# Patient Record
Sex: FEMALE | Race: WHITE | Employment: OTHER | URBAN - METROPOLITAN AREA
[De-identification: names, ages, dates, MRNs, and addresses within clinical notes are randomized per-mention and may not be internally consistent; named-entity substitution may affect disease eponyms.]

---

## 2017-08-30 PROBLEM — I95.9 SEPSIS ASSOCIATED HYPOTENSION (HCC): Status: ACTIVE | Noted: 2017-08-30

## 2017-08-30 PROBLEM — A41.9 SEPSIS ASSOCIATED HYPOTENSION (HCC): Status: ACTIVE | Noted: 2017-08-30

## 2017-08-30 PROBLEM — M62.82 RHABDOMYOLYSIS: Status: ACTIVE | Noted: 2017-08-30

## 2017-09-01 ENCOUNTER — HOSPITAL ENCOUNTER (OUTPATIENT)
Dept: OTHER | Age: 82
Discharge: HOME | End: 2017-09-01
Attending: FAMILY MEDICINE | Admitting: FAMILY MEDICINE

## 2017-09-01 RX ORDER — DRONEDARONE 400 MG/1
TABLET, FILM COATED ORAL
Qty: 180 TABLET | Refills: 3 | Status: SHIPPED | OUTPATIENT
Start: 2017-09-01 | End: 2017-09-04 | Stop reason: HOSPADM

## 2017-09-05 LAB
ALBUMIN SERPL-MCNC: 3.4 GM/DL (ref 3.4–5)
ALP BLD-CCNC: 51 IU/L (ref 40–128)
ALT SERPL-CCNC: 13 U/L (ref 10–40)
ANION GAP SERPL CALCULATED.3IONS-SCNC: 14 MMOL/L (ref 4–16)
AST SERPL-CCNC: 18 IU/L (ref 15–37)
BASOPHILS ABSOLUTE: 0.1 K/CU MM
BASOPHILS RELATIVE PERCENT: 0.5 % (ref 0–1)
BILIRUB SERPL-MCNC: 0.3 MG/DL (ref 0–1)
BUN BLDV-MCNC: 17 MG/DL (ref 6–23)
CALCIUM SERPL-MCNC: 8.6 MG/DL (ref 8.3–10.6)
CHLORIDE BLD-SCNC: 101 MMOL/L (ref 99–110)
CHOLESTEROL, FASTING: 129 MG/DL
CO2: 25 MMOL/L (ref 21–32)
CREAT SERPL-MCNC: 1.3 MG/DL (ref 0.6–1.1)
DIFFERENTIAL TYPE: ABNORMAL
EOSINOPHILS ABSOLUTE: 0.4 K/CU MM
EOSINOPHILS RELATIVE PERCENT: 4.1 % (ref 0–3)
GFR AFRICAN AMERICAN: 48 ML/MIN/1.73M2
GFR NON-AFRICAN AMERICAN: 39 ML/MIN/1.73M2
GLUCOSE BLD-MCNC: 191 MG/DL (ref 70–140)
HCT VFR BLD CALC: 37.5 % (ref 37–47)
HDLC SERPL-MCNC: 30 MG/DL
HEMOGLOBIN: 11.5 GM/DL (ref 12.5–16)
IMMATURE NEUTROPHIL %: 0.6 % (ref 0–0.43)
LDL CHOLESTEROL DIRECT: 81 MG/DL
LYMPHOCYTES ABSOLUTE: 1.3 K/CU MM
LYMPHOCYTES RELATIVE PERCENT: 13.3 % (ref 24–44)
MCH RBC QN AUTO: 29.8 PG (ref 27–31)
MCHC RBC AUTO-ENTMCNC: 30.7 % (ref 32–36)
MCV RBC AUTO: 97.2 FL (ref 78–100)
MONOCYTES ABSOLUTE: 0.8 K/CU MM
MONOCYTES RELATIVE PERCENT: 8.4 % (ref 0–4)
NUCLEATED RBC %: 0 %
PDW BLD-RTO: 14.6 % (ref 11.7–14.9)
PLATELET # BLD: 316 K/CU MM (ref 140–440)
PMV BLD AUTO: 10.1 FL (ref 7.5–11.1)
POTASSIUM SERPL-SCNC: 4.7 MMOL/L (ref 3.5–5.1)
RBC # BLD: 3.86 M/CU MM (ref 4.2–5.4)
SEGMENTED NEUTROPHILS ABSOLUTE COUNT: 7.3 K/CU MM
SEGMENTED NEUTROPHILS RELATIVE PERCENT: 73.1 % (ref 36–66)
SODIUM BLD-SCNC: 140 MMOL/L (ref 135–145)
TOTAL IMMATURE NEUTOROPHIL: 0.06 K/CU MM
TOTAL NUCLEATED RBC: 0 K/CU MM
TOTAL PROTEIN: 6.4 GM/DL (ref 6.4–8.2)
TRIGLYCERIDE, FASTING: 92 MG/DL
TSH HIGH SENSITIVITY: 4.49 UIU/ML (ref 0.27–4.2)
WBC # BLD: 9.9 K/CU MM (ref 4–10.5)

## 2017-09-11 ENCOUNTER — OFFICE VISIT (OUTPATIENT)
Dept: CARDIOLOGY CLINIC | Age: 82
End: 2017-09-11

## 2017-09-11 VITALS
WEIGHT: 242 LBS | BODY MASS INDEX: 41.32 KG/M2 | DIASTOLIC BLOOD PRESSURE: 70 MMHG | HEIGHT: 64 IN | HEART RATE: 92 BPM | SYSTOLIC BLOOD PRESSURE: 118 MMHG

## 2017-09-11 DIAGNOSIS — Z01.810 PRE-OPERATIVE CARDIOVASCULAR EXAMINATION: ICD-10-CM

## 2017-09-11 DIAGNOSIS — I48.20 CHRONIC ATRIAL FIBRILLATION (HCC): Primary | ICD-10-CM

## 2017-09-11 LAB
ALBUMIN SERPL-MCNC: 3.3 GM/DL (ref 3.4–5)
ALP BLD-CCNC: 58 IU/L (ref 40–128)
ALT SERPL-CCNC: 16 U/L (ref 10–40)
ANION GAP SERPL CALCULATED.3IONS-SCNC: 13 MMOL/L (ref 4–16)
AST SERPL-CCNC: 23 IU/L (ref 15–37)
BASOPHILS ABSOLUTE: 0.1 K/CU MM
BASOPHILS RELATIVE PERCENT: 0.5 % (ref 0–1)
BILIRUB SERPL-MCNC: 0.2 MG/DL (ref 0–1)
BUN BLDV-MCNC: 19 MG/DL (ref 6–23)
CALCIUM SERPL-MCNC: 8.8 MG/DL (ref 8.3–10.6)
CHLORIDE BLD-SCNC: 100 MMOL/L (ref 99–110)
CO2: 25 MMOL/L (ref 21–32)
CREAT SERPL-MCNC: 1.1 MG/DL (ref 0.6–1.1)
DIFFERENTIAL TYPE: ABNORMAL
EOSINOPHILS ABSOLUTE: 0.4 K/CU MM
EOSINOPHILS RELATIVE PERCENT: 4.4 % (ref 0–3)
GFR AFRICAN AMERICAN: 58 ML/MIN/1.73M2
GFR NON-AFRICAN AMERICAN: 48 ML/MIN/1.73M2
GLUCOSE BLD-MCNC: 159 MG/DL (ref 70–140)
HCT VFR BLD CALC: 37.5 % (ref 37–47)
HEMOGLOBIN: 11.8 GM/DL (ref 12.5–16)
IMMATURE NEUTROPHIL %: 0.5 % (ref 0–0.43)
LYMPHOCYTES ABSOLUTE: 1.8 K/CU MM
LYMPHOCYTES RELATIVE PERCENT: 18.5 % (ref 24–44)
MCH RBC QN AUTO: 29.7 PG (ref 27–31)
MCHC RBC AUTO-ENTMCNC: 31.5 % (ref 32–36)
MCV RBC AUTO: 94.5 FL (ref 78–100)
MONOCYTES ABSOLUTE: 0.7 K/CU MM
MONOCYTES RELATIVE PERCENT: 6.6 % (ref 0–4)
NUCLEATED RBC %: 0 %
PDW BLD-RTO: 14.1 % (ref 11.7–14.9)
PLATELET # BLD: 353 K/CU MM (ref 140–440)
PMV BLD AUTO: 9.9 FL (ref 7.5–11.1)
POTASSIUM SERPL-SCNC: 4.8 MMOL/L (ref 3.5–5.1)
RBC # BLD: 3.97 M/CU MM (ref 4.2–5.4)
SEGMENTED NEUTROPHILS ABSOLUTE COUNT: 6.9 K/CU MM
SEGMENTED NEUTROPHILS RELATIVE PERCENT: 69.5 % (ref 36–66)
SODIUM BLD-SCNC: 138 MMOL/L (ref 135–145)
TOTAL IMMATURE NEUTOROPHIL: 0.05 K/CU MM
TOTAL NUCLEATED RBC: 0 K/CU MM
TOTAL PROTEIN: 6.4 GM/DL (ref 6.4–8.2)
TSH HIGH SENSITIVITY: 3.16 UIU/ML (ref 0.27–4.2)
WBC # BLD: 9.9 K/CU MM (ref 4–10.5)

## 2017-09-11 PROCEDURE — 1123F ACP DISCUSS/DSCN MKR DOCD: CPT | Performed by: INTERNAL MEDICINE

## 2017-09-11 PROCEDURE — G8419 CALC BMI OUT NRM PARAM NOF/U: HCPCS | Performed by: INTERNAL MEDICINE

## 2017-09-11 PROCEDURE — 1090F PRES/ABSN URINE INCON ASSESS: CPT | Performed by: INTERNAL MEDICINE

## 2017-09-11 PROCEDURE — 1036F TOBACCO NON-USER: CPT | Performed by: INTERNAL MEDICINE

## 2017-09-11 PROCEDURE — 99213 OFFICE O/P EST LOW 20 MIN: CPT | Performed by: INTERNAL MEDICINE

## 2017-09-11 PROCEDURE — 4040F PNEUMOC VAC/ADMIN/RCVD: CPT | Performed by: INTERNAL MEDICINE

## 2017-09-11 PROCEDURE — G8427 DOCREV CUR MEDS BY ELIG CLIN: HCPCS | Performed by: INTERNAL MEDICINE

## 2017-09-11 PROCEDURE — 1111F DSCHRG MED/CURRENT MED MERGE: CPT | Performed by: INTERNAL MEDICINE

## 2017-09-11 PROCEDURE — G8400 PT W/DXA NO RESULTS DOC: HCPCS | Performed by: INTERNAL MEDICINE

## 2017-09-18 LAB
ANION GAP SERPL CALCULATED.3IONS-SCNC: 11 MMOL/L (ref 4–16)
BUN BLDV-MCNC: 24 MG/DL (ref 6–23)
CALCIUM SERPL-MCNC: 8.8 MG/DL (ref 8.3–10.6)
CHLORIDE BLD-SCNC: 102 MMOL/L (ref 99–110)
CO2: 26 MMOL/L (ref 21–32)
CREAT SERPL-MCNC: 1.5 MG/DL (ref 0.6–1.1)
GFR AFRICAN AMERICAN: 40 ML/MIN/1.73M2
GFR NON-AFRICAN AMERICAN: 33 ML/MIN/1.73M2
GLUCOSE BLD-MCNC: 139 MG/DL (ref 70–140)
HCT VFR BLD CALC: 36.7 % (ref 37–47)
HEMOGLOBIN: 11.4 GM/DL (ref 12.5–16)
MCH RBC QN AUTO: 29.8 PG (ref 27–31)
MCHC RBC AUTO-ENTMCNC: 31.1 % (ref 32–36)
MCV RBC AUTO: 95.8 FL (ref 78–100)
PDW BLD-RTO: 14.3 % (ref 11.7–14.9)
PLATELET # BLD: 348 K/CU MM (ref 140–440)
PMV BLD AUTO: 9.9 FL (ref 7.5–11.1)
POTASSIUM SERPL-SCNC: 5 MMOL/L (ref 3.5–5.1)
RBC # BLD: 3.83 M/CU MM (ref 4.2–5.4)
SODIUM BLD-SCNC: 139 MMOL/L (ref 135–145)
WBC # BLD: 10.4 K/CU MM (ref 4–10.5)

## 2017-10-01 ENCOUNTER — HOSPITAL ENCOUNTER (OUTPATIENT)
Dept: OTHER | Age: 82
Discharge: HOME | End: 2017-10-01
Attending: FAMILY MEDICINE | Admitting: FAMILY MEDICINE

## 2017-10-09 LAB
ANION GAP SERPL CALCULATED.3IONS-SCNC: 14 MMOL/L (ref 4–16)
BUN BLDV-MCNC: 25 MG/DL (ref 6–23)
CALCIUM SERPL-MCNC: 8.8 MG/DL (ref 8.3–10.6)
CHLORIDE BLD-SCNC: 99 MMOL/L (ref 99–110)
CO2: 25 MMOL/L (ref 21–32)
CREAT SERPL-MCNC: 1.5 MG/DL (ref 0.6–1.1)
GFR AFRICAN AMERICAN: 40 ML/MIN/1.73M2
GFR NON-AFRICAN AMERICAN: 33 ML/MIN/1.73M2
GLUCOSE BLD-MCNC: 90 MG/DL (ref 70–140)
HCT VFR BLD CALC: 36.2 % (ref 37–47)
HEMOGLOBIN: 11.4 GM/DL (ref 12.5–16)
MCH RBC QN AUTO: 29.8 PG (ref 27–31)
MCHC RBC AUTO-ENTMCNC: 31.5 % (ref 32–36)
MCV RBC AUTO: 94.8 FL (ref 78–100)
PDW BLD-RTO: 14.2 % (ref 11.7–14.9)
PLATELET # BLD: 304 K/CU MM (ref 140–440)
PMV BLD AUTO: 10 FL (ref 7.5–11.1)
POTASSIUM SERPL-SCNC: 5 MMOL/L (ref 3.5–5.1)
RBC # BLD: 3.82 M/CU MM (ref 4.2–5.4)
SODIUM BLD-SCNC: 138 MMOL/L (ref 135–145)
WBC # BLD: 8.1 K/CU MM (ref 4–10.5)

## 2017-10-10 ENCOUNTER — TELEPHONE (OUTPATIENT)
Dept: CARDIOLOGY CLINIC | Age: 82
End: 2017-10-10

## 2017-10-10 NOTE — LETTER
[address]  [facility]  [address] [facility], [address] [place] [address],[address]  [address]  Phone: [phone]    Fax [phone]                  Lisa Godinez MD, Rain Adams MD, Hoa Baig MD, Elvira Hollins MD, MD Daljit Jackson MD Donnamarie Gentles, MD    Cardiac Risk Assessment for Surgery    10/10/2017    Surgery Date: Pending       Surgery: Interstim Removal       Anesthesia Type: MAC       Fax Number: [phone]  To: Dr. Man Copper  From : Dr. aJyy Carroll    Patient Name: Srinivas Lopez   YOB: 1935      Rosita Anaya was evaluated from a cardiac standpoint for her surgery and based on her history, diagnosis, recent cardiac testing, she is considered a medium risk candidate for any cornel-operative cardiac complications. Please continue patient's current medical regimen. Patient may hold Savaysa for a period of 48 hours prior to surgery or procedure. Please resume ASAP. Please call with any further questions.     Respectfully,     Lisa Godinez MD, [facility]  FA/bjd

## 2017-10-10 NOTE — TELEPHONE ENCOUNTER
Cardiac Risk assessment letter faxed to Dr. Flex Nichols for Interstim Removal surgery.   [phone]

## 2017-11-07 ENCOUNTER — TELEPHONE (OUTPATIENT)
Dept: CARDIOLOGY CLINIC | Age: 82
End: 2017-11-07

## 2017-11-07 NOTE — TELEPHONE ENCOUNTER
Patient to have a procedure with Dr Christina Jimenez , they are asking that the patient hold her Savsay five days prior to procedure , family would like a call has some questions
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
## 2018-01-09 ENCOUNTER — OFFICE VISIT (OUTPATIENT)
Dept: CARDIOLOGY CLINIC | Age: 83
End: 2018-01-09

## 2018-01-09 VITALS
SYSTOLIC BLOOD PRESSURE: 128 MMHG | DIASTOLIC BLOOD PRESSURE: 80 MMHG | BODY MASS INDEX: 41.66 KG/M2 | HEART RATE: 88 BPM | HEIGHT: 64 IN | WEIGHT: 244 LBS

## 2018-01-09 DIAGNOSIS — I48.20 CHRONIC ATRIAL FIBRILLATION (HCC): Primary | ICD-10-CM

## 2018-01-09 PROCEDURE — 1123F ACP DISCUSS/DSCN MKR DOCD: CPT | Performed by: INTERNAL MEDICINE

## 2018-01-09 PROCEDURE — G8484 FLU IMMUNIZE NO ADMIN: HCPCS | Performed by: INTERNAL MEDICINE

## 2018-01-09 PROCEDURE — 99213 OFFICE O/P EST LOW 20 MIN: CPT | Performed by: INTERNAL MEDICINE

## 2018-01-09 PROCEDURE — 1036F TOBACCO NON-USER: CPT | Performed by: INTERNAL MEDICINE

## 2018-01-09 PROCEDURE — G8400 PT W/DXA NO RESULTS DOC: HCPCS | Performed by: INTERNAL MEDICINE

## 2018-01-09 PROCEDURE — G8427 DOCREV CUR MEDS BY ELIG CLIN: HCPCS | Performed by: INTERNAL MEDICINE

## 2018-01-09 PROCEDURE — 4040F PNEUMOC VAC/ADMIN/RCVD: CPT | Performed by: INTERNAL MEDICINE

## 2018-01-09 PROCEDURE — 1111F DSCHRG MED/CURRENT MED MERGE: CPT | Performed by: INTERNAL MEDICINE

## 2018-01-09 PROCEDURE — G8417 CALC BMI ABV UP PARAM F/U: HCPCS | Performed by: INTERNAL MEDICINE

## 2018-01-09 PROCEDURE — 1090F PRES/ABSN URINE INCON ASSESS: CPT | Performed by: INTERNAL MEDICINE

## 2018-01-09 NOTE — PATIENT INSTRUCTIONS
Please remember to bring all medication bottles or a medication list with you to your appointment. If you have any questions, please call our office at [phone].

## 2018-02-23 PROBLEM — O22.30 DVT (DEEP VEIN THROMBOSIS) IN PREGNANCY: Status: ACTIVE | Noted: 2018-02-23

## 2018-02-23 PROBLEM — I82.409: Status: ACTIVE | Noted: 2018-02-23

## 2018-03-21 ENCOUNTER — TELEPHONE (OUTPATIENT)
Dept: CARDIOLOGY CLINIC | Age: 83
End: 2018-03-21
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
## 2018-07-06 PROBLEM — G93.41 ACUTE METABOLIC ENCEPHALOPATHY: Status: ACTIVE | Noted: 2018-07-06

## 2018-07-06 PROBLEM — T83.511A URINARY TRACT INFECTION ASSOCIATED WITH CATHETERIZATION OF URINARY TRACT (HCC): Status: ACTIVE | Noted: 2018-07-06

## 2018-07-06 PROBLEM — N39.0 URINARY TRACT INFECTION ASSOCIATED WITH CATHETERIZATION OF URINARY TRACT (HCC): Status: ACTIVE | Noted: 2018-07-06

## 2018-11-22 ENCOUNTER — HOSPITAL ENCOUNTER (OUTPATIENT)
Age: 83
Setting detail: OBSERVATION
Discharge: HOME HEALTH | End: 2018-11-25
Attending: EMERGENCY MEDICINE | Admitting: HOSPITALIST
Payer: MEDICARE

## 2018-11-22 ENCOUNTER — APPOINTMENT (OUTPATIENT)
Dept: GENERAL RADIOLOGY | Age: 83
End: 2018-11-22
Payer: MEDICARE

## 2018-11-22 DIAGNOSIS — R29.6 MULTIPLE FALLS: ICD-10-CM

## 2018-11-22 DIAGNOSIS — Z97.8 CHRONIC INDWELLING FOLEY CATHETER: ICD-10-CM

## 2018-11-22 DIAGNOSIS — R53.1 GENERAL WEAKNESS: Primary | ICD-10-CM

## 2018-11-22 PROBLEM — M62.81 MUSCLE WEAKNESS OF LOWER EXTREMITY: Status: ACTIVE | Noted: 2018-11-22

## 2018-11-22 LAB
ALBUMIN SERPL-MCNC: 3.7 GM/DL (ref 3.4–5)
ALP BLD-CCNC: 43 IU/L (ref 40–129)
ALT SERPL-CCNC: 11 U/L (ref 10–40)
ANION GAP SERPL CALCULATED.3IONS-SCNC: 14 MMOL/L (ref 4–16)
AST SERPL-CCNC: 17 IU/L (ref 15–37)
BACTERIA: ABNORMAL /HPF
BASOPHILS ABSOLUTE: 0 K/CU MM
BASOPHILS RELATIVE PERCENT: 0.4 % (ref 0–1)
BILIRUB SERPL-MCNC: 0.5 MG/DL (ref 0–1)
BILIRUBIN URINE: NEGATIVE MG/DL
BLOOD, URINE: NEGATIVE
BUN BLDV-MCNC: 28 MG/DL (ref 6–23)
CALCIUM SERPL-MCNC: 9.3 MG/DL (ref 8.3–10.6)
CHLORIDE BLD-SCNC: 101 MMOL/L (ref 99–110)
CLARITY: ABNORMAL
CO2: 23 MMOL/L (ref 21–32)
COLOR: YELLOW
CREAT SERPL-MCNC: 1.6 MG/DL (ref 0.6–1.1)
DIFFERENTIAL TYPE: ABNORMAL
EOSINOPHILS ABSOLUTE: 0.4 K/CU MM
EOSINOPHILS RELATIVE PERCENT: 4.2 % (ref 0–3)
GFR AFRICAN AMERICAN: 37 ML/MIN/1.73M2
GFR NON-AFRICAN AMERICAN: 31 ML/MIN/1.73M2
GLUCOSE BLD-MCNC: 82 MG/DL (ref 70–99)
GLUCOSE, URINE: NEGATIVE MG/DL
HCT VFR BLD CALC: 36.3 % (ref 37–47)
HEMOGLOBIN: 11.3 GM/DL (ref 12.5–16)
IMMATURE NEUTROPHIL %: 0.2 % (ref 0–0.43)
KETONES, URINE: NEGATIVE MG/DL
LACTATE: 1.7 MMOL/L (ref 0.4–2)
LEUKOCYTE ESTERASE, URINE: ABNORMAL
LYMPHOCYTES ABSOLUTE: 0.9 K/CU MM
LYMPHOCYTES RELATIVE PERCENT: 10.9 % (ref 24–44)
MCH RBC QN AUTO: 28.6 PG (ref 27–31)
MCHC RBC AUTO-ENTMCNC: 31.1 % (ref 32–36)
MCV RBC AUTO: 91.9 FL (ref 78–100)
MONOCYTES ABSOLUTE: 0.9 K/CU MM
MONOCYTES RELATIVE PERCENT: 10.9 % (ref 0–4)
MUCUS: ABNORMAL HPF
NITRITE URINE, QUANTITATIVE: NEGATIVE
NUCLEATED RBC %: 0 %
PDW BLD-RTO: 14.6 % (ref 11.7–14.9)
PH, URINE: 8 (ref 5–8)
PLATELET # BLD: 246 K/CU MM (ref 140–440)
PMV BLD AUTO: 10.5 FL (ref 7.5–11.1)
POTASSIUM SERPL-SCNC: 3.9 MMOL/L (ref 3.5–5.1)
PROTEIN UA: >500 MG/DL
RBC # BLD: 3.95 M/CU MM (ref 4.2–5.4)
RBC URINE: 11 /HPF (ref 0–6)
SEGMENTED NEUTROPHILS ABSOLUTE COUNT: 6.3 K/CU MM
SEGMENTED NEUTROPHILS RELATIVE PERCENT: 73.4 % (ref 36–66)
SODIUM BLD-SCNC: 138 MMOL/L (ref 135–145)
SPECIFIC GRAVITY UA: 1.01 (ref 1–1.03)
SQUAMOUS EPITHELIAL: 1 /HPF
TOTAL CK: 129 IU/L (ref 26–140)
TOTAL IMMATURE NEUTOROPHIL: 0.02 K/CU MM
TOTAL NUCLEATED RBC: 0 K/CU MM
TOTAL PROTEIN: 7.5 GM/DL (ref 6.4–8.2)
TRICHOMONAS: ABNORMAL /HPF
TRIPLE PHOSPHATE CRYSTALS: ABNORMAL /HPF
TROPONIN T: <0.01 NG/ML
UROBILINOGEN, URINE: NORMAL MG/DL (ref 0.2–1)
WBC # BLD: 8.5 K/CU MM (ref 4–10.5)
WBC UA: 11 /HPF (ref 0–5)

## 2018-11-22 PROCEDURE — 2580000003 HC RX 258: Performed by: NURSE PRACTITIONER

## 2018-11-22 PROCEDURE — 82550 ASSAY OF CK (CPK): CPT

## 2018-11-22 PROCEDURE — 84484 ASSAY OF TROPONIN QUANT: CPT

## 2018-11-22 PROCEDURE — 80053 COMPREHEN METABOLIC PANEL: CPT

## 2018-11-22 PROCEDURE — 87086 URINE CULTURE/COLONY COUNT: CPT

## 2018-11-22 PROCEDURE — 94761 N-INVAS EAR/PLS OXIMETRY MLT: CPT

## 2018-11-22 PROCEDURE — 85025 COMPLETE CBC W/AUTO DIFF WBC: CPT

## 2018-11-22 PROCEDURE — 81001 URINALYSIS AUTO W/SCOPE: CPT

## 2018-11-22 PROCEDURE — 87040 BLOOD CULTURE FOR BACTERIA: CPT

## 2018-11-22 PROCEDURE — 96365 THER/PROPH/DIAG IV INF INIT: CPT

## 2018-11-22 PROCEDURE — 71045 X-RAY EXAM CHEST 1 VIEW: CPT

## 2018-11-22 PROCEDURE — 99285 EMERGENCY DEPT VISIT HI MDM: CPT

## 2018-11-22 PROCEDURE — 87077 CULTURE AEROBIC IDENTIFY: CPT

## 2018-11-22 PROCEDURE — 1200000000 HC SEMI PRIVATE

## 2018-11-22 PROCEDURE — 83605 ASSAY OF LACTIC ACID: CPT

## 2018-11-22 PROCEDURE — 6370000000 HC RX 637 (ALT 250 FOR IP): Performed by: NURSE PRACTITIONER

## 2018-11-22 PROCEDURE — 87186 SC STD MICRODIL/AGAR DIL: CPT

## 2018-11-22 PROCEDURE — 6360000002 HC RX W HCPCS: Performed by: NURSE PRACTITIONER

## 2018-11-22 PROCEDURE — 36415 COLL VENOUS BLD VENIPUNCTURE: CPT

## 2018-11-22 RX ORDER — PRAVASTATIN SODIUM 10 MG
10 TABLET ORAL NIGHTLY
Status: DISCONTINUED | OUTPATIENT
Start: 2018-11-22 | End: 2018-11-25 | Stop reason: HOSPADM

## 2018-11-22 RX ORDER — SODIUM CHLORIDE 0.9 % (FLUSH) 0.9 %
10 SYRINGE (ML) INJECTION PRN
Status: DISCONTINUED | OUTPATIENT
Start: 2018-11-22 | End: 2018-11-25 | Stop reason: HOSPADM

## 2018-11-22 RX ORDER — ONDANSETRON 2 MG/ML
4 INJECTION INTRAMUSCULAR; INTRAVENOUS EVERY 6 HOURS PRN
Status: DISCONTINUED | OUTPATIENT
Start: 2018-11-22 | End: 2018-11-25 | Stop reason: HOSPADM

## 2018-11-22 RX ORDER — PANTOPRAZOLE SODIUM 40 MG/1
40 TABLET, DELAYED RELEASE ORAL
Status: DISCONTINUED | OUTPATIENT
Start: 2018-11-23 | End: 2018-11-25 | Stop reason: HOSPADM

## 2018-11-22 RX ORDER — SODIUM CHLORIDE 0.9 % (FLUSH) 0.9 %
10 SYRINGE (ML) INJECTION EVERY 12 HOURS SCHEDULED
Status: DISCONTINUED | OUTPATIENT
Start: 2018-11-22 | End: 2018-11-25 | Stop reason: HOSPADM

## 2018-11-22 RX ORDER — SODIUM CHLORIDE 9 MG/ML
INJECTION, SOLUTION INTRAVENOUS CONTINUOUS
Status: DISCONTINUED | OUTPATIENT
Start: 2018-11-22 | End: 2018-11-25 | Stop reason: HOSPADM

## 2018-11-22 RX ORDER — FUROSEMIDE 20 MG/1
20 TABLET ORAL DAILY
Status: DISCONTINUED | OUTPATIENT
Start: 2018-11-22 | End: 2018-11-25 | Stop reason: HOSPADM

## 2018-11-22 RX ORDER — ACETAMINOPHEN 325 MG/1
325 TABLET ORAL DAILY
Status: DISCONTINUED | OUTPATIENT
Start: 2018-11-22 | End: 2018-11-25 | Stop reason: HOSPADM

## 2018-11-22 RX ORDER — LEVOTHYROXINE SODIUM 0.12 MG/1
125 TABLET ORAL DAILY
Status: DISCONTINUED | OUTPATIENT
Start: 2018-11-23 | End: 2018-11-25 | Stop reason: HOSPADM

## 2018-11-22 RX ORDER — ACETAMINOPHEN 325 MG/1
650 TABLET ORAL EVERY 4 HOURS PRN
Status: DISCONTINUED | OUTPATIENT
Start: 2018-11-22 | End: 2018-11-25 | Stop reason: HOSPADM

## 2018-11-22 RX ORDER — LOSARTAN POTASSIUM 100 MG/1
100 TABLET ORAL DAILY
Status: DISCONTINUED | OUTPATIENT
Start: 2018-11-23 | End: 2018-11-25 | Stop reason: HOSPADM

## 2018-11-22 RX ORDER — FENOFIBRATE 54 MG/1
54 TABLET ORAL DAILY
Status: DISCONTINUED | OUTPATIENT
Start: 2018-11-23 | End: 2018-11-25 | Stop reason: HOSPADM

## 2018-11-22 RX ADMIN — ACETAMINOPHEN 325 MG: 325 TABLET, FILM COATED ORAL at 19:13

## 2018-11-22 RX ADMIN — PRAVASTATIN SODIUM 10 MG: 10 TABLET ORAL at 22:02

## 2018-11-22 RX ADMIN — SODIUM CHLORIDE: 9 INJECTION, SOLUTION INTRAVENOUS at 19:05

## 2018-11-22 RX ADMIN — CEFTRIAXONE 1 G: 1 INJECTION, POWDER, FOR SOLUTION INTRAMUSCULAR; INTRAVENOUS at 19:15

## 2018-11-22 RX ADMIN — APIXABAN 2.5 MG: 2.5 TABLET, FILM COATED ORAL at 22:02

## 2018-11-22 RX ADMIN — FUROSEMIDE 20 MG: 20 TABLET ORAL at 19:10

## 2018-11-22 RX ADMIN — DRONEDARONE 400 MG: 400 TABLET, FILM COATED ORAL at 19:13

## 2018-11-22 ASSESSMENT — PAIN SCALES - GENERAL
PAINLEVEL_OUTOF10: 0

## 2018-11-22 NOTE — ED NOTES
This nurse attempted to call pt's son, POA and brother for further information. Unable to be reached. Messages left. Pt states they are all together for Thanksgiving.       Hyun Farley RN  11/22/18 5293

## 2018-11-22 NOTE — ED NOTES
Hospitalist in at bedside. Warm blanket provided. Meal tray ordered.       Faisal Chan RN  11/22/18 0718

## 2018-11-23 LAB
ANION GAP SERPL CALCULATED.3IONS-SCNC: 13 MMOL/L (ref 4–16)
BASOPHILS ABSOLUTE: 0 K/CU MM
BASOPHILS RELATIVE PERCENT: 0.3 % (ref 0–1)
BUN BLDV-MCNC: 23 MG/DL (ref 6–23)
CALCIUM SERPL-MCNC: 8.7 MG/DL (ref 8.3–10.6)
CHLORIDE BLD-SCNC: 102 MMOL/L (ref 99–110)
CO2: 26 MMOL/L (ref 21–32)
CREAT SERPL-MCNC: 1.4 MG/DL (ref 0.6–1.1)
DIFFERENTIAL TYPE: ABNORMAL
EOSINOPHILS ABSOLUTE: 0.4 K/CU MM
EOSINOPHILS RELATIVE PERCENT: 5.9 % (ref 0–3)
GFR AFRICAN AMERICAN: 44 ML/MIN/1.73M2
GFR NON-AFRICAN AMERICAN: 36 ML/MIN/1.73M2
GLUCOSE BLD-MCNC: 92 MG/DL (ref 70–99)
HCT VFR BLD CALC: 36.9 % (ref 37–47)
HEMOGLOBIN: 11.3 GM/DL (ref 12.5–16)
IMMATURE NEUTROPHIL %: 0.3 % (ref 0–0.43)
LYMPHOCYTES ABSOLUTE: 1 K/CU MM
LYMPHOCYTES RELATIVE PERCENT: 14.3 % (ref 24–44)
MCH RBC QN AUTO: 28.4 PG (ref 27–31)
MCHC RBC AUTO-ENTMCNC: 30.6 % (ref 32–36)
MCV RBC AUTO: 92.7 FL (ref 78–100)
MONOCYTES ABSOLUTE: 0.9 K/CU MM
MONOCYTES RELATIVE PERCENT: 12.5 % (ref 0–4)
NUCLEATED RBC %: 0 %
PDW BLD-RTO: 14.6 % (ref 11.7–14.9)
PLATELET # BLD: 230 K/CU MM (ref 140–440)
PMV BLD AUTO: 10.2 FL (ref 7.5–11.1)
POTASSIUM SERPL-SCNC: 3.7 MMOL/L (ref 3.5–5.1)
RBC # BLD: 3.98 M/CU MM (ref 4.2–5.4)
SEGMENTED NEUTROPHILS ABSOLUTE COUNT: 4.8 K/CU MM
SEGMENTED NEUTROPHILS RELATIVE PERCENT: 66.7 % (ref 36–66)
SODIUM BLD-SCNC: 141 MMOL/L (ref 135–145)
TOTAL IMMATURE NEUTOROPHIL: 0.02 K/CU MM
TOTAL NUCLEATED RBC: 0 K/CU MM
WBC # BLD: 7.1 K/CU MM (ref 4–10.5)

## 2018-11-23 PROCEDURE — 96376 TX/PRO/DX INJ SAME DRUG ADON: CPT

## 2018-11-23 PROCEDURE — G8979 MOBILITY GOAL STATUS: HCPCS

## 2018-11-23 PROCEDURE — 85025 COMPLETE CBC W/AUTO DIFF WBC: CPT

## 2018-11-23 PROCEDURE — 1200000000 HC SEMI PRIVATE

## 2018-11-23 PROCEDURE — 36415 COLL VENOUS BLD VENIPUNCTURE: CPT

## 2018-11-23 PROCEDURE — 6360000002 HC RX W HCPCS: Performed by: NURSE PRACTITIONER

## 2018-11-23 PROCEDURE — 2580000003 HC RX 258: Performed by: NURSE PRACTITIONER

## 2018-11-23 PROCEDURE — 97163 PT EVAL HIGH COMPLEX 45 MIN: CPT

## 2018-11-23 PROCEDURE — 80048 BASIC METABOLIC PNL TOTAL CA: CPT

## 2018-11-23 PROCEDURE — 6370000000 HC RX 637 (ALT 250 FOR IP): Performed by: NURSE PRACTITIONER

## 2018-11-23 PROCEDURE — G8978 MOBILITY CURRENT STATUS: HCPCS

## 2018-11-23 PROCEDURE — 97530 THERAPEUTIC ACTIVITIES: CPT

## 2018-11-23 RX ORDER — METOPROLOL TARTRATE 50 MG/1
50 TABLET, FILM COATED ORAL 2 TIMES DAILY
COMMUNITY
End: 2019-03-30 | Stop reason: CLARIF

## 2018-11-23 RX ORDER — POTASSIUM CHLORIDE 750 MG/1
10 CAPSULE, EXTENDED RELEASE ORAL 2 TIMES DAILY
COMMUNITY
End: 2019-03-30 | Stop reason: DRUGHIGH

## 2018-11-23 RX ORDER — POTASSIUM CHLORIDE 1.5 G/1.77G
10 POWDER, FOR SOLUTION ORAL DAILY
COMMUNITY
End: 2019-03-30 | Stop reason: DRUGHIGH

## 2018-11-23 RX ORDER — LORATADINE 10 MG/1
10 TABLET ORAL DAILY
COMMUNITY

## 2018-11-23 RX ADMIN — LEVOTHYROXINE SODIUM 125 MCG: 0.12 TABLET ORAL at 06:15

## 2018-11-23 RX ADMIN — SODIUM CHLORIDE: 9 INJECTION, SOLUTION INTRAVENOUS at 06:19

## 2018-11-23 RX ADMIN — FENOFIBRATE 54 MG: 54 TABLET ORAL at 08:30

## 2018-11-23 RX ADMIN — DRONEDARONE 400 MG: 400 TABLET, FILM COATED ORAL at 17:28

## 2018-11-23 RX ADMIN — PANTOPRAZOLE SODIUM 40 MG: 40 TABLET, DELAYED RELEASE ORAL at 06:15

## 2018-11-23 RX ADMIN — DRONEDARONE 400 MG: 400 TABLET, FILM COATED ORAL at 08:29

## 2018-11-23 RX ADMIN — PRAVASTATIN SODIUM 10 MG: 10 TABLET ORAL at 21:44

## 2018-11-23 RX ADMIN — CEFTRIAXONE 1 G: 1 INJECTION, POWDER, FOR SOLUTION INTRAMUSCULAR; INTRAVENOUS at 17:28

## 2018-11-23 RX ADMIN — Medication 5000 UNITS: at 08:29

## 2018-11-23 RX ADMIN — SODIUM CHLORIDE: 9 INJECTION, SOLUTION INTRAVENOUS at 21:44

## 2018-11-23 RX ADMIN — LOSARTAN POTASSIUM 100 MG: 100 TABLET, FILM COATED ORAL at 08:29

## 2018-11-23 RX ADMIN — APIXABAN 2.5 MG: 2.5 TABLET, FILM COATED ORAL at 21:44

## 2018-11-23 RX ADMIN — APIXABAN 2.5 MG: 2.5 TABLET, FILM COATED ORAL at 08:30

## 2018-11-23 RX ADMIN — FUROSEMIDE 20 MG: 20 TABLET ORAL at 08:30

## 2018-11-23 RX ADMIN — ACETAMINOPHEN 325 MG: 325 TABLET, FILM COATED ORAL at 08:30

## 2018-11-23 ASSESSMENT — PAIN DESCRIPTION - LOCATION: LOCATION: HEAD

## 2018-11-23 ASSESSMENT — PAIN SCALES - GENERAL
PAINLEVEL_OUTOF10: 1
PAINLEVEL_OUTOF10: 0

## 2018-11-24 LAB
CULTURE: NORMAL
Lab: NORMAL
ORGANISM: NORMAL
REPORT STATUS: NORMAL
SPECIMEN: NORMAL
TOTAL COLONY COUNT: NORMAL

## 2018-11-24 PROCEDURE — 97166 OT EVAL MOD COMPLEX 45 MIN: CPT

## 2018-11-24 PROCEDURE — 97535 SELF CARE MNGMENT TRAINING: CPT

## 2018-11-24 PROCEDURE — 97530 THERAPEUTIC ACTIVITIES: CPT

## 2018-11-24 PROCEDURE — 2580000003 HC RX 258: Performed by: NURSE PRACTITIONER

## 2018-11-24 PROCEDURE — G8988 SELF CARE GOAL STATUS: HCPCS

## 2018-11-24 PROCEDURE — 6370000000 HC RX 637 (ALT 250 FOR IP): Performed by: NURSE PRACTITIONER

## 2018-11-24 PROCEDURE — G8989 SELF CARE D/C STATUS: HCPCS

## 2018-11-24 PROCEDURE — G8987 SELF CARE CURRENT STATUS: HCPCS

## 2018-11-24 PROCEDURE — 1200000000 HC SEMI PRIVATE

## 2018-11-24 RX ORDER — AMOXICILLIN AND CLAVULANATE POTASSIUM 875; 125 MG/1; MG/1
1 TABLET, FILM COATED ORAL 2 TIMES DAILY
Qty: 14 TABLET | Refills: 0 | Status: SHIPPED | OUTPATIENT
Start: 2018-11-24 | End: 2018-12-01

## 2018-11-24 RX ADMIN — FENOFIBRATE 54 MG: 54 TABLET ORAL at 09:20

## 2018-11-24 RX ADMIN — APIXABAN 2.5 MG: 2.5 TABLET, FILM COATED ORAL at 09:20

## 2018-11-24 RX ADMIN — PRAVASTATIN SODIUM 10 MG: 10 TABLET ORAL at 21:59

## 2018-11-24 RX ADMIN — FUROSEMIDE 20 MG: 20 TABLET ORAL at 09:19

## 2018-11-24 RX ADMIN — SODIUM CHLORIDE: 9 INJECTION, SOLUTION INTRAVENOUS at 10:29

## 2018-11-24 RX ADMIN — LOSARTAN POTASSIUM 100 MG: 100 TABLET, FILM COATED ORAL at 09:18

## 2018-11-24 RX ADMIN — APIXABAN 2.5 MG: 2.5 TABLET, FILM COATED ORAL at 21:59

## 2018-11-24 RX ADMIN — Medication 5000 UNITS: at 09:19

## 2018-11-24 RX ADMIN — DRONEDARONE 400 MG: 400 TABLET, FILM COATED ORAL at 09:19

## 2018-11-24 RX ADMIN — ACETAMINOPHEN 325 MG: 325 TABLET, FILM COATED ORAL at 09:19

## 2018-11-24 ASSESSMENT — PAIN SCALES - GENERAL: PAINLEVEL_OUTOF10: 2

## 2018-11-24 NOTE — PROGRESS NOTES
Occupational Therapy  Occupational Therapy Initial Assessment  Date:  2018    Patient Name: Marylu Cole  MRN: [id number]     :  1935     Treatment Diagnosis: R53.1    Restrictions  Restrictions/Precautions  Restrictions/Precautions: General Precautions, Fall Risk  Position Activity Restriction  Other position/activity restrictions: IV; casey   Subjective   General  Patient assessed for rehabilitation services?: Yes  Pain Assessment  Patient Currently in Pain: No  Pain Level: 2  Vital Signs  Temp: 97.8 °F (36.6 °C)  Temp Source: Oral  Pulse: 72  Heart Rate Source: Monitor  Resp: 19  BP: (!) 144/63  BP Location: Right upper arm  BP Upper/Lower: Upper  MAP (mmHg): 91  Patient Position: Sitting  Level of Consciousness: Alert  MEWS Score: 1  Patient Currently in Pain: No  Oxygen Therapy  SpO2: 93 %  O2 Device: None (Room air)  Home Living  Social/Functional History  Lives With: Alone  Type of Home: House  Home Layout: Two level (with basement but lives on first floor)  Home Access: Stairs to enter without rails  Entrance Stairs - Number of Steps: 1-2  Bathroom Shower/Tub: Walk-in shower  Bathroom Toilet: Standard  Bathroom Equipment: Grab bars in shower, Shower chair  Bathroom Accessibility: Accessible  Home Equipment: Rolling walker  Receives Help From: Family ([employer] keep[employer])  ADL Assistance: Independent  Homemaking Assistance: Needs assistance  Additional Comments: Massiel stays with her on weekends and cooks. Has comfort keepers 9-12 during week which help with bathing and dressing as needed and cleans house. Objective   Observation/Palpation  Posture: Fair  Observation: Up in recliner. ADL  Feeding: Independent  Grooming: Setup  UE Bathing: Setup  UE Dressing: Setup;Stand by assistance;Supervision  LE Dressing: Stand by assistance;Setup  Toileting: Stand by assistance  Additional Comments: Pt has casey catheter ongoing. States only goes to [facility] for BM.    Tone RUE  RUE Tone: Normotonic  Tone

## 2018-11-24 NOTE — DISCHARGE SUMMARY
(VITAMIN D3) 5000 units TABS  Take by mouth             dronedarone hcl (MULTAQ) 400 MG TABS  Take 1 tablet by mouth 2 times daily (with meals)             fenofibrate (TRICOR) 48 MG tablet  Take 67 mg by mouth daily              furosemide (LASIX) 20 MG tablet  Take 20 mg by mouth daily             levothyroxine (SYNTHROID) 125 MCG tablet  Take 1 tablet by mouth daily. loratadine (CLARITIN) 10 MG tablet  Take 10 mg by mouth daily             losartan (COZAAR) 100 MG tablet  Take 100 mg by mouth daily             metoprolol tartrate (LOPRESSOR) 50 MG tablet  Take 50 mg by mouth 2 times daily             omeprazole (PRILOSEC) 20 MG delayed release capsule  Take 20 mg by mouth daily             potassium chloride (KLOR-CON) 20 MEQ packet  Take 10 mEq by mouth daily             potassium chloride (MICRO-K) 10 MEQ extended release capsule  Take 10 mEq by mouth 2 times daily             pravastatin (PRAVACHOL) 10 MG tablet  Take 10 mg by mouth daily                 Objective Findings at Discharge:   BP (!) 165/76   Pulse 74   Temp 97.8 °F (36.6 °C) (Oral)   Resp 17   Ht 5' 4\" (1.626 m)   Wt 202 lb (91.6 kg)   SpO2 96%   BMI 34.67 kg/m²            PHYSICAL EXAM   General - AAO x 3  Psych - Appropriate affect/speech. No agitation  Eyes - Eye lids intact. No scleral icterus  ENT - Lips wnl. External ear clear/dry/intact. No thyromegaly on inspection  Neuro - No gross peripheral or central neuro deficits on inspection  Heart - Sinus. RRR. S1 and S2 present. No added HS/murmurs appreciated. No elevated JVD appreciated  Lung - Adequate air entry b/l, No crackles/wheezes appreciated  GI -  Soft. No guarding/rigidity. No hepatosplenomegaly/ascites. BS+  Skin - Intact. No rash/petechiae/ecchymosis. Warm extremities  MSK - Joints with normal ROM.  No joint swellings    BMP/CBC  Recent Labs      11/22/18   1221  11/23/18   0351   NA  138  141   K  3.9  3.7   CL  101  102   CO2  23  26   BUN  28*  23   CREATININE 1. 6*  1.4*   WBC  8.5  7.1   HCT  36.3*  36.9*   PLT  246  230         Discharge Time of 35 minutes    Electronically signed by Kimber Browne MD on 11/24/2018 at 4:21 PM

## 2018-11-25 VITALS
OXYGEN SATURATION: 95 % | BODY MASS INDEX: 35.68 KG/M2 | SYSTOLIC BLOOD PRESSURE: 114 MMHG | TEMPERATURE: 98.4 F | HEART RATE: 80 BPM | HEIGHT: 64 IN | DIASTOLIC BLOOD PRESSURE: 57 MMHG | WEIGHT: 209 LBS | RESPIRATION RATE: 18 BRPM

## 2018-11-25 PROCEDURE — 6370000000 HC RX 637 (ALT 250 FOR IP): Performed by: NURSE PRACTITIONER

## 2018-11-25 PROCEDURE — G0378 HOSPITAL OBSERVATION PER HR: HCPCS

## 2018-11-25 RX ADMIN — ACETAMINOPHEN 325 MG: 325 TABLET, FILM COATED ORAL at 09:12

## 2018-11-25 RX ADMIN — FENOFIBRATE 54 MG: 54 TABLET ORAL at 09:12

## 2018-11-25 RX ADMIN — APIXABAN 2.5 MG: 2.5 TABLET, FILM COATED ORAL at 09:12

## 2018-11-25 RX ADMIN — LOSARTAN POTASSIUM 100 MG: 100 TABLET, FILM COATED ORAL at 09:12

## 2018-11-25 RX ADMIN — PANTOPRAZOLE SODIUM 40 MG: 40 TABLET, DELAYED RELEASE ORAL at 06:05

## 2018-11-25 RX ADMIN — FUROSEMIDE 20 MG: 20 TABLET ORAL at 09:12

## 2018-11-25 RX ADMIN — Medication 5000 UNITS: at 09:12

## 2018-11-25 RX ADMIN — LEVOTHYROXINE SODIUM 125 MCG: 0.12 TABLET ORAL at 06:05

## 2018-11-25 RX ADMIN — DRONEDARONE 400 MG: 400 TABLET, FILM COATED ORAL at 09:12

## 2018-11-25 ASSESSMENT — PAIN SCALES - GENERAL: PAINLEVEL_OUTOF10: 0

## 2018-11-25 NOTE — PROGRESS NOTES
levothyroxine (SYNTHROID) tablet 125 mcg  125 mcg Oral Daily RAHAT Frost - CNP   125 mcg at 11/25/18 1283    losartan (COZAAR) tablet 100 mg  100 mg Oral Daily RAHAT Frost - CNP   100 mg at 11/25/18 0912    pantoprazole (PROTONIX) tablet 40 mg  40 mg Oral QAM AC RAHAT Frost - CNP   40 mg at 11/25/18 8684    pravastatin (PRAVACHOL) tablet 10 mg  10 mg Oral Nightly RAHAT Frost - CNP   10 mg at 11/24/18 2159    cefTRIAXone (ROCEPHIN) 1 g in dextrose 5 % 50 mL IVPB  1 g Intravenous Q24H RAHAT Frost CNP   Stopped at 11/23/18 1758    0.9 % sodium chloride infusion   Intravenous Continuous RAHAT Frost CNP 75 mL/hr at 11/24/18 1029       Current Outpatient Prescriptions   Medication Sig Dispense Refill    amoxicillin-clavulanate (AUGMENTIN) 875-125 MG per tablet Take 1 tablet by mouth 2 times daily for 7 days 14 tablet 0    metoprolol tartrate (LOPRESSOR) 50 MG tablet Take 50 mg by mouth 2 times daily      loratadine (CLARITIN) 10 MG tablet Take 10 mg by mouth daily      potassium chloride (MICRO-K) 10 MEQ extended release capsule Take 10 mEq by mouth 2 times daily      potassium chloride (KLOR-CON) 20 MEQ packet Take 10 mEq by mouth daily      dronedarone hcl (MULTAQ) 400 MG TABS Take 1 tablet by mouth 2 times daily (with meals) 180 tablet 3    apixaban (ELIQUIS) 2.5 MG TABS tablet Take 1 tablet by mouth 2 times daily 60 tablet 5    furosemide (LASIX) 20 MG tablet Take 20 mg by mouth daily      omeprazole (PRILOSEC) 20 MG delayed release capsule Take 20 mg by mouth daily      Cholecalciferol (VITAMIN D3) 5000 units TABS Take by mouth      fenofibrate (TRICOR) 48 MG tablet Take 67 mg by mouth daily       losartan (COZAAR) 100 MG tablet Take 100 mg by mouth daily      pravastatin (PRAVACHOL) 10 MG tablet Take 10 mg by mouth daily      levothyroxine (SYNTHROID) 125 MCG tablet Take 1 tablet by mouth daily.  (Patient taking differently: Take 150 mcg by mouth daily ) 90 tablet 0    acetaminophen (TYLENOL) 325 MG tablet Take 325 mg by mouth daily. Allergies  Allergies   Allergen Reactions    Tape Glennie Sharif Tape] Itching    Codeine Rash    Sulfa Antibiotics Nausea And Vomiting       REVIEW OF SYSTEMS     Within above limitations. 14 point review of systems reviewed. Pertinent positive or negative as per HPI or otherwise negative per 14 point systems review. Reviewed 11/25/2018 at 5:10 PM    PHYSICAL EXAM       Blood pressure (!) 114/57, pulse 80, temperature 98.4 °F (36.9 °C), temperature source Oral, resp. rate 18, height 5' 4\" (1.626 m), weight 209 lb (94.8 kg), SpO2 95 %. General - AAO x 3  Psych - Appropriate affect/speech. No agitation  Eyes - Eye lids intact. No scleral icterus  ENT - Lips wnl. External ear clear/dry/intact. No thyromegaly on inspection  Neuro - No gross peripheral or central neuro deficits on inspection  Heart - Sinus. RRR. S1 and S2 present. No added HS/murmurs appreciated. No elevated JVD appreciated  Lung - Adequate air entry b/l, No crackles/wheezes appreciated  GI -  Soft. No guarding/rigidity. No hepatosplenomegaly/ascites. BS+  Skin - Intact. No rash/petechiae/ecchymosis. Warm extremities  MSK - Joints with normal ROM.  No joint swellings      Lines/Drains/Airways/Wounds:  [unfilled]    LABS AND IMAGING   CBC  [unfilled]    Last 3 Hemoglobin  Lab Results   Component Value Date    HGB 11.3 11/23/2018    HGB 11.3 11/22/2018    HGB 9.3 07/07/2018     Last 3 WBC/ANC  Lab Results   Component Value Date    WBC 7.1 11/23/2018    WBC 8.5 11/22/2018    WBC 9.0 07/07/2018     No components found for: GRNLOCTYABS  Last 3 Platelets  No results found for: PLATELET  Chemistry  [unfilled]  [unfilled]  No results found for: LDH  Coagulation Studies  Lab Results   Component Value Date    INR 1.48 07/06/2018     Liver Function Studies  Lab Results   Component Value Date    ALT 11 11/22/2018    AST 17 11/22/2018    ALKPHOS 43 11/22/2018       Recent Imaging        Relevant labs and imaging reviewed    ASSESSMENT AND PLAN     Frequent falls  Multifactorial etiology including  UTI, dehydration and generalized deconditioning. UTI  Patient is afebrile with no leukocytosis. Patient discharged on Augmentin    Atrial fibrillation with rate control  Patient is rate controlled. Currently on apixaban for stroke prophylaxis. Patient discharged home.         Hospitalist, Internal Medicine  11/25/2018 at 5:10 PM

## 2018-11-27 LAB
CULTURE: NORMAL
CULTURE: NORMAL
Lab: NORMAL
Lab: NORMAL
REPORT STATUS: NORMAL
REPORT STATUS: NORMAL
SPECIMEN: NORMAL
SPECIMEN: NORMAL

## 2018-11-30 LAB
BUN BLDV-MCNC: 16 MG/DL
CALCIUM SERPL-MCNC: 9.2 MG/DL
CHLORIDE BLD-SCNC: 100 MMOL/L
CO2: NORMAL MMOL/L
CREAT SERPL-MCNC: 1.4 MG/DL
GFR CALCULATED: NORMAL
GLUCOSE BLD-MCNC: 132 MG/DL
POTASSIUM SERPL-SCNC: 4.1 MMOL/L
SODIUM BLD-SCNC: 140 MMOL/L

## 2019-03-30 DIAGNOSIS — R25.1 TREMOR: ICD-10-CM

## 2019-03-30 DIAGNOSIS — K21.9 GASTROESOPHAGEAL REFLUX DISEASE WITHOUT ESOPHAGITIS: ICD-10-CM

## 2019-03-30 DIAGNOSIS — R73.9 HYPERGLYCEMIA: ICD-10-CM

## 2019-03-30 RX ORDER — FENOFIBRATE 67 MG/1
67 CAPSULE ORAL
COMMUNITY
End: 2020-01-15 | Stop reason: SDUPTHER

## 2019-03-30 RX ORDER — OMEPRAZOLE 20 MG/1
20 CAPSULE, DELAYED RELEASE ORAL DAILY
COMMUNITY
End: 2021-09-08

## 2019-03-30 RX ORDER — POTASSIUM CHLORIDE 750 MG/1
10 CAPSULE, EXTENDED RELEASE ORAL DAILY
COMMUNITY
End: 2020-01-15 | Stop reason: SDUPTHER

## 2019-03-30 RX ORDER — LOPERAMIDE HYDROCHLORIDE 2 MG/1
2 CAPSULE ORAL DAILY PRN
Status: ON HOLD | COMMUNITY
End: 2022-09-01 | Stop reason: HOSPADM

## 2019-03-30 RX ORDER — LEVOTHYROXINE SODIUM 0.15 MG/1
150 TABLET ORAL DAILY
COMMUNITY
End: 2020-01-15 | Stop reason: SDUPTHER

## 2019-08-05 NOTE — TELEPHONE ENCOUNTER
Brenda De Jesus called needing the following prescription refills:   Requested Prescriptions     Pending Prescriptions Disp Refills    losartan (COZAAR) 100 MG tablet [Pharmacy Med Name: Russ Farley 100MG] 90 tablet 0     Sig: TAKE 1 TABLET DAILY       Medications were reviewed and appropriate refills were sent to the requested pharmacy after provider approval.     Electronically signed by Leonel Hemphill LPN on 5/0/54 at 3:48 PM

## 2019-08-06 RX ORDER — LOSARTAN POTASSIUM 100 MG/1
TABLET ORAL
Qty: 90 TABLET | Refills: 0 | Status: SHIPPED | OUTPATIENT
Start: 2019-08-06 | End: 2020-01-02 | Stop reason: SDUPTHER

## 2019-08-29 ENCOUNTER — TELEPHONE (OUTPATIENT)
Dept: FAMILY MEDICINE CLINIC | Age: 84
End: 2019-08-29

## 2019-09-26 ENCOUNTER — TELEPHONE (OUTPATIENT)
Dept: FAMILY MEDICINE CLINIC | Age: 84
End: 2019-09-26

## 2019-09-26 NOTE — TELEPHONE ENCOUNTER
----- Message from Verner Luria sent at 9/26/2019  3:04 PM EDT -----  Contact: [phone] [email or web address]     [address] WITH KEEP SEEING HER FOR THE CATHETER.

## 2019-09-27 ENCOUNTER — TELEPHONE (OUTPATIENT)
Dept: FAMILY MEDICINE CLINIC | Age: 84
End: 2019-09-27

## 2019-09-27 NOTE — TELEPHONE ENCOUNTER
----- Message from Kevin Moses sent at 9/27/2019  7:59 AM EDT -----  Contact: SHAW[address] [phone]  RETURN CALL

## 2020-01-02 RX ORDER — LOSARTAN POTASSIUM 100 MG/1
TABLET ORAL
Qty: 90 TABLET | Refills: 0 | Status: SHIPPED | OUTPATIENT
Start: 2020-01-02 | End: 2020-01-15 | Stop reason: SDUPTHER

## 2020-01-15 ENCOUNTER — OFFICE VISIT (OUTPATIENT)
Dept: FAMILY MEDICINE CLINIC | Age: 85
End: 2020-01-15
Payer: MEDICARE

## 2020-01-15 VITALS
OXYGEN SATURATION: 96 % | DIASTOLIC BLOOD PRESSURE: 78 MMHG | HEIGHT: 64 IN | SYSTOLIC BLOOD PRESSURE: 120 MMHG | WEIGHT: 231 LBS | BODY MASS INDEX: 39.44 KG/M2 | HEART RATE: 85 BPM

## 2020-01-15 PROCEDURE — 4040F PNEUMOC VAC/ADMIN/RCVD: CPT | Performed by: PHYSICIAN ASSISTANT

## 2020-01-15 PROCEDURE — 90686 IIV4 VACC NO PRSV 0.5 ML IM: CPT | Performed by: PHYSICIAN ASSISTANT

## 2020-01-15 PROCEDURE — 99214 OFFICE O/P EST MOD 30 MIN: CPT | Performed by: PHYSICIAN ASSISTANT

## 2020-01-15 PROCEDURE — G0008 ADMIN INFLUENZA VIRUS VAC: HCPCS | Performed by: PHYSICIAN ASSISTANT

## 2020-01-15 PROCEDURE — G8417 CALC BMI ABV UP PARAM F/U: HCPCS | Performed by: PHYSICIAN ASSISTANT

## 2020-01-15 PROCEDURE — G8482 FLU IMMUNIZE ORDER/ADMIN: HCPCS | Performed by: PHYSICIAN ASSISTANT

## 2020-01-15 PROCEDURE — G8427 DOCREV CUR MEDS BY ELIG CLIN: HCPCS | Performed by: PHYSICIAN ASSISTANT

## 2020-01-15 PROCEDURE — 1090F PRES/ABSN URINE INCON ASSESS: CPT | Performed by: PHYSICIAN ASSISTANT

## 2020-01-15 PROCEDURE — G8400 PT W/DXA NO RESULTS DOC: HCPCS | Performed by: PHYSICIAN ASSISTANT

## 2020-01-15 PROCEDURE — 1123F ACP DISCUSS/DSCN MKR DOCD: CPT | Performed by: PHYSICIAN ASSISTANT

## 2020-01-15 PROCEDURE — 1036F TOBACCO NON-USER: CPT | Performed by: PHYSICIAN ASSISTANT

## 2020-01-15 RX ORDER — FENOFIBRATE 67 MG/1
67 CAPSULE ORAL
Qty: 90 CAPSULE | Refills: 1 | Status: SHIPPED | OUTPATIENT
Start: 2020-01-15 | End: 2020-08-13 | Stop reason: SDUPTHER

## 2020-01-15 RX ORDER — POTASSIUM CHLORIDE 750 MG/1
10 CAPSULE, EXTENDED RELEASE ORAL DAILY
Qty: 90 CAPSULE | Refills: 1 | Status: SHIPPED | OUTPATIENT
Start: 2020-01-15 | End: 2021-03-08 | Stop reason: SDUPTHER

## 2020-01-15 RX ORDER — LEVOTHYROXINE SODIUM 0.15 MG/1
150 TABLET ORAL DAILY
Qty: 90 TABLET | Refills: 1 | Status: SHIPPED | OUTPATIENT
Start: 2020-01-15 | End: 2020-02-07 | Stop reason: DRUGHIGH

## 2020-01-15 RX ORDER — PRAVASTATIN SODIUM 10 MG
TABLET ORAL
Qty: 90 TABLET | Refills: 1 | Status: SHIPPED | OUTPATIENT
Start: 2020-01-15 | End: 2020-08-13 | Stop reason: SDUPTHER

## 2020-01-15 RX ORDER — FUROSEMIDE 20 MG/1
20 TABLET ORAL DAILY
Qty: 90 TABLET | Refills: 1 | Status: SHIPPED | OUTPATIENT
Start: 2020-01-15 | End: 2020-08-13 | Stop reason: SDUPTHER

## 2020-01-15 RX ORDER — LOSARTAN POTASSIUM 100 MG/1
TABLET ORAL
Qty: 90 TABLET | Refills: 1 | Status: SHIPPED | OUTPATIENT
Start: 2020-01-15 | End: 2020-08-13 | Stop reason: SDUPTHER

## 2020-01-15 ASSESSMENT — PATIENT HEALTH QUESTIONNAIRE - PHQ9
SUM OF ALL RESPONSES TO PHQ QUESTIONS 1-9: 0
SUM OF ALL RESPONSES TO PHQ QUESTIONS 1-9: 0
SUM OF ALL RESPONSES TO PHQ9 QUESTIONS 1 & 2: 0
2. FEELING DOWN, DEPRESSED OR HOPELESS: 0
1. LITTLE INTEREST OR PLEASURE IN DOING THINGS: 0

## 2020-01-15 NOTE — PROGRESS NOTES
1/15/2020    Nagi Butler    Chief Complaint   Patient presents with    Other     med refills,pt wants to try a new bp med       HPI  History was obtained from patient and her niece and grandson. Philippe Steward is a 82 y.o. female who presents today for routine office visit and medication refills. Patient has a history of hypertension for which she takes losartan 100 mg daily. Blood pressure reading of 120/70 in office today. Patient denies any chest pain, shortness of breath, palpitations, lightheadedness, dizziness, headaches, vision changes, leg pain or swelling. Patient has a history of hyperlipidemia for which he takes pravastatin 10 mg daily and fenofibrate 67 mg daily. Patient has not had a fasting lipid panel in a few years that I can locate. She is agreeable to returning this week fasting for labs. Patient has a history of chronic A. fib and is anticoagulated on Eliquis 2.5 mg twice daily. Patient has a history of CHF and takes Lasix 20 mg and supplements with potassium 10 Meq daily. She follows cardiologist, Dr. Matilde Flores. Patient would like her annual influenza injection today but unfortunately, we are out of the high-dose vaccine. I encouraged her to go to the pharmacy so that she could receive the high-dose but patient states that she does not receive it today, she will likely not get one at all. She is requesting regular dose influenza vaccine. Patient is overdue for her mammogram.  She is agreeable to getting 1.     REVIEW OF SYMPTOMS    Constitutional:  Denies fever, chills, weight loss or weakness  Eyes:  Denies photophobia or discharge  ENT:  Denies sore throat or ear pain  Cardiovascular:  Denies chest pain, palpitations or swelling  Respiratory:  Denies cough or shortness of breath  GI:  Denies abdominal pain, nausea, vomiting, or diarrhea  Musculoskeletal:  Denies back pain  Skin:  No rashes  Neurologic:  Denies headache, focal weakness, or sensory changes  Endocrine:  Denies polyuria or polydipsia  Lymphatic:  Denies swollen glands  Psychiatric:  Denies suicidal ideation or homicidal ideation    PAST MEDICAL HISTORY  Past Medical History:   Diagnosis Date    Allergic rhinitis     Arthritis     Atrial fibrillation ([address]     Edema leg     Gastroesophageal reflux disease 3/30/2019    H/O 24 hour EKG monitoring 6/10 1/09 11/08    6/7/10- predominant rhythm is a-fib, freq PVCs    H/O cardiac catheterization 7/20/00    H/O cardiac catheterization 7/20/00    right dominant system, left main is short but angiographically normal, LAD and CX reveal sign  tortuosities distally  but no sign angiographic atherosclerotic disease noted except luminal  irregularities here and there. LVSF normal, EF  55%    H/O cardiovascular stress test 5/2/2016    lexiscan-normal,EF70%    H/O Doppler echocardiogram 7/12/10    intimal thickening but no sign. atherosclerotic plaque noted in [facility]  [facility].  H/O echocardiogram 5/2/2016 9/10     5/16 EF60% mild MR, TR, AR 9/20/10 Complete 2 dim transthoracic echo, techn. difficult. The study was  tech limited, LVSF is normal    History of atrial fibrillation     History of cardiovascular stress test 7/26/13, 7/10 8/08    7/13-WNL Observed defect consistent with breast attenuation. EF 70%. 7/10-post stress myocardial images show a normal pattern of perfusion in all regions. LV is normal  in size. EF is 62%, global  LVSF is normal    Hyperglycemia 3/30/2019    Hyperlipidemia     Hypertension     Hypothyroidism     Incontinence of bowel     Incontinence of urine     Tremor 3/30/2019       FAMILY HISTORY  Family History   Problem Relation Age of Onset    Diabetes Mother     Diabetes Father     Heart Disease Father        SOCIAL HISTORY  Social History     Socioeconomic History    Marital status:       Spouse name: None    Number of children: None    Years of education: None    Highest education level: None   Occupational History    None Social Needs    Financial resource strain: None    Food insecurity:     Worry: None     Inability: None    Transportation needs:     Medical: None     Non-medical: None   Tobacco Use    Smoking status: Former Smoker    Smokeless tobacco: Never Used   Substance and Sexual Activity    Alcohol use: No     Alcohol/week: 0.0 standard drinks    Drug use: No    Sexual activity: Not Currently   Lifestyle    Physical activity:     Days per week: None     Minutes per session: None    Stress: None   Relationships    Social connections:     Talks on phone: None     Gets together: None     Attends [employer] service: None     Active member of club or organization: None     Attends meetings of clubs or organizations: None     Relationship status: None    Intimate partner violence:     Fear of current or ex partner: None     Emotionally abused: None     Physically abused: None     Forced sexual activity: None   Other Topics Concern    None   Social History Narrative    None        SURGICAL HISTORY  Past Surgical History:   Procedure Laterality Date    BREAST SURGERY      Left breast biopsy    CARDIOVERSION  10/17/2008    CARDIOVERSION  12/15/2008    HYSTERECTOMY      JOINT REPLACEMENT  4/2005 2/2006    bilat    OTHER SURGICAL HISTORY  10/2009     bladder stimulator - interstim II 3058 - MRI OF HEAD ONLY -STIMULATOR MUST BE OFF AND TRANSMIT RECEIVE HEAD COIL ONLY    [place] TOTAL HIP ARTHROPLASTY Left 02/23/2015    Hip Replacement, Total    TOTAL KNEE ARTHROPLASTY  2005, 2006    bilateral       CURRENT MEDICATIONS  Current Outpatient Medications   Medication Sig Dispense Refill    losartan (COZAAR) 100 MG tablet TAKE 1 TABLET DAILY 90 tablet 1    fenofibrate micronized (LOFIBRA) 67 MG capsule Take 1 capsule by mouth every morning (before breakfast) 90 capsule 1    potassium chloride (MICRO-K) 10 MEQ extended release capsule Take 1 capsule by mouth daily 90 capsule 1    pravastatin (PRAVACHOL) 10 MG tablet tablet; TAKE 1 TABLET DAILY  -     Comprehensive Metabolic Panel; Future  Well-controlled. Blood pressure reading of 120/70 in office today. We will refill the above medication. Patient to return for a CMP. Hypothyroidism, unspecified type  -     levothyroxine (SYNTHROID) 150 MCG tablet; Take 1 tablet by mouth Daily  -     T4, Free; Future  -     TSH without Reflex; Future  Questionable control. Last TSH and T4 was July 2018 and TSH was slightly low at 0.049. We will recheck TSH and T4. For now, patient will stay on Synthroid 150 mcg daily. We will call with lab results and make any medication changes at that time if needed. Hyperlipidemia, unspecified hyperlipidemia type  -     fenofibrate micronized (LOFIBRA) 67 MG capsule; Take 1 capsule by mouth every morning (before breakfast)  -     pravastatin (PRAVACHOL) 10 MG tablet; TAKE 1 TABLET AT BEDTIME  -     Lipid Panel; Future  Questionable control. I cannot locate a recent fasting lipid panel. Patient is willing to return this week for fasting labs. For now, patient will continue pravastatin 10 mg daily and fenofibrate 67 mg daily. We will call with lab results and make any medication changes at that time if needed. Patient was encouraged to maintain a well-balanced, heart healthy diet. She is to avoid greasy, fatty, fried foods. Chronic atrial fibrillation  -     apixaban (ELIQUIS) 2.5 MG TABS tablet; Take 1 tablet by mouth 2 times daily  Continue Eliquis 2.5 mg twice daily. Continue close follow-up with cardiology. Congestive heart failure, unspecified HF chronicity, unspecified heart failure type (HCC)  -     potassium chloride (MICRO-K) 10 MEQ extended release capsule; Take 1 capsule by mouth daily  -     furosemide (LASIX) 20 MG tablet; Take 1 tablet by mouth daily  Continue Lasix 20 mg daily and potassium 10 mEq daily. Continue close follow-up with cardiology. We will check CMP.     Need for influenza vaccination  -

## 2020-01-23 ENCOUNTER — TELEPHONE (OUTPATIENT)
Dept: FAMILY MEDICINE CLINIC | Age: 85
End: 2020-01-23

## 2020-01-23 NOTE — TELEPHONE ENCOUNTER
[id number] [facility] [address] will be checking monthly to continue changing super pubic catheter.     Rubbie Files [phone]

## 2020-02-05 DIAGNOSIS — Z86.2 HISTORY OF ANEMIA: ICD-10-CM

## 2020-02-05 DIAGNOSIS — E78.5 HYPERLIPIDEMIA, UNSPECIFIED HYPERLIPIDEMIA TYPE: ICD-10-CM

## 2020-02-05 DIAGNOSIS — E03.9 HYPOTHYROIDISM, UNSPECIFIED TYPE: ICD-10-CM

## 2020-02-05 DIAGNOSIS — I10 ESSENTIAL HYPERTENSION: ICD-10-CM

## 2020-02-05 LAB
A/G RATIO: 1.1 (ref 1.1–2.2)
ALBUMIN SERPL-MCNC: 4.1 G/DL (ref 3.4–5)
ALP BLD-CCNC: 41 U/L (ref 40–129)
ALT SERPL-CCNC: 10 U/L (ref 10–40)
ANION GAP SERPL CALCULATED.3IONS-SCNC: 12 MMOL/L (ref 3–16)
AST SERPL-CCNC: 22 U/L (ref 15–37)
BASOPHILS ABSOLUTE: 0.1 K/UL (ref 0–0.2)
BASOPHILS RELATIVE PERCENT: 0.9 %
BILIRUB SERPL-MCNC: 0.4 MG/DL (ref 0–1)
BUN BLDV-MCNC: 16 MG/DL (ref 7–20)
CALCIUM SERPL-MCNC: 9.8 MG/DL (ref 8.3–10.6)
CHLORIDE BLD-SCNC: 102 MMOL/L (ref 99–110)
CHOLESTEROL, TOTAL: 144 MG/DL (ref 0–199)
CO2: 26 MMOL/L (ref 21–32)
CREAT SERPL-MCNC: 1.4 MG/DL (ref 0.6–1.2)
EOSINOPHILS ABSOLUTE: 0.3 K/UL (ref 0–0.6)
EOSINOPHILS RELATIVE PERCENT: 5.4 %
GFR AFRICAN AMERICAN: 44
GFR NON-AFRICAN AMERICAN: 36
GLOBULIN: 3.8 G/DL
GLUCOSE BLD-MCNC: 85 MG/DL (ref 70–99)
HCT VFR BLD CALC: 36.9 % (ref 36–48)
HDLC SERPL-MCNC: 47 MG/DL (ref 40–60)
HEMOGLOBIN: 12.3 G/DL (ref 12–16)
LDL CHOLESTEROL CALCULATED: 79 MG/DL
LYMPHOCYTES ABSOLUTE: 1.4 K/UL (ref 1–5.1)
LYMPHOCYTES RELATIVE PERCENT: 24.4 %
MCH RBC QN AUTO: 31.7 PG (ref 26–34)
MCHC RBC AUTO-ENTMCNC: 33.3 G/DL (ref 31–36)
MCV RBC AUTO: 95.3 FL (ref 80–100)
MONOCYTES ABSOLUTE: 0.6 K/UL (ref 0–1.3)
MONOCYTES RELATIVE PERCENT: 9.5 %
NEUTROPHILS ABSOLUTE: 3.5 K/UL (ref 1.7–7.7)
NEUTROPHILS RELATIVE PERCENT: 59.8 %
PDW BLD-RTO: 14.2 % (ref 12.4–15.4)
PLATELET # BLD: 186 K/UL (ref 135–450)
PMV BLD AUTO: 10.9 FL (ref 5–10.5)
POTASSIUM SERPL-SCNC: 4.4 MMOL/L (ref 3.5–5.1)
RBC # BLD: 3.88 M/UL (ref 4–5.2)
SODIUM BLD-SCNC: 140 MMOL/L (ref 136–145)
T4 FREE: 0.9 NG/DL (ref 0.9–1.8)
TOTAL PROTEIN: 7.9 G/DL (ref 6.4–8.2)
TRIGL SERPL-MCNC: 88 MG/DL (ref 0–150)
TSH SERPL DL<=0.05 MIU/L-ACNC: 11.17 UIU/ML (ref 0.27–4.2)
VLDLC SERPL CALC-MCNC: 18 MG/DL
WBC # BLD: 5.9 K/UL (ref 4–11)

## 2020-02-07 RX ORDER — LEVOTHYROXINE SODIUM 175 UG/1
175 TABLET ORAL DAILY
Qty: 90 TABLET | Refills: 1 | Status: SHIPPED | OUTPATIENT
Start: 2020-02-07 | End: 2020-02-11 | Stop reason: SDUPTHER

## 2020-02-11 RX ORDER — LEVOTHYROXINE SODIUM 175 UG/1
175 TABLET ORAL DAILY
Qty: 90 TABLET | Refills: 1 | Status: SHIPPED | OUTPATIENT
Start: 2020-02-11 | End: 2020-08-13 | Stop reason: SDUPTHER

## 2020-03-23 ENCOUNTER — TELEPHONE (OUTPATIENT)
Dept: FAMILY MEDICINE CLINIC | Age: 85
End: 2020-03-23

## 2020-03-31 ENCOUNTER — TELEPHONE (OUTPATIENT)
Dept: FAMILY MEDICINE CLINIC | Age: 85
End: 2020-03-31

## 2020-04-01 ENCOUNTER — TELEPHONE (OUTPATIENT)
Dept: FAMILY MEDICINE CLINIC | Age: 85
End: 2020-04-01

## 2020-04-08 ENCOUNTER — VIRTUAL VISIT (OUTPATIENT)
Dept: FAMILY MEDICINE CLINIC | Age: 85
End: 2020-04-08
Payer: MEDICARE

## 2020-04-08 PROCEDURE — 99441 PR PHYS/QHP TELEPHONE EVALUATION 5-10 MIN: CPT | Performed by: FAMILY MEDICINE

## 2020-04-08 NOTE — PROGRESS NOTES
Sindy Duarte is a 82 y.o. female evaluated via telephone on 4/8/2020. Consent:  She and/or health care decision maker is aware that that she may receive a bill for this telephone service, depending on her insurance coverage, and has provided verbal consent to proceed: Yes      Documentation:  I communicated with the patient and/or health care decision maker about hypertension, A. fib, diarrhea. Patient states she had a week of diarrhea. She has no idea why it started  . Details of this discussion including any medical advice provided:. Multiple bowel movements in a day. But it is resolved in the last 2 days and she does not know why. She is taken small doses of Imodium with success. Son does not have diarrhea. Patient is not coughing she is not feeling she has any covert symptoms. Patient denies other complaints. She is unable to tell me that she is still on her Eliquis. She has no home blood pressures to relate to me. Her son is usually the one that helps with this. Patient denies complaints and wishes for refill on her medications. I affirm this is a Patient Initiated Episode with an Established Patient who has not had a related appointment within my department in the past 7 days or scheduled within the next 24 hours.     Total Time: minutes: 5-10 minutes    Note: not billable if this call serves to triage the patient into an appointment for the relevant concern      Colletta Marinas

## 2020-05-21 ENCOUNTER — TELEPHONE (OUTPATIENT)
Dept: FAMILY MEDICINE CLINIC | Age: 85
End: 2020-05-21

## 2020-06-25 ENCOUNTER — TELEPHONE (OUTPATIENT)
Dept: FAMILY MEDICINE CLINIC | Age: 85
End: 2020-06-25
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
## 2020-07-05 ENCOUNTER — HOSPITAL ENCOUNTER (EMERGENCY)
Age: 85
Discharge: HOME | End: 2020-07-05
Payer: MEDICARE

## 2020-07-05 VITALS
SYSTOLIC BLOOD PRESSURE: 138 MMHG | HEART RATE: 71 BPM | HEIGHT: 64 IN | TEMPERATURE: 97.9 F | RESPIRATION RATE: 16 BRPM | OXYGEN SATURATION: 98 % | BODY MASS INDEX: 34.15 KG/M2 | WEIGHT: 200 LBS | DIASTOLIC BLOOD PRESSURE: 76 MMHG

## 2020-07-05 PROCEDURE — 99283 EMERGENCY DEPT VISIT LOW MDM: CPT

## 2020-07-05 NOTE — ED PROVIDER NOTES
 furosemide (LASIX) 20 MG tablet Take 1 tablet by mouth daily 90 tablet 1    MULTAQ 400 MG TABS TAKE 1 TABLET TWICE A DAY WITH MEALS 180 tablet 0    omeprazole (PRILOSEC) 20 MG delayed release capsule Take 20 mg by mouth daily      loperamide (IMODIUM) 2 MG capsule Take 2 mg by mouth daily as needed for Diarrhea      loratadine (CLARITIN) 10 MG tablet Take 10 mg by mouth daily      Cholecalciferol (VITAMIN D3) 5000 units TABS Take by mouth      acetaminophen (TYLENOL) 325 MG tablet Take 325 mg by mouth daily. ALLERGIES    Allergies   Allergen Reactions    Tape Vincenzo Dye Tape] Itching    Codeine Rash    Sulfa Antibiotics Nausea And Vomiting       SOCIAL AND FAMILY HISTORY    Social History     Socioeconomic History    Marital status:       Spouse name: None    Number of children: None    Years of education: None    Highest education level: None   Occupational History    None   Social Needs    Financial resource strain: None    Food insecurity     Worry: None     Inability: None    Transportation needs     Medical: None     Non-medical: None   Tobacco Use    Smoking status: Former Smoker    Smokeless tobacco: Never Used   Substance and Sexual Activity    Alcohol use: No     Alcohol/week: 0.0 standard drinks    Drug use: No    Sexual activity: Not Currently   Lifestyle    Physical activity     Days per week: None     Minutes per session: None    Stress: None   Relationships    Social connections     Talks on phone: None     Gets together: None     Attends [employer] service: None     Active member of club or organization: None     Attends meetings of clubs or organizations: None     Relationship status: None    Intimate partner violence     Fear of current or ex partner: None     Emotionally abused: None     Physically abused: None     Forced sexual activity: None   Other Topics Concern    None   Social History Narrative    None     Family History   Problem Relation Age of Onset    Diabetes Mother     Diabetes Father     Heart Disease Father          PHYSICAL EXAM    VITAL SIGNS: BP (!) 150/90   Pulse 84   Temp 97.9 °F (36.6 °C) (Oral)   Resp 19   Ht 5' 4\" (1.626 m)   Wt 200 lb (90.7 kg)   SpO2 98%   BMI 34.33 kg/m²    Constitutional: Elderly female, no acute distress  HENT:  Normocephalic, Atraumatic. Neck/Lymphatics: supple, no JVD, no swollen nodes  Cardiovascular:  Normal heart rate, Normal rhythm  Respiratory:  Nonlabored breathing. Normal breath sounds, No wheezing  Abdomen: Bowel sounds normal, Soft, No tenderness, no masses. No CVA tenderness. 2 areas of erythema to midline abdomen. One at location of suspected umbilicus with no active bleeding. There is no obvious suprapubic site inferior to this, there is area superior in location. Mild surrounding erythema with no obvious tract. Possible tract in suspected umbilicus  Integument:  Warm, Dry  Neurologic:  Alert & oriented , No focal deficits noted. Sensation intact. Psychiatric:  Affect normal, Mood normal.       ED COURSE & MEDICAL DECISION MAKING      Patient presents as above. Patient has 2 locations to abdomen that potentially could be suprapubic location. Patient does not know which area is her suprapubic. Area that appears to be her umbilicus does have a tract that seems possible to be her suprapubic location. There is no suprapubic location inferior to this. There is erythematous area to mid upper abdomen with no obvious tract. No induration or tenderness. No drainage. Attempt is made in both locations without success to place catheter. Consult is made to urologist Dr. Damion Fox who is familiar with placement and states the inferior area is likely suprapubic and that the superior location would be too high. He recommends attempting with smaller catheter and if unable to place it to put a Pelaez catheter and he will follow on the outpatient basis. Reattempt with smaller catheter is unsuccessful. Pelaez catheter is placed with clear urine draining. Recommend follow-up with urologist by calling office to schedule appointment for evaluation. Clinical  IMPRESSION    1. Encounter for suprapubic catheter care [facility])          I discussed with patient importance return the emergency department immediately if new or worsening symptoms develop. Comment: Please note this report has been produced using speech recognition software and may contain errors related to that system including errors in grammar, punctuation, and spelling, as well as words and phrases that may be inappropriate. If there are any questions or concerns please feel free to contact the dictating provider for clarification.             Joce Yoder PA-C  07/05/20 [id number]

## 2020-07-05 NOTE — ED TRIAGE NOTES
Patient to ER via ems after her suprapubic catheter came out. Home health nurse was unable to place it back in position. Patient denies pain, no further complaints.

## 2020-07-22 ENCOUNTER — TELEPHONE (OUTPATIENT)
Dept: FAMILY MEDICINE CLINIC | Age: 85
End: 2020-07-22

## 2020-08-06 ENCOUNTER — TELEPHONE (OUTPATIENT)
Dept: FAMILY MEDICINE CLINIC | Age: 85
End: 2020-08-06

## 2020-08-06 NOTE — TELEPHONE ENCOUNTER
Pt son stated [facility] was faxing over to us about the pt being on a blood thinner-- needs to be off of this for the procedure to have a urine tube put back in-- do we have this back there?

## 2020-08-06 NOTE — TELEPHONE ENCOUNTER
Urology was asking for surgical clearance. We are needing patient to come to our office to be seen to give that surgical clearance. I have not seen her personally in over a year.

## 2020-08-07 NOTE — TELEPHONE ENCOUNTER
Spoke with pt's son suraj [facility] scheduled in office visit 8/13/20 3:30pm with dr Syed Fruits

## 2020-08-13 ENCOUNTER — OFFICE VISIT (OUTPATIENT)
Dept: FAMILY MEDICINE CLINIC | Age: 85
End: 2020-08-13
Payer: MEDICARE

## 2020-08-13 VITALS
HEART RATE: 91 BPM | DIASTOLIC BLOOD PRESSURE: 78 MMHG | BODY MASS INDEX: 40.54 KG/M2 | SYSTOLIC BLOOD PRESSURE: 122 MMHG | OXYGEN SATURATION: 94 % | TEMPERATURE: 98.2 F | WEIGHT: 236.2 LBS

## 2020-08-13 PROCEDURE — 99214 OFFICE O/P EST MOD 30 MIN: CPT | Performed by: FAMILY MEDICINE

## 2020-08-13 PROCEDURE — 93000 ELECTROCARDIOGRAM COMPLETE: CPT | Performed by: FAMILY MEDICINE

## 2020-08-13 RX ORDER — FENOFIBRATE 67 MG/1
67 CAPSULE ORAL
Qty: 90 CAPSULE | Refills: 1 | Status: SHIPPED | OUTPATIENT
Start: 2020-08-13 | End: 2021-03-08 | Stop reason: SDUPTHER

## 2020-08-13 RX ORDER — FUROSEMIDE 20 MG/1
20 TABLET ORAL DAILY
Qty: 90 TABLET | Refills: 1 | Status: SHIPPED | OUTPATIENT
Start: 2020-08-13 | End: 2021-03-08 | Stop reason: SDUPTHER

## 2020-08-13 RX ORDER — PRAVASTATIN SODIUM 10 MG
TABLET ORAL
Qty: 90 TABLET | Refills: 1 | Status: SHIPPED | OUTPATIENT
Start: 2020-08-13 | End: 2021-03-08 | Stop reason: SDUPTHER

## 2020-08-13 RX ORDER — LOSARTAN POTASSIUM 100 MG/1
TABLET ORAL
Qty: 90 TABLET | Refills: 1 | Status: SHIPPED | OUTPATIENT
Start: 2020-08-13 | End: 2021-03-08 | Stop reason: SDUPTHER

## 2020-08-13 RX ORDER — LEVOTHYROXINE SODIUM 175 UG/1
175 TABLET ORAL DAILY
Qty: 90 TABLET | Refills: 1 | Status: SHIPPED | OUTPATIENT
Start: 2020-08-13 | End: 2021-03-08 | Stop reason: SDUPTHER

## 2020-08-13 NOTE — PROGRESS NOTES
6/7/10- predominant rhythm is a-fib, freq PVCs    H/O cardiac catheterization 7/20/00    H/O cardiac catheterization 7/20/00    right dominant system, left main is short but angiographically normal, LAD and CX reveal sign  tortuosities distally  but no sign angiographic atherosclerotic disease noted except luminal  irregularities here and there. LVSF normal, EF  55%    H/O cardiovascular stress test 5/2/2016    lexiscan-normal,EF70%    H/O Doppler echocardiogram 7/12/10    intimal thickening but no sign. atherosclerotic plaque noted in [facility].  H/O echocardiogram 5/2/2016 9/10     5/16 EF60% mild MR, TR, AR 9/20/10 Complete 2 dim transthoracic echo, techn. difficult. The study was  tech limited, LVSF is normal    History of atrial fibrillation     History of cardiovascular stress test 7/26/13, 7/10 8/08    7/13-WNL Observed defect consistent with breast attenuation. EF 70%. 7/10-post stress myocardial images show a normal pattern of perfusion in all regions. LV is normal  in size. EF is 62%, global  LVSF is normal    Hyperglycemia 3/30/2019    Hyperlipidemia     Hypertension     Hypothyroidism     Incontinence of bowel     Incontinence of urine     Tremor 3/30/2019       FAMILY HISTORY  Family History   Problem Relation Age of Onset    Diabetes Mother     Diabetes Father     Heart Disease Father        SOCIAL HISTORY  Social History     Socioeconomic History    Marital status:       Spouse name: Not on file    Number of children: Not on file    Years of education: Not on file    Highest education level: Not on file   Occupational History    Not on file   Social Needs    Financial resource strain: Not on file    Food insecurity     Worry: Not on file     Inability: Not on file    Transportation needs     Medical: Not on file     Non-medical: Not on file   Tobacco Use    Smoking status: Former Smoker    Smokeless tobacco: Never Used   Substance and Sexual Activity    Alcohol use: No     Alcohol/week: 0.0 standard drinks    Drug use: No    Sexual activity: Not Currently   Lifestyle    Physical activity     Days per week: Not on file     Minutes per session: Not on file    Stress: Not on file   Relationships    Social connections     Talks on phone: Not on file     Gets together: Not on file     Attends Taoism service: Not on file     Active member of club or organization: Not on file     Attends meetings of clubs or organizations: Not on file     Relationship status: Not on file    Intimate partner violence     Fear of current or ex partner: Not on file     Emotionally abused: Not on file     Physically abused: Not on file     Forced sexual activity: Not on file   Other Topics Concern    Not on file   Social History Narrative    Not on file        SURGICAL HISTORY  Past Surgical History:   Procedure Laterality Date    BREAST SURGERY      Left breast biopsy    CARDIOVERSION  10/17/2008    CARDIOVERSION  12/15/2008    HYSTERECTOMY      JOINT REPLACEMENT  4/2005 2/2006    bilat    OTHER SURGICAL HISTORY  10/2009     bladder stimulator - interstim II 3058 - MRI OF HEAD ONLY -STIMULATOR MUST BE OFF AND TRANSMIT RECEIVE HEAD COIL ONLY    AL TOTAL HIP ARTHROPLASTY Left 02/23/2015    Hip Replacement, Total    TOTAL KNEE ARTHROPLASTY  2005, 2006    bilateral       CURRENT MEDICATIONS  Current Outpatient Medications   Medication Sig Dispense Refill    fenofibrate micronized (LOFIBRA) 67 MG capsule Take 1 capsule by mouth every morning (before breakfast) 90 capsule 1    dronedarone hcl (MULTAQ) 400 MG TABS TAKE 1 TABLET TWICE A DAY WITH MEALS 180 tablet 1    losartan (COZAAR) 100 MG tablet TAKE 1 TABLET DAILY 90 tablet 1    pravastatin (PRAVACHOL) 10 MG tablet TAKE 1 TABLET AT BEDTIME 90 tablet 1    furosemide (LASIX) 20 MG tablet Take 1 tablet by mouth daily 90 tablet 1    levothyroxine (SYNTHROID) 175 MCG tablet Take 1 tablet by mouth daily 90 tablet 1    apixaban (ELIQUIS) 2.5 MG TABS tablet Take 1 tablet by mouth 2 times daily 180 tablet 1    potassium chloride (MICRO-K) 10 MEQ extended release capsule Take 1 capsule by mouth daily 90 capsule 1    omeprazole (PRILOSEC) 20 MG delayed release capsule Take 20 mg by mouth daily      loperamide (IMODIUM) 2 MG capsule Take 2 mg by mouth daily as needed for Diarrhea      loratadine (CLARITIN) 10 MG tablet Take 10 mg by mouth daily      Cholecalciferol (VITAMIN D3) 5000 units TABS Take by mouth      acetaminophen (TYLENOL) 325 MG tablet Take 325 mg by mouth daily. No current facility-administered medications for this visit. ALLERGIES  Allergies   Allergen Reactions    Tape Keyana Ends Tape] Itching    Codeine Rash    Sulfa Antibiotics Nausea And Vomiting       PHYSICAL EXAM    /78   Pulse 91   Temp 98.2 °F (36.8 °C)   Wt 236 lb 3.2 oz (107.1 kg)   SpO2 94%   BMI 40.54 kg/m²     Constitutional:  Well developed, well nourished  HEENT:  Normocephalic, atraumatic, bilateral external ears normal, oropharynx moist, nose normal  Neck:  Normal range of motion, no tenderness, supple  Lymphatic:  No lymphadenopathy noted  Cardiovascular:  Normal heart rate, S1S2 nl, I cannot tell she was in A. fib without her EKG. Rate is well controlled  Thorax & Lungs:  Normal breath sounds, no respiratory distress, no wheezing  Skin:  Warm, dry, no erythema, no rash  Back:  straight  Extremities:  No edema, no tenderness, no cyanosis  Musculoskeletal:  Good range of motion   Neurologic:  Alert & oriented X 3      ASSESSMENT & PLAN    1. Hyperlipidemia, unspecified hyperlipidemia type  Issue controlled. Continue meds. Refilled meds. - fenofibrate micronized (LOFIBRA) 67 MG capsule; Take 1 capsule by mouth every morning (before breakfast)  Dispense: 90 capsule; Refill: 1  - pravastatin (PRAVACHOL) 10 MG tablet; TAKE 1 TABLET AT BEDTIME  Dispense: 90 tablet; Refill: 1    2.  Essential hypertension  Issue controlled. Continue meds. Refilled meds. - losartan (COZAAR) 100 MG tablet; TAKE 1 TABLET DAILY  Dispense: 90 tablet; Refill: 1    3. Congestive heart failure, unspecified HF chronicity, unspecified heart failure type ([address]  Well compensated. Continue the same  - furosemide (LASIX) 20 MG tablet; Take 1 tablet by mouth daily  Dispense: 90 tablet; Refill: 1    4. Hypothyroidism, unspecified type  - levothyroxine (SYNTHROID) 175 MCG tablet; Take 1 tablet by mouth daily  Dispense: 90 tablet; Refill: 1    5. Chronic atrial fibrillation  Rate controlled and anticoagulated with Eliquis  - apixaban (ELIQUIS) 2.5 MG TABS tablet; Take 1 tablet by mouth 2 times daily  Dispense: 180 tablet; Refill: 1    6. Preop testing  EKG was atrial fibrillation with controlled rate no acute or ischemic changes noted. No comparison  - [id number] - GA ELECTROCARDIOGRAM, COMPLETE    I asked patient to hold her Eliquis the day before surgery on the day of surgery. I asked her to restart it the day after surgery and I wrote this out for her. Patient's preop clearance was completed and faxed back today along with her EKG. Thank you for assisting her with her catheter.   Here she noted that she preferred to have her surgery at [facility] if that is of any help       Electronically signed by Giovany North MD on 8/13/2020

## 2020-08-17 ENCOUNTER — HOSPITAL ENCOUNTER (OUTPATIENT)
Dept: LAB | Age: 85
Discharge: HOME | End: 2020-08-17
Payer: MEDICARE

## 2020-08-17 ENCOUNTER — TELEPHONE (OUTPATIENT)
Dept: FAMILY MEDICINE CLINIC | Age: 85
End: 2020-08-17

## 2020-08-17 PROCEDURE — C9803 HOPD COVID-19 SPEC COLLECT: HCPCS

## 2020-08-17 PROCEDURE — U0002 COVID-19 LAB TEST NON-CDC: HCPCS

## 2020-08-18 LAB
SARS-COV-2: NOT DETECTED
SOURCE: NORMAL

## 2020-08-21 ENCOUNTER — HOSPITAL ENCOUNTER (OUTPATIENT)
Dept: INTERVENTIONAL RADIOLOGY/VASCULAR | Age: 85
Discharge: HOME | End: 2020-08-21
Payer: MEDICARE

## 2020-08-21 VITALS
HEART RATE: 86 BPM | TEMPERATURE: 96.4 F | RESPIRATION RATE: 16 BRPM | BODY MASS INDEX: 40.29 KG/M2 | DIASTOLIC BLOOD PRESSURE: 72 MMHG | HEIGHT: 64 IN | OXYGEN SATURATION: 96 % | WEIGHT: 236 LBS | SYSTOLIC BLOOD PRESSURE: 130 MMHG

## 2020-08-21 LAB
APTT: 33.4 SECONDS (ref 25.1–37.1)
HCT VFR BLD CALC: 37.9 % (ref 37–47)
HEMOGLOBIN: 12 GM/DL (ref 12.5–16)
INR BLD: 1.02 INDEX
MCH RBC QN AUTO: 30.5 PG (ref 27–31)
MCHC RBC AUTO-ENTMCNC: 31.7 % (ref 32–36)
MCV RBC AUTO: 96.4 FL (ref 78–100)
PDW BLD-RTO: 14.6 % (ref 11.7–14.9)
PLATELET # BLD: 233 K/CU MM (ref 140–440)
PMV BLD AUTO: 11.7 FL (ref 7.5–11.1)
PROTHROMBIN TIME: 12.4 SECONDS (ref 11.7–14.5)
RBC # BLD: 3.93 M/CU MM (ref 4.2–5.4)
WBC # BLD: 6.8 K/CU MM (ref 4–10.5)

## 2020-08-21 PROCEDURE — 7100000011 HC PHASE II RECOVERY - ADDTL 15 MIN

## 2020-08-21 PROCEDURE — 51102 DRAIN BL W/CATH INSERTION: CPT

## 2020-08-21 PROCEDURE — 7100000010 HC PHASE II RECOVERY - FIRST 15 MIN

## 2020-08-21 PROCEDURE — 2580000003 HC RX 258: Performed by: RADIOLOGY

## 2020-08-21 PROCEDURE — C1894 INTRO/SHEATH, NON-LASER: HCPCS

## 2020-08-21 PROCEDURE — 6370000000 HC RX 637 (ALT 250 FOR IP): Performed by: RADIOLOGY

## 2020-08-21 PROCEDURE — 85610 PROTHROMBIN TIME: CPT

## 2020-08-21 PROCEDURE — 6360000002 HC RX W HCPCS: Performed by: RADIOLOGY

## 2020-08-21 PROCEDURE — C1729 CATH, DRAINAGE: HCPCS

## 2020-08-21 PROCEDURE — 85730 THROMBOPLASTIN TIME PARTIAL: CPT

## 2020-08-21 PROCEDURE — 77002 NEEDLE LOCALIZATION BY XRAY: CPT

## 2020-08-21 PROCEDURE — 2709999900 HC NON-CHARGEABLE SUPPLY

## 2020-08-21 PROCEDURE — 6360000004 HC RX CONTRAST MEDICATION: Performed by: UROLOGY

## 2020-08-21 PROCEDURE — 85027 COMPLETE CBC AUTOMATED: CPT

## 2020-08-21 PROCEDURE — C1769 GUIDE WIRE: HCPCS

## 2020-08-21 RX ORDER — SODIUM CHLORIDE 0.9 % (FLUSH) 0.9 %
10 SYRINGE (ML) INJECTION PRN
Status: DISCONTINUED | OUTPATIENT
Start: 2020-08-21 | End: 2020-08-22 | Stop reason: HOSPADM

## 2020-08-21 RX ORDER — CIPROFLOXACIN 500 MG/1
500 TABLET, FILM COATED ORAL ONCE
Status: COMPLETED | OUTPATIENT
Start: 2020-08-21 | End: 2020-08-21

## 2020-08-21 RX ORDER — MIDAZOLAM HYDROCHLORIDE 1 MG/ML
1 INJECTION INTRAMUSCULAR; INTRAVENOUS ONCE
Status: COMPLETED | OUTPATIENT
Start: 2020-08-21 | End: 2020-08-21

## 2020-08-21 RX ORDER — FENTANYL CITRATE 50 UG/ML
50 INJECTION, SOLUTION INTRAMUSCULAR; INTRAVENOUS ONCE
Status: COMPLETED | OUTPATIENT
Start: 2020-08-21 | End: 2020-08-21

## 2020-08-21 RX ADMIN — SODIUM CHLORIDE, PRESERVATIVE FREE 10 ML: 5 INJECTION INTRAVENOUS at 12:47

## 2020-08-21 RX ADMIN — CIPROFLOXACIN HYDROCHLORIDE 500 MG: 500 TABLET, FILM COATED ORAL at 14:35

## 2020-08-21 RX ADMIN — MIDAZOLAM 1 MG: 1 INJECTION INTRAMUSCULAR; INTRAVENOUS at 15:56

## 2020-08-21 RX ADMIN — IOPAMIDOL 15 ML: 755 INJECTION, SOLUTION INTRAVENOUS at 15:58

## 2020-08-21 RX ADMIN — FENTANYL CITRATE 50 MCG: 50 INJECTION INTRAMUSCULAR; INTRAVENOUS at 15:56

## 2020-08-21 ASSESSMENT — PAIN - FUNCTIONAL ASSESSMENT: PAIN_FUNCTIONAL_ASSESSMENT: 0-10

## 2020-08-21 ASSESSMENT — PAIN SCALES - GENERAL
PAINLEVEL_OUTOF10: 0

## 2020-08-21 NOTE — PROGRESS NOTES
Procedure: Suprapubic catheter insertion by Dr Antonio Cardozo    Pt arrived to IR large room, A&OX4, verbalizes understanding of procedure. Pt supine on IR table, prepped and draped.     Sedation given by Del Ortiz RN    Outcome: 8FR suprapubic catheter placed new site, sutured in place   Report given to SDS at [facility]

## 2020-08-23 ENCOUNTER — APPOINTMENT (OUTPATIENT)
Dept: GENERAL RADIOLOGY | Age: 85
End: 2020-08-23
Payer: MEDICARE

## 2020-08-23 ENCOUNTER — HOSPITAL ENCOUNTER (EMERGENCY)
Age: 85
Discharge: HOME | End: 2020-08-24
Payer: MEDICARE

## 2020-08-23 VITALS
OXYGEN SATURATION: 97 % | DIASTOLIC BLOOD PRESSURE: 89 MMHG | HEART RATE: 81 BPM | TEMPERATURE: 98.1 F | HEIGHT: 64 IN | RESPIRATION RATE: 18 BRPM | SYSTOLIC BLOOD PRESSURE: 140 MMHG | BODY MASS INDEX: 40.51 KG/M2

## 2020-08-23 PROCEDURE — 4500000027

## 2020-08-23 PROCEDURE — 2709999900 XR ABDOMEN (KUB) (SINGLE AP VIEW)

## 2020-08-23 PROCEDURE — 99284 EMERGENCY DEPT VISIT MOD MDM: CPT

## 2020-08-24 NOTE — ED NOTES
Bed: H-03  Expected date:   Expected time:   Means of arrival:   Comments:  EMS-jacinto Vera RN  08/23/20 2028

## 2020-08-24 NOTE — ED NOTES
Pt changed into a Gown. Pt bedding was changed and new bedding and pads were placed on the bed. PT was placed in position of comfort. Warm Blankets given to PT.       Lesley Cushing, RN  08/24/20 0025

## 2020-08-24 NOTE — ED PROVIDER NOTES
Triage Chief Complaint:   Other (suprapubic cath not draining)    [place]:  Today in the ED I had the pleasure of caring for Modesto Mon who is a 82 y.o. female that presents today to the emergency department complaining of catheter here regularity. Context this patient was seen here in the ED just over a week ago for suprapubic catheter falling out. Patient was then had a Pelaez catheter placed. Which was patent. And then outpatient had a suprapubic catheter placed. Patient states that over the last 1 day she is noticed that she has been wetting her pants secondary to urine coming out from outside of the bladder. She denies any abdominal pain. She has been eating and drinking baseline limiting baseline no fever chills nausea vomiting diarrhea no urinary pain or bladder pain. ROS:  REVIEW OF SYSTEMS    At least 10 systems reviewed      All other review of systems are negative  See HPI and nursing notes for additional information       Past Medical History:   Diagnosis Date    Allergic rhinitis     Arthritis     Atrial fibrillation ([address]     follows with Dr Shashi Kennedy CHF (congestive heart failure) ([address]     Edema leg     Gastroesophageal reflux disease 3/30/2019    H/O 24 hour EKG monitoring 6/10 1/09 11/08    6/7/10- predominant rhythm is a-fib, freq PVCs    H/O cardiac catheterization 7/20/00    H/O cardiac catheterization 7/20/00    right dominant system, left main is short but angiographically normal, LAD and CX reveal sign  tortuosities distally  but no sign angiographic atherosclerotic disease noted except luminal  irregularities here and there. LVSF normal, EF  55%    H/O cardiovascular stress test 5/2/2016    lexiscan-normal,EF70%    H/O Doppler echocardiogram 7/12/10    intimal thickening but no sign. atherosclerotic plaque noted in [facility]  [facility].  H/O echocardiogram 5/2/2016 9/10     5/16 EF60% mild MR, TR, AR 9/20/10 Complete 2 dim transthoracic echo, techn. difficult.   The study was  tech limited, LVSF is normal    History of atrial fibrillation     History of cardiovascular stress test 7/26/13, 7/10 8/08    7/13-WNL Observed defect consistent with breast attenuation. EF 70%. 7/10-post stress myocardial images show a normal pattern of perfusion in all regions. LV is normal  in size. EF is 62%, global  LVSF is normal    History of falling     per old chart    Hx of blood clots     per old chart  hx of DVT left leg    Hx: UTI (urinary tract infection)     Hyperglycemia 3/30/2019    Hyperlipidemia     Hypertension     Hypothyroidism     Incontinence of bowel     Incontinence of urine     Sepsis ([address] 2015    Tremor 3/30/2019    Wears glasses     to read    Wears partial dentures     upper     Past Surgical History:   Procedure Laterality Date    BREAST SURGERY      Left breast biopsy    CARDIOVERSION  10/17/2008    CARDIOVERSION  12/15/2008    HYSTERECTOMY  age 21's    IR GUIDED DRAIN W CATHETER PLACEMENT  8/21/2020    IR GUIDED DRAIN W CATHETER PLACEMENT 8/21/2020 [facility] SPECIAL PROCEDURES    JOINT REPLACEMENT  4/2005 2/2006    bilat    OTHER SURGICAL HISTORY  10/2009     bladder stimulator - interstim II 3058 - MRI OF HEAD ONLY -STIMULATOR MUST BE OFF AND TRANSMIT RECEIVE HEAD COIL ONLY    DC TOTAL HIP ARTHROPLASTY Left 02/23/2015    Hip Replacement, Total    TOTAL KNEE ARTHROPLASTY  2005, 2006    bilateral     Family History   Problem Relation Age of Onset    Diabetes Mother     Diabetes Father     Heart Disease Father      Social History     Socioeconomic History    Marital status:       Spouse name: Not on file    Number of children: Not on file    Years of education: Not on file    Highest education level: Not on file   Occupational History    Not on file   Social Needs    Financial resource strain: Not on file    Food insecurity     Worry: Not on file     Inability: Not on file    Transportation needs     Medical: Not on file     Non-medical: Not on file   Tobacco Use    Smoking status: Former Smoker     Years: 1.00    Smokeless tobacco: Never Used    Tobacco comment: \"quit yrs ago only smoked for 2-3 yrs\"   Substance and Sexual Activity    Alcohol use: No     Alcohol/week: 0.0 standard drinks    Drug use: No    Sexual activity: Not Currently   Lifestyle    Physical activity     Days per week: Not on file     Minutes per session: Not on file    Stress: Not on file   Relationships    Social connections     Talks on phone: Not on file     Gets together: Not on file     Attends Holiness service: Not on file     Active member of club or organization: Not on file     Attends meetings of clubs or organizations: Not on file     Relationship status: Not on file    Intimate partner violence     Fear of current or ex partner: Not on file     Emotionally abused: Not on file     Physically abused: Not on file     Forced sexual activity: Not on file   Other Topics Concern    Not on file   Social History Narrative    Not on file     No current facility-administered medications for this encounter.       Current Outpatient Medications   Medication Sig Dispense Refill    fenofibrate micronized (LOFIBRA) 67 MG capsule Take 1 capsule by mouth every morning (before breakfast) 90 capsule 1    dronedarone hcl (MULTAQ) 400 MG TABS TAKE 1 TABLET TWICE A DAY WITH MEALS 180 tablet 1    losartan (COZAAR) 100 MG tablet TAKE 1 TABLET DAILY 90 tablet 1    pravastatin (PRAVACHOL) 10 MG tablet TAKE 1 TABLET AT BEDTIME 90 tablet 1    furosemide (LASIX) 20 MG tablet Take 1 tablet by mouth daily 90 tablet 1    levothyroxine (SYNTHROID) 175 MCG tablet Take 1 tablet by mouth daily 90 tablet 1    apixaban (ELIQUIS) 2.5 MG TABS tablet Take 1 tablet by mouth 2 times daily 180 tablet 1    potassium chloride (MICRO-K) 10 MEQ extended release capsule Take 1 capsule by mouth daily 90 capsule 1    omeprazole (PRILOSEC) 20 MG delayed release capsule Take 20 mg by mouth daily  loperamide (IMODIUM) 2 MG capsule Take 2 mg by mouth daily as needed for Diarrhea      loratadine (CLARITIN) 10 MG tablet Take 10 mg by mouth daily      Cholecalciferol (VITAMIN D3) 5000 units TABS Take by mouth      acetaminophen (TYLENOL) 325 MG tablet Take 325 mg by mouth daily. Allergies   Allergen Reactions    Tape Gae Chalet Tape] Itching    Codeine Rash    Sulfa Antibiotics Nausea And Vomiting       Nursing Notes Reviewed    Physical Exam:  ED Triage Vitals   Enc Vitals Group      BP 08/23/20 2111 (!) 140/89      Pulse 08/23/20 2111 81      Resp 08/23/20 2111 18      Temp 08/23/20 2111 98.1 °F (36.7 °C)      Temp Source 08/23/20 2111 Oral      SpO2 08/23/20 2111 94 %      Weight --       Height 08/23/20 2026 5' 4\" (1.626 m)      Head Circumference --       Peak Flow --       Pain Score --       Pain Loc --       Pain Edu? --       Excl. in [address]? --      General :Patient is awake alert oriented person place and time no acute distress nontoxic appearing  HEENT: Pupils are equally round and reactive to light extraocular motors are intact conjunctivae clear sclerae white there is no injection no icterus. Nose without any rhinorrhea or epistaxis. Oral mucosa is moist no exudate buccal mucosa shows no ulcerations. Uvula is midline    Neck: Neck is supple full range of motion trachea midline thyroid nonpalpable  Cardiac: Heart regular rate rhythm no murmurs rubs clicks or gallops  Lungs: Lungs are clear to auscultation there is no wheezing rhonchi or rales. There is no use of accessory muscles no nasal flaring identified. Chest wall: There is no tenderness to palpation over the chest wall or over ribs  Abdomen: Abdomen is soft nontender nondistended. There is no firm or pulsatile masses no rebound rigidity or guarding negative Pierce's negative McBurney, no peritoneal signs. Suprapubic catheter in place. It is tacked down by sutures. There is no surrounding erythema edema. Or crepitus.   There is urine coming out of the stoma there which is noted. Suprapubic:  there is no tenderness to palpation over the external bladder   Musculoskeletal: 5 out of 5 strength in all 4 extremities full flexion extension abduction and adduction supination pronation of all extremities and all digits. No obvious muscle atrophy is noted. No focal muscle deficits are appreciated          I have reviewed and interpreted all of the currently available lab results from this visit (if applicable):  No results found for this visit on 08/23/20. Radiographs (if obtained):  [] The following radiograph was interpreted by myself in the absence of a radiologist:   [] Radiologist's Report Reviewed:  XR ABDOMEN (KUB) (SINGLE AP VIEW)   Final Result   Injected contrast is in the right pelvis, not definitely within the urinary   bladder. EKG (if obtained):   Please See Note of attending physician for EKG interpretation. Chart review shows recent radiograph(s):  Xr Abdomen (kub) (single Ap View)    Result Date: 8/23/2020  EXAMINATION: ONE SUPINE XRAY VIEW(S) OF THE ABDOMEN 8/23/2020 9:48 pm COMPARISON: 02/24/2018 HISTORY: ORDERING SYSTEM PROVIDED HISTORY: gastrographin suprapubic placement TECHNOLOGIST PROVIDED HISTORY: Abd KUB Reason for exam:->gastrographin suprapubic placement Reason for Exam: gastrographin suprapubic catheter placement Acuity: Acute Type of Exam: Initial FINDINGS: There is a pigtail catheter in the right pelvis. Injected contrast is ill-defined, not definitely within the urinary bladder. Osteopenia with levocurvature and multilevel degenerative changes of the lumbar spine. Left total hip arthroplasty. Injected contrast is in the right pelvis, not definitely within the urinary bladder.      Ir Guided Fluid Drainage By Cath Soft Tissue Perc    Result Date: 8/22/2020  PROCEDURE: VR PERCUTANEOUS DRAINAGE AND CATHETER PLACEMENT MODERATE CONSCIOUS SEDATION 8/21/2020 HISTORY: ORDERING SYSTEM PROVIDED HISTORY: Mixed incontinence TECHNOLOGIST PROVIDED HISTORY: Reason for exam:->Supra Pubic Cath Insertion Previous suprapubic tube fell out. Patient needs a new suprapubic tube placement. TECHNIQUE: A suprapubic tube was placed using both ultrasound and fluoroscopic guidance. CONTRAST: 15 cc of Isovue 370 SEDATION: 1 mgversed and 50 mg fentanyl were titrated intravenously for moderate sedation monitored under my direction. Total intraservice time of sedation was 19 minutes. The patient's vital signs were monitored throughout the procedure and recorded in the patient's medical record by the nurse. 500 mg p.o. ciprofloxacin was administered prior to the procedure. FLUOROSCOPY DOSE AND TYPE OR TIME AND EXPOSURES: 3.6 minutes of Fluoro time. Air Kerma 148 mGy. DESCRIPTION OF PROCEDURE: Informed consent was obtained after a detailed explanation of the procedure including risks, benefits, and alternatives. Universal protocol was observed. The patient's anterior lower abdomen was prepped and draped using standard aseptic technique. An ultrasound was performed in the mid low pelvis, which demonstrated that the bladder was decompressed. The expected needle course was anesthetized using 1% subcutaneous lidocaine. The bladder was not able to be reached with a standard micropuncture needle and so access was obtained with some difficulty under continuous ultrasound guidance using the Accustick set. Contrast was instilled via the inner dilator of the Accustick set to confirm intravesicular location. The 6 Mosotho Accustick sheath was then used to advance an Amplatz wire. The tract was then upsized to accommodate an 8 Western Anya drain. Repeat contrast injection confirmed intravesicular location. The patient tolerated the procedure well and there were no immediate complications. EBL: Less than 5 cc FINDINGS: 8 Mosotho drain seen within the bladder. Technically successful suprapubic tube placement of an 8 Western Anya drain. Patient will need to return for drain upsizing in 1-2 weeks. MDM:     Patient presents today to the emergency department for suprapubic catheter dysfunction. The suprapubic catheter was draining around the catheter in the stoma. It is not patent. Gastrografin x-ray reveals that it appears to be in the pelvis but not definitively in the bladder for this reason traditional Pelaez catheter was placed and that is patent. Were getting good volume urine out. And patient is to follow-up with her urologist.  Suprapubic catheter that is in place will remain there. To keep the stoma patent. I independently managed patient today in the ED        BP (!) 140/89   Pulse 81   Temp 98.1 °F (36.7 °C) (Oral)   Resp 18   Ht 5' 4\" (1.626 m)   SpO2 97%   BMI 40.51 kg/m²       Clinical Impression:  1. Suprapubic catheter dysfunction, initial encounter [facility])        Disposition referral (if applicable): Miles Floyd, [address] [phone]    In 1 day      Disposition medications (if applicable):  New Prescriptions    No medications on file         Comment: Please note this report has been produced using speech recognition software and may contain errors related to that system including errors in grammar, punctuation, and spelling, as well as words and phrases that may be inappropriate. If there are any questions or concerns please feel free to contact the dictating provider for clarification.       Tray Warren, [address] [place] [address]  08/24/20 [id number]

## 2020-08-25 ENCOUNTER — TELEPHONE (OUTPATIENT)
Dept: FAMILY MEDICINE CLINIC | Age: 85
End: 2020-08-25

## 2020-08-25 ENCOUNTER — CARE COORDINATION (OUTPATIENT)
Dept: CARE COORDINATION | Age: 85
End: 2020-08-25

## 2020-08-25 NOTE — TELEPHONE ENCOUNTER
Home health aid needed for personal and catheter care-- would like to add to plan of care-- please advise       Call [phone] -- for verbal

## 2020-08-25 NOTE — CARE COORDINATION
Followed up with Crystal Azevedo after ED visit on 20. Crystal Azevedo reports that she is still having some issues with her suprapubic catheter. She states that it is still causing a lot of pain and she does not feel that it is draining correctly. Asked pt if she has an appointment to follow up with urology. She states that she does not think so. She asked ACM to schedule an appointment for her. She also ask ACM to call her brother Ankit Alexandra stating that he takes her to her appointments. Miguel Angel, pt's brother (ALFREDO) who states that Crystal Azevedo was doing fine last night. [address] that she needs an appointment to f/u with urology and he states \"who said that, they did not say that in the ER\". Reviewed the ED AVS and instructed that yes, pt needs to f/u with urology. Offered to call and schedule an appointment for pt and he states the he will call or his son will call and schedule the appointment. ACM once again stressed the importance of following up with urology. Patient contacted regarding recent discharge and COVID-19 risk. Discussed COVID-19 related testing which was not done at this time. Test results were not done. Patient informed of results, if available? Not done. Care Transition Nurse/ Ambulatory Care Manager contacted the patient by telephone to perform post discharge assessment. Verified name and  with patient as identifiers. Patient has following risk factors of: heart failure and age and recent ED visit. CTN/ACM reviewed discharge instructions, medical action plan and red flags related to discharge diagnosis. Reviewed and educated them on any new and changed medications related to discharge diagnosis. Advised obtaining a 90-day supply of all daily and as-needed medications. Education provided regarding infection prevention, and signs and symptoms of COVID-19 and when to seek medical attention with patient who verbalized understanding.  Discussed exposure protocols and quarantine from [facility] Guidelines Are you at higher risk for severe illness 2019 and given an opportunity for questions and concerns. The patient agrees to contact the COVID-19 hotline [phone] or PCP office for questions related to their healthcare. CTN/ACM provided contact information for future reference. From CDC: Are you at higher risk for severe illness?  Wash your hands often.  Avoid close contact (6 feet, which is about two arm lengths) with people who are sick.  Put distance between yourself and other people if COVID-19 is spreading in your community.  Clean and disinfect frequently touched surfaces.  Avoid all cruise travel and non-essential air travel.  Call your healthcare professional if you have concerns about COVID-19 and your underlying condition or if you are sick. For more information on steps you can take to protect yourself, see CDC's How to [id number] [place] [address] for follow-up call in 7-14 days based on severity of symptoms and risk factors.

## 2020-08-27 VITALS — HEIGHT: 64 IN | WEIGHT: 236 LBS | BODY MASS INDEX: 40.29 KG/M2

## 2020-08-27 NOTE — PROGRESS NOTES
.Surgery 9/1/20 @ 1430, arrival 1230               1. Do not eat or drink anything within 8 hours of your surgery - unless instructed by your doctor prior to surgery. This includes                   no water, chewing gum or mints. 2. Follow your directions as prescribed by the doctor for your procedure and medications. 3. Check with your Doctor regarding stopping Plavix, Coumadin, Lovenox,Effient,Pradaxa,Xarelto, Fragmin or other blood thinners and                   follow their instructions. 4. Do not smoke, and do not drink any alcoholic beverages 24 hours prior to surgery. This includes NA Beer. 5. You may brush your teeth and gargle the morning of surgery. DO NOT SWALLOW WATER   6. You MUST make arrangements for a responsible adult to take you home after your surgery and be able to check on you every couple                   hours for the day. You will not be allowed to leave alone or drive yourself home. It is strongly suggested someone stay with you the first 24                   hrs. Your surgery will be cancelled if you do not have a ride home. 7. Please wear simple, loose fitting clothing to the hospital.  Cody Harry not bring valuables (money, credit cards, checkbooks, etc.) Do not wear any                   makeup (including no eye makeup) or nail polish on your fingers or toes. 8. DO NOT wear any jewelry or piercings on day of surgery. All body piercing jewelry must be removed. 9. If you have dentures, they will be removed before going to the OR; we will provide you a container. If you wear contact lenses or glasses,                  they will be removed; please bring a case for them. 10. If you  have a Living Will and Durable Power of  for Healthcare, please bring in a copy. 11. Please bring picture ID,  insurance card, paperwork from the doctors office    (H & P, Consent, & card for implantable devices).            12. Take a shower the night before or morning of your procedure, do not apply any lotion, oil or powder. 13. Wear a mask covering your nose & mouth when entering the hospital. Have your covid-19 test performed within 7 days of your                  surgery. Quarantine yourself after the test until after your surgery.

## 2020-08-28 ENCOUNTER — HOSPITAL ENCOUNTER (OUTPATIENT)
Dept: LAB | Age: 85
Discharge: HOME | End: 2020-08-28
Payer: MEDICARE

## 2020-08-28 PROCEDURE — U0002 COVID-19 LAB TEST NON-CDC: HCPCS

## 2020-08-30 LAB
SARS-COV-2: NOT DETECTED
SOURCE: NORMAL

## 2020-09-01 ENCOUNTER — HOSPITAL ENCOUNTER (OUTPATIENT)
Dept: INTERVENTIONAL RADIOLOGY/VASCULAR | Age: 85
Discharge: HOME | End: 2020-09-01
Payer: MEDICARE

## 2020-09-01 ENCOUNTER — CARE COORDINATION (OUTPATIENT)
Dept: CARE COORDINATION | Age: 85
End: 2020-09-01

## 2020-09-01 NOTE — CARE COORDINATION
You Patient resolved from the Care Transitions episode on 9/1/20. Discussed COVID-19 related testing which was not done at this time. Test results were not done. Patient informed of results, if available? Not done. Patient/family has been provided the following resources and education related to COVID-19:                         Signs, symptoms and red flags related to COVID-19            CDC exposure and quarantine guidelines            [employer] exposure contact - [phone]            Contact for their local Department of Health                 Patient currently reports that the following symptoms have improved:  Pt denies any COVID-19 symptoms at time of call. .  Pt is active with [place] [facility] [address] and has recently had a home health aide added for personal and catheter care. No further outreach scheduled with this CTN/ACM. Episode of Care resolved. Patient has this CTN/ACM contact information if future needs arise.

## 2020-09-04 NOTE — PROGRESS NOTES
At end of phone assessment- reviewed procedure time/ arrival time and what meds pt could take am of procedure, npo after midnight- unable to read back these instructions- had to review this 3 times- patient finally able after 4th try to read back instructions given.  Did call her brother Alverto Vallejo who is to bring her and informed him procedure 9/8/2020 with arrival [id number] procedure at 1030 and npo after midnight states he will make sure she gets her on time

## 2020-09-05 ENCOUNTER — HOSPITAL ENCOUNTER (EMERGENCY)
Age: 85
Discharge: HOME | End: 2020-09-05
Attending: EMERGENCY MEDICINE
Payer: MEDICARE

## 2020-09-05 VITALS
RESPIRATION RATE: 18 BRPM | HEIGHT: 64 IN | WEIGHT: 250 LBS | BODY MASS INDEX: 42.68 KG/M2 | HEART RATE: 82 BPM | SYSTOLIC BLOOD PRESSURE: 127 MMHG | OXYGEN SATURATION: 99 % | TEMPERATURE: 98.1 F | DIASTOLIC BLOOD PRESSURE: 84 MMHG

## 2020-09-05 PROCEDURE — 99283 EMERGENCY DEPT VISIT LOW MDM: CPT

## 2020-09-05 PROCEDURE — 94761 N-INVAS EAR/PLS OXIMETRY MLT: CPT

## 2020-09-05 ASSESSMENT — PAIN SCALES - GENERAL: PAINLEVEL_OUTOF10: 6

## 2020-09-05 NOTE — ED PROVIDER NOTES
Procedure Laterality Date    BREAST SURGERY      Left breast biopsy    CARDIOVERSION  10/17/2008    CARDIOVERSION  12/15/2008    HYSTERECTOMY  age 21's    IR GUIDED DRAIN W CATHETER PLACEMENT  8/21/2020    IR GUIDED DRAIN W CATHETER PLACEMENT 8/21/2020 [facility] SPECIAL PROCEDURES    JOINT REPLACEMENT  4/2005 2/2006    bilat    OTHER SURGICAL HISTORY  10/2009     bladder stimulator - interstim II 3058 - MRI OF HEAD ONLY -STIMULATOR MUST BE OFF AND TRANSMIT RECEIVE HEAD COIL ONLY    MA TOTAL HIP ARTHROPLASTY Left 02/23/2015    Hip Replacement, Total    TOTAL KNEE ARTHROPLASTY  2005, 2006    bilateral       FAMILY HISTORY  Family History   Problem Relation Age of Onset    Diabetes Mother     Diabetes Father     Heart Disease Father        SOCIAL HISTORY  Social History     Socioeconomic History    Marital status:       Spouse name: Not on file    Number of children: Not on file    Years of education: Not on file    Highest education level: Not on file   Occupational History    Not on file   Social Needs    Financial resource strain: Not on file    Food insecurity     Worry: Not on file     Inability: Not on file    Transportation needs     Medical: Not on file     Non-medical: Not on file   Tobacco Use    Smoking status: Former Smoker     Years: 1.00    Smokeless tobacco: Never Used    Tobacco comment: \"quit yrs ago only smoked for 2-3 yrs\"   Substance and Sexual Activity    Alcohol use: No     Alcohol/week: 0.0 standard drinks    Drug use: No    Sexual activity: Not Currently   Lifestyle    Physical activity     Days per week: Not on file     Minutes per session: Not on file    Stress: Not on file   Relationships    Social connections     Talks on phone: Not on file     Gets together: Not on file     Attends Confucianist service: Not on file     Active member of club or organization: Not on file     Attends meetings of clubs or organizations: Not on file     Relationship status: Not on file    Intimate partner violence     Fear of current or ex partner: Not on file     Emotionally abused: Not on file     Physically abused: Not on file     Forced sexual activity: Not on file   Other Topics Concern    Not on file   Social History Narrative    Not on file           **Past medical, family and social histories reviewed and verified by me**      PHYSICAL EXAM  VITAL SIGNS:   ED Triage Vitals [09/05/20 1817]   Enc Vitals Group      /84      Pulse 82      Resp 18      Temp 98.1 °F (36.7 °C)      Temp Source Oral      SpO2 99 %      Weight 250 lb (113.4 kg)      Height 5' 4\" (1.626 m)      Head Circumference       Peak Flow       Pain Score       Pain Loc       Pain Edu? Excl. in [address]? Vitals during ED course were reviewed and are as charted. Constitutional: Minimal distress, Non-toxic appearance    Eyes: Conjunctiva normal, No discharge    HENT: Normocephalic, Atraumatic, Bilateral external ears normal, posterior oropharynx is nonerythematous and without exudate, uvula is midline, oropharynx moist    Neck: Supple, no stridor, no grossly visible or palpable masses    Cardiovascular: Regular rate and rhythm, No murmurs, No rubs, No gallops    Pulmonary/Chest:  Normal breath sounds, No respiratory distress or accessory muscle use, No wheezing, crackles or rhonchi. Abdomen: There is a stoma in tact in the suprapubic abdomen without any drainage or bleeding or surrounding erythema, otherwise abdomen is soft, nondistended and nonrigid, No tenderness or peritoneal signs, No masses, normal bowel sounds      Back:  No midline point tenderness, No paraspinous muscle tenderness.   No CVA tenderness    Extremities:  No gross deformities, no edema, no tenderness    Neurologic:  Normal motor function, Normal sensory function, No focal deficits    Skin:  Warm, Dry, No erythema, No rash, No cyanosis, No mottling    Lymphatic:  No inguinal lymphadenopathy          RADIOLOGY/PROCEDURES/LABS/MEDICATIONS ADMINISTERED:    I have reviewed and interpreted all of the currently available lab results from this visit (if applicable):  No results found for this visit on 09/05/20. ABNORMAL LABS:  Labs Reviewed - No data to display      IMAGING STUDIES ORDERED:  None      No orders to display         MEDICATIONS ADMINISTERED:  Medications - No data to display      [facility]  Last vitals: /84   Pulse 82   Temp 98.1 °F (36.7 °C) (Oral)   Resp 18   Ht 5' 4\" (1.626 m)   Wt 250 lb (113.4 kg)   SpO2 99%   BMI 42.91 kg/m²     68-year-old female who presents for need for replacement of urethral urinary catheter. This was done. The patient denies any abdominal pain at this time abdomen is benign on examination. Differential diagnoses considered included, but were not limited to, acute appendicitis, bowel obstruction, perforated bowel, incarcerated or strangulated hernia, colitis, diverticulitis, ischemic bowel, cystitis/pyelonephritis, kidney stone. Additional workup and treatment in the ED as documented above. Patient reassured and will be discharged home. I have explained to the patient in appropriate terminology our work-up in the ED and their diagnosis. I have also given anticipatory guidance and expectant management of their condition as an outpatient as per my custom. The patient was given clear discharge and follow-up instructions including return to the ER immediately for worsening concerns. The patient has been advised to follow-up with their primary care physician and/or referred physician in the next two to three days or sooner if worsening and to return to the ER immediately as above with any concerns. I provided the patient counseling with regard to my customary list of strict return precautions as well as return precautions specific to the cause for today's emergency department visit.  The patient will return under these provided conditions, but should also return for new concerns or further worsening. Pt and/or family understand and agree with plan. Clinical Impression:  1. Urinary catheter complication, initial encounter ([facility] [address]    2. Urinary catheter (Pelaez) change required        Disposition referral (if applicable): Mika Freeman, [address]  [phone]    Schedule an appointment as soon as possible for a visit       [facility] Emergency Department  [place] [id number]  [phone]    If symptoms worsen      Disposition medications (if applicable):  New Prescriptions    No medications on file       ED Provider Disposition Time  DISPOSITION Decision To Discharge 09/05/2020 07:05:41 PM        Electronically signed by: Erendira hSultz M.D., 9/5/2020  7:06 PM    This dictation was created with voice recognition software. While attempts have been made to review the dictation as it is transcribed, on occasion the spoken word can be misinterpreted by the technology leading to omissions or inappropriate words, phrases or sentences.        Erasto Bonilla MD  09/05/20 Jr Blair

## 2020-09-05 NOTE — ED NOTES
Bed: ED-15  Expected date:   Expected time:   Means of arrival:   Comments:  EMS     Hoa Garcia RN  09/05/20 9345

## 2020-09-08 ENCOUNTER — HOSPITAL ENCOUNTER (OUTPATIENT)
Dept: INTERVENTIONAL RADIOLOGY/VASCULAR | Age: 85
Discharge: HOME | End: 2020-09-08
Payer: MEDICARE

## 2020-09-08 VITALS
BODY MASS INDEX: 40.29 KG/M2 | SYSTOLIC BLOOD PRESSURE: 136 MMHG | RESPIRATION RATE: 18 BRPM | WEIGHT: 236 LBS | DIASTOLIC BLOOD PRESSURE: 78 MMHG | OXYGEN SATURATION: 97 % | HEART RATE: 80 BPM | HEIGHT: 64 IN | TEMPERATURE: 98.8 F

## 2020-09-08 PROCEDURE — 6360000004 HC RX CONTRAST MEDICATION: Performed by: SPECIALIST

## 2020-09-08 PROCEDURE — 6360000002 HC RX W HCPCS: Performed by: RADIOLOGY

## 2020-09-08 PROCEDURE — 51705 CHANGE OF BLADDER TUBE: CPT

## 2020-09-08 PROCEDURE — 2709999900 HC NON-CHARGEABLE SUPPLY

## 2020-09-08 PROCEDURE — 7100000011 HC PHASE II RECOVERY - ADDTL 15 MIN

## 2020-09-08 PROCEDURE — 75984 XRAY CONTROL CATHETER CHANGE: CPT

## 2020-09-08 PROCEDURE — C1729 CATH, DRAINAGE: HCPCS

## 2020-09-08 PROCEDURE — 7100000010 HC PHASE II RECOVERY - FIRST 15 MIN

## 2020-09-08 PROCEDURE — C1769 GUIDE WIRE: HCPCS

## 2020-09-08 RX ORDER — SODIUM CHLORIDE 0.9 % (FLUSH) 0.9 %
10 SYRINGE (ML) INJECTION PRN
Status: DISCONTINUED | OUTPATIENT
Start: 2020-09-08 | End: 2020-09-09 | Stop reason: HOSPADM

## 2020-09-08 RX ORDER — FENTANYL CITRATE 50 UG/ML
50 INJECTION, SOLUTION INTRAMUSCULAR; INTRAVENOUS ONCE
Status: COMPLETED | OUTPATIENT
Start: 2020-09-08 | End: 2020-09-08

## 2020-09-08 RX ADMIN — FENTANYL CITRATE 50 MCG: 50 INJECTION INTRAMUSCULAR; INTRAVENOUS at 11:51

## 2020-09-08 RX ADMIN — IOPAMIDOL 20 ML: 755 INJECTION, SOLUTION INTRAVENOUS at 12:17

## 2020-09-08 ASSESSMENT — PAIN DESCRIPTION - DESCRIPTORS: DESCRIPTORS: ACHING

## 2020-09-08 ASSESSMENT — PAIN - FUNCTIONAL ASSESSMENT: PAIN_FUNCTIONAL_ASSESSMENT: 0-10

## 2020-09-08 ASSESSMENT — PAIN SCALES - GENERAL
PAINLEVEL_OUTOF10: 0
PAINLEVEL_OUTOF10: 0

## 2020-09-08 NOTE — H&P
History Obtained From:  Patient    HISTORY OF PRESENT ILLNESS:      The patient is a 82 y.o. female with significant past medical history of suprapubic catheter that is not working and is dislodged. Patient with discomfort at the site and would like to have the catheter placed more superiorly. Currently patient with casey catheter in place. Past Medical History:    Non functioning suprapubic catheter      Allergies:  Tape [adhesive tape]; Codeine; and Sulfa antibiotics    Social History:   Non contrib. REVIEW OF SYSTEMS:  +urinary retension    PHYSICAL EXAM:    Vitals:  BP (!) 152/89   Pulse 80   Temp 96.9 °F (36.1 °C) (Temporal)   Resp 18   Ht 5' 4\" (1.626 m)   Wt 236 lb (107 kg)   SpO2 98%   Breastfeeding No   BMI 40.51 kg/m²     ASA 3 Mallapati 2    Elderly female in nad resting on bed  +subrapubic catheter not connected with casey catheter in place  AAO x3       ASSESSMENT AND PLAN:      Reposition/replace/upsize subrapubic catheter. Consent obtained. Risks and benefits explained to the patient.

## 2020-09-08 NOTE — PROGRESS NOTES
Procedure: Upsize Suprapubic catheter by Dr Roni Tran    Pt arrived to large room, A&OX4. Pt C/O placement of new catheter placed a couple weeks ago. Pt stated it wasn't working and has a casey catheter in place. No sutures present on catheter. Plan: Catheter not in place. Place catheter in other old area. 1140 Fentanyl  Given, see MAR    Outcome: 12FR catheter placed RLQ, sutured, old site. Casey removed per Dr Roni Tran order.     Report given to JULIANNE at [facility]

## 2020-09-09 ENCOUNTER — TELEPHONE (OUTPATIENT)
Dept: FAMILY MEDICINE CLINIC | Age: 85
End: 2020-09-09

## 2020-09-10 ENCOUNTER — TELEPHONE (OUTPATIENT)
Dept: FAMILY MEDICINE CLINIC | Age: 85
End: 2020-09-10

## 2020-09-10 NOTE — TELEPHONE ENCOUNTER
Ben Muñiz (patient's son) wants to know if his mother should be back on her blood thinners---she has had a couple of surgeries within last 2 months. Please advise him.

## 2020-09-10 NOTE — TELEPHONE ENCOUNTER
Call son. Yes with the atrial fibrillation. She should be back on her Eliquis of 2.5 mg twice a day. It still on her medicine list.  Do we need to call someone to verify that she is getting it or restart it?

## 2020-09-30 ENCOUNTER — TELEPHONE (OUTPATIENT)
Dept: CARDIOLOGY CLINIC | Age: 85
End: 2020-09-30

## 2020-10-02 ENCOUNTER — TELEPHONE (OUTPATIENT)
Dept: CARDIOLOGY CLINIC | Age: 85
End: 2020-10-02

## 2020-10-02 NOTE — TELEPHONE ENCOUNTER
Patient needs cardiac clearance, per Daryl Schaffer pt needs OV with Dr Gissel Hines. Spoke with patient and scheduled.  She is arranging transportation

## 2020-10-05 ENCOUNTER — OFFICE VISIT (OUTPATIENT)
Dept: CARDIOLOGY CLINIC | Age: 85
End: 2020-10-05
Payer: MEDICARE

## 2020-10-05 VITALS
DIASTOLIC BLOOD PRESSURE: 78 MMHG | SYSTOLIC BLOOD PRESSURE: 132 MMHG | BODY MASS INDEX: 42.68 KG/M2 | WEIGHT: 250 LBS | HEART RATE: 92 BPM | HEIGHT: 64 IN

## 2020-10-05 PROCEDURE — 1036F TOBACCO NON-USER: CPT | Performed by: INTERNAL MEDICINE

## 2020-10-05 PROCEDURE — 4040F PNEUMOC VAC/ADMIN/RCVD: CPT | Performed by: INTERNAL MEDICINE

## 2020-10-05 PROCEDURE — G8484 FLU IMMUNIZE NO ADMIN: HCPCS | Performed by: INTERNAL MEDICINE

## 2020-10-05 PROCEDURE — 93000 ELECTROCARDIOGRAM COMPLETE: CPT | Performed by: INTERNAL MEDICINE

## 2020-10-05 PROCEDURE — G8417 CALC BMI ABV UP PARAM F/U: HCPCS | Performed by: INTERNAL MEDICINE

## 2020-10-05 PROCEDURE — 1090F PRES/ABSN URINE INCON ASSESS: CPT | Performed by: INTERNAL MEDICINE

## 2020-10-05 PROCEDURE — 99214 OFFICE O/P EST MOD 30 MIN: CPT | Performed by: INTERNAL MEDICINE

## 2020-10-05 PROCEDURE — G8427 DOCREV CUR MEDS BY ELIG CLIN: HCPCS | Performed by: INTERNAL MEDICINE

## 2020-10-05 PROCEDURE — G8400 PT W/DXA NO RESULTS DOC: HCPCS | Performed by: INTERNAL MEDICINE

## 2020-10-05 PROCEDURE — 1123F ACP DISCUSS/DSCN MKR DOCD: CPT | Performed by: INTERNAL MEDICINE

## 2020-10-05 NOTE — PROGRESS NOTES
CARDIOLOGY NOTE      10/5/2020    RE: Amanda Abel  (1935)                               TO:  Dr. Dontae Henry MD            Jessica1 Regino Bruno is a 82 y.o. female who was seen today for management of  A fib                                  Here for preop Cl  HPI:                   The patient does not have cardiac complaints  Patient also seen  for    - Atrial fibrillation, pt is  compliant with meds. Patient does not have symptoms from atrial fibrillation  - Hypertension,is  well controlled, pt is  compliant with medicines  - Hyperlipidimea, importance of hyperlipidimea discussed with pt.        Amanda Abel has the following history recorded in care path:  Patient Active Problem List    Diagnosis Date Noted    DVT, lower extremity, proximal, acute, left ([facility] [address]      Priority: High    EKG abnormalities      Priority: High    Noncompliance with medication regimen      Priority: High    Chronic atrial fibrillation (HCC)      Priority: High    Acute cystitis without hematuria      Priority: High    Sepsis ([facility] [address] 05/18/2015     Priority: High    Recurrent UTI 05/14/2015     Priority: Medium    Rhabdomyolysis 08/30/2017     Priority: Low    Sepsis associated hypotension (HCC) 08/30/2017     Priority: Low    Atrial fibrillation ([facility] [address] 04/28/2016     Priority: Low    Leg weakness 05/20/2015     Priority: Low    Abnormality of gait 05/20/2015     Priority: Low    CHF (congestive heart failure) ([facility] [address] 05/20/2015     Priority: Low    Anemia 05/15/2015     Priority: Low    Multiple falls 05/14/2015     Priority: Low    Atrial fibrillation with rapid ventricular response 02/21/2013     Priority: Low    Chronic anticoagulation 08/20/2012     Priority: Low    Hypertension 08/20/2012     Priority: Low    Hypothyroidism 08/20/2012     Priority: Low    Encounter for long-term (current) use of other medications 08/20/2012     Priority: Low    Tremor 03/30/2019    Gastroesophageal reflux disease 03/30/2019    Hyperglycemia 03/30/2019    Frequent falls 11/22/2018    Muscle weakness of lower extremity 11/22/2018    Acute metabolic encephalopathy 31/00/5093    Urinary tract infection associated with catheterization of urinary tract ([facility] 07/06/2018    Fall at home, initial encounter 07/06/2018    DVT (deep vein thrombosis) in pregnancy 02/23/2018    Progression of deep vein thrombosis (DVT) (HCC) 02/23/2018     Current Outpatient Medications   Medication Sig Dispense Refill    apixaban (ELIQUIS) 2.5 MG TABS tablet Take 1 tablet by mouth 2 times daily 180 tablet 1    fenofibrate micronized (LOFIBRA) 67 MG capsule Take 1 capsule by mouth every morning (before breakfast) 90 capsule 1    dronedarone hcl (MULTAQ) 400 MG TABS TAKE 1 TABLET TWICE A DAY WITH MEALS 180 tablet 1    losartan (COZAAR) 100 MG tablet TAKE 1 TABLET DAILY 90 tablet 1    pravastatin (PRAVACHOL) 10 MG tablet TAKE 1 TABLET AT BEDTIME 90 tablet 1    furosemide (LASIX) 20 MG tablet Take 1 tablet by mouth daily 90 tablet 1    levothyroxine (SYNTHROID) 175 MCG tablet Take 1 tablet by mouth daily 90 tablet 1    potassium chloride (MICRO-K) 10 MEQ extended release capsule Take 1 capsule by mouth daily 90 capsule 1    omeprazole (PRILOSEC) 20 MG delayed release capsule Take 20 mg by mouth daily      loperamide (IMODIUM) 2 MG capsule Take 2 mg by mouth daily as needed for Diarrhea      loratadine (CLARITIN) 10 MG tablet Take 10 mg by mouth daily      Cholecalciferol (VITAMIN D3) 5000 units TABS Take by mouth      acetaminophen (TYLENOL) 325 MG tablet Take 325 mg by mouth daily. No current facility-administered medications for this visit. Allergies: Tape [adhesive tape];  Codeine; and Sulfa antibiotics  Past Medical History:   Diagnosis Date    Allergic rhinitis     Arthritis     Atrial fibrillation ([facility] [address]     follows with Dr Riana Harding CHF (congestive heart failure) ([facility]     Edema leg  Gastroesophageal reflux disease 3/30/2019    H/O 24 hour EKG monitoring 6/10 1/09 11/08    6/7/10- predominant rhythm is a-fib, freq PVCs    H/O cardiac catheterization 7/20/00    H/O cardiac catheterization 7/20/00    right dominant system, left main is short but angiographically normal, LAD and CX reveal sign  tortuosities distally  but no sign angiographic atherosclerotic disease noted except luminal  irregularities here and there. LVSF normal, EF  55%    H/O cardiovascular stress test 5/2/2016    lexiscan-normal,EF70%    H/O Doppler echocardiogram 7/12/10    intimal thickening but no sign. atherosclerotic plaque noted in [facility]  [facility].  H/O echocardiogram 5/2/2016 9/10     5/16 EF60% mild MR, TR, AR 9/20/10 Complete 2 dim transthoracic echo, techn. difficult. The study was  tech limited, LVSF is normal    History of atrial fibrillation     History of cardiovascular stress test 7/26/13, 7/10 8/08    7/13-WNL Observed defect consistent with breast attenuation. EF 70%. 7/10-post stress myocardial images show a normal pattern of perfusion in all regions. LV is normal  in size.   EF is 62%, global  LVSF is normal    History of falling     per old chart    Hx of blood clots     per old chart  hx of DVT left leg    Hx: UTI (urinary tract infection)     Hyperglycemia 3/30/2019    Hyperlipidemia     Hypertension     Hypothyroidism     Incontinence of bowel     Incontinence of urine     Sepsis ([address] [address] 2015    Tremor 3/30/2019    Wears glasses     to read    Wears partial dentures     upper     Past Surgical History:   Procedure Laterality Date    BREAST SURGERY      Left breast biopsy    CARDIOVERSION  10/17/2008    CARDIOVERSION  12/15/2008    HYSTERECTOMY  age 21's    IR GUIDED DRAIN W CATHETER PLACEMENT  8/21/2020    IR GUIDED DRAIN W CATHETER PLACEMENT 8/21/2020 [facility] SPECIAL PROCEDURES    IR GUIDED DRAIN W CATHETER PLACEMENT  9/8/2020    IR GUIDED DRAIN W CATHETER PLACEMENT 9/8/2020 [facility] SPECIAL PROCEDURES    JOINT REPLACEMENT  4/2005 2/2006    bilat    OTHER SURGICAL HISTORY  10/2009     bladder stimulator - interstim II 3058 - MRI OF HEAD ONLY -STIMULATOR MUST BE OFF AND TRANSMIT RECEIVE HEAD COIL ONLY    DE TOTAL HIP ARTHROPLASTY Left 02/23/2015    Hip Replacement, Total    TOTAL KNEE ARTHROPLASTY  2005, 2006    bilateral      As reviewed   Family History   Problem Relation Age of Onset    Diabetes Mother     Diabetes Father     Heart Disease Father      Social History     Tobacco Use    Smoking status: Former Smoker     Years: 1.00    Smokeless tobacco: Never Used    Tobacco comment: \"quit yrs ago only smoked for 2-3 yrs\"   Substance Use Topics    Alcohol use: No     Alcohol/week: 0.0 standard drinks      Review of Systems:    Constitutional: Negative for diaphoresis and fatigue  Psychological:Negative for anxiety or depression  HENT: Negative for headaches, nasal congestion, sinus pain or vertigo  Eyes: Negative for visual disturbance. Endocrine: Negative for polydipsia/polyuria  Respiratory: Negative for shortness of breath  Cardiovascular: Negative for chest pain, dyspnea on exertion, claudication, edema, irregular heartbeat, murmur, palpitations or shortness of breath  Gastrointestinal: Negative for abdominal pain or heartburn  Genito-Urinary: Negative for urinary frequency/urgency  Musculoskeletal: Negative for muscle pain, muscular weakness, negative for pain in arm and leg or swelling in foot and leg  Neurological: Negative for dizziness, headaches, memory loss, numbness/tingling, visual changes, syncope  Dermatological: Negative for rash    Objective:    Vitals:    10/05/20 1458   BP: 132/78   Pulse: 92   Weight: 250 lb (113.4 kg)   Height: 5' 4\" (1.626 m)     /78   Pulse 92   Ht 5' 4\" (1.626 m)   Wt 250 lb (113.4 kg)   BMI 42.91 kg/m²     No flowsheet data found.      Wt Readings from Last 3 Encounters:   10/05/20 250 lb (113.4 kg)   09/04/20 236 lb (107 kg) 09/05/20 250 lb (113.4 kg)     Body mass index is 42.91 kg/m². GENERAL - Alert, oriented, pleasant, in no apparent distress. EYES: No jaundice, no conjunctival pallor. SKIN: It is warm & dry. No rashes. No Echhymosis    HEENT - No clinically significant abnormalities seen. Neck - Supple. No jugular venous distention noted. No carotid bruits. Cardiovascular - Normal S1 and S2 without obvious murmur or gallop. Extremities - No cyanosis, clubbing, or significant edema. Pulmonary - No respiratory distress. No wheezes or rales. Abdomen - No masses, tenderness, or organomegaly. Musculoskeletal - No significant edema. No joint deformities. No muscle wasting. Neurologic - Cranial nerves II through XII are grossly intact. There were no gross focal neurologic abnormalities.     Lab Review   Lab Results   Component Value Date    CKTOTAL 129 11/22/2018    TROPONINT <0.010 11/22/2018     BNP:  No results found for: BNP  PT/INR:    Lab Results   Component Value Date    INR 1.02 08/21/2020     Lab Results   Component Value Date    LABA1C 5.9 07/07/2014     Lab Results   Component Value Date    WBC 6.8 08/21/2020    HCT 37.9 08/21/2020    MCV 96.4 08/21/2020     08/21/2020     Lab Results   Component Value Date    CHOL 144 02/05/2020    TRIG 88 02/05/2020    HDL 47 02/05/2020    LDLCALC 79 02/05/2020    LDLDIRECT 70 02/27/2018     Lab Results   Component Value Date    ALT 10 02/05/2020    AST 22 02/05/2020     BMP:    Lab Results   Component Value Date     02/05/2020    K 4.4 02/05/2020    K 3.8 02/24/2018     02/05/2020    CO2 26 02/05/2020    BUN 16 02/05/2020    CREATININE 1.4 02/05/2020     CMP:   Lab Results   Component Value Date     02/05/2020    K 4.4 02/05/2020    K 3.8 02/24/2018     02/05/2020    CO2 26 02/05/2020    BUN 16 02/05/2020    PROT 7.9 02/05/2020    PROT 7.4 08/20/2012     TSH:    Lab Results   Component Value Date    TSH 11.17 02/05/2020    TSHHS 0.049 07/06/2018         Impression:    1. Atrial fibrillation, unspecified type [facility])       Patient Active Problem List   Diagnosis Code    Chronic anticoagulation Z79.01    Hypertension I10    Hypothyroidism E03.9    Encounter for long-term (current) use of other medications Z79.899    Atrial fibrillation with rapid ventricular response I48.91    Recurrent UTI N39.0    Multiple falls R29.6    Anemia D64.9    Sepsis ([facility]) A41.9    Leg weakness R29.898    Abnormality of gait R26.9    CHF (congestive heart failure) ([facility]) I50.9    Atrial fibrillation ([facility]) I48.91    Rhabdomyolysis M62.82    Sepsis associated hypotension ([facility]) A41.9, I95.9    Acute cystitis without hematuria N30.00    DVT (deep vein thrombosis) in pregnancy O22.30    Progression of deep vein thrombosis (DVT) ([facility]) I82.409    DVT, lower extremity, proximal, acute, left ([facility]) I82.4Y2    EKG abnormalities R94.31    Noncompliance with medication regimen Z91.14    Chronic atrial fibrillation ([facility]) I48.20    Acute metabolic encephalopathy Q31.77    Urinary tract infection associated with catheterization of urinary tract ([facility]) T83.511A, N39.0    Fall at home, initial encounter W19. Ashlyn Brick, Y92.009    Frequent falls R29.6    Muscle weakness of lower extremity M62.81    Tremor R25.1    Gastroesophageal reflux disease K21.9    Hyperglycemia R73.9       Assessment & Plan:    - Atrial fibrillation, pt is  compliant with meds. Patient does not have symptoms from atrial fibrillation    -  Hypertension: Patients blood pressure is normal. Patient is advised about low sodium diet. Present medical regimen will not be changed. -  LIPID MANAGEMENT:  Importance of lipid levels discussed with patient   and patient was given dietary advice. NCEP- ATP III guidelines reviewed with patient. -   Changes  in medicines made:  No                  - preop for Cystoscopy:     Echo and lexiscan      - DVT on anticoag       NEED :Rebecca Suarez BOUND  HAS  Multiple Cardiac risk factors                          Tena Lane MD    [facility] [place]

## 2020-10-05 NOTE — LETTER
[address] [facility]  1935  [id number]    Check Venous \"LEG PROBLEM Questionnaire\"    Do you have a surgery or procedure scheduled in the near future - Yes    Cardiologist: Dr. Kayy Babin  Surgeon: Dr. Evelyn Zhang: cystoscopy  , supra pubic tube placement  Anesthesia: General  Date: Pending  FAX# [phone]  Ph# [phone]        BE SURE TO GIVE THIS INFORMATION to [facility]    Ask patient if they want to sign up for [employer] if they are not already signed up    Check to see if we have an E-MAIL on file for the patient    Check medication list thoroughly!!!  BE SURE TO ASK PATIENT IF THEY NEED MEDICATION REFILLS

## 2020-10-08 ENCOUNTER — PROCEDURE VISIT (OUTPATIENT)
Dept: CARDIOLOGY CLINIC | Age: 85
End: 2020-10-08
Payer: MEDICARE

## 2020-10-08 LAB
LV EF: 60 %
LVEF MODALITY: NORMAL

## 2020-10-08 PROCEDURE — 93017 CV STRESS TEST TRACING ONLY: CPT | Performed by: INTERNAL MEDICINE

## 2020-10-08 PROCEDURE — 93018 CV STRESS TEST I&R ONLY: CPT | Performed by: INTERNAL MEDICINE

## 2020-10-08 PROCEDURE — 93016 CV STRESS TEST SUPVJ ONLY: CPT | Performed by: INTERNAL MEDICINE

## 2020-10-08 PROCEDURE — A9500 TC99M SESTAMIBI: HCPCS | Performed by: INTERNAL MEDICINE

## 2020-10-08 PROCEDURE — 78452 HT MUSCLE IMAGE SPECT MULT: CPT | Performed by: INTERNAL MEDICINE

## 2020-10-13 ENCOUNTER — TELEPHONE (OUTPATIENT)
Dept: CARDIOLOGY CLINIC | Age: 85
End: 2020-10-13

## 2020-10-15 ENCOUNTER — PROCEDURE VISIT (OUTPATIENT)
Dept: CARDIOLOGY CLINIC | Age: 85
End: 2020-10-15
Payer: MEDICARE

## 2020-10-15 LAB
LV EF: 53 %
LVEF MODALITY: NORMAL

## 2020-10-15 PROCEDURE — 93306 TTE W/DOPPLER COMPLETE: CPT | Performed by: INTERNAL MEDICINE

## 2020-10-22 ENCOUNTER — OFFICE VISIT (OUTPATIENT)
Dept: CARDIOLOGY CLINIC | Age: 85
End: 2020-10-22
Payer: MEDICARE

## 2020-10-22 VITALS
HEIGHT: 64 IN | DIASTOLIC BLOOD PRESSURE: 72 MMHG | HEART RATE: 96 BPM | WEIGHT: 250 LBS | SYSTOLIC BLOOD PRESSURE: 124 MMHG | BODY MASS INDEX: 42.68 KG/M2

## 2020-10-22 PROBLEM — E66.01 MORBIDLY OBESE (HCC): Status: ACTIVE | Noted: 2020-10-22

## 2020-10-22 PROCEDURE — 1123F ACP DISCUSS/DSCN MKR DOCD: CPT | Performed by: NURSE PRACTITIONER

## 2020-10-22 PROCEDURE — 99214 OFFICE O/P EST MOD 30 MIN: CPT | Performed by: NURSE PRACTITIONER

## 2020-10-22 PROCEDURE — 1036F TOBACCO NON-USER: CPT | Performed by: NURSE PRACTITIONER

## 2020-10-22 PROCEDURE — G8400 PT W/DXA NO RESULTS DOC: HCPCS | Performed by: NURSE PRACTITIONER

## 2020-10-22 PROCEDURE — G8417 CALC BMI ABV UP PARAM F/U: HCPCS | Performed by: NURSE PRACTITIONER

## 2020-10-22 PROCEDURE — 1090F PRES/ABSN URINE INCON ASSESS: CPT | Performed by: NURSE PRACTITIONER

## 2020-10-22 PROCEDURE — G8484 FLU IMMUNIZE NO ADMIN: HCPCS | Performed by: NURSE PRACTITIONER

## 2020-10-22 PROCEDURE — 4040F PNEUMOC VAC/ADMIN/RCVD: CPT | Performed by: NURSE PRACTITIONER

## 2020-10-22 PROCEDURE — G8427 DOCREV CUR MEDS BY ELIG CLIN: HCPCS | Performed by: NURSE PRACTITIONER

## 2020-10-22 ASSESSMENT — ENCOUNTER SYMPTOMS
DIARRHEA: 0
BLOOD IN STOOL: 0
NAUSEA: 0
SINUS PAIN: 0
VOMITING: 0
SHORTNESS OF BREATH: 0
CONSTIPATION: 0
ABDOMINAL PAIN: 0
COUGH: 0
COLOR CHANGE: 0
PHOTOPHOBIA: 0

## 2020-10-22 NOTE — LETTER
[address] [facility]  1935  [id number]      Pt denies having any cardiac complaints. Do you have a surgery or procedure scheduled in the near future - Yes  If Yes- DO EKG  If Yes - Who is the surgery with?  Dr. Randy Rivas  Phone number of physician   Fax number of physician   Type of surgery Bladder surgery, pending clearance      BE SURE TO GIVE THIS INFORMATION to [facility]    Ask patient if they want to sign up for MyChart if they are not already signed up    Check to see if we have an E-MAIL on file for the patient    Check medication list thoroughly!!!  BE SURE TO ASK PATIENT IF THEY NEED MEDICATION REFILLS    At check out add to every patient's \"wrap up\" the following dot phrase AFTERHOURSEDUCATION and ensure we explain this to our patients

## 2020-10-22 NOTE — PROGRESS NOTES
[facility], [address]  Phone: [phone]    Fax [phone]                  Rajesh Argueta MD, Hadley Dexter MD, [address], MD, Hilton Beers, MD Laurelyn Sofia, MD Claudean So, MD Adrian Stuart, RAHAT Yao, RAHAT Steel, RAHAT Qiu, APRHAIR    CARDIOLOGY  NOTE      10/22/2020    RE: Linda Montano  (1935)                               TO:  Dr. Joceline Garcia MD  The primary cardiologist is Dr. Kelechi Hernández    CC:   1. Congestive heart failure, unspecified HF chronicity, unspecified heart failure type ([address]    2. Longstanding persistent atrial fibrillation ([address]    3. Essential hypertension    4. Morbidly obese ([address]    5. Preop cardiovascular exam      Patient denies all of the following:  Chest Pain  Palpitations  Increased Shortness of Breath  Edema  Dizziness  Syncope      HPI: Thank you for involving me in taking care of your patient Linda Montano, who is a  82y.o. year old female with a history as listed above. Patient is not active and does not exercise regularly. Patient is compliant with her medications. Patient denies any cardiac complaints or needs.    She is here to follow up on her testing that she had done earlier in the month and for preoperative examination for a urological procedure by Dr. Yoana Landon.     Nusrat Gaytanorest:    10/22/20 1318   BP: 124/72   Pulse: 96       Current Outpatient Medications   Medication Sig Dispense Refill    apixaban (ELIQUIS) 2.5 MG TABS tablet Take 1 tablet by mouth 2 times daily 180 tablet 1    fenofibrate micronized (LOFIBRA) 67 MG capsule Take 1 capsule by mouth every morning (before breakfast) 90 capsule 1    dronedarone hcl (MULTAQ) 400 MG TABS TAKE 1 TABLET TWICE A DAY WITH MEALS 180 tablet 1    losartan (COZAAR) 100 MG tablet TAKE 1 TABLET DAILY 90 tablet 1    pravastatin (PRAVACHOL) 10 MG tablet TAKE 1 TABLET AT BEDTIME 90 consistent with breast attenuation. EF 70%. 7/10-post stress myocardial images show a normal pattern of perfusion in all regions. LV is normal  in size. EF is 62%, global  LVSF is normal    History of falling     per old chart    History of nuclear stress test 10/08/2020    EF 60%, Normal    Hx of blood clots     per old chart  hx of DVT left leg    Hx: UTI (urinary tract infection)     Hyperglycemia 3/30/2019    Hyperlipidemia     Hypertension     Hypothyroidism     Incontinence of bowel     Incontinence of urine     Sepsis ([address] 2015    Tremor 3/30/2019    Wears glasses     to read    Wears partial dentures     upper     Past Surgical History:   Procedure Laterality Date    BREAST SURGERY      Left breast biopsy    CARDIOVERSION  10/17/2008    CARDIOVERSION  12/15/2008    HYSTERECTOMY  age 21's    IR GUIDED DRAIN W CATHETER PLACEMENT  8/21/2020    IR GUIDED DRAIN W CATHETER PLACEMENT 8/21/2020 [facility] PROCED[facility]    IR GUIDED DRAIN W CATHETER PLACEMENT  9/8/2020    IR GUIDED DRAIN W CATHETER PLACEMENT 9/8/2020 [facility] SPECIAL PROCEDURES    JOINT REPLACEMENT  4/2005 2/2006    bilat    OTHER SURGICAL HISTORY  10/2009     bladder stimulator - interstim II 3058 - MRI OF HEAD ONLY -STIMULATOR MUST BE OFF AND TRANSMIT RECEIVE HEAD COIL ONLY    MI TOTAL HIP ARTHROPLASTY Left 02/23/2015    Hip Replacement, Total    TOTAL KNEE ARTHROPLASTY  2005, 2006    bilateral     Family History   Problem Relation Age of Onset    Diabetes Mother     Diabetes Father     Heart Disease Father      Social History     Tobacco Use    Smoking status: Former Smoker     Years: 1.00    Smokeless tobacco: Never Used    Tobacco comment: \"quit yrs ago only smoked for 2-3 yrs\"   Substance Use Topics    Alcohol use: No     Alcohol/week: 0.0 standard drinks        Review of Systems   Constitutional: Positive for fatigue. Negative for fever. HENT: Negative for ear pain and sinus pain.     Eyes: Negative for Refill: Capillary refill takes less than 2 seconds. Neurological:      Mental Status: She is alert and oriented to person, place, and time. Psychiatric:         Mood and Affect: Mood normal.         Behavior: Behavior normal.         Thought Content: Thought content normal.         Judgment: Judgment normal.         DATA:  Lab Results   Component Value Date    CKTOTAL 129 11/22/2018     BNP:  No results found for: BNP  PT/INR:  No results found for: PTINR  Lab Results   Component Value Date    LABA1C 5.9 07/07/2014     Lab Results   Component Value Date    CHOL 144 02/05/2020    TRIG 88 02/05/2020    HDL 47 02/05/2020    LDLCALC 79 02/05/2020    LDLDIRECT 70 02/27/2018     Lab Results   Component Value Date    ALT 10 02/05/2020    AST 22 02/05/2020     TSH:    Lab Results   Component Value Date    TSH 11.17 02/05/2020         Assessment/ Plan:     Hypertension   Controlled   To continue Beta blocker, ARB, Diuretic   advised low salt diet   Last echo 10/15/2020 Summary   Technically difficult examination. Definity offered, patient refused. Left ventricular systolic function is normal with an ejection fraction of 50-55%. Mild concentric left ventricular hypertrophy. The left atrium is mild to moderately dilated. Sclerotic, but non-stenotic aortic valve. Mitral annular calcification is present. No evidence of pericardial effusion. CHF (congestive heart failure) (HCC)   Appears compensated   Continue BB, ARB, Diuretic   Limit sodium to <2000mg a day   Limit water to <64 oz in a day   Monitor weight daily, Call the office if a weight gain of >3 lbs in 2 days or > 5 lbs in a week is noted. Atrial fibrillation ([address]   Rate controlled yes.  Chadsvasc score is 5   She is  on anticoagulation.    Continue anti rhythmic / rate control medications     [id number] Score for Atrial Fibrillation Stroke Risk   Risk   Factors  Component Value   C CHF Yes 1   H HTN Yes 1   A2 Age >= 76 Yes,  (85 y.o.) 2   D DM No 0   S2 Prior Stroke/TIA No 0   V Vascular Disease No 0   A Age 74-69 No,  (82 y.o.) 0   Sc Sex female 1    [id number]  Score  5   Score last updated 10/22/20 5:38 PM EDT    Click here for a link to the UpToDate guideline \"Atrial Fibrillation: Anticoagulation therapy to prevent embolization    Disclaimer: Risk Score calculation is dependent on accuracy of patient problem list and past encounter diagnosis. Patient seen, interviewed and examined. Testing was reviewed. Patient is encouraged to exercise even a brisk walk for 30 minutes at least 3 to 4 times a week. Lifestyle and risk factor modificatons discussed. Various goals are discussed and questions answered. Continue current medications. Appropriate prescriptions are addressed. Questions answered and patient verbalizes understanding. Call for any problems, questions, or concerns. Pt is to follow up in 12 months for Cardiac management    I have spent 35 minutes of face to face time with the patient with more than 50% spent counseling and coordinating care for CHF, PAF, HTN, obesity, preoperative examination.      Electronically signed by RAHAT Plaza CNP on 10/22/2020 at 1:39 PM

## 2020-10-22 NOTE — ASSESSMENT & PLAN NOTE
 Rate controlled yes.  Chadsvasc score is 5   She is  on anticoagulation.  Continue anti rhythmic / rate control medications     [id number] Score for Atrial Fibrillation Stroke Risk   Risk   Factors  Component Value   C CHF Yes 1   H HTN Yes 1   A2 Age >= 76 Yes,  (82 y.o.) 2   D DM No 0   S2 Prior Stroke/TIA No 0   V Vascular Disease No 0   A Age 74-69 No,  (82 y.o.) 0   Sc Sex female 1    [id number]  Score  5   Score last updated 10/22/20 4:73 PM EDT    Click here for a link to the UpToDate guideline \"Atrial Fibrillation: Anticoagulation therapy to prevent embolization    Disclaimer: Risk Score calculation is dependent on accuracy of patient problem list and past encounter diagnosis.

## 2020-10-22 NOTE — ASSESSMENT & PLAN NOTE
 Appears compensated   Continue BB, ARB, Diuretic   Limit sodium to <2000mg a day   Limit water to <64 oz in a day   Monitor weight daily, Call the office if a weight gain of >3 lbs in 2 days or > 5 lbs in a week is noted.

## 2020-11-09 ENCOUNTER — TELEPHONE (OUTPATIENT)
Dept: CARDIOLOGY CLINIC | Age: 85
End: 2020-11-09

## 2020-11-09 NOTE — TELEPHONE ENCOUNTER
Bruno Richard with [facility]  Called for cardiac clearance   They have not received  asked to put att Bruno Richard   Fax to both #  [phone]

## 2021-03-08 ENCOUNTER — OFFICE VISIT (OUTPATIENT)
Dept: FAMILY MEDICINE CLINIC | Age: 86
End: 2021-03-08
Payer: MEDICARE

## 2021-03-08 VITALS
HEIGHT: 64 IN | DIASTOLIC BLOOD PRESSURE: 74 MMHG | BODY MASS INDEX: 40.8 KG/M2 | SYSTOLIC BLOOD PRESSURE: 128 MMHG | HEART RATE: 84 BPM | TEMPERATURE: 97.2 F | WEIGHT: 239 LBS

## 2021-03-08 DIAGNOSIS — Z23 FLU VACCINE NEED: ICD-10-CM

## 2021-03-08 DIAGNOSIS — E03.9 HYPOTHYROIDISM, UNSPECIFIED TYPE: ICD-10-CM

## 2021-03-08 DIAGNOSIS — I50.9 CONGESTIVE HEART FAILURE, UNSPECIFIED HF CHRONICITY, UNSPECIFIED HEART FAILURE TYPE (HCC): ICD-10-CM

## 2021-03-08 DIAGNOSIS — E78.5 HYPERLIPIDEMIA, UNSPECIFIED HYPERLIPIDEMIA TYPE: ICD-10-CM

## 2021-03-08 DIAGNOSIS — E66.01 MORBID OBESITY WITH BMI OF 40.0-44.9, ADULT (HCC): ICD-10-CM

## 2021-03-08 DIAGNOSIS — I10 ESSENTIAL HYPERTENSION: ICD-10-CM

## 2021-03-08 DIAGNOSIS — Z79.01 CHRONIC ANTICOAGULATION: Primary | ICD-10-CM

## 2021-03-08 PROCEDURE — G0008 ADMIN INFLUENZA VIRUS VAC: HCPCS | Performed by: FAMILY MEDICINE

## 2021-03-08 PROCEDURE — G8400 PT W/DXA NO RESULTS DOC: HCPCS | Performed by: FAMILY MEDICINE

## 2021-03-08 PROCEDURE — 99214 OFFICE O/P EST MOD 30 MIN: CPT | Performed by: FAMILY MEDICINE

## 2021-03-08 PROCEDURE — G8417 CALC BMI ABV UP PARAM F/U: HCPCS | Performed by: FAMILY MEDICINE

## 2021-03-08 PROCEDURE — G8484 FLU IMMUNIZE NO ADMIN: HCPCS | Performed by: FAMILY MEDICINE

## 2021-03-08 PROCEDURE — 4040F PNEUMOC VAC/ADMIN/RCVD: CPT | Performed by: FAMILY MEDICINE

## 2021-03-08 PROCEDURE — G8427 DOCREV CUR MEDS BY ELIG CLIN: HCPCS | Performed by: FAMILY MEDICINE

## 2021-03-08 PROCEDURE — 1036F TOBACCO NON-USER: CPT | Performed by: FAMILY MEDICINE

## 2021-03-08 PROCEDURE — 1090F PRES/ABSN URINE INCON ASSESS: CPT | Performed by: FAMILY MEDICINE

## 2021-03-08 PROCEDURE — 90694 VACC AIIV4 NO PRSRV 0.5ML IM: CPT | Performed by: FAMILY MEDICINE

## 2021-03-08 PROCEDURE — 1123F ACP DISCUSS/DSCN MKR DOCD: CPT | Performed by: FAMILY MEDICINE

## 2021-03-08 RX ORDER — POTASSIUM CHLORIDE 750 MG/1
10 CAPSULE, EXTENDED RELEASE ORAL DAILY
Qty: 90 CAPSULE | Refills: 1 | Status: SHIPPED | OUTPATIENT
Start: 2021-03-08 | End: 2021-09-08 | Stop reason: SDUPTHER

## 2021-03-08 RX ORDER — LOSARTAN POTASSIUM 100 MG/1
TABLET ORAL
Qty: 90 TABLET | Refills: 1 | Status: SHIPPED | OUTPATIENT
Start: 2021-03-08 | End: 2021-09-08 | Stop reason: SDUPTHER

## 2021-03-08 RX ORDER — PRAVASTATIN SODIUM 10 MG
TABLET ORAL
Qty: 90 TABLET | Refills: 1 | Status: SHIPPED | OUTPATIENT
Start: 2021-03-08 | End: 2021-09-08 | Stop reason: SDUPTHER

## 2021-03-08 RX ORDER — FENOFIBRATE 67 MG/1
67 CAPSULE ORAL
Qty: 90 CAPSULE | Refills: 1 | Status: SHIPPED | OUTPATIENT
Start: 2021-03-08 | End: 2021-09-08 | Stop reason: SDUPTHER

## 2021-03-08 RX ORDER — LEVOTHYROXINE SODIUM 175 UG/1
175 TABLET ORAL DAILY
Qty: 90 TABLET | Refills: 1 | Status: SHIPPED | OUTPATIENT
Start: 2021-03-08 | End: 2021-09-08 | Stop reason: SDUPTHER

## 2021-03-08 RX ORDER — FUROSEMIDE 20 MG/1
20 TABLET ORAL DAILY
Qty: 90 TABLET | Refills: 1 | Status: SHIPPED | OUTPATIENT
Start: 2021-03-08 | End: 2021-09-08 | Stop reason: SDUPTHER

## 2021-03-08 SDOH — ECONOMIC STABILITY: TRANSPORTATION INSECURITY
IN THE PAST 12 MONTHS, HAS LACK OF TRANSPORTATION KEPT YOU FROM MEETINGS, WORK, OR FROM GETTING THINGS NEEDED FOR DAILY LIVING?: NO

## 2021-03-08 SDOH — ECONOMIC STABILITY: TRANSPORTATION INSECURITY
IN THE PAST 12 MONTHS, HAS THE LACK OF TRANSPORTATION KEPT YOU FROM MEDICAL APPOINTMENTS OR FROM GETTING MEDICATIONS?: NO

## 2021-03-08 ASSESSMENT — PATIENT HEALTH QUESTIONNAIRE - PHQ9
1. LITTLE INTEREST OR PLEASURE IN DOING THINGS: 0
SUM OF ALL RESPONSES TO PHQ QUESTIONS 1-9: 0
2. FEELING DOWN, DEPRESSED OR HOPELESS: 0
SUM OF ALL RESPONSES TO PHQ QUESTIONS 1-9: 0

## 2021-03-08 NOTE — PROGRESS NOTES
Vaccine Information Sheet, \"Influenza - Inactivated\"  given to Jon Rocha, or parent/legal guardian of  Jon Rocha and verbalized understanding. Patient responses:    Have you ever had a reaction to a flu vaccine? No  Do you have any current illness? No  Have you ever had Guillian Magnolia Syndrome? No  Do you have a serious allergy to any of the follow: Neomycin, Polymyxin, Thimerosal, eggs or egg products? No    Flu vaccine given per order. Please see immunization tab. Risks and benefits explained. Current VIS given.

## 2021-03-08 NOTE — PROGRESS NOTES
3/8/2021    Yani Belcher    Chief Complaint   Patient presents with    6 Month Follow-Up    Other     no c/o's       HPI    Mal Macias is a 82 y.o. female who presents today with follow-up. Patient doing well. She denies problem with her anticoagulants. She denies shortness of breath. Only on review of systems if she believes that she is having leg swelling. Patient does have some 1-2+ leg swelling she believes it is due to the leg bag versus very circular urine back which I do not believe would be the case. She is working with Dr. Kvng Heredia on this. She has not changed her water pill dose and she is actually down a dozen pounds from her last visit here. REVIEW OF SYSTEMS    Constitutional:  Denies fever, chills, weight loss or weakness  Eyes:  no photophobia or discharge  ENT:  no sore throat or ear pain  Cardiovascular:  Denies chest pain, palpitations or swelling  Respiratory:  Denies cough or shortness of breath  GI:  no abdominal pain, nausea, vomiting, or diarrhea  Musculoskeletal:  no back pain  Skin:  No rashes  Neurologic:  no headache, focal weakness, or sensory changes  Endocrine:  no polyuria or polydipsia      PAST MEDICAL HISTORY  Past Medical History:   Diagnosis Date    Allergic rhinitis     Arthritis     CHF (congestive heart failure) (HCC)     DVT, lower extremity, proximal, acute, left (HCC)     Edema leg     Gastroesophageal reflux disease 3/30/2019    H/O 24 hour EKG monitoring 6/10 1/09 11/08    6/7/10- predominant rhythm is a-fib, freq PVCs    H/O cardiac catheterization 7/20/00    H/O cardiac catheterization 7/20/00    right dominant system, left main is short but angiographically normal, LAD and CX reveal sign  tortuosities distally  but no sign angiographic atherosclerotic disease noted except luminal  irregularities here and there.   LVSF normal, EF  55%    H/O cardiovascular stress test 5/2/2016    lexiscan-normal,EF70%    H/O Doppler echocardiogram 7/12/10 intimal thickening but no sign. atherosclerotic plaque noted in [facility].  H/O echocardiogram 5/2/2016 9/10     5/16 EF60% mild MR, TR, AR 9/20/10 Complete 2 dim transthoracic echo, techn. difficult. The study was  tech limited, LVSF is normal    History of atrial fibrillation     History of cardiovascular stress test 7/26/13, 7/10 8/08    7/13-WNL Observed defect consistent with breast attenuation. EF 70%. 7/10-post stress myocardial images show a normal pattern of perfusion in all regions. LV is normal  in size. EF is 62%, global  LVSF is normal    History of falling     per old chart    History of nuclear stress test 10/08/2020    EF 60%, Normal    Hx of blood clots     per old chart  hx of DVT left leg    Hx: UTI (urinary tract infection)     Hyperglycemia 3/30/2019    Hyperlipidemia     Hypertension     Hypothyroidism     Incontinence of bowel     Incontinence of urine     Seps[facility] 2015    Tremor 3/30/2019    Wears glasses     to read    Wears partial dentures     upper       FAMILY HISTORY  Family History   Problem Relation Age of Onset    Diabetes Mother     Diabetes Father     Heart Disease Father        SOCIAL HISTORY  Social History     Socioeconomic History    Marital status:       Spouse name: None    Number of children: None    Years of education: None    Highest education level: None   Occupational History    None   Social Needs    Financial resource strain: Not hard at all   [employer] insecurity     Worry: Never true     Inability: Never true   [employer] needs     Medical: No     Non-medical: No   Tobacco Use    Smoking status: Former Smoker     Years: 1.00    Smokeless tobacco: Never Used    Tobacco comment: \"quit yrs ago only smoked for 2-3 yrs\"   Substance and Sexual Activity    Alcohol use: No     Alcohol/week: 0.0 standard drinks    Drug use: No    Sexual activity: Not Currently   Lifestyle    Physical activity     Days per week: None (SYNTHROID) 175 MCG tablet Take 1 tablet by mouth daily 90 tablet 1    potassium chloride (MICRO-K) 10 MEQ extended release capsule Take 1 capsule by mouth daily 90 capsule 1    loperamide (IMODIUM) 2 MG capsule Take 2 mg by mouth daily as needed for Diarrhea      loratadine (CLARITIN) 10 MG tablet Take 10 mg by mouth daily      Cholecalciferol (VITAMIN D3) 5000 units TABS Take by mouth      acetaminophen (TYLENOL) 325 MG tablet Take 325 mg by mouth daily.  omeprazole (PRILOSEC) 20 MG delayed release capsule Take 20 mg by mouth daily       No current facility-administered medications for this visit. ALLERGIES  Allergies   Allergen Reactions    Tape [place] Tape] Itching    Codeine Rash    Sulfa Antibiotics Nausea And Vomiting       PHYSICAL EXAM  /74   Pulse 84   Temp 97.2 °F (36.2 °C)   Ht 5' 4\" (1.626 m)   Wt 239 lb (108.4 kg)   BMI 41.02 kg/m²     1-2+ bilateral lower extremity edema. Skin tolerating well. ASSESSMENT & PLAN    1. Chronic anticoagulation  Doing excellent on Eliquis    2. Essential hypertension  Issue controlled. Continue meds. Refilled meds. - losartan (COZAAR) 100 MG tablet; TAKE 1 TABLET DAILY  Dispense: 90 tablet; Refill: 1  - Basic Metabolic Panel; Future  - CBC Auto Differential; Future    3. Morbid obesity with BMI of 40.0-44.9, adult [facility])  Patient to lose weight    4. Hyperlipidemia, unspecified hyperlipidemia type  Issue controlled. Continue meds. Refilled meds. - fenofibrate micronized (LOFIBRA) 67 MG capsule; Take 1 capsule by mouth every morning (before breakfast)  Dispense: 90 capsule; Refill: 1  - pravastatin (PRAVACHOL) 10 MG tablet; TAKE 1 TABLET AT BEDTIME  Dispense: 90 tablet; Refill: 1    5. Congestive heart failure, unspecified HF chronicity, unspecified heart failure type ([address]  Issue controlled. Continue meds. Refilled meds. .  Nice weight loss. Continue the same  - furosemide (LASIX) 20 MG tablet;  Take 1 tablet by mouth daily Dispense: 90 tablet; Refill: 1  - potassium chloride (MICRO-K) 10 MEQ extended release capsule; Take 1 capsule by mouth daily  Dispense: 90 capsule; Refill: 1    6. Hypothyroidism, unspecified type  Issue is stable check labs today. Adjust medication off of lab results. - levothyroxine (SYNTHROID) 175 MCG tablet; Take 1 tablet by mouth daily  Dispense: 90 tablet; Refill: 1  - TSH without Reflex; Future    7.  Flu vaccine need  - Influenza, Quadv, Adjunanted,, 65 yrs+ , IM, PF, Prefill syr, 0.5mL (FLUAD)    Follow-up 6 months       Electronically signed by Jimmy Green MD on 3/8/2021

## 2021-04-20 ENCOUNTER — TELEPHONE (OUTPATIENT)
Dept: FAMILY MEDICINE CLINIC | Age: 86
End: 2021-04-20

## 2021-04-20 NOTE — TELEPHONE ENCOUNTER
Debora Mayers from [facility] and stated that Dr Marlen Arzola ordered home care for cath care only. Debora Mayers is requesting orders for PT,OT and medical social worker.  Call Debora Mayers with verbal

## 2021-06-24 ENCOUNTER — TELEPHONE (OUTPATIENT)
Dept: FAMILY MEDICINE CLINIC | Age: 86
End: 2021-06-24

## 2021-06-24 NOTE — TELEPHONE ENCOUNTER
PT HAS ELIQUIS AT HOME AND NOT SURE IF SHE IS SUPPOSED TO TAKE THIS. I DID SEE IT ON CARDIO DR LIST. PLEASE ADVISE.  LM MESSAGE IF HE DOESN'T ANSWER

## 2021-06-24 NOTE — TELEPHONE ENCOUNTER
Mandi, I am with you. I cannot find a reason why the Eliquis would be gone. Both my and the cardiologist notes have it in there and there are no notes taking it off. Please refill the Eliquis or have her keep on it. Lenard Flower

## 2021-06-25 NOTE — TELEPHONE ENCOUNTER
Attempts to reach joe pt's son . Unable to [employer] with pt who states she does take the medication. Unsure of exact does & who actually prescribes.  Doesn't need rf at this time

## 2021-06-28 NOTE — TELEPHONE ENCOUNTER
Requested Prescriptions     Signed Prescriptions Disp Refills    apixaban (ELIQUIS) 2.5 MG TABS tablet 180 tablet 1     Sig: Take 1 tablet by mouth 2 times daily     Authorizing Provider: Alma Delia Malik     Ordering User: Nils Shone

## 2021-06-28 NOTE — TELEPHONE ENCOUNTER
Please call Lai Blue patients son at [phone].  Media Aaron is unavailable and needs Lai Blue to take care of this about the Eliquis

## 2021-09-08 ENCOUNTER — OFFICE VISIT (OUTPATIENT)
Dept: FAMILY MEDICINE CLINIC | Age: 86
End: 2021-09-08
Payer: MEDICARE

## 2021-09-08 VITALS
WEIGHT: 245 LBS | DIASTOLIC BLOOD PRESSURE: 58 MMHG | OXYGEN SATURATION: 97 % | HEIGHT: 64 IN | BODY MASS INDEX: 41.83 KG/M2 | SYSTOLIC BLOOD PRESSURE: 110 MMHG | HEART RATE: 82 BPM

## 2021-09-08 DIAGNOSIS — E03.9 HYPOTHYROIDISM, UNSPECIFIED TYPE: ICD-10-CM

## 2021-09-08 DIAGNOSIS — E78.5 HYPERLIPIDEMIA, UNSPECIFIED HYPERLIPIDEMIA TYPE: ICD-10-CM

## 2021-09-08 DIAGNOSIS — I10 ESSENTIAL HYPERTENSION: Primary | ICD-10-CM

## 2021-09-08 DIAGNOSIS — I48.11 LONGSTANDING PERSISTENT ATRIAL FIBRILLATION (HCC): ICD-10-CM

## 2021-09-08 DIAGNOSIS — Z23 FLU VACCINE NEED: ICD-10-CM

## 2021-09-08 DIAGNOSIS — I50.9 CONGESTIVE HEART FAILURE, UNSPECIFIED HF CHRONICITY, UNSPECIFIED HEART FAILURE TYPE (HCC): ICD-10-CM

## 2021-09-08 DIAGNOSIS — I82.409: ICD-10-CM

## 2021-09-08 PROCEDURE — 90694 VACC AIIV4 NO PRSRV 0.5ML IM: CPT | Performed by: FAMILY MEDICINE

## 2021-09-08 PROCEDURE — 99214 OFFICE O/P EST MOD 30 MIN: CPT | Performed by: FAMILY MEDICINE

## 2021-09-08 PROCEDURE — G0008 ADMIN INFLUENZA VIRUS VAC: HCPCS | Performed by: FAMILY MEDICINE

## 2021-09-08 PROCEDURE — 1036F TOBACCO NON-USER: CPT | Performed by: FAMILY MEDICINE

## 2021-09-08 PROCEDURE — 1123F ACP DISCUSS/DSCN MKR DOCD: CPT | Performed by: FAMILY MEDICINE

## 2021-09-08 PROCEDURE — G8417 CALC BMI ABV UP PARAM F/U: HCPCS | Performed by: FAMILY MEDICINE

## 2021-09-08 PROCEDURE — 4040F PNEUMOC VAC/ADMIN/RCVD: CPT | Performed by: FAMILY MEDICINE

## 2021-09-08 PROCEDURE — 1090F PRES/ABSN URINE INCON ASSESS: CPT | Performed by: FAMILY MEDICINE

## 2021-09-08 PROCEDURE — G8427 DOCREV CUR MEDS BY ELIG CLIN: HCPCS | Performed by: FAMILY MEDICINE

## 2021-09-08 PROCEDURE — G8400 PT W/DXA NO RESULTS DOC: HCPCS | Performed by: FAMILY MEDICINE

## 2021-09-08 RX ORDER — LOSARTAN POTASSIUM 100 MG/1
TABLET ORAL
Qty: 90 TABLET | Refills: 1 | Status: SHIPPED | OUTPATIENT
Start: 2021-09-08 | End: 2021-09-14

## 2021-09-08 RX ORDER — LEVOTHYROXINE SODIUM 175 UG/1
175 TABLET ORAL DAILY
Qty: 90 TABLET | Refills: 1 | Status: SHIPPED | OUTPATIENT
Start: 2021-09-08

## 2021-09-08 RX ORDER — FENOFIBRATE 67 MG/1
67 CAPSULE ORAL
Qty: 90 CAPSULE | Refills: 1 | Status: SHIPPED | OUTPATIENT
Start: 2021-09-08 | End: 2022-08-30

## 2021-09-08 RX ORDER — FUROSEMIDE 20 MG/1
20 TABLET ORAL DAILY
Qty: 90 TABLET | Refills: 1 | Status: SHIPPED | OUTPATIENT
Start: 2021-09-08 | End: 2021-10-12 | Stop reason: SDUPTHER

## 2021-09-08 RX ORDER — PRAVASTATIN SODIUM 10 MG
TABLET ORAL
Qty: 90 TABLET | Refills: 1 | Status: SHIPPED | OUTPATIENT
Start: 2021-09-08 | End: 2022-08-30

## 2021-09-08 RX ORDER — POTASSIUM CHLORIDE 750 MG/1
10 CAPSULE, EXTENDED RELEASE ORAL DAILY
Qty: 90 CAPSULE | Refills: 1 | Status: ON HOLD | OUTPATIENT
Start: 2021-09-08 | End: 2022-09-01 | Stop reason: SDUPTHER

## 2021-09-08 NOTE — PROGRESS NOTES
Vaccine Information Sheet, \"Influenza - Inactivated\"  given to Mike Moran, or parent/legal guardian of  Mike Moran and verbalized understanding. Patient responses:    Have you ever had a reaction to a flu vaccine? No  Do you have any current illness? No  Have you ever had Guillian Redford Syndrome? No  Do you have a serious allergy to any of the follow: Neomycin, Polymyxin, Thimerosal, eggs or egg products? No    Flu vaccine given per order. Please see immunization tab. Risks and benefits explained. Current VIS given.

## 2021-09-08 NOTE — PROGRESS NOTES
9/8/2021    Bryce Hinojosa    Chief Complaint   Patient presents with    6 Month Follow-Up    Edema     left leg       HPI    Jose Rodriguez is a 82 y.o. female who presents today with follow-up. Patient concerned of edema. Left side worse than right. She notes of having had a history of a left-sided DVT in the past.  She does not use support socks. She did sleep with her legs down last night in a chair. She does not do that often. Patient denies chest pain. She notes being on Multaq. Patient denies shortness of breath. Occasionally she coughs up colored congestion. She notes she does not cough well. REVIEW OF SYSTEMS    Constitutional:  Denies fever, chills, weight loss or weakness  Eyes:  no photophobia or discharge  ENT:  no sore throat or ear pain  Cardiovascular:  Denies chest pain, palpitations or swelling  Respiratory:  Denies cough or shortness of breath  GI:  no abdominal pain, nausea, vomiting, or diarrhea  Musculoskeletal:  no back pain  Skin:  No rashes  Neurologic:  no headache, focal weakness, or sensory changes  Endocrine:  no polyuria or polydipsia      PAST MEDICAL HISTORY  Past Medical History:   Diagnosis Date    Allergic rhinitis     Arthritis     CHF (congestive heart failure) (HCC)     DVT, lower extremity, proximal, acute, left (HCC)     Edema leg     Gastroesophageal reflux disease 3/30/2019    H/O 24 hour EKG monitoring 6/10 1/09 11/08    6/7/10- predominant rhythm is a-fib, freq PVCs    H/O cardiac catheterization 7/20/00    H/O cardiac catheterization 7/20/00    right dominant system, left main is short but angiographically normal, LAD and CX reveal sign  tortuosities distally  but no sign angiographic atherosclerotic disease noted except luminal  irregularities here and there. LVSF normal, EF  55%    H/O cardiovascular stress test 5/2/2016    lexiscan-normal,EF70%    H/O Doppler echocardiogram 7/12/10    intimal thickening but no sign.  atherosclerotic plaque noted in [facility].  H/O echocardiogram 5/2/2016 9/10     5/16 EF60% mild MR, TR, AR 9/20/10 Complete 2 dim transthoracic echo, techn. difficult. The study was  tech limited, LVSF is normal    History of atrial fibrillation     History of cardiovascular stress test 7/26/13, 7/10 8/08    7/13-WNL Observed defect consistent with breast attenuation. EF 70%. 7/10-post stress myocardial images show a normal pattern of perfusion in all regions. LV is normal  in size. EF is 62%, global  LVSF is normal    History of falling     per old chart    History of nuclear stress test 10/08/2020    EF 60%, Normal    Hx of blood clots     per old chart  hx of DVT left leg    Hx: UTI (urinary tract infection)     Hyperglycemia 3/30/2019    Hyperlipidemia     Hypertension     Hypothyroidism     Incontinence of bowel     Incontinence of urine     Sepsis ([facility] [address] 2015    Tremor 3/30/2019    Wears glasses     to read    Wears partial dentures     upper       FAMILY HISTORY  Family History   Problem Relation Age of Onset    Diabetes Mother     Diabetes Father     Heart Disease Father        SOCIAL HISTORY  Social History     Socioeconomic History    Marital status:       Spouse name: Not on file    Number of children: Not on file    Years of education: Not on file    Highest education level: Not on file   Occupational History    Not on file   Tobacco Use    Smoking status: Former Smoker     Years: 1.00    Smokeless tobacco: Never Used    Tobacco comment: \"quit yrs ago only smoked for 2-3 yrs\"   Vaping Use    Vaping Use: Never used   Substance and Sexual Activity    Alcohol use: No     Alcohol/week: 0.0 standard drinks    Drug use: No    Sexual activity: Not Currently   Other Topics Concern    Not on file   Social History Narrative    Not on file     Social Determinants of Health     Financial Resource Strain: Low Risk     Difficulty of Paying Living Expenses: Not hard at all   Food Insecurity: No Food Insecurity    Worried About Running Out of Food in the Last Year: Never true    Ran Out of Food in the Last Year: Never true   Transportation Needs: No Transportation Needs    Lack of Transportation (Medical): No    Lack of Transportation (Non-Medical):  No   Physical Activity:     Days of Exercise per Week:     Minutes of Exercise per Session:    Stress:     Feeling of Stress :    Social Connections:     Frequency of Communication with Friends and Family:     Frequency of Social Gatherings with Friends and Family:     Attends [employer]oration[employer] Services:     Active Member of Clubs or Organizations:     Attends Club or Organization Meetings:     Marital Status:    Intimate Partner Violence:     Fear of Current or Ex-Partner:     Emotionally Abused:     Physically Abused:     Sexually Abused:         SURGICAL HISTORY  Past Surgical History:   Procedure Laterality Date    BREAST SURGERY      Left breast biopsy    CARDIOVERSION  10/17/2008    CARDIOVERSION  12/15/2008    HYSTERECTOMY  age 21's    IR GUIDED [address]  8/21/2020    IR [address] 8/21/2020 [facility] SPECIAL PROCEDURES    IR GUIDED [address]  9/8/2020    IR GUIDED DRAIN W CATHETER PLACEMENT 9/8/2020 [facility] SPECIAL PROCEDURES    JOINT REPLACEMENT  4/2005 2/2006    bilat    OTHER SURGICAL HISTORY  10/2009     bladder stimulator - interstim II 3058 - MRI OF HEAD ONLY -STIMULATOR MUST BE OFF AND TRANSMIT RECEIVE HEAD COIL ONLY    MT TOTAL HIP ARTHROPLASTY Left 02/23/2015    Hip Replacement, Total    TOTAL KNEE ARTHROPLASTY  2005, 2006    bilateral       CURRENT MEDICATIONS  Current Outpatient Medications   Medication Sig Dispense Refill    fenofibrate micronized (LOFIBRA) 67 MG capsule Take 1 capsule by mouth every morning (before breakfast) 90 capsule 1    losartan (COZAAR) 100 MG tablet TAKE 1 TABLET DAILY 90 tablet 1    pravastatin (PRAVACHOL) 10 MG tablet TAKE 1 TABLET AT BEDTIME 90 tablet 1    furosemide (LASIX) 20 MG tablet Take 1 tablet by mouth daily 90 tablet 1    levothyroxine (SYNTHROID) 175 MCG tablet Take 1 tablet by mouth daily 90 tablet 1    potassium chloride (MICRO-K) 10 MEQ extended release capsule Take 1 capsule by mouth daily 90 capsule 1    apixaban (ELIQUIS) 2.5 MG TABS tablet Take 1 tablet by mouth 2 times daily 180 tablet 1    dronedarone hcl (MULTAQ) 400 MG TABS TAKE 1 TABLET TWICE A DAY WITH MEALS 180 tablet 1    loperamide (IMODIUM) 2 MG capsule Take 2 mg by mouth daily as needed for Diarrhea      loratadine (CLARITIN) 10 MG tablet Take 10 mg by mouth daily      acetaminophen (TYLENOL) 325 MG tablet Take 325 mg by mouth daily. No current facility-administered medications for this visit. ALLERGIES  Allergies   Allergen Reactions    Tape Ana Rice Tape] Itching    Codeine Rash    Sulfa Antibiotics Nausea And Vomiting       PHYSICAL EXAM  BP (!) 110/58   Pulse 82   Ht 5' 4\" (1.626 m)   Wt 245 lb (111.1 kg)   SpO2 97%   BMI 42.05 kg/m²     ASSESSMENT & PLAN    1. Essential hypertension  Blood pressure not high enough to attempt further diuresis. Patient to decrease salt. We wrote out the nutrition changes recommended  Recommended bilateral support socks first thing in the morning. In the future if needed would decrease the losartan and increase the Lasix  . - losartan (COZAAR) 100 MG tablet; TAKE 1 TABLET DAILY  Dispense: 90 tablet; Refill: 1    2. 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 Continue same meds    3. Longstanding persistent atrial fibrillation (HCC)  Issue controlled. Continue meds. Refilled meds. 4. Flu vaccine need  - INFLUENZA, QUADV, ADJUVANTED, 65 YRS =, IM, PF, PREFILL SYR, 0.5ML (FLUAD)    5. Hyperlipidemia, unspecified hyperlipidemia type  Reviewed labs. At goal.  Remain on the same    - fenofibrate micronized (LOFIBRA) 67 MG capsule;  Take 1 capsule by mouth every morning (before

## 2021-09-09 DIAGNOSIS — N18.4 CKD (CHRONIC KIDNEY DISEASE) STAGE 4, GFR 15-29 ML/MIN (HCC): Primary | ICD-10-CM

## 2021-09-14 DIAGNOSIS — I10 ESSENTIAL HYPERTENSION: ICD-10-CM

## 2021-09-14 RX ORDER — LOSARTAN POTASSIUM 100 MG/1
TABLET ORAL
Qty: 90 TABLET | Refills: 1
Start: 2021-09-14 | End: 2021-10-12 | Stop reason: SINTOL

## 2021-10-11 ENCOUNTER — HOSPITAL ENCOUNTER (OUTPATIENT)
Age: 86
Discharge: HOME | End: 2021-10-11
Payer: MEDICARE

## 2021-10-11 DIAGNOSIS — N17.9 ACUTE RENAL FAILURE, UNSPECIFIED ACUTE RENAL FAILURE TYPE (HCC): Primary | ICD-10-CM

## 2021-10-11 DIAGNOSIS — N18.4 CKD (CHRONIC KIDNEY DISEASE) STAGE 4, GFR 15-29 ML/MIN (HCC): ICD-10-CM

## 2021-10-11 LAB
ANION GAP SERPL CALCULATED.3IONS-SCNC: 14 MMOL/L (ref 4–16)
BUN BLDV-MCNC: 45 MG/DL (ref 6–23)
CALCIUM SERPL-MCNC: 8.8 MG/DL (ref 8.3–10.6)
CHLORIDE BLD-SCNC: 104 MMOL/L (ref 99–110)
CO2: 22 MMOL/L (ref 21–32)
CREAT SERPL-MCNC: 2.7 MG/DL (ref 0.6–1.1)
GFR AFRICAN AMERICAN: 20 ML/MIN/1.73M2
GFR NON-AFRICAN AMERICAN: 17 ML/MIN/1.73M2
GLUCOSE BLD-MCNC: 90 MG/DL (ref 70–99)
POTASSIUM SERPL-SCNC: 4.6 MMOL/L (ref 3.5–5.1)
SODIUM BLD-SCNC: 140 MMOL/L (ref 135–145)

## 2021-10-11 PROCEDURE — 36415 COLL VENOUS BLD VENIPUNCTURE: CPT

## 2021-10-11 PROCEDURE — 80048 BASIC METABOLIC PNL TOTAL CA: CPT

## 2021-10-12 DIAGNOSIS — I50.9 CONGESTIVE HEART FAILURE, UNSPECIFIED HF CHRONICITY, UNSPECIFIED HEART FAILURE TYPE (HCC): ICD-10-CM

## 2021-10-12 RX ORDER — FUROSEMIDE 20 MG/1
TABLET ORAL
Qty: 90 TABLET | Refills: 1
Start: 2021-10-12 | End: 2021-11-29 | Stop reason: SDUPTHER

## 2021-10-13 ENCOUNTER — HOSPITAL ENCOUNTER (OUTPATIENT)
Dept: ULTRASOUND IMAGING | Age: 86
Discharge: HOME | End: 2021-10-13
Payer: MEDICARE

## 2021-10-13 DIAGNOSIS — N17.9 ACUTE RENAL FAILURE, UNSPECIFIED ACUTE RENAL FAILURE TYPE (HCC): ICD-10-CM

## 2021-10-13 PROCEDURE — 76775 US EXAM ABDO BACK WALL LIM: CPT

## 2021-10-14 ENCOUNTER — TELEPHONE (OUTPATIENT)
Dept: FAMILY MEDICINE CLINIC | Age: 86
End: 2021-10-14

## 2021-10-15 ENCOUNTER — TELEPHONE (OUTPATIENT)
Dept: FAMILY MEDICINE CLINIC | Age: 86
End: 2021-10-15

## 2021-10-18 DIAGNOSIS — N17.9 ACUTE RENAL FAILURE, UNSPECIFIED ACUTE RENAL FAILURE TYPE (HCC): ICD-10-CM

## 2021-10-18 LAB
ANION GAP SERPL CALCULATED.3IONS-SCNC: 13 MMOL/L (ref 3–16)
BUN BLDV-MCNC: 19 MG/DL (ref 7–20)
CALCIUM SERPL-MCNC: 9.2 MG/DL (ref 8.3–10.6)
CHLORIDE BLD-SCNC: 105 MMOL/L (ref 99–110)
CO2: 22 MMOL/L (ref 21–32)
CREAT SERPL-MCNC: 1.3 MG/DL (ref 0.6–1.2)
GFR AFRICAN AMERICAN: 47
GFR NON-AFRICAN AMERICAN: 39
GLUCOSE BLD-MCNC: 96 MG/DL (ref 70–99)
POTASSIUM SERPL-SCNC: 4.9 MMOL/L (ref 3.5–5.1)
SODIUM BLD-SCNC: 140 MMOL/L (ref 136–145)

## 2021-11-29 ENCOUNTER — OFFICE VISIT (OUTPATIENT)
Dept: CARDIOLOGY CLINIC | Age: 86
End: 2021-11-29
Payer: MEDICARE

## 2021-11-29 VITALS
HEIGHT: 64 IN | DIASTOLIC BLOOD PRESSURE: 76 MMHG | SYSTOLIC BLOOD PRESSURE: 128 MMHG | BODY MASS INDEX: 41.48 KG/M2 | HEART RATE: 103 BPM | WEIGHT: 243 LBS

## 2021-11-29 DIAGNOSIS — I50.9 CONGESTIVE HEART FAILURE, UNSPECIFIED HF CHRONICITY, UNSPECIFIED HEART FAILURE TYPE (HCC): ICD-10-CM

## 2021-11-29 DIAGNOSIS — I48.91 ATRIAL FIBRILLATION, UNSPECIFIED TYPE (HCC): Primary | ICD-10-CM

## 2021-11-29 PROCEDURE — G8400 PT W/DXA NO RESULTS DOC: HCPCS | Performed by: INTERNAL MEDICINE

## 2021-11-29 PROCEDURE — 1123F ACP DISCUSS/DSCN MKR DOCD: CPT | Performed by: INTERNAL MEDICINE

## 2021-11-29 PROCEDURE — G8484 FLU IMMUNIZE NO ADMIN: HCPCS | Performed by: INTERNAL MEDICINE

## 2021-11-29 PROCEDURE — 99213 OFFICE O/P EST LOW 20 MIN: CPT | Performed by: INTERNAL MEDICINE

## 2021-11-29 PROCEDURE — 4040F PNEUMOC VAC/ADMIN/RCVD: CPT | Performed by: INTERNAL MEDICINE

## 2021-11-29 PROCEDURE — 1090F PRES/ABSN URINE INCON ASSESS: CPT | Performed by: INTERNAL MEDICINE

## 2021-11-29 PROCEDURE — G8417 CALC BMI ABV UP PARAM F/U: HCPCS | Performed by: INTERNAL MEDICINE

## 2021-11-29 PROCEDURE — 93000 ELECTROCARDIOGRAM COMPLETE: CPT | Performed by: INTERNAL MEDICINE

## 2021-11-29 PROCEDURE — 1036F TOBACCO NON-USER: CPT | Performed by: INTERNAL MEDICINE

## 2021-11-29 PROCEDURE — G8427 DOCREV CUR MEDS BY ELIG CLIN: HCPCS | Performed by: INTERNAL MEDICINE

## 2021-11-29 RX ORDER — FUROSEMIDE 20 MG/1
TABLET ORAL
Qty: 90 TABLET | Refills: 1 | Status: ON HOLD | OUTPATIENT
Start: 2021-11-29 | End: 2022-09-01 | Stop reason: SDUPTHER

## 2021-11-29 NOTE — PROGRESS NOTES
CARDIOLOGY NOTE      11/29/2021    RE: Triston Novak  (1935)                               TO:  Dr. Oda Lesch, MD            [address] is a 82 y.o. female who was seen today for management of  A fib                                    HPI:                   Pt has h/o a fib, htn, jhyperlipidimea, seen today for  6 mo fu.  Pt has  No cardiac complains , feels weak    Triston Novak has the following history recorded in care path:  Patient Active Problem List    Diagnosis Date Noted    EKG abnormalities     Noncompliance with medication regimen     Chronic atrial fibrillation ([address]     Acute cystitis without hematuria     Sepsis ([address] 05/18/2015    Recurrent UTI 05/14/2015    Rhabdomyolysis 08/30/2017    Sepsis associated hypotension ([address] 08/30/2017    Leg weakness 05/20/2015    Abnormality of gait 05/20/2015    CHF (congestive heart failure) ([address] 05/20/2015    Anemia 05/15/2015    Multiple falls 05/14/2015    Chronic anticoagulation 08/20/2012    Hypertension 08/20/2012    Hypothyroidism 08/20/2012    Encounter for long-term (current) use of other medications 08/20/2012    CKD (chronic kidney disease) stage 4, GFR 15-29 ml/min ([address] 09/09/2021    Morbid obesity with BMI of 40.0-44.9, adult ([address] 10/22/2020    Tremor 03/30/2019    Gastroesophageal reflux disease 03/30/2019    Hyperglycemia 03/30/2019    Frequent falls 11/22/2018    Muscle weakness of lower extremity 11/22/2018    Acute metabolic encephalopathy 45/88/9280    Urinary tract infection associated with catheterization of urinary tract ([address] 07/06/2018    Fall at home, initial encounter 07/06/2018    DVT (deep vein thrombosis) in pregnancy 02/23/2018    Progression of deep vein thrombosis (DVT) ([address] 02/23/2018     Current Outpatient Medications   Medication Sig Dispense Refill    furosemide (LASIX) 20 MG tablet Take 1 pill on Mon, Weds, Fri 90 tablet 1    fenofibrate micronized (LOFIBRA) 67 MG capsule Take 1 capsule by mouth every morning (before breakfast) 90 capsule 1    pravastatin (PRAVACHOL) 10 MG tablet TAKE 1 TABLET AT BEDTIME 90 tablet 1    levothyroxine (SYNTHROID) 175 MCG tablet Take 1 tablet by mouth daily 90 tablet 1    potassium chloride (MICRO-K) 10 MEQ extended release capsule Take 1 capsule by mouth daily 90 capsule 1    apixaban (ELIQUIS) 2.5 MG TABS tablet Take 1 tablet by mouth 2 times daily 180 tablet 1    dronedarone hcl (MULTAQ) 400 MG TABS TAKE 1 TABLET TWICE A DAY WITH MEALS 180 tablet 1    loperamide (IMODIUM) 2 MG capsule Take 2 mg by mouth daily as needed for Diarrhea      acetaminophen (TYLENOL) 325 MG tablet Take 325 mg by mouth daily.  loratadine (CLARITIN) 10 MG tablet Take 10 mg by mouth daily (Patient not taking: Reported on 11/29/2021)       No current facility-administered medications for this visit. Allergies: Tape [adhesive tape], Codeine, and Sulfa antibiotics  Past Medical History:   Diagnosis Date    Allergic rhinitis     Arthritis     [facility]) [facility] [address]     DVT, lower extremity, proximal, acute, left (HCC)     Edema leg     Gastroesophageal reflux disease 3/30/2019    H/O 24 hour EKG monitoring 6/10 1/09 11/08    6/7/10- predominant rhythm is a-fib, freq PVCs    H/O cardiac catheterization 7/20/00    H/O cardiac catheterization 7/20/00    right dominant system, left main is short but angiographically normal, LAD and CX reveal sign  tortuosities distally  but no sign angiographic atherosclerotic disease noted except luminal  irregularities here and there. LVSF normal, EF  55%    H/O cardiovascular stress test 5/2/2016    lexiscan-normal,EF70%    H/O Doppler echocardiogram 7/12/10    intimal thickening but no sign. atherosclerotic plaque noted in [facility].  H/O echocardiogram 5/2/2016 9/10     5/16 EF60% mild MR, TR, AR 9/20/10 Complete 2 dim transthoracic echo, techn. difficult.   The study was  tech limited, LVSF is normal    History of atrial fibrillation     History of cardiovascular stress test 7/26/13, 7/10 8/08    7/13-WNL Observed defect consistent with breast attenuation. EF 70%. 7/10-post stress myocardial images show a normal pattern of perfusion in all regions. LV is normal  in size.   EF is 62%, global  LVSF is normal    History of falling     per old chart    History of nuclear stress test 10/08/2020    EF 60%, Normal    Hx of blood clots     per old chart  hx of DVT left leg    Hx: UTI (urinary tract infection)     Hyperglycemia 3/30/2019    Hyperlipidemia     Hypertension     Hypothyroidism     Incontinence of bowel     Incontinence of urine     Sepsis ([address] 2015    Tremor 3/30/2019    Wears glasses     to read    Wears partial dentures     upper     Past Surgical History:   Procedure Laterality Date    BREAST SURGERY      Left breast biopsy    CARDIOVERSION  10/17/2008    CARDIOVERSION  12/15/2008    HYSTERECTOMY  age 21's    IR GUIDED DRAIN W CATHETER PLACEMENT  8/21/2020    IR GUIDED DRAIN W CATHETER PLACEMENT 8/21/2020 [facility] PROCEDURES    IR GUIDED DRAIN W CATHETER PLACEMENT  9/8/2020    IR GUIDED DRAIN W CATHETER PLACEMENT 9/8/2020 [facility] SPECIAL PROCEDURES    JOINT REPLACEMENT  4/2005 2/2006    bilat    OTHER SURGICAL HISTORY  10/2009     bladder stimulator - interstim II 3058 - MRI OF HEAD ONLY -STIMULATOR MUST BE OFF AND TRANSMIT RECEIVE HEAD COIL ONLY    AR TOTAL HIP ARTHROPLASTY Left 02/23/2015    Hip Replacement, Total    TOTAL KNEE ARTHROPLASTY  2005, 2006    bilateral      As reviewed   Family History   Problem Relation Age of Onset    Diabetes Mother     Diabetes Father     Heart Disease Father      Social History     Tobacco Use    Smoking status: Former Smoker     Years: 1.00    Smokeless tobacco: Never Used    Tobacco comment: \"quit yrs ago only smoked for 2-3 yrs\"   Substance Use Topics    Alcohol use: No     Alcohol/week: 0.0 standard drinks      Review of Systems:    Constitutional: Negative for diaphoresis and fatigue  Psychological:Negative for anxiety or depression  HENT: Negative for headaches, nasal congestion, sinus pain or vertigo  Eyes: Negative for visual disturbance. Endocrine: Negative for polydipsia/polyuria  Respiratory: Negative for shortness of breath  Cardiovascular: Negative for chest pain, dyspnea on exertion, claudication, edema, irregular heartbeat, murmur, palpitations or shortness of breath  Gastrointestinal: Negative for abdominal pain or heartburn  Genito-Urinary: Negative for urinary frequency/urgency  Musculoskeletal: Negative for muscle pain, muscular weakness, negative for pain in arm and leg or swelling in foot and leg  Neurological: Negative for dizziness, headaches, memory loss, numbness/tingling, visual changes, syncope  Dermatological: Negative for rash    Objective:    Vitals:    11/29/21 1532   BP: 128/76   Pulse: 103   Weight: 243 lb (110.2 kg)   Height: 5' 4\" (1.626 m)     /76   Pulse 103   Ht 5' 4\" (1.626 m)   Wt 243 lb (110.2 kg)   BMI 41.71 kg/m²     No flowsheet data found. Wt Readings from Last 3 Encounters:   11/29/21 243 lb (110.2 kg)   09/08/21 245 lb (111.1 kg)   03/08/21 239 lb (108.4 kg)     Body mass index is 41.71 kg/m². GENERAL - Alert, oriented, pleasant, in no apparent distress. EYES: No jaundice, no conjunctival pallor. SKIN: It is warm & dry. No rashes. No Echhymosis    HEENT - No clinically significant abnormalities seen. Neck - Supple. No jugular venous distention noted. No carotid bruits. Cardiovascular - Normal S1 and S2 without obvious murmur or gallop. Extremities - No cyanosis, clubbing, or significant edema. Pulmonary - No respiratory distress. No wheezes or rales. Abdomen - No masses, tenderness, or organomegaly. Musculoskeletal - No significant edema. No joint deformities. No muscle wasting.   Neurologic - Cranial nerves II through XII are grossly intact. There were no gross focal neurologic abnormalities. Lab Review   Lab Results   Component Value Date    CKTOTAL 129 11/22/2018    TROPONINT <0.010 11/22/2018     BNP:  No results found for: BNP  PT/INR:    Lab Results   Component Value Date    INR 1.02 08/21/2020     Lab Results   Component Value Date    LABA1C 5.9 07/07/2014     Lab Results   Component Value Date    WBC 5.8 09/08/2021    HCT 31.7 (L) 09/08/2021    MCV 95.6 09/08/2021     09/08/2021     Lab Results   Component Value Date    CHOL 144 02/05/2020    TRIG 88 02/05/2020    HDL 47 02/05/2020    LDLCALC 79 02/05/2020    LDLDIRECT 70 02/27/2018     Lab Results   Component Value Date    ALT 10 02/05/2020    AST 22 02/05/2020     BMP:    Lab Results   Component Value Date     10/18/2021    K 4.9 10/18/2021    K 3.8 02/24/2018     10/18/2021    CO2 22 10/18/2021    BUN 19 10/18/2021    CREATININE 1.3 10/18/2021     CMP:   Lab Results   Component Value Date     10/18/2021    K 4.9 10/18/2021    K 3.8 02/24/2018     10/18/2021    CO2 22 10/18/2021    BUN 19 10/18/2021    PROT 7.9 02/05/2020    PROT 7.4 08/20/2012     TSH:    Lab Results   Component Value Date    TSH 0.39 09/08/2021    TSHHS 0.049 07/06/2018           Assessment & Plan:    -  Hypertension: Patients blood pressure is normal. Patient is advised about low sodium diet. Present medical regimen will not be changed. On lasix         -  LIPID MANAGEMENT:  Importance of lipid levels discussed with patient   and patient was given dietary advice. NCEP- ATP III guidelines reviewed with patient. -   Changes  in medicines made: No     On pravachol                           -Chronic  Atrial fibrillation, pt is  compliant with meds.  Patient does not have symptoms from atrial fibrillation    On multaq    - in wheel chair post knee surgery      Abigail Lunsford MD    [facility]

## 2021-11-29 NOTE — PATIENT INSTRUCTIONS
Please be informed that if you contact our office outside of normal business hours the physician on call cannot help with any scheduling or rescheduling issues, procedure instruction questions or any type of medication issue. We advise you for any urgent/emergency that you go to the nearest emergency room! PLEASE CALL OUR OFFICE DURING NORMAL BUSINESS HOURS    Monday - Friday   8 am to 5 pm    [place]: [phone]    [place]:  [phone]    **It is YOUR responsibilty to bring medication bottles and/or updated medication list to [address] [place] [address].  This will allow us to better serve you and all your healthcare needs**

## 2021-12-30 ENCOUNTER — APPOINTMENT (OUTPATIENT)
Dept: GENERAL RADIOLOGY | Age: 86
DRG: 178 | End: 2021-12-30
Payer: MEDICARE

## 2021-12-30 ENCOUNTER — APPOINTMENT (OUTPATIENT)
Dept: ULTRASOUND IMAGING | Age: 86
DRG: 178 | End: 2021-12-30
Payer: MEDICARE

## 2021-12-30 ENCOUNTER — HOSPITAL ENCOUNTER (INPATIENT)
Age: 86
LOS: 7 days | Discharge: HOME | DRG: 178 | End: 2022-01-06
Attending: EMERGENCY MEDICINE | Admitting: INTERNAL MEDICINE
Payer: MEDICARE

## 2021-12-30 DIAGNOSIS — U07.1 COVID-19: Primary | ICD-10-CM

## 2021-12-30 DIAGNOSIS — N30.00 ACUTE CYSTITIS WITHOUT HEMATURIA: ICD-10-CM

## 2021-12-30 DIAGNOSIS — R79.89 ELEVATED LACTIC ACID LEVEL: ICD-10-CM

## 2021-12-30 DIAGNOSIS — R77.8 ELEVATED TROPONIN: ICD-10-CM

## 2021-12-30 DIAGNOSIS — R53.1 GENERALIZED WEAKNESS: ICD-10-CM

## 2021-12-30 DIAGNOSIS — N28.9 RENAL INSUFFICIENCY: ICD-10-CM

## 2021-12-30 DIAGNOSIS — Z98.890 HISTORY OF SUPRAPUBIC CATHETER: ICD-10-CM

## 2021-12-30 PROBLEM — N39.0 URINARY TRACT INFECTION: Status: ACTIVE | Noted: 2021-12-30

## 2021-12-30 PROBLEM — E87.1 HYPONATREMIA: Status: ACTIVE | Noted: 2021-12-30

## 2021-12-30 LAB
ALBUMIN SERPL-MCNC: 3.3 GM/DL (ref 3.4–5)
ALP BLD-CCNC: 36 IU/L (ref 40–129)
ALT SERPL-CCNC: 18 U/L (ref 10–40)
ANION GAP SERPL CALCULATED.3IONS-SCNC: 12 MMOL/L (ref 4–16)
AST SERPL-CCNC: 39 IU/L (ref 15–37)
BACTERIA: NEGATIVE /HPF
BASOPHILS ABSOLUTE: 0 K/CU MM
BASOPHILS RELATIVE PERCENT: 0.2 % (ref 0–1)
BILIRUB SERPL-MCNC: 0.3 MG/DL (ref 0–1)
BILIRUBIN URINE: NEGATIVE MG/DL
BLOOD, URINE: ABNORMAL
BUN BLDV-MCNC: 24 MG/DL (ref 6–23)
CALCIUM SERPL-MCNC: 8.7 MG/DL (ref 8.3–10.6)
CHLORIDE BLD-SCNC: 96 MMOL/L (ref 99–110)
CLARITY: ABNORMAL
CO2: 24 MMOL/L (ref 21–32)
COLOR: ABNORMAL
CREAT SERPL-MCNC: 1.5 MG/DL (ref 0.6–1.1)
DIFFERENTIAL TYPE: ABNORMAL
EOSINOPHILS ABSOLUTE: 0 K/CU MM
EOSINOPHILS RELATIVE PERCENT: 0.4 % (ref 0–3)
GFR AFRICAN AMERICAN: 40 ML/MIN/1.73M2
GFR NON-AFRICAN AMERICAN: 33 ML/MIN/1.73M2
GLUCOSE BLD-MCNC: 108 MG/DL (ref 70–99)
GLUCOSE, URINE: NEGATIVE MG/DL
HCT VFR BLD CALC: 33.6 % (ref 37–47)
HEMOGLOBIN: 10.6 GM/DL (ref 12.5–16)
IMMATURE NEUTROPHIL %: 0.4 % (ref 0–0.43)
KETONES, URINE: NEGATIVE MG/DL
LACTATE: 2.6 MMOL/L (ref 0.4–2)
LEUKOCYTE ESTERASE, URINE: ABNORMAL
LYMPHOCYTES ABSOLUTE: 0.6 K/CU MM
LYMPHOCYTES RELATIVE PERCENT: 13.1 % (ref 24–44)
MCH RBC QN AUTO: 30 PG (ref 27–31)
MCHC RBC AUTO-ENTMCNC: 31.5 % (ref 32–36)
MCV RBC AUTO: 95.2 FL (ref 78–100)
MONOCYTES ABSOLUTE: 0.7 K/CU MM
MONOCYTES RELATIVE PERCENT: 14.9 % (ref 0–4)
MUCUS: ABNORMAL HPF
NITRITE URINE, QUANTITATIVE: NEGATIVE
NON SQUAM EPI CELLS: 1 /HPF
NUCLEATED RBC %: 0 %
PDW BLD-RTO: 14.9 % (ref 11.7–14.9)
PH, URINE: 8 (ref 5–8)
PLATELET # BLD: 354 K/CU MM (ref 140–440)
PMV BLD AUTO: 11.2 FL (ref 7.5–11.1)
POTASSIUM SERPL-SCNC: 4.1 MMOL/L (ref 3.5–5.1)
PRO-BNP: 268.5 PG/ML
PROTEIN UA: >500 MG/DL
RAPID INFLUENZA  B AGN: NEGATIVE
RAPID INFLUENZA A AGN: NEGATIVE
RBC # BLD: 3.53 M/CU MM (ref 4.2–5.4)
RBC URINE: 7 /HPF (ref 0–6)
SARS-COV-2, NAAT: DETECTED
SEGMENTED NEUTROPHILS ABSOLUTE COUNT: 3.3 K/CU MM
SEGMENTED NEUTROPHILS RELATIVE PERCENT: 71 % (ref 36–66)
SODIUM BLD-SCNC: 132 MMOL/L (ref 135–145)
SOURCE: ABNORMAL
SPECIFIC GRAVITY UA: 1.02 (ref 1–1.03)
SQUAMOUS EPITHELIAL: 9 /HPF
T4 FREE: 1.53 NG/DL (ref 0.9–1.8)
TOTAL CK: 453 IU/L (ref 26–140)
TOTAL IMMATURE NEUTOROPHIL: 0.02 K/CU MM
TOTAL NUCLEATED RBC: 0 K/CU MM
TOTAL PROTEIN: 7 GM/DL (ref 6.4–8.2)
TRICHOMONAS: ABNORMAL /HPF
TRIPLE PHOSPHATE CRYSTALS: ABNORMAL /HPF
TROPONIN T: 0.04 NG/ML
TSH HIGH SENSITIVITY: 0.38 UIU/ML (ref 0.27–4.2)
UNCLASSIFIED CAST: 10 /LPF
UROBILINOGEN, URINE: NEGATIVE MG/DL (ref 0.2–1)
WBC # BLD: 4.6 K/CU MM (ref 4–10.5)
WBC UA: 32 /HPF (ref 0–5)

## 2021-12-30 PROCEDURE — 2580000003 HC RX 258: Performed by: NURSE PRACTITIONER

## 2021-12-30 PROCEDURE — 82550 ASSAY OF CK (CPK): CPT

## 2021-12-30 PROCEDURE — 84439 ASSAY OF FREE THYROXINE: CPT

## 2021-12-30 PROCEDURE — 80053 COMPREHEN METABOLIC PANEL: CPT

## 2021-12-30 PROCEDURE — 87635 SARS-COV-2 COVID-19 AMP PRB: CPT

## 2021-12-30 PROCEDURE — 85025 COMPLETE CBC W/AUTO DIFF WBC: CPT

## 2021-12-30 PROCEDURE — 83880 ASSAY OF NATRIURETIC PEPTIDE: CPT

## 2021-12-30 PROCEDURE — 87804 INFLUENZA ASSAY W/OPTIC: CPT

## 2021-12-30 PROCEDURE — 83605 ASSAY OF LACTIC ACID: CPT

## 2021-12-30 PROCEDURE — 1200000000 HC SEMI PRIVATE

## 2021-12-30 PROCEDURE — 84484 ASSAY OF TROPONIN QUANT: CPT

## 2021-12-30 PROCEDURE — 71045 X-RAY EXAM CHEST 1 VIEW: CPT

## 2021-12-30 PROCEDURE — 87040 BLOOD CULTURE FOR BACTERIA: CPT

## 2021-12-30 PROCEDURE — 87077 CULTURE AEROBIC IDENTIFY: CPT

## 2021-12-30 PROCEDURE — 6360000002 HC RX W HCPCS: Performed by: NURSE PRACTITIONER

## 2021-12-30 PROCEDURE — 6370000000 HC RX 637 (ALT 250 FOR IP): Performed by: NURSE PRACTITIONER

## 2021-12-30 PROCEDURE — 93970 EXTREMITY STUDY: CPT

## 2021-12-30 PROCEDURE — 93005 ELECTROCARDIOGRAM TRACING: CPT | Performed by: EMERGENCY MEDICINE

## 2021-12-30 PROCEDURE — 84443 ASSAY THYROID STIM HORMONE: CPT

## 2021-12-30 PROCEDURE — 99284 EMERGENCY DEPT VISIT MOD MDM: CPT

## 2021-12-30 PROCEDURE — 2580000003 HC RX 258: Performed by: EMERGENCY MEDICINE

## 2021-12-30 PROCEDURE — 81001 URINALYSIS AUTO W/SCOPE: CPT

## 2021-12-30 PROCEDURE — 36415 COLL VENOUS BLD VENIPUNCTURE: CPT

## 2021-12-30 PROCEDURE — 87086 URINE CULTURE/COLONY COUNT: CPT

## 2021-12-30 RX ORDER — ACETAMINOPHEN 650 MG/1
650 SUPPOSITORY RECTAL EVERY 6 HOURS PRN
Status: DISCONTINUED | OUTPATIENT
Start: 2021-12-30 | End: 2021-12-30 | Stop reason: SDUPTHER

## 2021-12-30 RX ORDER — SODIUM CHLORIDE 9 MG/ML
25 INJECTION, SOLUTION INTRAVENOUS PRN
Status: DISCONTINUED | OUTPATIENT
Start: 2021-12-30 | End: 2022-01-06 | Stop reason: HOSPADM

## 2021-12-30 RX ORDER — ACETAMINOPHEN 650 MG/1
650 SUPPOSITORY RECTAL EVERY 6 HOURS PRN
Status: DISCONTINUED | OUTPATIENT
Start: 2021-12-30 | End: 2022-01-06 | Stop reason: HOSPADM

## 2021-12-30 RX ORDER — PRAVASTATIN SODIUM 10 MG
10 TABLET ORAL NIGHTLY
Status: DISCONTINUED | OUTPATIENT
Start: 2021-12-30 | End: 2022-01-06 | Stop reason: HOSPADM

## 2021-12-30 RX ORDER — SODIUM CHLORIDE 0.9 % (FLUSH) 0.9 %
5-40 SYRINGE (ML) INJECTION PRN
Status: DISCONTINUED | OUTPATIENT
Start: 2021-12-30 | End: 2022-01-06 | Stop reason: HOSPADM

## 2021-12-30 RX ORDER — SODIUM CHLORIDE 0.9 % (FLUSH) 0.9 %
5-40 SYRINGE (ML) INJECTION EVERY 12 HOURS SCHEDULED
Status: DISCONTINUED | OUTPATIENT
Start: 2021-12-30 | End: 2022-01-06 | Stop reason: HOSPADM

## 2021-12-30 RX ORDER — ONDANSETRON 2 MG/ML
4 INJECTION INTRAMUSCULAR; INTRAVENOUS EVERY 6 HOURS PRN
Status: DISCONTINUED | OUTPATIENT
Start: 2021-12-30 | End: 2022-01-06 | Stop reason: HOSPADM

## 2021-12-30 RX ORDER — ACETAMINOPHEN 325 MG/1
325 TABLET ORAL DAILY
Status: DISCONTINUED | OUTPATIENT
Start: 2021-12-30 | End: 2022-01-06 | Stop reason: HOSPADM

## 2021-12-30 RX ORDER — ONDANSETRON 2 MG/ML
4 INJECTION INTRAMUSCULAR; INTRAVENOUS EVERY 6 HOURS PRN
Status: DISCONTINUED | OUTPATIENT
Start: 2021-12-30 | End: 2021-12-30 | Stop reason: SDUPTHER

## 2021-12-30 RX ORDER — GUAIFENESIN/DEXTROMETHORPHAN 100-10MG/5
5 SYRUP ORAL EVERY 4 HOURS PRN
Status: DISCONTINUED | OUTPATIENT
Start: 2021-12-30 | End: 2022-01-06 | Stop reason: HOSPADM

## 2021-12-30 RX ORDER — VITAMIN B COMPLEX
2000 TABLET ORAL DAILY
Status: DISCONTINUED | OUTPATIENT
Start: 2021-12-30 | End: 2022-01-02 | Stop reason: RX

## 2021-12-30 RX ORDER — FENOFIBRATE 54 MG/1
54 TABLET ORAL
Status: DISCONTINUED | OUTPATIENT
Start: 2021-12-31 | End: 2022-01-06 | Stop reason: HOSPADM

## 2021-12-30 RX ORDER — ACETAMINOPHEN 325 MG/1
650 TABLET ORAL EVERY 6 HOURS PRN
Status: DISCONTINUED | OUTPATIENT
Start: 2021-12-30 | End: 2022-01-06 | Stop reason: HOSPADM

## 2021-12-30 RX ORDER — POLYETHYLENE GLYCOL 3350 17 G/17G
17 POWDER, FOR SOLUTION ORAL DAILY PRN
Status: DISCONTINUED | OUTPATIENT
Start: 2021-12-30 | End: 2022-01-06 | Stop reason: HOSPADM

## 2021-12-30 RX ORDER — SODIUM CHLORIDE 9 MG/ML
INJECTION, SOLUTION INTRAVENOUS CONTINUOUS
Status: DISCONTINUED | OUTPATIENT
Start: 2021-12-30 | End: 2021-12-30 | Stop reason: SDUPTHER

## 2021-12-30 RX ORDER — ACETAMINOPHEN 325 MG/1
650 TABLET ORAL EVERY 6 HOURS PRN
Status: DISCONTINUED | OUTPATIENT
Start: 2021-12-30 | End: 2021-12-30 | Stop reason: SDUPTHER

## 2021-12-30 RX ORDER — POLYETHYLENE GLYCOL 3350 17 G/17G
17 POWDER, FOR SOLUTION ORAL DAILY PRN
Status: DISCONTINUED | OUTPATIENT
Start: 2021-12-30 | End: 2021-12-30 | Stop reason: SDUPTHER

## 2021-12-30 RX ORDER — ONDANSETRON 4 MG/1
4 TABLET, ORALLY DISINTEGRATING ORAL EVERY 8 HOURS PRN
Status: DISCONTINUED | OUTPATIENT
Start: 2021-12-30 | End: 2021-12-30 | Stop reason: SDUPTHER

## 2021-12-30 RX ORDER — ONDANSETRON 4 MG/1
4 TABLET, ORALLY DISINTEGRATING ORAL EVERY 8 HOURS PRN
Status: DISCONTINUED | OUTPATIENT
Start: 2021-12-30 | End: 2022-01-06 | Stop reason: HOSPADM

## 2021-12-30 RX ORDER — CETIRIZINE HYDROCHLORIDE 10 MG/1
5 TABLET ORAL DAILY
Status: DISCONTINUED | OUTPATIENT
Start: 2021-12-30 | End: 2022-01-06 | Stop reason: HOSPADM

## 2021-12-30 RX ORDER — SODIUM CHLORIDE 9 MG/ML
INJECTION, SOLUTION INTRAVENOUS CONTINUOUS
Status: ACTIVE | OUTPATIENT
Start: 2021-12-30 | End: 2021-12-30

## 2021-12-30 RX ADMIN — CETIRIZINE HYDROCHLORIDE 5 MG: 10 TABLET, FILM COATED ORAL at 17:54

## 2021-12-30 RX ADMIN — Medication 2000 UNITS: at 17:54

## 2021-12-30 RX ADMIN — SODIUM CHLORIDE, PRESERVATIVE FREE 10 ML: 5 INJECTION INTRAVENOUS at 22:28

## 2021-12-30 RX ADMIN — SODIUM CHLORIDE 100 ML/HR: 9 INJECTION, SOLUTION INTRAVENOUS at 18:52

## 2021-12-30 RX ADMIN — DRONEDARONE 400 MG: 400 TABLET, FILM COATED ORAL at 17:54

## 2021-12-30 RX ADMIN — CEFEPIME HYDROCHLORIDE 1000 MG: 1 INJECTION, POWDER, FOR SOLUTION INTRAMUSCULAR; INTRAVENOUS at 17:49

## 2021-12-30 RX ADMIN — SODIUM CHLORIDE 1000 ML: 9 INJECTION, SOLUTION INTRAVENOUS at 13:21

## 2021-12-30 RX ADMIN — PRAVASTATIN SODIUM 10 MG: 10 TABLET ORAL at 22:26

## 2021-12-30 RX ADMIN — ACETAMINOPHEN 325 MG: 325 TABLET ORAL at 17:40

## 2021-12-30 RX ADMIN — LEVOTHYROXINE SODIUM 175 MCG: 0.1 TABLET ORAL at 17:39

## 2021-12-30 RX ADMIN — SODIUM CHLORIDE, PRESERVATIVE FREE 10 ML: 5 INJECTION INTRAVENOUS at 22:27

## 2021-12-30 RX ADMIN — ENOXAPARIN SODIUM 30 MG: 100 INJECTION SUBCUTANEOUS at 22:26

## 2021-12-30 ASSESSMENT — PAIN SCALES - GENERAL
PAINLEVEL_OUTOF10: 0
PAINLEVEL_OUTOF10: 0

## 2021-12-30 NOTE — ED NOTES
Bed: ED-17  Expected date:   Expected time:   Means of arrival:   Comments:  [email or web address]     Adela Walker  12/30/21 1495

## 2021-12-30 NOTE — H&P
History and Physical      Name:  Jeane May /Age/Sex: 1935  (82 y.o. female)   MRN & CSN:  [id number] & [id number] Admission Date/Time: 2021    Location:  [place] [address] PCP: Ellie Haas MD         History of present illness     Chief Complaint:  Patient complained of Fatigue and ambulatory difficulty. Jeane May is a 82 y.o.  female her medical history include DVT, hypothyroidism hypertension: Hyperlipidemia, congestive heart failure, Obesity  GERD,, A. fib. And UTI. Ambulates with wheel chair often. She presents with 2-day history of fatigue, ambulatory difficulty  and reduced oral intake. Patient lives alone and gets assistance  from her grandchild that helps her with food and other house chores. When she had difficulty getting up today, it was  Her  grandson that assisted her and called  EMS   At the ER during evaluation patient was found to be Covid positive. Patient tells me she has had 2 Covid vaccine shots with no booster dose. .  Currently she is not requiring oxygen as her oxygen saturation on room air is 97%. She does not also require steroid at this time. She was also observed to have urinary tract infection. Patient will be admitted to the Covid unit with close monitoring. She is also being managed for her urinary tract infection. She  Will  also need to work with physical therapy to assist with ambulatory difficulty. Assessment and Plan:     # COVID 19 Infection   Tested positive antigen test   previously had 2 dose vaccination  Fatigue, cough, denies fever  on room air.with oxygen saturation of 97%  Admit to COVID unit for close monitoring. Not needing steroid now   Droplet plus isolation  Covid protocol order set, GI and DVT prophylaxis    # Hyponatremia    [id number] Low MMOL/L    may be due to dehydration, replace sodium and recheck lab in am     #  Urinary Tract infection       History of suprapubic catheter.     Urinalysis: Leukocyte Esterase, UrineLARGE Abnormal      Increased urgency, fluid, cefepime   culture. # Hyperlipidemia    Patient on pravastatin and fenofibrate    #  Afib (not in exacerbation)      Takes Multaq, rate controlled currently    # Hypothyroidism   takes synthroid at home     #  CHF (HFpEF)      Follows with Dr. Nathan Christensen     Last Echo 10/15/2020) estimated EF 50-55%    Elevated trop. Trend trop. Troponin T0.035 High NG/ML      On lasix, and potassium.       # Obesity BMI 38.6      Lifestyle management of diet and exercise encouraged       Medications:   Medications:    cefepime  2,000 mg IntraVENous Once    acetaminophen  325 mg Oral Daily    dronedarone hcl  400 mg Oral BID WC    [START ON 12/31/2021] fenofibrate micronized  67 mg Oral QAM AC    levothyroxine  175 mcg Oral Daily    loratadine  10 mg Oral Daily    pravastatin  10 mg Oral Nightly    sodium chloride flush  5-40 mL IntraVENous 2 times per day    enoxaparin  30 mg SubCUTAneous BID    Vitamin D  2,000 Units Oral Daily    sodium chloride flush  5-40 mL IntraVENous 2 times per day    enoxaparin  40 mg SubCUTAneous Daily    cefepime  1,000 mg IntraVENous Q12H      Infusions:    sodium chloride 1,000 mL (12/30/21 1321)    sodium chloride      sodium chloride      sodium chloride       PRN Meds: sodium chloride flush, 5-40 mL, PRN  sodium chloride, 25 mL, PRN  ondansetron, 4 mg, Q8H PRN   Or  ondansetron, 4 mg, Q6H PRN  polyethylene glycol, 17 g, Daily PRN  acetaminophen, 650 mg, Q6H PRN   Or  acetaminophen, 650 mg, Q6H PRN  guaiFENesin-dextromethorphan, 5 mL, Q4H PRN  sodium chloride flush, 5-40 mL, PRN  sodium chloride, 25 mL, PRN  ondansetron, 4 mg, Q8H PRN   Or  ondansetron, 4 mg, Q6H PRN  acetaminophen, 650 mg, Q6H PRN   Or  acetaminophen, 650 mg, Q6H PRN  polyethylene glycol, 17 g, Daily PRN        Current Facility-Administered Medications:     0.9 % sodium chloride infusion, , IntraVENous, Continuous, Romi Jacobson MD, Last Rate: 100 mL/hr at 12/30/21 1321, 1,000 mL at 12/30/21 1321    cefepime (MAXIPIME) 2000 mg IVPB minibag, 2,000 mg, IntraVENous, Once, Suanne Hammans, MD    acetaminophen (TYLENOL) tablet 325 mg, 325 mg, Oral, Daily, Guilherme Duque APRN - CNP    dronedarone hcl (MULTAQ) tablet 400 mg, 400 mg, Oral, BID WC, RAHAT Jensen - CNP    [START ON 12/31/2021] fenofibrate micronized (LOFIBRA) capsule 67 mg, 67 mg, Oral, QAM AC, Guilherme Duque APRN - CNP    levothyroxine (SYNTHROID) tablet 175 mcg, 175 mcg, Oral, Daily, Guilherme Duque APRN - CNP    loratadine (CLARITIN) tablet 10 mg, 10 mg, Oral, Daily, Guilherme Duque APRN - CNP    pravastatin (PRAVACHOL) tablet 10 mg, 10 mg, Oral, Nightly, Guilherme Duque APRN - CNP    sodium chloride flush 0.9 % injection 5-40 mL, 5-40 mL, IntraVENous, 2 times per day, Guilherme Bainze, APRN - CNP    sodium chloride flush 0.9 % injection 5-40 mL, 5-40 mL, IntraVENous, PRN, Guilherme Duque APRN - CNP    0.9 % sodium chloride infusion, 25 mL, IntraVENous, PRN, Guilherme Duque APRN - CNP    enoxaparin (LOVENOX) injection 30 mg, 30 mg, SubCUTAneous, BID, Guilhemre Duque APRN - CNP    ondansetron (ZOFRAN-ODT) disintegrating tablet 4 mg, 4 mg, Oral, Q8H PRN **OR** ondansetron (ZOFRAN) injection 4 mg, 4 mg, IntraVENous, Q6H PRN, Guilherme Duque APRN - CNP    polyethylene glycol (GLYCOLAX) packet 17 g, 17 g, Oral, Daily PRN, Guilherme Duque APRN - CNP    acetaminophen (TYLENOL) tablet 650 mg, 650 mg, Oral, Q6H PRN **OR** acetaminophen (TYLENOL) suppository 650 mg, 650 mg, Rectal, Q6H PRN, Guilherme Akaeze, APRN - CNP    guaiFENesin-dextromethorphan (ROBITUSSIN DM) 100-10 MG/5ML syrup 5 mL, 5 mL, Oral, Q4H PRN, Guilherme Akaeze, APRN - CNP    Vitamin D (CHOLECALCIFEROL) tablet 2,000 Units, 2,000 Units, Oral, Daily, Guilherme Akaeze, APRN - CNP    sodium chloride flush 0.9 % injection 5-40 mL, 5-40 mL, IntraVENous, 2 times per day, Guilherme Akaeze, APRN - CNP    sodium chloride flush 0.9 % injection 5-40 mL, 5-40 mL, IntraVENous, PRN, Guilherme Akaeze, APRN - CNP    0.9 % sodium chloride infusion, 25 mL, IntraVENous, PRN, Guilherme Akaeze, APRN - CNP    enoxaparin (LOVENOX) injection 40 mg, 40 mg, SubCUTAneous, Daily, Guilherme Akhayze, APRN - CNP    ondansetron (ZOFRAN-ODT) disintegrating tablet 4 mg, 4 mg, Oral, Q8H PRN **OR** ondansetron (ZOFRAN) injection 4 mg, 4 mg, IntraVENous, Q6H PRN, Guilherme Akaeze, APRN - CNP    acetaminophen (TYLENOL) tablet 650 mg, 650 mg, Oral, Q6H PRN **OR** acetaminophen (TYLENOL) suppository 650 mg, 650 mg, Rectal, Q6H PRN, Guilherme Akaeze, APRN - CNP    polyethylene glycol (GLYCOLAX) packet 17 g, 17 g, Oral, Daily PRN, Guilherme Bainze, APRN - CNP    cefepime (MAXIPIME) 1000 mg IVPB minibag, 1,000 mg, IntraVENous, Q12H, Guilherme Bainze, APRN - CNP    0.9 % sodium chloride infusion, , IntraVENous, Continuous, Guilherme Akaeze, APRN - CNP    Current Outpatient Medications:     furosemide (LASIX) 20 MG tablet, Take 1 pill on Mon, Weds, Fri, Disp: 90 tablet, Rfl: 1    fenofibrate micronized (LOFIBRA) 67 MG capsule, Take 1 capsule by mouth every morning (before breakfast), Disp: 90 capsule, Rfl: 1    pravastatin (PRAVACHOL) 10 MG tablet, TAKE 1 TABLET AT BEDTIME, Disp: 90 tablet, Rfl: 1    levothyroxine (SYNTHROID) 175 MCG tablet, Take 1 tablet by mouth daily, Disp: 90 tablet, Rfl: 1    potassium chloride (MICRO-K) 10 MEQ extended release capsule, Take 1 capsule by mouth daily, Disp: 90 capsule, Rfl: 1    dronedarone hcl (MULTAQ) 400 MG TABS, TAKE 1 TABLET TWICE A DAY WITH MEALS, Disp: 180 tablet, Rfl: 1    loperamide (IMODIUM) 2 MG capsule, Take 2 mg by mouth daily as needed for Diarrhea, Disp: , Rfl:     loratadine (CLARITIN) 10 MG tablet, Take 10 mg by mouth daily (Patient not taking: Reported on 11/29/2021), Disp: , Rfl:     acetaminophen (TYLENOL) 325 MG tablet, Take 325 mg by mouth daily.   , Disp: , Rfl:      Review of Systems     Ten point ROS reviewed and negative, unless as noted below. GENERAL:  Fatigue  Denies fever, chills, night sweats, or changes in weight. EYES:  Denies recent visual changes. ENT:  Denies ear pain, hearing loss or tinnitus  RESP:   Endorsed  cough,  Denies dyspnea, or wheezing.   CV:  Denies any chest pain with exertion or at rest, palpitations, syncope,   GI:  Denies any dysphagia, nausea, vomiting, abdominal pain, heartburn, changes in bowel habit,   NEURO:  Denies any headaches, tremors, dizziness, vertigo, memory loss, confusion,   PSYCH:  Denies any sleeping problems,   HEME/LYMPHATIC/IMMUNO:  Denies , bruising,   ENDO:  Denies any heat or cold intolerance,       Objective:     No intake or output data in the 24 hours ending 12/30/21 1509     Vitals:   Vitals:    12/30/21 1034   BP:    Pulse:    Resp:    Temp:    SpO2: 95%       Recent Results (from the past 24 hour(s))   Lactic acid, plasma    Collection Time: 12/30/21 10:38 AM   Result Value Ref Range    Lactate 2.6 (HH) 0.4 - 2.0 mMOL/L   CBC with Auto Diff    Collection Time: 12/30/21 11:21 AM   Result Value Ref Range    WBC 4.6 4.0 - 10.5 K/CU MM    RBC 3.53 (L) 4.2 - 5.4 M/CU MM    Hemoglobin 10.6 (L) 12.5 - 16.0 GM/DL    Hematocrit 33.6 (L) 37 - 47 %    MCV 95.2 78 - 100 FL    MCH 30.0 27 - 31 PG    MCHC 31.5 (L) 32.0 - 36.0 %    RDW 14.9 11.7 - 14.9 %    Platelets 416 406 - 457 K/CU MM    MPV 11.2 (H) 7.5 - 11.1 FL    Differential Type AUTOMATED DIFFERENTIAL     Segs Relative 71.0 (H) 36 - 66 %    Lymphocytes % 13.1 (L) 24 - 44 %    Monocytes % 14.9 (H) 0 - 4 %    Eosinophils % 0.4 0 - 3 %    Basophils % 0.2 0 - 1 %    Segs Absolute 3.3 K/CU MM    Lymphocytes Absolute 0.6 K/CU MM    Monocytes Absolute 0.7 K/CU MM    Eosinophils Absolute 0.0 K/CU MM    Basophils Absolute 0.0 K/CU MM    Nucleated RBC % 0.0 %    Total Nucleated RBC 0.0 K/CU MM    Total Immature Neutrophil 0.02 K/CU MM    Immature Neutrophil % 0.4 0 - 0.43 %   CMP    Collection Time: 12/30/21 11:21 AM   Result Value Ref Range    Sodium 132 (L) 135 - 145 MMOL/L    Potassium 4.1 3.5 - 5.1 MMOL/L    Chloride 96 (L) 99 - 110 mMol/L    CO2 24 21 - 32 MMOL/L    BUN 24 (H) 6 - 23 MG/DL    CREATININE 1.5 (H) 0.6 - 1.1 MG/DL    Glucose 108 (H) 70 - 99 MG/DL    Calcium 8.7 8.3 - 10.6 MG/DL    Albumin 3.3 (L) 3.4 - 5.0 GM/DL    Total Protein 7.0 6.4 - 8.2 GM/DL    Total Bilirubin 0.3 0.0 - 1.0 MG/DL    ALT 18 10 - 40 U/L    AST 39 (H) 15 - 37 IU/L    Alkaline Phosphatase 36 (L) 40 - 129 IU/L    GFR Non- 33 (L) >60 mL/min/1.73m2    GFR  40 (L) >60 mL/min/1.73m2    Anion Gap 12 4 - 16   Troponin    Collection Time: 12/30/21 11:21 AM   Result Value Ref Range    Troponin T 0.035 (H) <0.01 NG/ML   TSH without Reflex    Collection Time: 12/30/21 11:21 AM   Result Value Ref Range    TSH, High Sensitivity 0.377 0.270 - 4.20 uIu/ml   T4, free    Collection Time: 12/30/21 11:21 AM   Result Value Ref Range    T4 Free 1.53 0.9 - 1.8 NG/DL   Brain Natriuretic Peptide    Collection Time: 12/30/21 11:21 AM   Result Value Ref Range    Pro-.5 <300 PG/ML   CK    Collection Time: 12/30/21 11:21 AM   Result Value Ref Range    Total  (H) 26 - 140 IU/L   COVID-19, Rapid    Collection Time: 12/30/21 11:36 AM    Specimen: Nasopharyngeal   Result Value Ref Range    Source THROAT     SARS-CoV-2, NAAT DETECTED (A) NOT DETECTED   Rapid Flu Swab    Collection Time: 12/30/21 11:40 AM    Specimen: Nasopharyngeal   Result Value Ref Range    Rapid Influenza A Ag NEGATIVE NEGATIVE    Rapid Influenza B Ag NEGATIVE NEGATIVE   Urinalysis with microscopic    Collection Time: 12/30/21  1:02 PM   Result Value Ref Range    Color, UA SAHD (A) YELLOW    Clarity, UA CLOUDY (A) CLEAR    Glucose, Urine NEGATIVE NEGATIVE MG/DL    Bilirubin Urine NEGATIVE NEGATIVE MG/DL    Ketones, Urine NEGATIVE NEGATIVE MG/DL    Specific Gravity, UA 1.022 1.001 - 1.035    Blood, Urine SMALL (A) NEGATIVE    pH, Urine 8.0 5.0 - 8.0    Protein, UA >500 (HH) NEGATIVE MG/DL    Urobilinogen, Urine NEGATIVE 0.2 - 1.0 MG/DL    Nitrite Urine, Quantitative NEGATIVE NEGATIVE    Leukocyte Esterase, Urine LARGE (A) NEGATIVE    RBC, UA 7 (H) 0 - 6 /HPF    WBC, UA 32 (H) 0 - 5 /HPF    Bacteria, UA NEGATIVE NEGATIVE /HPF    Squam Epithel, UA 9 /HPF    Mucus, UA RARE (A) NEGATIVE HPF    Trichomonas, UA NONE SEEN NONE SEEN /HPF    non squam epi cells 1 /HPF    Unclassified Cast 10 /LPF    TRIPLE PHOSPHATE CRYSTALS OCCASIONAL /HPF       Physical Exam:     GEN: Awake female, sitting upright in Bed  in no apparent distress. Appears given age. EYES: Pupils are equally round. No scleral erythema, discharge, or conjunctivitis. HENT: Mucous membranes are moist. External ears and nose appear normal.   NECK: Supple, no apparent thyromegaly or masses. RESP:  Symmetric chest movement while on room air. No Respiratory Distress. CARDIO/VASC: S1 S2 regular rate and rhythm , lower exteriorly edema +1. No JVD  GI:  normoactive bowl sound, abdomen non tender non distended  Rectal exam deferred. HEME/LYMPH:  No lymphadenopathy, no spenomgaly  MSK: No gross joint deformities. SKIN: Normal coloration, warm, dry. NEURO: Cranial nerves appear grossly intact, normal speech, no lateralizing weakness. PSYCH: Awake, alert, oriented x 4. Affect appropriate.       Past Medical History:      Past Medical History:   Diagnosis Date    Allergic rhinitis     Arthritis     [facility]F [facility]congestive heart failure) ([facility] [address]     DVT, lower extremity, proximal, acute, left (HCC)     Edema leg     Gastroesophageal reflux disease 3/30/2019    H/O 24 hour EKG monitoring 6/10 1/09 11/08    6/7/10- predominant rhythm is a-fib, freq PVCs    H/O cardiac catheterization 7/20/00    H/O cardiac catheterization 7/20/00    right dominant system, left main is short but angiographically normal, LAD and CX reveal sign  tortuosities distally  but no sign angiographic atherosclerotic disease noted except luminal irregularities here and there. LVSF normal, EF  55%    H/O cardiovascular stress test 5/2/2016    lexiscan-normal,EF70%    H/O Doppler echocardiogram 7/12/10    intimal thickening but no sign. atherosclerotic plaque noted in [facility].  H/O echocardiogram 5/2/2016 9/10     5/16 EF60% mild MR, TR, AR 9/20/10 Complete 2 dim transthoracic echo, techn. difficult. The study was  tech limited, LVSF is normal    History of atrial fibrillation     History of cardiovascular stress test 7/26/13, 7/10 8/08    7/13-WNL Observed defect consistent with breast attenuation. EF 70%. 7/10-post stress myocardial images show a normal pattern of perfusion in all regions. LV is normal  in size. EF is 62%, global  LVSF is normal    History of falling     per old chart    History of nuclear stress test 10/08/2020    EF 60%, Normal    Hx of blood clots     per old chart  hx of DVT left leg    Hx: UTI (urinary tract infection)     Hyperglycemia 3/30/2019    Hyperlipidemia     Hypertension     Hypothyroidism     Incontinence of bowel     Incontinence of urine     Sepsis ([facility] [address] 2015    Tremor 3/30/2019    Wears glasses     to read    Wears partial dentures     upper     PSHX:  has a past surgical history that includes Total knee arthroplasty (2005, 2006); Breast surgery; joint replacement (4/2005 2/2006); Cardioversion (10/17/2008); Cardioversion (12/15/2008); other surgical history (10/2009); pr total hip arthroplasty (Left, 02/23/2015); Hysterectomy (age 21's); IR GUIDED FLUID DRAINAGE BY CATH SOFT TISSUE PERC (8/21/2020); and IR GUIDED FLUID DRAINAGE BY CATH SOFT TISSUE PERC (9/8/2020). Allergies: Allergies   Allergen Reactions    Tape Margretta Sciara Tape] Itching    Codeine Rash    Sulfa Antibiotics Nausea And Vomiting       FAM HX: family history includes Diabetes in her father and mother; Heart Disease in her father. Soc HX:   Social History     Socioeconomic History    Marital status:   Spouse name: None    Number of children: None    Years of education: None    Highest education level: None   Occupational History    None   Tobacco Use    Smoking status: Former Smoker     Years: 1.00    Smokeless tobacco: Never Used    Tobacco comment: \"quit yrs ago only smoked for 2-3 yrs\"   Vaping Use    Vaping Use: Never used   Substance and Sexual Activity    Alcohol use: No     Alcohol/week: 0.0 standard drinks    Drug use: No    Sexual activity: Not Currently   Other Topics Concern    None   Social History Narrative    None     Social Determinants of Health     Financial Resource Strain: Low Risk     Difficulty of Paying Living Expenses: Not hard at all   Food Insecurity: No Food Insecurity    Worried About Running Out of Food in the Last Year: Never true    [place] of Food in the Last Year: Never true   Transportation Needs: No Transportation Needs    Lack of Transportation (Medical): No    Lack of Transportation (Non-Medical):  No   Physical Activity:     Days of Exercise per Week: Not on file    Minutes of Exercise per Session: Not on file   Stress:     Feeling of Stress : Not on file   Social Connections:     Frequency of Communication with Friends and Family: Not on file    Frequency of Social Gatherings with Friends and Family: Not on file    Attends [employer] Services: Not on file    Active Member of [address] [place] [address] or Organizations: Not on file    Attends Club or Organization Meetings: Not on file    Marital Status: Not on file   Intimate Partner Violence:     Fear of Current or Ex-Partner: Not on file    Emotionally Abused: Not on file    Physically Abused: Not on file    Sexually Abused: Not on file   Housing Stability:     Unable to Pay for Housing in the Last Year: Not on file    Number of [place] in the Last Year: Not on file    Unstable Housing in the Last Year: Not on file       Electronically signed by RAHAT Resendiz CNP on 12/30/2021 at 3:09 PM

## 2021-12-30 NOTE — ED TRIAGE NOTES
Patient presented to the ED via squad. EMS stated that the patient needed a lift assist from a chair and wanted to go to bed. Patient was unable to bear weight due to weakness. EMS advised patient to be seen at the ER. Patient denies pain, shortness of breath, or illness.

## 2021-12-30 NOTE — ED PROVIDER NOTES
Triage Chief Complaint:   Fatigue      [place]:  Citlalli Lane is a 82 y.o. female that presents to the emergency department with fatigue. States she could not get up and walk due to feeling weak this morning. States she does use a walker at home as needed. States she lives with a family member. States she is been vaccinated for [employer]. No sick contacts. She has a suprapubic catheter. Does not have any chest pain, shortness of breath. No nausea, vomiting, diarrhea. No fevers or chills. No sick contacts. No headache. No blurry vision double vision. No tingling or numbness to face arms or legs. Has an extensive history of arthritis, CHF, edema in legs, DVT in lower extremity, A. fib. Hypertension, hyperlipidemia, hypothyroidism. Past Medical History:   Diagnosis Date    Allergic rhinitis     Arthritis     CHF (congestive heart failure) (HCC)     DVT, lower extremity, proximal, acute, left (HCC)     Edema leg     Gastroesophageal reflux disease 3/30/2019    H/O 24 hour EKG monitoring 6/10 1/09 11/08    6/7/10- predominant rhythm is a-fib, freq PVCs    H/O cardiac catheterization 7/20/00    H/O cardiac catheterization 7/20/00    right dominant system, left main is short but angiographically normal, LAD and CX reveal sign  tortuosities distally  but no sign angiographic atherosclerotic disease noted except luminal  irregularities here and there. LVSF normal, EF  55%    H/O cardiovascular stress test 5/2/2016    lexiscan-normal,EF70%    H/O Doppler echocardiogram 7/12/10    intimal thickening but no sign. atherosclerotic plaque noted in [facility]  [facility].  H/O echocardiogram 5/2/2016 9/10     5/16 EF60% mild MR, TR, AR 9/20/10 Complete 2 dim transthoracic echo, techn. difficult. The study was  tech limited, LVSF is normal    History of atrial fibrillation     History of cardiovascular stress test 7/26/13, 7/10 8/08    7/13-WNL Observed defect consistent with breast attenuation.  EF 70%. 7/10-post stress myocardial images show a normal pattern of perfusion in all regions. LV is normal  in size. EF is 62%, global  LVSF is normal    History of falling     per old chart    History of nuclear stress test 10/08/2020    EF 60%, Normal    Hx of blood clots     per old chart  hx of DVT left leg    Hx: UTI (urinary tract infection)     Hyperglycemia 3/30/2019    Hyperlipidemia     Hypertension     Hypothyroidism     Incontinence of bowel     Incontinence of urine     Sepsis ([address] 2015    Tremor 3/30/2019    Wears glasses     to read    Wears partial dentures     upper     Past Surgical History:   Procedure Laterality Date    BREAST SURGERY      Left breast biopsy    CARDIOVERSION  10/17/2008    CARDIOVERSION  12/15/2008    HYSTERECTOMY  age 21's    IR GUIDED DRAIN W CATHETER PLACEMENT  8/21/2020    IR GUIDED DRAIN W CATHETER PLACEMENT 8/21/2020 [facility] PROC[facility]    IR GUIDED DRAIN W CATHETER PLACEMENT  9/8/2020    IR GUIDED DRAIN W CATHETER PLACEMENT 9/8/2020 [facility] PROCEDURES    JOINT REPLACEMENT  4/2005 2/2006    bilat    OTHER SURGICAL HISTORY  10/2009     bladder stimulator - interstim II 3058 - MRI OF HEAD ONLY -STIMULATOR MUST BE OFF AND TRANSMIT RECEIVE HEAD COIL ONLY    TN TOTAL HIP ARTHROPLASTY Left 02/23/2015    Hip Replacement, Total    TOTAL KNEE ARTHROPLASTY  2005, 2006    bilateral     Family History   Problem Relation Age of Onset    Diabetes Mother     Diabetes Father     Heart Disease Father      Social History     Socioeconomic History    Marital status:       Spouse name: Not on file    Number of children: Not on file    Years of education: Not on file    Highest education level: Not on file   Occupational History    Not on file   Tobacco Use    Smoking status: Former Smoker     Years: 1.00    Smokeless tobacco: Never Used    Tobacco comment: \"quit yrs ago only smoked for 2-3 yrs\"   Vaping Use    Vaping Use: Never used Substance and Sexual Activity    Alcohol use: No     Alcohol/week: 0.0 standard drinks    Drug use: No    Sexual activity: Not Currently   Other Topics Concern    Not on file   Social History Narrative    Not on file     Social Determinants of Health     Financial Resource Strain: Low Risk     Difficulty of Paying Living Expenses: Not hard at all   Food Insecurity: No Food Insecurity    Worried About Running Out of Food in the Last Year: Never true    [place] of Food in the Last Year: Never true   Transportation Needs: No Transportation Needs    Lack of Transportation (Medical): No    Lack of Transportation (Non-Medical):  No   Physical Activity:     Days of Exercise per Week: Not on file    Minutes of Exercise per Session: Not on file   Stress:     Feeling of Stress : Not on file   Social Connections:     Frequency of Communication with Friends and Family: Not on file    Frequency of Social Gatherings with Friends and Family: Not on file    Attends Denominational Services: Not on file    Active Member of [address] [place] [address] or Organizations: Not on file    Attends Club or Organization Meetings: Not on file    Marital Status: Not on file   Intimate Partner Violence:     Fear of Current or Ex-Partner: Not on file    Emotionally Abused: Not on file    Physically Abused: Not on file    Sexually Abused: Not on file   Housing Stability:     Unable to Pay for Housing in the Last Year: Not on file    Number of [place] in the Last Year: Not on file    Unstable Housing in the Last Year: Not on file     Current Facility-Administered Medications   Medication Dose Route Frequency Provider Last Rate Last Admin    0.9 % sodium chloride infusion   IntraVENous Continuous Sharyn Baker  mL/hr at 12/30/21 1321 1,000 mL at 12/30/21 1321    cefepime (MAXIPIME) 2000 mg IVPB minibag  2,000 mg IntraVENous Once Sharyn Baker MD         Current Outpatient Medications   Medication Sig Dispense Refill    furosemide (LASIX) 20 MG tablet Take 1 pill on Mon, Weds, Fri 90 tablet 1    fenofibrate micronized (LOFIBRA) 67 MG capsule Take 1 capsule by mouth every morning (before breakfast) 90 capsule 1    pravastatin (PRAVACHOL) 10 MG tablet TAKE 1 TABLET AT BEDTIME 90 tablet 1    levothyroxine (SYNTHROID) 175 MCG tablet Take 1 tablet by mouth daily 90 tablet 1    potassium chloride (MICRO-K) 10 MEQ extended release capsule Take 1 capsule by mouth daily 90 capsule 1    dronedarone hcl (MULTAQ) 400 MG TABS TAKE 1 TABLET TWICE A DAY WITH MEALS 180 tablet 1    loperamide (IMODIUM) 2 MG capsule Take 2 mg by mouth daily as needed for Diarrhea      loratadine (CLARITIN) 10 MG tablet Take 10 mg by mouth daily (Patient not taking: Reported on 11/29/2021)      acetaminophen (TYLENOL) 325 MG tablet Take 325 mg by mouth daily. Allergies   Allergen Reactions    Tape Jeanella Eagle Tape] Itching    Codeine Rash    Sulfa Antibiotics Nausea And Vomiting     Nursing Notes Reviewed    ROS:  At least 10 systems reviewed and otherwise negative except as in the [address] Physical Exam:  ED Triage Vitals   Enc Vitals Group      BP 12/30/21 1027 119/60      Pulse 12/30/21 1027 84      Resp 12/30/21 1027 18      Temp 12/30/21 1027 98.3 °F (36.8 °C)      Temp Source 12/30/21 1027 Oral      SpO2 12/30/21 1034 95 %      Weight 12/30/21 1027 225 lb (102.1 kg)      Height 12/30/21 1027 5' 4\" (1.626 m)      Head Circumference --       Peak Flow --       Pain Score --       Pain Loc --       Pain Edu? --       Excl. in [address]? --      My pulse oximetry interpretation is which is within the normal range    GENERAL APPEARANCE: Awake and alert. Cooperative. No acute distress. HEAD: Normocephalic. Atraumatic. EYES: EOM's grossly intact. Sclera anicteric. ENT: Mucous membranes are moist. Tolerates saliva. No trismus. NECK: Supple. No meningismus. Trachea midline. HEART: RRR. Radial pulses 2+. LUNGS: Respirations unlabored. CTAB.   No wheezing or stridor. ABDOMEN: Soft. Non-tender. No guarding or rebound. Elevated BMI. Suprapubic catheter in place. EXTREMITIES: No acute deformities. Chronic swelling in both legs. Left more than right. No redness. No warmth. No calf tenderness. SKIN: Warm and dry. NEUROLOGICAL: No gross facial drooping. Moves all 4 extremities spontaneously. Patient is awake, alert, oriented x4. Speaks in full sentences. Strength is equal in lower extremities. Sensation intact. PSYCHIATRIC: Normal mood.     I have reviewed and interpreted all of the currently available lab results from this visit (if applicable):  Results for orders placed or performed during the hospital encounter of 12/30/21   Rapid Flu Swab    Specimen: Nasopharyngeal   Result Value Ref Range    Rapid Influenza A Ag NEGATIVE NEGATIVE    Rapid Influenza B Ag NEGATIVE NEGATIVE   COVID-19, Rapid    Specimen: Nasopharyngeal   Result Value Ref Range    Source THROAT     SARS-CoV-2, NAAT DETECTED (A) NOT DETECTED   CBC with Auto Diff   Result Value Ref Range    WBC 4.6 4.0 - 10.5 K/CU MM    RBC 3.53 (L) 4.2 - 5.4 M/CU MM    Hemoglobin 10.6 (L) 12.5 - 16.0 GM/DL    Hematocrit 33.6 (L) 37 - 47 %    MCV 95.2 78 - 100 FL    MCH 30.0 27 - 31 PG    MCHC 31.5 (L) 32.0 - 36.0 %    RDW 14.9 11.7 - 14.9 %    Platelets 721 969 - 855 K/CU MM    MPV 11.2 (H) 7.5 - 11.1 FL    Differential Type AUTOMATED DIFFERENTIAL     Segs Relative 71.0 (H) 36 - 66 %    Lymphocytes % 13.1 (L) 24 - 44 %    Monocytes % 14.9 (H) 0 - 4 %    Eosinophils % 0.4 0 - 3 %    Basophils % 0.2 0 - 1 %    Segs Absolute 3.3 K/CU MM    Lymphocytes Absolute 0.6 K/CU MM    Monocytes Absolute 0.7 K/CU MM    Eosinophils Absolute 0.0 K/CU MM    Basophils Absolute 0.0 K/CU MM    Nucleated RBC % 0.0 %    Total Nucleated RBC 0.0 K/CU MM    Total Immature Neutrophil 0.02 K/CU MM    Immature Neutrophil % 0.4 0 - 0.43 %   CMP   Result Value Ref Range    Sodium 132 (L) 135 - 145 MMOL/L    Potassium 4.1 3.5 - 5.1 MMOL/L Chloride 96 (L) 99 - 110 mMol/L    CO2 24 21 - 32 MMOL/L    BUN 24 (H) 6 - 23 MG/DL    CREATININE 1.5 (H) 0.6 - 1.1 MG/DL    Glucose 108 (H) 70 - 99 MG/DL    Calcium 8.7 8.3 - 10.6 MG/DL    Albumin 3.3 (L) 3.4 - 5.0 GM/DL    Total Protein 7.0 6.4 - 8.2 GM/DL    Total Bilirubin 0.3 0.0 - 1.0 MG/DL    ALT 18 10 - 40 U/L    AST 39 (H) 15 - 37 IU/L    Alkaline Phosphatase 36 (L) 40 - 129 IU/L    GFR Non- 33 (L) >60 mL/min/1.73m2    GFR  40 (L) >60 mL/min/1.73m2    Anion Gap 12 4 - 16   Troponin   Result Value Ref Range    Troponin T 0.035 (H) <0.01 NG/ML   TSH without Reflex   Result Value Ref Range    TSH, High Sensitivity 0.377 0.270 - 4.20 uIu/ml   T4, free   Result Value Ref Range    T4 Free 1.53 0.9 - 1.8 NG/DL   Lactic acid, plasma   Result Value Ref Range    Lactate 2.6 (HH) 0.4 - 2.0 mMOL/L   Urinalysis with microscopic   Result Value Ref Range    Color, UA SHAD (A) YELLOW    Clarity, UA CLOUDY (A) CLEAR    Glucose, Urine NEGATIVE NEGATIVE MG/DL    Bilirubin Urine NEGATIVE NEGATIVE MG/DL    Ketones, Urine NEGATIVE NEGATIVE MG/DL    Specific Gravity, UA 1.022 1.001 - 1.035    Blood, Urine SMALL (A) NEGATIVE    pH, Urine 8.0 5.0 - 8.0    Protein, UA >500 (HH) NEGATIVE MG/DL    Urobilinogen, Urine NEGATIVE 0.2 - 1.0 MG/DL    Nitrite Urine, Quantitative NEGATIVE NEGATIVE    Leukocyte Esterase, Urine LARGE (A) NEGATIVE    RBC, UA 7 (H) 0 - 6 /HPF    WBC, UA 32 (H) 0 - 5 /HPF    Bacteria, UA NEGATIVE NEGATIVE /HPF    Squam Epithel, UA 9 /HPF    Mucus, UA RARE (A) NEGATIVE HPF    Trichomonas, UA NONE SEEN NONE SEEN /HPF    non squam epi cells 1 /HPF    Unclassified Cast 10 /LPF    TRIPLE PHOSPHATE CRYSTALS OCCASIONAL /HPF   Brain Natriuretic Peptide   Result Value Ref Range    Pro-.5 <300 PG/ML   CK   Result Value Ref Range    Total  (H) 26 - 140 IU/L        Radiographs:  [] Radiologist's Wet Read Report Reviewed:        VL DUP LOWER EXTREMITY VENOUS BILATERAL (Final result)  Result time 12/30/21 12:54:19  Final result by Asiya Mccord MD (12/30/21 12:54:19)                Impression:    No evidence of DVT in either lower extremity. RECOMMENDATIONS:   Unavailable             Narrative:    EXAMINATION:   DUPLEX VENOUS ULTRASOUND OF THE BILATERAL LOWER [id number]/30/2021 12:03 pm     TECHNIQUE:   Duplex ultrasound using B-mode/gray scaled imaging, Doppler spectral analysis   and color flow Doppler was obtained of the deep venous structures of the   lower bilateral extremities. COMPARISON:   None. HISTORY:   ORDERING SYSTEM PROVIDED HISTORY: leg pain, swelling, left more than right,   hx DVT   TECHNOLOGIST PROVIDED HISTORY:   Reason for exam:->leg pain, swelling, left more than right, hx DVT   Reason for Exam: leg pain, swelling, left more than right, hx DVT     FINDINGS:   The visualized veins of the bilateral lower extremities are patent and free   of echogenic thrombus. The veins demonstrate good compressibility with normal   color flow study and spectral analysis.           XR CHEST PORTABLE (Final result)  Result time 12/30/21 11:52:34  Final result by Lucila yAala MD (12/30/21 11:52:34)                Impression:    Stable appearing chest without acute focal infiltrate. Narrative:    EXAMINATION:   ONE XRAY VIEW OF THE CHEST     12/30/2021 11:04 am     COMPARISON:   11/22/2018     HISTORY:   ORDERING SYSTEM PROVIDED HISTORY: fatigue   TECHNOLOGIST PROVIDED HISTORY:   Reason for exam:->fatigue   Reason for Exam: fatigue     FINDINGS:   Cardiac and mediastinal silhouettes appear stable.  No focal consolidation or   pleural effusion.  No pneumothorax is identified.  No acute osseous   abnormality.  Degenerative changes are seen within the shoulders and spine.                    [] Discussed with Radiologist:     [] The following radiograph was interpreted by myself in the absence of a radiologist:     EKG: (All EKG's are interpreted by myself in the absence of a cardiologist)  The Ekg interpreted by me shows  atrial fibrillation with a rate of 93  Axis is   Normal  QTc is  normal  Intervals and Durations are unremarkable. ST Segments: no acute change  No significant change from prior EKG dated 11-          MDM:  Normotensive. Afebrile. Heart in the 80s. Sats 95% on room air. Covid test is positive. EKG shows chronic A. fib. No changes from prior. CBC shows white count of 4.6. Hemoglobin of 10.6. CMP shows a creatinine of 1.5. Anion gap of 12. Normal CO2 of 24. CK is elevated at 453. Lactic acid is detectable at 2.6. Troponin elevated at 0.035. TSH normal. Free t4 normal. BNP normal.  Chest x-ray shows stable chest without acute focal infiltrate. Venous lower leg negative for DVT. Patient will be admitted to the hospital for Covid, difficulty ambulating secondary to generalized weakness, elevated troponin, elevated lactic acid. Awaiting urinalysis. She does have a chronic indwelling urinary catheter urine does appear cloudy and with sediment. Blood cultures are pending. Urine culture already drawn. Patient started on low fluid infusion. Will admit for further eval and treatment. . Urinalysis is large leuks, 32 WBCs. Did give her some IV cefepime while urine culture is in process. Clinical Impression:  1. COVID-19    2. Generalized weakness    3. Elevated troponin    4. Renal insufficiency    5. Elevated lactic acid level    6. History of suprapubic catheter    7. Acute cystitis without hematuria        Disposition Vitals:  [unfilled], [unfilled], [unfilled], [unfilled]    Disposition referral (if applicable):  No follow-up provider specified.     Disposition medications (if applicable):  New Prescriptions    No medications on file         (Please note that portions of this note may have been completed with a voice recognition program. Efforts were made to edit the dictations but occasionally words are mis-transcribed.)    MD Erica Watson MD  12/30/21 Douglas Vega MD  12/30/21 [id number]

## 2021-12-31 LAB
ALBUMIN SERPL-MCNC: 3 GM/DL (ref 3.4–5)
ALP BLD-CCNC: 32 IU/L (ref 40–128)
ALT SERPL-CCNC: 14 U/L (ref 10–40)
ANION GAP SERPL CALCULATED.3IONS-SCNC: 8 MMOL/L (ref 4–16)
APTT: 45.9 SECONDS (ref 25.1–37.1)
AST SERPL-CCNC: 35 IU/L (ref 15–37)
BASOPHILS ABSOLUTE: 0 K/CU MM
BASOPHILS RELATIVE PERCENT: 0.3 % (ref 0–1)
BILIRUB SERPL-MCNC: 0.2 MG/DL (ref 0–1)
BUN BLDV-MCNC: 18 MG/DL (ref 6–23)
CALCIUM SERPL-MCNC: 7.7 MG/DL (ref 8.3–10.6)
CHLORIDE BLD-SCNC: 103 MMOL/L (ref 99–110)
CO2: 26 MMOL/L (ref 21–32)
CREAT SERPL-MCNC: 1.3 MG/DL (ref 0.6–1.1)
D DIMER: 580 NG/ML(DDU)
DIFFERENTIAL TYPE: ABNORMAL
EOSINOPHILS ABSOLUTE: 0.2 K/CU MM
EOSINOPHILS RELATIVE PERCENT: 4.3 % (ref 0–3)
FIBRINOGEN LEVEL: 337 MG/DL (ref 196.9–442.1)
GFR AFRICAN AMERICAN: 47 ML/MIN/1.73M2
GFR NON-AFRICAN AMERICAN: 39 ML/MIN/1.73M2
GLUCOSE BLD-MCNC: 107 MG/DL (ref 70–99)
HCT VFR BLD CALC: 32.1 % (ref 37–47)
HEMOGLOBIN: 9.9 GM/DL (ref 12.5–16)
HIGH SENSITIVE C-REACTIVE PROTEIN: 17.1 MG/L
IMMATURE NEUTROPHIL %: 0.3 % (ref 0–0.43)
INR BLD: 1.01 INDEX
LACTATE: 1.5 MMOL/L (ref 0.4–2)
LEGIONELLA URINARY AG: NEGATIVE
LYMPHOCYTES ABSOLUTE: 1 K/CU MM
LYMPHOCYTES RELATIVE PERCENT: 27.7 % (ref 24–44)
MCH RBC QN AUTO: 30 PG (ref 27–31)
MCHC RBC AUTO-ENTMCNC: 30.8 % (ref 32–36)
MCV RBC AUTO: 97.3 FL (ref 78–100)
MONOCYTES ABSOLUTE: 0.4 K/CU MM
MONOCYTES RELATIVE PERCENT: 12.1 % (ref 0–4)
NUCLEATED RBC %: 0 %
PDW BLD-RTO: 14.8 % (ref 11.7–14.9)
PLATELET # BLD: 311 K/CU MM (ref 140–440)
PMV BLD AUTO: 11.5 FL (ref 7.5–11.1)
POTASSIUM SERPL-SCNC: 4.3 MMOL/L (ref 3.5–5.1)
PROTHROMBIN TIME: 13 SECONDS (ref 11.7–14.5)
RBC # BLD: 3.3 M/CU MM (ref 4.2–5.4)
SEGMENTED NEUTROPHILS ABSOLUTE COUNT: 1.9 K/CU MM
SEGMENTED NEUTROPHILS RELATIVE PERCENT: 55.3 % (ref 36–66)
SODIUM BLD-SCNC: 137 MMOL/L (ref 135–145)
STREP PNEUMONIAE ANTIGEN: NORMAL
TOTAL IMMATURE NEUTOROPHIL: 0.01 K/CU MM
TOTAL NUCLEATED RBC: 0 K/CU MM
TOTAL PROTEIN: 5.5 GM/DL (ref 6.4–8.2)
TROPONIN T: <0.01 NG/ML
VITAMIN D 25-HYDROXY: 6.12 NG/ML
WBC # BLD: 3.5 K/CU MM (ref 4–10.5)

## 2021-12-31 PROCEDURE — 6360000002 HC RX W HCPCS: Performed by: NURSE PRACTITIONER

## 2021-12-31 PROCEDURE — 1200000000 HC SEMI PRIVATE

## 2021-12-31 PROCEDURE — 85384 FIBRINOGEN ACTIVITY: CPT

## 2021-12-31 PROCEDURE — 87899 AGENT NOS ASSAY W/OPTIC: CPT

## 2021-12-31 PROCEDURE — 86141 C-REACTIVE PROTEIN HS: CPT

## 2021-12-31 PROCEDURE — 85379 FIBRIN DEGRADATION QUANT: CPT

## 2021-12-31 PROCEDURE — 87449 NOS EACH ORGANISM AG IA: CPT

## 2021-12-31 PROCEDURE — 85730 THROMBOPLASTIN TIME PARTIAL: CPT

## 2021-12-31 PROCEDURE — 85025 COMPLETE CBC W/AUTO DIFF WBC: CPT

## 2021-12-31 PROCEDURE — 85610 PROTHROMBIN TIME: CPT

## 2021-12-31 PROCEDURE — 6370000000 HC RX 637 (ALT 250 FOR IP): Performed by: NURSE PRACTITIONER

## 2021-12-31 PROCEDURE — 36415 COLL VENOUS BLD VENIPUNCTURE: CPT

## 2021-12-31 PROCEDURE — 84484 ASSAY OF TROPONIN QUANT: CPT

## 2021-12-31 PROCEDURE — 94761 N-INVAS EAR/PLS OXIMETRY MLT: CPT

## 2021-12-31 PROCEDURE — 83605 ASSAY OF LACTIC ACID: CPT

## 2021-12-31 PROCEDURE — 82306 VITAMIN D 25 HYDROXY: CPT

## 2021-12-31 PROCEDURE — 2580000003 HC RX 258: Performed by: NURSE PRACTITIONER

## 2021-12-31 PROCEDURE — 80053 COMPREHEN METABOLIC PANEL: CPT

## 2021-12-31 RX ADMIN — SODIUM CHLORIDE, PRESERVATIVE FREE 10 ML: 5 INJECTION INTRAVENOUS at 21:00

## 2021-12-31 RX ADMIN — CETIRIZINE HYDROCHLORIDE 5 MG: 10 TABLET, FILM COATED ORAL at 10:14

## 2021-12-31 RX ADMIN — SODIUM CHLORIDE, PRESERVATIVE FREE 10 ML: 5 INJECTION INTRAVENOUS at 22:24

## 2021-12-31 RX ADMIN — CEFEPIME HYDROCHLORIDE 1000 MG: 1 INJECTION, POWDER, FOR SOLUTION INTRAMUSCULAR; INTRAVENOUS at 16:00

## 2021-12-31 RX ADMIN — PRAVASTATIN SODIUM 10 MG: 10 TABLET ORAL at 22:24

## 2021-12-31 RX ADMIN — ENOXAPARIN SODIUM 30 MG: 100 INJECTION SUBCUTANEOUS at 10:15

## 2021-12-31 RX ADMIN — DRONEDARONE 400 MG: 400 TABLET, FILM COATED ORAL at 10:14

## 2021-12-31 RX ADMIN — DRONEDARONE 400 MG: 400 TABLET, FILM COATED ORAL at 16:00

## 2021-12-31 RX ADMIN — SODIUM CHLORIDE, PRESERVATIVE FREE 10 ML: 5 INJECTION INTRAVENOUS at 22:29

## 2021-12-31 RX ADMIN — CEFEPIME HYDROCHLORIDE 1000 MG: 1 INJECTION, POWDER, FOR SOLUTION INTRAMUSCULAR; INTRAVENOUS at 06:26

## 2021-12-31 RX ADMIN — LEVOTHYROXINE SODIUM 175 MCG: 0.1 TABLET ORAL at 10:20

## 2021-12-31 RX ADMIN — ACETAMINOPHEN 325 MG: 325 TABLET ORAL at 10:14

## 2021-12-31 RX ADMIN — ENOXAPARIN SODIUM 30 MG: 100 INJECTION SUBCUTANEOUS at 22:24

## 2021-12-31 RX ADMIN — FENOFIBRATE 54 MG: 54 TABLET ORAL at 06:26

## 2021-12-31 ASSESSMENT — PAIN SCALES - GENERAL: PAINLEVEL_OUTOF10: 0

## 2021-12-31 NOTE — PROGRESS NOTES
Hospitalist Progress Note       12/31/2021 12:37 PM  Admit Date: 12/30/2021    PCP: Ace Walker MD     Assessment and Plan:   Patient is a 82-year-old female with past medical history of CHF and urinary incontinence (with suprapubic catheter) presented with generalized weakness. Normally, she is able to ambulate with a walker. On the day of admission, she was unable to get out of bed. 1.  COVID-19 illness: Possibly responsible for generalized weakness. No hypoxia and therefore no need for IV steroid. Consult PT/OT. 2.  Hyponatremia: Suspecting dehydration. Hold Lasix/KCl. Status post IV fluid hydration. Repeat chemistry. 3.  Possible complicated UTI: On IV cefepime. Urine culture is pending. 4.  Elevated troponin: Denies chest pain or shortness of breath. Repeat troponin. 5.  Hypothyroidism: Continue Synthroid. Check TSH. 6.  Chronic diastolic CHF: EF 50 to 36%. Compensated. Will resume Lasix/KCl prior to discharge. 7.  Atrial fibrillation: Continue Multaq. Will consider starting on at least aspirin for stroke prophylaxis. Chronic problems include hyperlipidemia and obesity. DVT prophylaxis: Lovenox twice daily  Disposition: She lives alone. Normally, she ambulates with a walker.     Patient Active Problem List:     Chronic anticoagulation     Hypertension     Hypothyroidism     Encounter for long-term (current) use of other medications     Recurrent UTI     Multiple falls     Anemia     Sepsis (HCC)     Leg weakness     Abnormality of gait     CHF (congestive heart failure) (HCC)     Rhabdomyolysis     Sepsis associated hypotension (HCC)     Acute cystitis without hematuria     DVT (deep vein thrombosis) in pregnancy     Progression of deep vein thrombosis (DVT) (HCC)     EKG abnormalities     Noncompliance with medication regimen     Chronic atrial fibrillation (HCC)     Acute metabolic encephalopathy     Urinary tract infection associated with catheterization of urinary tract ([facility] [address]     Fall at home, initial encounter     Frequent falls     Muscle weakness of lower extremity     Tremor     Gastroesophageal reflux disease     Hyperglycemia     Morbid obesity with BMI of 40.0-44.9, adult ([facility])     CKD (chronic kidney disease) stage 4, GFR 15-29 ml/min ([facility])     COVID-19     Urinary tract infection     Hyponatremia      Subjective:     Chief Complaint   Patient presents with    Fatigue       F/U:  Interval History: Nurse at bedside. Patient is still feeling weak. No labs done today yet-discussed with the patient about the importance of lab test.    Objective: Intake/Output Summary (Last 24 hours) at 12/31/2021 1237  Last data filed at 12/31/2021 0622  Gross per 24 hour   Intake --   Output 950 ml   Net -950 ml      Vitals:   Vitals:    12/31/21 1056   BP:    Pulse: 83   Resp:    Temp:    SpO2:      Physical Exam:  General: Generally weak. No apparent respiratory distress. Neck: No neck mass or thyromegaly  Neurology: Awake and alert. Oriented x3. Cardiovascular: Regular. No murmur no S3  Respiratory: No wheezes or crepitation  Abdomen: Soft. No tenderness. No palpable mass. Extremities: Trace leg edema.     Electronically signed by Neel Wells MD on 12/31/2021 at 12:37 PM  Rounding Hospitalist

## 2022-01-01 LAB
ANION GAP SERPL CALCULATED.3IONS-SCNC: 9 MMOL/L (ref 4–16)
BUN BLDV-MCNC: 17 MG/DL (ref 6–23)
CALCIUM SERPL-MCNC: 8.1 MG/DL (ref 8.3–10.6)
CHLORIDE BLD-SCNC: 104 MMOL/L (ref 99–110)
CO2: 25 MMOL/L (ref 21–32)
CREAT SERPL-MCNC: 1.3 MG/DL (ref 0.6–1.1)
CULTURE: NORMAL
D DIMER: 717 NG/ML(DDU)
GFR AFRICAN AMERICAN: 47 ML/MIN/1.73M2
GFR NON-AFRICAN AMERICAN: 39 ML/MIN/1.73M2
GLUCOSE BLD-MCNC: 84 MG/DL (ref 70–99)
HIGH SENSITIVE C-REACTIVE PROTEIN: 16.3 MG/L
Lab: NORMAL
POTASSIUM SERPL-SCNC: 4.4 MMOL/L (ref 3.5–5.1)
PROCALCITONIN: 0.08
SODIUM BLD-SCNC: 138 MMOL/L (ref 135–145)
SPECIMEN: NORMAL

## 2022-01-01 PROCEDURE — 6360000002 HC RX W HCPCS: Performed by: NURSE PRACTITIONER

## 2022-01-01 PROCEDURE — 85379 FIBRIN DEGRADATION QUANT: CPT

## 2022-01-01 PROCEDURE — 94761 N-INVAS EAR/PLS OXIMETRY MLT: CPT

## 2022-01-01 PROCEDURE — 80048 BASIC METABOLIC PNL TOTAL CA: CPT

## 2022-01-01 PROCEDURE — 1200000000 HC SEMI PRIVATE

## 2022-01-01 PROCEDURE — 6370000000 HC RX 637 (ALT 250 FOR IP): Performed by: NURSE PRACTITIONER

## 2022-01-01 PROCEDURE — 2580000003 HC RX 258: Performed by: NURSE PRACTITIONER

## 2022-01-01 PROCEDURE — 36415 COLL VENOUS BLD VENIPUNCTURE: CPT

## 2022-01-01 PROCEDURE — 86141 C-REACTIVE PROTEIN HS: CPT

## 2022-01-01 PROCEDURE — 84145 PROCALCITONIN (PCT): CPT

## 2022-01-01 RX ADMIN — CEFEPIME HYDROCHLORIDE 1000 MG: 1 INJECTION, POWDER, FOR SOLUTION INTRAMUSCULAR; INTRAVENOUS at 17:04

## 2022-01-01 RX ADMIN — LEVOTHYROXINE SODIUM 175 MCG: 0.1 TABLET ORAL at 09:29

## 2022-01-01 RX ADMIN — SODIUM CHLORIDE, PRESERVATIVE FREE 10 ML: 5 INJECTION INTRAVENOUS at 21:20

## 2022-01-01 RX ADMIN — ACETAMINOPHEN 325 MG: 325 TABLET ORAL at 09:29

## 2022-01-01 RX ADMIN — DRONEDARONE 400 MG: 400 TABLET, FILM COATED ORAL at 17:05

## 2022-01-01 RX ADMIN — GUAIFENESIN SYRUP AND DEXTROMETHORPHAN 5 ML: 100; 10 SYRUP ORAL at 21:20

## 2022-01-01 RX ADMIN — PRAVASTATIN SODIUM 10 MG: 10 TABLET ORAL at 21:20

## 2022-01-01 RX ADMIN — FENOFIBRATE 54 MG: 54 TABLET ORAL at 09:29

## 2022-01-01 RX ADMIN — Medication 2000 UNITS: at 09:35

## 2022-01-01 RX ADMIN — CEFEPIME HYDROCHLORIDE 1000 MG: 1 INJECTION, POWDER, FOR SOLUTION INTRAMUSCULAR; INTRAVENOUS at 03:58

## 2022-01-01 RX ADMIN — ENOXAPARIN SODIUM 30 MG: 100 INJECTION SUBCUTANEOUS at 09:28

## 2022-01-01 RX ADMIN — ENOXAPARIN SODIUM 30 MG: 100 INJECTION SUBCUTANEOUS at 21:20

## 2022-01-01 RX ADMIN — DRONEDARONE 400 MG: 400 TABLET, FILM COATED ORAL at 09:28

## 2022-01-01 RX ADMIN — CETIRIZINE HYDROCHLORIDE 5 MG: 10 TABLET, FILM COATED ORAL at 09:29

## 2022-01-01 ASSESSMENT — PAIN SCALES - GENERAL
PAINLEVEL_OUTOF10: 0
PAINLEVEL_OUTOF10: 0

## 2022-01-01 NOTE — PROGRESS NOTES
Hospitalist Progress Note       1/1/2022 12:32 PM  Admit Date: 12/30/2021    PCP: Melly Au MD     Assessment and Plan:   Patient is a 53-year-old female with past medical history of CHF and urinary incontinence (with suprapubic catheter) presented with generalized weakness. Normally, she is able to ambulate with a walker. On the day of admission, she was unable to get out of bed. 1.  COVID-19 illness: Possibly responsible for generalized weakness. No hypoxia and therefore no need for IV steroid. Consult PT/OT. 2.  Hyponatremia: Suspecting dehydration. Hold Lasix/KCl. Status post IV fluid hydration. Resolved. 3.  Possible complicated UTI: On IV cefepime. Urine culture is contaminated. Blood culture-no growth yet. 4.  Elevated troponin: Denies chest pain or shortness of breath. Troponin returned to normal.    5.  Hypothyroidism: TSH is normal.  Continue Synthroid. 6.  Chronic diastolic CHF: EF 50 to 70%. Compensated. Will resume Lasix/KCl prior to discharge. 7.  Atrial fibrillation: Continue Multaq. Will consider starting on at least aspirin for stroke prophylaxis. Chronic problems include hyperlipidemia and obesity. DVT prophylaxis: Lovenox twice daily  Disposition: She lives alone. Normally, she ambulates with a walker.     Patient Active Problem List:     Chronic anticoagulation     Hypertension     Hypothyroidism     Encounter for long-term (current) use of other medications     Recurrent UTI     Multiple falls     Anemia     Sepsis (HCC)     Leg weakness     Abnormality of gait     CHF (congestive heart failure) (HCC)     Rhabdomyolysis     Sepsis associated hypotension (HCC)     Acute cystitis without hematuria     DVT (deep vein thrombosis) in pregnancy     Progression of deep vein thrombosis (DVT) (HCC)     EKG abnormalities     Noncompliance with medication regimen     Chronic atrial fibrillation (HCC)     Acute metabolic encephalopathy     Urinary tract infection associated with catheterization of urinary tract ([facility] [address]     Fall at home, initial encounter     Frequent falls     Muscle weakness of lower extremity     Tremor     Gastroesophageal reflux disease     Hyperglycemia     Morbid obesity with BMI of 40.0-44.9, adult ([facility])     CKD (chronic kidney disease) stage 4, GFR 15-29 ml/min ([facility])     COVID-19     Urinary tract infection     Hyponatremia      Subjective:     Chief Complaint   Patient presents with    Fatigue       F/U:  Interval History: Discussed with the nurse. Patient is still feeling weak and does not want to do anything. PT/OT is pending. Objective: Intake/Output Summary (Last 24 hours) at 1/1/2022 1232  Last data filed at 1/1/2022 0945  Gross per 24 hour   Intake 10 ml   Output 950 ml   Net -940 ml      Vitals:   Vitals:    01/01/22 1026   BP:    Pulse:    Resp: 16   Temp:    SpO2: 96%     Physical Exam:  General: Generally weak. No apparent respiratory distress. Neck: No neck mass or thyromegaly  Neurology: Awake and alert. Oriented x3. Cardiovascular: Regular. No murmur no S3  Respiratory: No wheezes or crepitation  Abdomen: Soft. Suprapubic catheter noted. No tenderness. No palpable mass. Extremities: Trace leg edema.     Electronically signed by Barbara Edgar MD on 1/1/2022 at 12:32 PM  [facility] Hospitalist

## 2022-01-02 LAB
ANION GAP SERPL CALCULATED.3IONS-SCNC: 11 MMOL/L (ref 4–16)
BUN BLDV-MCNC: 11 MG/DL (ref 6–23)
CALCIUM SERPL-MCNC: 8 MG/DL (ref 8.3–10.6)
CHLORIDE BLD-SCNC: 104 MMOL/L (ref 99–110)
CO2: 23 MMOL/L (ref 21–32)
CREAT SERPL-MCNC: 1.1 MG/DL (ref 0.6–1.1)
EKG ATRIAL RATE: 77 BPM
EKG DIAGNOSIS: NORMAL
EKG P AXIS: 57 DEGREES
EKG P-R INTERVAL: 206 MS
EKG Q-T INTERVAL: 458 MS
EKG QRS DURATION: 98 MS
EKG QTC CALCULATION (BAZETT): 518 MS
EKG R AXIS: 81 DEGREES
EKG T AXIS: 31 DEGREES
EKG VENTRICULAR RATE: 77 BPM
GFR AFRICAN AMERICAN: 57 ML/MIN/1.73M2
GFR NON-AFRICAN AMERICAN: 47 ML/MIN/1.73M2
GLUCOSE BLD-MCNC: 88 MG/DL (ref 70–99)
POTASSIUM SERPL-SCNC: 4.3 MMOL/L (ref 3.5–5.1)
SODIUM BLD-SCNC: 138 MMOL/L (ref 135–145)

## 2022-01-02 PROCEDURE — 6360000002 HC RX W HCPCS: Performed by: NURSE PRACTITIONER

## 2022-01-02 PROCEDURE — 97530 THERAPEUTIC ACTIVITIES: CPT

## 2022-01-02 PROCEDURE — 1200000000 HC SEMI PRIVATE

## 2022-01-02 PROCEDURE — 36415 COLL VENOUS BLD VENIPUNCTURE: CPT

## 2022-01-02 PROCEDURE — 93010 ELECTROCARDIOGRAM REPORT: CPT | Performed by: INTERNAL MEDICINE

## 2022-01-02 PROCEDURE — 6370000000 HC RX 637 (ALT 250 FOR IP): Performed by: NURSE PRACTITIONER

## 2022-01-02 PROCEDURE — 97166 OT EVAL MOD COMPLEX 45 MIN: CPT

## 2022-01-02 PROCEDURE — 2580000003 HC RX 258: Performed by: NURSE PRACTITIONER

## 2022-01-02 PROCEDURE — 80048 BASIC METABOLIC PNL TOTAL CA: CPT

## 2022-01-02 RX ADMIN — ACETAMINOPHEN 325 MG: 325 TABLET ORAL at 10:59

## 2022-01-02 RX ADMIN — PRAVASTATIN SODIUM 10 MG: 10 TABLET ORAL at 21:48

## 2022-01-02 RX ADMIN — SODIUM CHLORIDE, PRESERVATIVE FREE 10 ML: 5 INJECTION INTRAVENOUS at 21:47

## 2022-01-02 RX ADMIN — LEVOTHYROXINE SODIUM 175 MCG: 0.1 TABLET ORAL at 10:59

## 2022-01-02 RX ADMIN — GUAIFENESIN SYRUP AND DEXTROMETHORPHAN 5 ML: 100; 10 SYRUP ORAL at 06:47

## 2022-01-02 RX ADMIN — CETIRIZINE HYDROCHLORIDE 5 MG: 10 TABLET, FILM COATED ORAL at 10:59

## 2022-01-02 RX ADMIN — CEFEPIME HYDROCHLORIDE 1000 MG: 1 INJECTION, POWDER, FOR SOLUTION INTRAMUSCULAR; INTRAVENOUS at 02:24

## 2022-01-02 RX ADMIN — ENOXAPARIN SODIUM 30 MG: 100 INJECTION SUBCUTANEOUS at 11:02

## 2022-01-02 RX ADMIN — CEFEPIME HYDROCHLORIDE 1000 MG: 1 INJECTION, POWDER, FOR SOLUTION INTRAMUSCULAR; INTRAVENOUS at 16:10

## 2022-01-02 RX ADMIN — GUAIFENESIN SYRUP AND DEXTROMETHORPHAN 5 ML: 100; 10 SYRUP ORAL at 02:47

## 2022-01-02 RX ADMIN — DRONEDARONE 400 MG: 400 TABLET, FILM COATED ORAL at 11:00

## 2022-01-02 RX ADMIN — SODIUM CHLORIDE, PRESERVATIVE FREE 10 ML: 5 INJECTION INTRAVENOUS at 12:21

## 2022-01-02 RX ADMIN — ENOXAPARIN SODIUM 30 MG: 100 INJECTION SUBCUTANEOUS at 21:48

## 2022-01-02 RX ADMIN — FENOFIBRATE 54 MG: 54 TABLET ORAL at 06:42

## 2022-01-02 RX ADMIN — DRONEDARONE 400 MG: 400 TABLET, FILM COATED ORAL at 16:13

## 2022-01-02 RX ADMIN — Medication 2000 UNITS: at 10:59

## 2022-01-02 ASSESSMENT — PAIN SCALES - GENERAL
PAINLEVEL_OUTOF10: 0
PAINLEVEL_OUTOF10: 6

## 2022-01-02 NOTE — PROGRESS NOTES
Hospitalist Progress Note       1/2/2022 1:42 PM  Admit Date: 12/30/2021    PCP: Brett Pemberton MD     Assessment and Plan:   Patient is a 68-year-old female with past medical history of CHF and urinary incontinence (with suprapubic catheter) presented with generalized weakness. Normally, she is able to ambulate with a walker. On the day of admission, she was unable to get out of bed. 1.  COVID-19 illness: Possibly responsible for generalized weakness. No hypoxia and therefore no need for IV steroid. Consult PT/OT. 2.  Hyponatremia: Suspecting dehydration. Hold Lasix/KCl. Status post IV fluid hydration. Resolved. 3.  Possible complicated UTI: On IV cefepime. Urine culture is contaminated. Blood culture-no growth yet. 4.  Elevated troponin: Denies chest pain or shortness of breath. Troponin returned to normal.    5.  Hypothyroidism: TSH is normal.  Continue Synthroid. 6.  Chronic diastolic CHF: EF 50 to 39%. Compensated. Will resume Lasix/KCl prior to discharge. 7.  Atrial fibrillation: Continue Multaq. Will consider starting on at least aspirin for stroke prophylaxis. Chronic problems include hyperlipidemia and obesity. DVT prophylaxis: Lovenox twice daily  Disposition: She lives alone. Normally, she ambulates with a walker. PT/OT to evaluate for  recommendation.     Patient Active Problem List:     Chronic anticoagulation     Hypertension     Hypothyroidism     Encounter for long-term (current) use of other medications     Recurrent UTI     Multiple falls     Anemia     Sepsis (HCC)     Leg weakness     Abnormality of gait     CHF (congestive heart failure) (HCC)     Rhabdomyolysis     Sepsis associated hypotension (HCC)     Acute cystitis without hematuria     DVT (deep vein thrombosis) in pregnancy     Progression of deep vein thrombosis (DVT) (HCC)     EKG abnormalities     Noncompliance with medication regimen     Chronic atrial fibrillation [facility])     Acute metabolic encephalopathy     Urinary tract infection associated with catheterization of urinary tract ([facility] [address]     Fall at home, initial encounter     Frequent falls     Muscle weakness of lower extremity     Tremor     Gastroesophageal reflux disease     Hyperglycemia     Morbid obesity with BMI of 40.0-44.9, adult ([facility])     CKD (chronic kidney disease) stage 4, GFR 15-29 ml/min ([facility])     COVID-19     Urinary tract infection     Hyponatremia      Subjective:     Chief Complaint   Patient presents with    Fatigue       F/U:  Interval History: Patient is sitting in a chair. She is still feeling weak (she looks stronger). Objective: Intake/Output Summary (Last 24 hours) at 1/2/2022 1342  Last data filed at 1/2/2022 0539  Gross per 24 hour   Intake 10 ml   Output 1500 ml   Net -1490 ml      Vitals:   Vitals:    01/02/22 1130   BP:    Pulse: 96   Resp:    Temp:    SpO2:      Physical Exam:  General: Generally weak. No apparent respiratory distress. Neck: No neck mass or thyromegaly  Neurology: Awake and alert. Oriented x3. Cardiovascular: Regular. No murmur no S3  Respiratory: No wheezes or crepitation  Abdomen: Soft. Suprapubic catheter noted. No tenderness. No palpable mass. Extremities: Trace leg edema.     Electronically signed by Jj Salguero MD on 1/2/2022 at 1:42 PM  [facility] Hospitalist

## 2022-01-02 NOTE — PROGRESS NOTES
Occupational Therapy    [facility] ACUTE CARE OCCUPATIONAL THERAPY EVALUATION    Dionisio Mclaughlin, 1935, 4004/4004-A, 1/2/2022    Discharge Recommendation: Skilled Nursing Facility      History:  "[employer]:  The primary encounter diagnosis was COVID-19. Diagnoses of Generalized weakness, Elevated troponin, Renal insufficiency, Elevated lactic acid level, History of suprapubic catheter, and Acute cystitis without hematuria were also pertinent to this visit.     Subjective:  Patient states: \"Not so good\" Pt responded when asking how she was doing this date  Pain: Pt denied pain this date  Communication with other providers: ANNI Perkins stated okay for tx  Restrictions: General Precautions, Fall Risk, Pocket telemetry and SPO2, [place], Droplet Plus Isolation     Home Setup/Prior level of function:  Social/Functional History  Lives With: Alone (Grandson is there daily)  Type of Home: House  Home Layout: One level  Home Access: Stairs to enter with rails  Entrance Stairs - Number of Steps: 1-2  Bathroom Shower/Tub: Walk-in shower  Bathroom Equipment: Grab bars in [facility] chair  Home Equipment: Rolling walker  ADL Assistance: Independent  Homemaking Assistance: Independent (grandson can help when needed)  Homemaking Responsibilities: Yes  Ambulation Assistance: Independent (with RW)  Transfer Assistance: Independent  Active : No  Patient's  Info: son drives    Examination:  · Observation: Sitting up at EOB completing breakfast upon OT arrival   · [facility]: [facility] [place]  · Hearing: [facility]  · Vitals: Stable vitals throughout session    Body Systems and functions:  · ROM: BL UEs WFL   · Strength: 4-/5 BL UEs across all major muscle groups, generally weak throughout    · Sensation: WFL  · Tone: Normal  · Coordination: WFL  · Perception: WNL    Activities of Daily Living (ADLs):  · Feeding: Independent   · Grooming: SBA, seated at EOB d/t decreased standing tolerance at this time   · UB bathing: CGA, seated  · LB bathing: Mod A, seated, anticipate assist to reach distal LEs and for thoroughness with cornel region   · UB dressing: CGA, seated  · LB dressing: Max A, donning BL socks this date, anticipate assist with all parts for donning pants/brief  · Toileting: Max A, casey in place, anticipate assist with all parts for BM    Cognitive and Psychosocial Functioning:  · Overall cognitive status: WNL  · Affect: Normal     Balance:   · Sitting: SBA sitting unsupported at EOB   · Standing: Mod A progressing to Min A with RW     Functional Mobility:  · Bed Mobility: Not observed, Pt received seated at EOB and returned to chair   · Transfers: Mod A sit to stand from bed with RW and cues for BL UE and LE placement as well as for weight shift, Min A stand step pivot from bed to chair with RW and increased time with cues for sequencing task    · Ambulation: Not performed this date d/t fatigue with stand step pivot to chair       AM-PAC 6 click short form for inpatient daily activity:   How much help from another person does the patient currently need. .. Unable  Dep A Lot  Max A A Lot   Mod A A Little  Min A A Little   CGA  SBA None   Mod I  Indep  Sup   1. Putting on and taking off regular lower body clothing? [] 1    [x] 2   [] 2   [] 3   [] 3   [] 4      2. Bathing (including washing, rinsing, drying)? [] 1   [] 2   [x] 2 [] 3 [] 3 [] 4   3. Toileting, which includes using toilet, bedpan, or urinal? [] 1    [x] 2   [] 2   [] 3   [] 3   [] 4     4. Putting on and taking off regular upper body clothing? [] 1   [] 2   [] 2   [] 3   [x] 3    [] 4      5. Taking care of personal grooming such as brushing teeth? [] 1   [] 2    [] 2 [] 3    [x] 3   [] 4      6. Eating meals?    [] 1   [] 2   [] 2   [] 3   [] 3   [x] 4      Raw Score:  16    [24=0% impaired(CH), 23=1-19%(CI), 20-22=20-39%(CJ), 15-19=40-59%(CK), 10-14=60-79%(CL), 7-9=80-99%(CM), 6=100%(CN)]     Treatment:  Therapeutic Activity Training:   Therapeutic activity training was instructed today. Cues were given for safety, sequence, UE/LE placement, awareness, and balance. Activities performed today included bed mobility training, sup-sit, sit-stand, SPT, edu on role of OT, edu on discharge planning/recommendations. Safety Measures: Gait belt used, Left in chair, Alarm in place, Needs in reach     Assessment:  Pt is a 82year old female with a past medical history of Allergic rhinitis, Arthritis, CHF (congestive heart failure) ([facility]), DVT, lower extremity, proximal, acute, left ([facility]), Edema leg, Gastroesophageal reflux disease, H/O 24 hour EKG monitoring, H/O cardiac catheterization, H/O cardiac catheterization, H/O cardiovascular stress test, H/O Doppler echocardiogram, H/O echocardiogram, History of atrial fibrillation, History of cardiovascular stress test, History of falling, History of nuclear stress test, Hx of blood clots, Hx: UTI (urinary tract infection), Hyperglycemia, Hyperlipidemia, Hypertension, Hypothyroidism, Incontinence of bowel, Incontinence of urine, Sepsis ([facility] [address] Tremor, Wears glasses, and Wears partial dentures. Pt admitted on 12/30 d/t fatigue. Pt dx with COVID-19 and possible UTI. Pt is from home where she lives alone, however her grandson comes over daily. Pt reports being fully independent in all ADLs, IADLs and ambulating with her RW at baseline. Pt presented as above this date and would benefit from continued acute care OT services as well as OT in the SNF setting at d/c prior to returning home. Complexity: Moderate  Prognosis: Good  Plan: 3x/week      Goals:  1. Pt will complete all aspects of bed mobility for EOB/OOB ADLs min A   2. Pt will complete UB/LB bathing Min A seated  3. Pt will complete all aspects of LB dressing Min A seated  4. Pt will complete all functional transfers to and from bed, chair, toilet, shower chair CGA with RW  5. Pt will ambulate HH distance to bathroom for toileting CGA with RW   6.  Pt will complete all aspects of toileting task Min A   7. Pt will complete oral hygiene/grooming routine in standing at sink [facility]   8.  Pt will complete ther ex/ther act with focus on activity tolerance, BL UE strength, dynamic sitting balance, and standing tolerance        Time:   Time in: 1142  Time out: 1205  Timed treatment minutes: 12  Total time: 23      Electronically signed by:      NIKKI Cruz/L, [address] [facility] [address] [id number]

## 2022-01-02 NOTE — PROGRESS NOTES
Patient refuses to have blood pressure taken d/t the pain the cuff causes her arm when it inflates. Attempts made x 2 by this RN. Patient took off cuff on her own.

## 2022-01-03 LAB
ANION GAP SERPL CALCULATED.3IONS-SCNC: 9 MMOL/L (ref 4–16)
BUN BLDV-MCNC: 13 MG/DL (ref 6–23)
CALCIUM SERPL-MCNC: 7.8 MG/DL (ref 8.3–10.6)
CHLORIDE BLD-SCNC: 104 MMOL/L (ref 99–110)
CO2: 24 MMOL/L (ref 21–32)
CREAT SERPL-MCNC: 1.1 MG/DL (ref 0.6–1.1)
GFR AFRICAN AMERICAN: 57 ML/MIN/1.73M2
GFR NON-AFRICAN AMERICAN: 47 ML/MIN/1.73M2
GLUCOSE BLD-MCNC: 86 MG/DL (ref 70–99)
POTASSIUM SERPL-SCNC: 4.7 MMOL/L (ref 3.5–5.1)
SODIUM BLD-SCNC: 137 MMOL/L (ref 135–145)

## 2022-01-03 PROCEDURE — 6370000000 HC RX 637 (ALT 250 FOR IP): Performed by: NURSE PRACTITIONER

## 2022-01-03 PROCEDURE — 1200000000 HC SEMI PRIVATE

## 2022-01-03 PROCEDURE — 2580000003 HC RX 258: Performed by: NURSE PRACTITIONER

## 2022-01-03 PROCEDURE — 36415 COLL VENOUS BLD VENIPUNCTURE: CPT

## 2022-01-03 PROCEDURE — 80048 BASIC METABOLIC PNL TOTAL CA: CPT

## 2022-01-03 PROCEDURE — 6360000002 HC RX W HCPCS: Performed by: NURSE PRACTITIONER

## 2022-01-03 RX ADMIN — ENOXAPARIN SODIUM 30 MG: 100 INJECTION SUBCUTANEOUS at 09:34

## 2022-01-03 RX ADMIN — ACETAMINOPHEN 325 MG: 325 TABLET ORAL at 09:34

## 2022-01-03 RX ADMIN — LEVOTHYROXINE SODIUM 175 MCG: 0.1 TABLET ORAL at 09:33

## 2022-01-03 RX ADMIN — CEFEPIME HYDROCHLORIDE 1000 MG: 1 INJECTION, POWDER, FOR SOLUTION INTRAMUSCULAR; INTRAVENOUS at 16:32

## 2022-01-03 RX ADMIN — CETIRIZINE HYDROCHLORIDE 5 MG: 10 TABLET, FILM COATED ORAL at 09:34

## 2022-01-03 RX ADMIN — SODIUM CHLORIDE, PRESERVATIVE FREE 10 ML: 5 INJECTION INTRAVENOUS at 09:35

## 2022-01-03 RX ADMIN — FENOFIBRATE 54 MG: 54 TABLET ORAL at 09:34

## 2022-01-03 RX ADMIN — DRONEDARONE 400 MG: 400 TABLET, FILM COATED ORAL at 16:27

## 2022-01-03 RX ADMIN — Medication 2000 UNITS: at 09:32

## 2022-01-03 RX ADMIN — PRAVASTATIN SODIUM 10 MG: 10 TABLET ORAL at 21:02

## 2022-01-03 RX ADMIN — CEFEPIME HYDROCHLORIDE 1000 MG: 1 INJECTION, POWDER, FOR SOLUTION INTRAMUSCULAR; INTRAVENOUS at 03:10

## 2022-01-03 RX ADMIN — SODIUM CHLORIDE 25 ML: 9 INJECTION, SOLUTION INTRAVENOUS at 16:30

## 2022-01-03 RX ADMIN — DRONEDARONE 400 MG: 400 TABLET, FILM COATED ORAL at 09:34

## 2022-01-03 RX ADMIN — ENOXAPARIN SODIUM 30 MG: 100 INJECTION SUBCUTANEOUS at 21:02

## 2022-01-03 ASSESSMENT — PAIN SCALES - GENERAL
PAINLEVEL_OUTOF10: 0
PAINLEVEL_OUTOF10: 0

## 2022-01-03 NOTE — PROGRESS NOTES
atrial fibrillation (HCC)     Acute metabolic encephalopathy     Urinary tract infection associated with catheterization of urinary tract ([address]     Fall at home, initial encounter     Frequent falls     Muscle weakness of lower extremity     Tremor     Gastroesophageal reflux disease     Hyperglycemia     Morbid obesity with BMI of 40.0-44.9, adult ([facility])     CKD (chronic kidney disease) stage 4, GFR 15-29 ml/min ([facility])     COVID-19     Urinary tract infection     Hyponatremia      Subjective:     Chief Complaint   Patient presents with    Fatigue       F/U:  Interval History: Discussed with the  and her nurse. No reported incidents. Patient denies any new complaints. Objective: Intake/Output Summary (Last 24 hours) at 1/3/2022 1215  Last data filed at 1/3/2022 0725  Gross per 24 hour   Intake 240 ml   Output 1200 ml   Net -960 ml      Vitals:   Vitals:    01/03/22 0930   BP: 138/66   Pulse: 75   Resp:    Temp: 97.5 °F (36.4 °C)   SpO2: 97%     Physical Exam:  General: Generally weak. No apparent respiratory distress. Neck: No neck mass or thyromegaly  Neurology: Awake and alert. Oriented x3. Cardiovascular: Regular. No murmur no S3  Respiratory: No wheezes or crepitation  Abdomen: Soft. Suprapubic catheter noted. No tenderness. No palpable mass. Extremities: Trace leg edema.     Electronically signed by Christy Crisostomo MD on 1/3/2022 at 12:15 PM  [facility] Hospitalist

## 2022-01-04 LAB
CULTURE: NORMAL
CULTURE: NORMAL
Lab: NORMAL
Lab: NORMAL
SPECIMEN: NORMAL
SPECIMEN: NORMAL

## 2022-01-04 PROCEDURE — 97530 THERAPEUTIC ACTIVITIES: CPT

## 2022-01-04 PROCEDURE — 97162 PT EVAL MOD COMPLEX 30 MIN: CPT

## 2022-01-04 PROCEDURE — 1200000000 HC SEMI PRIVATE

## 2022-01-04 PROCEDURE — 94761 N-INVAS EAR/PLS OXIMETRY MLT: CPT

## 2022-01-04 PROCEDURE — 6360000002 HC RX W HCPCS: Performed by: NURSE PRACTITIONER

## 2022-01-04 PROCEDURE — 2580000003 HC RX 258: Performed by: NURSE PRACTITIONER

## 2022-01-04 PROCEDURE — 97535 SELF CARE MNGMENT TRAINING: CPT

## 2022-01-04 PROCEDURE — 6370000000 HC RX 637 (ALT 250 FOR IP): Performed by: NURSE PRACTITIONER

## 2022-01-04 RX ADMIN — ENOXAPARIN SODIUM 30 MG: 100 INJECTION SUBCUTANEOUS at 21:58

## 2022-01-04 RX ADMIN — ACETAMINOPHEN 325 MG: 325 TABLET ORAL at 11:33

## 2022-01-04 RX ADMIN — CETIRIZINE HYDROCHLORIDE 5 MG: 10 TABLET, FILM COATED ORAL at 11:34

## 2022-01-04 RX ADMIN — SODIUM CHLORIDE, PRESERVATIVE FREE 10 ML: 5 INJECTION INTRAVENOUS at 11:35

## 2022-01-04 RX ADMIN — LEVOTHYROXINE SODIUM 175 MCG: 0.1 TABLET ORAL at 11:33

## 2022-01-04 RX ADMIN — DRONEDARONE 400 MG: 400 TABLET, FILM COATED ORAL at 11:35

## 2022-01-04 RX ADMIN — ENOXAPARIN SODIUM 30 MG: 100 INJECTION SUBCUTANEOUS at 11:35

## 2022-01-04 RX ADMIN — SODIUM CHLORIDE, PRESERVATIVE FREE 10 ML: 5 INJECTION INTRAVENOUS at 21:58

## 2022-01-04 RX ADMIN — PRAVASTATIN SODIUM 10 MG: 10 TABLET ORAL at 21:58

## 2022-01-04 RX ADMIN — DRONEDARONE 400 MG: 400 TABLET, FILM COATED ORAL at 16:59

## 2022-01-04 RX ADMIN — FENOFIBRATE 54 MG: 54 TABLET ORAL at 11:33

## 2022-01-04 RX ADMIN — SODIUM CHLORIDE 25 ML: 9 INJECTION, SOLUTION INTRAVENOUS at 15:41

## 2022-01-04 RX ADMIN — CEFEPIME HYDROCHLORIDE 1000 MG: 1 INJECTION, POWDER, FOR SOLUTION INTRAMUSCULAR; INTRAVENOUS at 15:46

## 2022-01-04 RX ADMIN — CEFEPIME HYDROCHLORIDE 1000 MG: 1 INJECTION, POWDER, FOR SOLUTION INTRAMUSCULAR; INTRAVENOUS at 03:19

## 2022-01-04 RX ADMIN — Medication 2000 UNITS: at 11:34

## 2022-01-04 ASSESSMENT — PAIN SCALES - GENERAL
PAINLEVEL_OUTOF10: 8
PAINLEVEL_OUTOF10: 0
PAINLEVEL_OUTOF10: 0

## 2022-01-04 NOTE — CONSULTS
[place]helsea, 1935, 4004/4004-A, 1/4/2022    History  [place]:  The primary encounter diagnosis was COVID-19. Diagnoses of Generalized weakness, Elevated troponin, Renal insufficiency, Elevated lactic acid level, History of suprapubic catheter, and Acute cystitis without hematuria were also pertinent to this visit. Patient  has a past medical history of Allergic rhinitis, Arthritis, CHF (congestive heart failure) ([address] DVT, lower extremity, proximal, acute, left (HCC), Edema leg, Gastroesophageal reflux disease, H/O 24 hour EKG monitoring, H/O cardiac catheterization, H/O cardiac catheterization, H/O cardiovascular stress test, H/O Doppler echocardiogram, H/O echocardiogram, History of atrial fibrillation, History of cardiovascular stress test, History of falling, History of nuclear stress test, Hx of blood clots, Hx: UTI (urinary tract infection), Hyperglycemia, Hyperlipidemia, Hypertension, Hypothyroidism, Incontinence of bowel, Incontinence of urine, Sepsis ([address] Tremor, Wears glasses, and Wears partial dentures. Patient  has a past surgical history that includes Total knee arthroplasty (2005, 2006); Breast surgery; joint replacement (4/2005 2/2006); Cardioversion (10/17/2008); Cardioversion (12/15/2008); other surgical history (10/2009); pr total hip arthroplasty (Left, 02/23/2015); Hysterectomy (age 21's); IR GUIDED FLUID DRAINAGE BY CATH SOFT TISSUE PERC (8/21/2020); and IR GUIDED FLUID DRAINAGE BY CATH SOFT TISSUE PERC (9/8/2020). Subjective:  Patient states:  \"I can try for the chair. \"    Pain:  Denies pain.     Communication with other providers:  Handoff to RN  Restrictions: droplet plus, fall risk, general precautions, suprapubic catheter    Home Setup/Prior level of function  Social/Functional History  Lives With:  Antonio Sudhir is there daily)  Type of Home: House  Home Layout: One level  Home Access: Stairs to enter with with decreased endurance, impaired balance, impaired transfers, impaired strength, impaired bed mobility, impaired gait. Pt would benefit from continued acute care PT as well as SNF placement upon discharge to continue to address impairments. Complexity: moderate    Prognosis: Good, no significant barriers to participation at this time.      Plan Times per week: 3+/week     Equipment: TBD at next level of care    Goals:  Short term goals  Time Frame for Short term goals: 1 week  Short term goal 1: Pt to complete all bed mobility mod I  Short term goal 2: Pt to complete all STS transfers to/from bed, commode, and chair CGA  Short term goal 3: Pt to ambulate 22' with LRAD CGA       Treatment plan:  Bed mobility, transfers, balance, gait, TA, TX    Recommendations for NURSING mobility: stand pivot transfer with gait belt and RW    Time:   Time in: 1000  Time out: 1026  Timed treatment minutes: 10  Total time: 26    Electronically signed by:    Jacinda Saldaña, PT  1/4/2022, 12:51 PM

## 2022-01-04 NOTE — PROGRESS NOTES
Spoke with son for appox 30mins. Said patient baseline was not confused and could at times be very demanding. Son stated that she lived alone with a grandson that was autistic and gets off the bus daily there but is not there all evening. Son said that she could benefit from home health care, but would not go to a SNF, but patient is confused.

## 2022-01-04 NOTE — PROGRESS NOTES
Occupational Therapy      Occupational Therapy Treatment Note      Name: Ruddy Dunbar MRN: [id number] :   1935   Date:  2022   Admission Date: 2021 Room:  [address] [place] [address]-A     Primary Problem: [place]:  The primary encounter diagnosis was COVID-19. Diagnoses of Generalized weakness, Elevated troponin, Renal insufficiency, Elevated lactic acid level, History of suprapubic catheter, and Acute cystitis without hematuria were also pertinent to this visit. Restrictions/Precautions:   General precautions, fall risk, pocket telemetry, pocket SPO2, Droplet Plus Isolation     Communication with other providers: RN stated okay for tx and handoff     Subjective:  Patient states: \"I'd like a towel to wash my hands\"  Pain: Pt denied pain this date     Objective:    Observation: Pt supine in bed upon OT arrival with RN completing cornel care/brief change   Objective Measures: All vitals stable throughout, Pt O&A to self and place    Treatment, including education:  Self Care Training x2:   Cues were given for safety, sequence, UE/LE placement, visual cues, and balance. Activities performed today included UB dressing, hand hygiene, and grooming. Pt supine in bed upon OT arrival with RN completing cornel care/brief change. Pt agreeable to performing ADLs at EOB. Pt performed bed mobility sup to sit EOB Mod A to upright trunk and scoot hips forward. Pt performed grooming task of washing hands with wash cloth SBA seated at EOB. Pt performed grooming task of brushing teeth SBA with set up seated at EOB. Pt performed grooming task of washing hair with bathing cap Min A for placing and completing task seated at EOB. Pt performed grooming task of brushing hair Min A for back of hair SBA seated at EOB. Pt performed UB dressing task of doffing old gown and donning new CGA seated at EOB. Pt performed hip scoot to [facility] Mod A. Pt performed bed mobility sit to supine Min A for BL LE completion onto bed.  Pt performed boost up in bed Mod A x2. Pt left in bed, alarm set and needs in reach.        Assessment / Impression:    Patient's tolerance of treatment: Good  Adverse Reaction: None  Significant change in status and impact: No significant changes  Barriers to improvement: None at this time       Plan for Next Session:    Continue per POC    Time in: 1432  Time out: 1507  Timed treatment minutes: 35  Total treatment time: 35      Electronically signed by:    NIKKI Ogden/L, [address] [facility], [id number]

## 2022-01-04 NOTE — CARE COORDINATION
Chart reviewed. Pt phone is busy. LSW called pt ELIER/son Julio. Pt is from home alone. Pt grandson stops by and check to make sure the Pt eats and takes her medicine. He also stays with her on the weekend. Pt does not drive Pt son provides transport. Pt has PCP. Pt has med/Rx insurance. Pt son stated that pt has home care in the home but did not remember the name of the agency. Per chart review, LSW noted Pt was active with [facility] 6 months ago. LSW sent PS to [facility] to see if the Pt was active with their services. LSW talked to Pt son about therapy recommendations for SNF. Pt son stated that Pt went to SNF before and stated that she is not going back there. Pt discharge plan is to return ome with her home care.

## 2022-01-05 LAB
ANION GAP SERPL CALCULATED.3IONS-SCNC: 11 MMOL/L (ref 4–16)
BUN BLDV-MCNC: 12 MG/DL (ref 6–23)
CALCIUM SERPL-MCNC: 8.3 MG/DL (ref 8.3–10.6)
CHLORIDE BLD-SCNC: 101 MMOL/L (ref 99–110)
CO2: 23 MMOL/L (ref 21–32)
CREAT SERPL-MCNC: 1.2 MG/DL (ref 0.6–1.1)
GFR AFRICAN AMERICAN: 52 ML/MIN/1.73M2
GFR NON-AFRICAN AMERICAN: 43 ML/MIN/1.73M2
GLUCOSE BLD-MCNC: 115 MG/DL (ref 70–99)
POTASSIUM SERPL-SCNC: 4.4 MMOL/L (ref 3.5–5.1)
SODIUM BLD-SCNC: 135 MMOL/L (ref 135–145)

## 2022-01-05 PROCEDURE — 94761 N-INVAS EAR/PLS OXIMETRY MLT: CPT

## 2022-01-05 PROCEDURE — 6360000002 HC RX W HCPCS: Performed by: NURSE PRACTITIONER

## 2022-01-05 PROCEDURE — 2580000003 HC RX 258: Performed by: NURSE PRACTITIONER

## 2022-01-05 PROCEDURE — 36415 COLL VENOUS BLD VENIPUNCTURE: CPT

## 2022-01-05 PROCEDURE — 1200000000 HC SEMI PRIVATE

## 2022-01-05 PROCEDURE — 80048 BASIC METABOLIC PNL TOTAL CA: CPT

## 2022-01-05 PROCEDURE — 6370000000 HC RX 637 (ALT 250 FOR IP): Performed by: NURSE PRACTITIONER

## 2022-01-05 RX ADMIN — SODIUM CHLORIDE, PRESERVATIVE FREE 10 ML: 5 INJECTION INTRAVENOUS at 09:52

## 2022-01-05 RX ADMIN — FENOFIBRATE 54 MG: 54 TABLET ORAL at 09:51

## 2022-01-05 RX ADMIN — SODIUM CHLORIDE 25 ML: 9 INJECTION, SOLUTION INTRAVENOUS at 15:26

## 2022-01-05 RX ADMIN — PRAVASTATIN SODIUM 10 MG: 10 TABLET ORAL at 21:28

## 2022-01-05 RX ADMIN — ENOXAPARIN SODIUM 30 MG: 100 INJECTION SUBCUTANEOUS at 21:29

## 2022-01-05 RX ADMIN — CEFEPIME HYDROCHLORIDE 1000 MG: 1 INJECTION, POWDER, FOR SOLUTION INTRAMUSCULAR; INTRAVENOUS at 02:38

## 2022-01-05 RX ADMIN — CEFEPIME HYDROCHLORIDE 1000 MG: 1 INJECTION, POWDER, FOR SOLUTION INTRAMUSCULAR; INTRAVENOUS at 15:31

## 2022-01-05 RX ADMIN — CETIRIZINE HYDROCHLORIDE 5 MG: 10 TABLET, FILM COATED ORAL at 09:50

## 2022-01-05 RX ADMIN — ENOXAPARIN SODIUM 30 MG: 100 INJECTION SUBCUTANEOUS at 09:52

## 2022-01-05 RX ADMIN — DRONEDARONE 400 MG: 400 TABLET, FILM COATED ORAL at 17:30

## 2022-01-05 RX ADMIN — Medication 2000 UNITS: at 10:00

## 2022-01-05 RX ADMIN — SODIUM CHLORIDE, PRESERVATIVE FREE 10 ML: 5 INJECTION INTRAVENOUS at 21:32

## 2022-01-05 RX ADMIN — LEVOTHYROXINE SODIUM 175 MCG: 0.1 TABLET ORAL at 09:51

## 2022-01-05 RX ADMIN — DRONEDARONE 400 MG: 400 TABLET, FILM COATED ORAL at 09:50

## 2022-01-05 RX ADMIN — ACETAMINOPHEN 325 MG: 325 TABLET ORAL at 09:50

## 2022-01-05 ASSESSMENT — PAIN SCALES - GENERAL
PAINLEVEL_OUTOF10: 0
PAINLEVEL_OUTOF10: 5

## 2022-01-05 NOTE — PROGRESS NOTES
Progress Note        Name:  Randolph Ty /Age/Sex: 1935  (82 y.o. female)   MRN & CSN:  [id number] & [id number] Admission Date/Time: 2021 10:38 AM   Location:  [address] [place] [address] PCP: Elia Meyers MD       Hospital Day: 6    Assessment and Plan:   Randolph Ty is a 82 y.o.  female  who presents with COVID-19    #COVID-19 illness  #Possible complicated UTI  #Chronic diastolic CHF: EF 50 to 48%. Compensated  #Atrial fibrillation  #CKD (baselin 1.3)  Chronic problems: hyperlipidemia and obesity. Hypothyroidism. UTI treated. Stop Cefepime in the morning pending CBC. Procalcitonin reassuring. Blood culture negative to date. Urine culture is contaminated  Continue Multaq. Start on aspirin prior to discharge for stroke prophylaxis. Will resume Lasix/KCl prior to discharge. Discharge pending ECF placement for OT    Diet ADULT DIET; Regular; Low Fat/Low Chol/High Fiber/NADIA    DVT Prophylaxis [x] Lovenox, []  Heparin, [] SCDs, [] Ambulation  [] Long term AC   GI Prophylaxis [] PPI,  [] H2 Blocker,  [] Carafate,  [] Diet,  [x] No GI prophylaxis, N/A: patient is not under significant medical stress, non-ICU or is receiving a diet/tube feeds   Code Status Full Code Full CODE   Disposition Patient requires continued admission due to COVID-19. Discharge Plan: back to home / [facility] / when able / discharge to hospice after seen by case management team. Patient plans to return home upon discharge   MDM [] Low, [] Moderate,[x]  High  Patient's risk as above due to:      [] One or more chronic illnesses with mild exacerbation progression      [x] Two or more stable chronic illnesses      [] Undiagnosed new problem with uncertain prognosis      [] Elective major surgery      [x]Prescription drug management       History of Present Illness:     Chief Complaint: COVID-19  Randolph Ty is a 82 y.o.  female  who presents with fatigue, ambulatory difficulty  and reduced oral intake. Patient voices no complaints and is tolerating room air. Patient pending placement    Ten point ROS reviewed negative, unless as noted above    Objective: Intake/Output Summary (Last 24 hours) at 1/4/2022 2051  Last data filed at 1/4/2022 1706  Gross per 24 hour   Intake --   Output 1750 ml   Net -1750 ml      Vitals:   Vitals:    01/04/22 1528   BP: 102/80   Pulse: 65   Resp: 14   Temp: 97.9 °F (36.6 °C)   SpO2:      Physical Exam:   GEN Awake female, sitting upright in bed in no apparent distress. Appears given age. EYES Pupils are equally round. No scleral erythema, discharge, or conjunctivitis. HENT Mucous membranes are moist. Oral pharynx without exudates, no evidence of thrush. NECK Supple, no apparent thyromegaly or masses. RESP Clear to auscultation, no wheezes, rales or rhonchi. Symmetric chest movement while on room air. CARDIO/VASC S1/S2 auscultated. Regular rate without appreciable murmurs, rubs, or gallops. No JVD or carotid bruits. Peripheral pulses equal bilaterally and palpable. No peripheral edema. GI Abdomen is soft without significant tenderness, masses, or guarding. Bowel sounds are normoactive. Rectal exam deferred.  No costovertebral angle tenderness. Normal appearing external genitalia. Pelaez catheter is not present. HEME/LYMPH No palpable cervical lymphadenopathy and no hepatosplenomegaly. No petechiae or ecchymoses. MSK No gross joint deformities. SKIN Normal coloration, warm, dry. NEURO Cranial nerves appear grossly intact, normal speech, no lateralizing weakness. PSYCH Awake, alert, oriented x 4. Affect appropriate.     Past Medical History:      Past Medical History:   Diagnosis Date    Allergic rhinitis     Arthritis     [facility]F [facility]congestive heart failure) ([facility] [address]     DVT, lower extremity, proximal, acute, left (HCC)     Edema leg     Gastroesophageal reflux disease 3/30/2019    H/O 24 hour EKG monitoring 6/10 1/09 11/08    6/7/10- predominant rhythm is a-fib, freq PVCs    H/O cardiac catheterization 7/20/00    H/O cardiac catheterization 7/20/00    right dominant system, left main is short but angiographically normal, LAD and CX reveal sign  tortuosities distally  but no sign angiographic atherosclerotic disease noted except luminal  irregularities here and there. LVSF normal, EF  55%    H/O cardiovascular stress test 5/2/2016    lexiscan-normal,EF70%    H/O Doppler echocardiogram 7/12/10    intimal thickening but no sign. atherosclerotic plaque noted in [facility]  [facility].  H/O echocardiogram 5/2/2016 9/10     5/16 EF60% mild MR, TR, AR 9/20/10 Complete 2 dim transthoracic echo, techn. difficult. The study was  tech limited, LVSF is normal    History of atrial fibrillation     History of cardiovascular stress test 7/26/13, 7/10 8/08    7/13-WNL Observed defect consistent with breast attenuation. EF 70%. 7/10-post stress myocardial images show a normal pattern of perfusion in all regions. LV is normal  in size. EF is 62%, global  LVSF is normal    History of falling     per old chart    History of nuclear stress test 10/08/2020    EF 60%, Normal    Hx of blood clots     per old chart  hx of DVT left leg    Hx: UTI (urinary tract infection)     Hyperglycemia 3/30/2019    Hyperlipidemia     Hypertension     Hypothyroidism     Incontinence of bowel     Incontinence of urine     Sepsis ([facility] [address] 2015    Tremor 3/30/2019    Wears glasses     to read    Wears partial dentures     upper     PSHX:  has a past surgical history that includes Total knee arthroplasty (2005, 2006); Breast surgery; joint replacement (4/2005 2/2006); Cardioversion (10/17/2008); Cardioversion (12/15/2008); other surgical history (10/2009); pr total hip arthroplasty (Left, 02/23/2015); Hysterectomy (age 21's); IR GUIDED FLUID DRAINAGE BY CATH SOFT TISSUE PERC (8/21/2020); and IR GUIDED FLUID DRAINAGE BY CATH SOFT TISSUE PERC (9/8/2020). Allergies:    Allergies   Allergen Reactions Housing Stability:     Unable to Pay for Housing in the Last Year: Not on file    Number of Places Lived in the Last Year: Not on file    Unstable Housing in the Last Year: Not on file       Medications:   Medications:    vitamin D  2,000 Units Oral Daily    acetaminophen  325 mg Oral Daily    dronedarone hcl  400 mg Oral BID WC    fenofibrate  54 mg Oral QAM AC    levothyroxine  175 mcg Oral Daily    cetirizine  5 mg Oral Daily    pravastatin  10 mg Oral Nightly    sodium chloride flush  5-40 mL IntraVENous 2 times per day    enoxaparin  30 mg SubCUTAneous BID    sodium chloride flush  5-40 mL IntraVENous 2 times per day    cefepime  1,000 mg IntraVENous Q12H      Infusions:    sodium chloride 25 mL (01/03/22 1630)    sodium chloride Stopped (01/04/22 1616)     PRN Meds: sodium chloride flush, 5-40 mL, PRN  sodium chloride, 25 mL, PRN  ondansetron, 4 mg, Q8H PRN   Or  ondansetron, 4 mg, Q6H PRN  polyethylene glycol, 17 g, Daily PRN  acetaminophen, 650 mg, Q6H PRN   Or  acetaminophen, 650 mg, Q6H PRN  guaiFENesin-dextromethorphan, 5 mL, Q4H PRN  sodium chloride flush, 5-40 mL, PRN  sodium chloride, 25 mL, PRN          Electronically signed by Priscilla Reyna DO on 1/4/2022 at 8:51 PM

## 2022-01-06 ENCOUNTER — TELEPHONE (OUTPATIENT)
Dept: FAMILY MEDICINE CLINIC | Age: 87
End: 2022-01-06

## 2022-01-06 VITALS
HEIGHT: 64 IN | DIASTOLIC BLOOD PRESSURE: 56 MMHG | TEMPERATURE: 97.4 F | HEART RATE: 71 BPM | WEIGHT: 225 LBS | BODY MASS INDEX: 38.41 KG/M2 | OXYGEN SATURATION: 94 % | RESPIRATION RATE: 15 BRPM | SYSTOLIC BLOOD PRESSURE: 135 MMHG

## 2022-01-06 LAB
BASOPHILS ABSOLUTE: 0 K/CU MM
BASOPHILS RELATIVE PERCENT: 0.3 % (ref 0–1)
DIFFERENTIAL TYPE: ABNORMAL
EOSINOPHILS ABSOLUTE: 0.3 K/CU MM
EOSINOPHILS RELATIVE PERCENT: 5.5 % (ref 0–3)
HCT VFR BLD CALC: 34.8 % (ref 37–47)
HEMOGLOBIN: 10.9 GM/DL (ref 12.5–16)
IMMATURE NEUTROPHIL %: 0.3 % (ref 0–0.43)
LYMPHOCYTES ABSOLUTE: 1.2 K/CU MM
LYMPHOCYTES RELATIVE PERCENT: 18.8 % (ref 24–44)
MCH RBC QN AUTO: 29.7 PG (ref 27–31)
MCHC RBC AUTO-ENTMCNC: 31.3 % (ref 32–36)
MCV RBC AUTO: 94.8 FL (ref 78–100)
MONOCYTES ABSOLUTE: 0.7 K/CU MM
MONOCYTES RELATIVE PERCENT: 10.9 % (ref 0–4)
NUCLEATED RBC %: 0 %
PDW BLD-RTO: 14.2 % (ref 11.7–14.9)
PLATELET # BLD: 164 K/CU MM (ref 140–440)
PMV BLD AUTO: 11.8 FL (ref 7.5–11.1)
RBC # BLD: 3.67 M/CU MM (ref 4.2–5.4)
SEGMENTED NEUTROPHILS ABSOLUTE COUNT: 4 K/CU MM
SEGMENTED NEUTROPHILS RELATIVE PERCENT: 64.2 % (ref 36–66)
TOTAL IMMATURE NEUTOROPHIL: 0.02 K/CU MM
TOTAL NUCLEATED RBC: 0 K/CU MM
WBC # BLD: 6.2 K/CU MM (ref 4–10.5)

## 2022-01-06 PROCEDURE — 2580000003 HC RX 258: Performed by: NURSE PRACTITIONER

## 2022-01-06 PROCEDURE — 6360000002 HC RX W HCPCS: Performed by: FAMILY MEDICINE

## 2022-01-06 PROCEDURE — 6370000000 HC RX 637 (ALT 250 FOR IP): Performed by: FAMILY MEDICINE

## 2022-01-06 PROCEDURE — 6370000000 HC RX 637 (ALT 250 FOR IP): Performed by: NURSE PRACTITIONER

## 2022-01-06 PROCEDURE — 97530 THERAPEUTIC ACTIVITIES: CPT

## 2022-01-06 PROCEDURE — 2580000003 HC RX 258: Performed by: FAMILY MEDICINE

## 2022-01-06 PROCEDURE — 85025 COMPLETE CBC W/AUTO DIFF WBC: CPT

## 2022-01-06 PROCEDURE — 6360000002 HC RX W HCPCS: Performed by: NURSE PRACTITIONER

## 2022-01-06 PROCEDURE — 36415 COLL VENOUS BLD VENIPUNCTURE: CPT

## 2022-01-06 RX ORDER — ASPIRIN 81 MG/1
81 TABLET, CHEWABLE ORAL DAILY
Status: DISCONTINUED | OUTPATIENT
Start: 2022-01-06 | End: 2022-01-06 | Stop reason: HOSPADM

## 2022-01-06 RX ORDER — ASPIRIN 81 MG/1
81 TABLET, CHEWABLE ORAL DAILY
Qty: 30 TABLET | Refills: 0 | Status: SHIPPED | OUTPATIENT
Start: 2022-01-06 | End: 2022-08-30

## 2022-01-06 RX ADMIN — ACETAMINOPHEN 325 MG: 325 TABLET ORAL at 09:03

## 2022-01-06 RX ADMIN — SODIUM CHLORIDE, PRESERVATIVE FREE 10 ML: 5 INJECTION INTRAVENOUS at 09:04

## 2022-01-06 RX ADMIN — Medication 2000 UNITS: at 09:03

## 2022-01-06 RX ADMIN — CEFEPIME HYDROCHLORIDE 1000 MG: 1 INJECTION, POWDER, FOR SOLUTION INTRAMUSCULAR; INTRAVENOUS at 04:12

## 2022-01-06 RX ADMIN — DRONEDARONE 400 MG: 400 TABLET, FILM COATED ORAL at 09:03

## 2022-01-06 RX ADMIN — CETIRIZINE HYDROCHLORIDE 5 MG: 10 TABLET, FILM COATED ORAL at 09:03

## 2022-01-06 RX ADMIN — FENOFIBRATE 54 MG: 54 TABLET ORAL at 09:02

## 2022-01-06 RX ADMIN — ASPIRIN 81 MG 81 MG: 81 TABLET ORAL at 15:36

## 2022-01-06 RX ADMIN — LEVOTHYROXINE SODIUM 175 MCG: 0.1 TABLET ORAL at 09:36

## 2022-01-06 RX ADMIN — ENOXAPARIN SODIUM 30 MG: 100 INJECTION SUBCUTANEOUS at 09:04

## 2022-01-06 ASSESSMENT — PAIN SCALES - GENERAL: PAINLEVEL_OUTOF10: 5

## 2022-01-06 ASSESSMENT — PAIN DESCRIPTION - ONSET: ONSET: ON-GOING

## 2022-01-06 ASSESSMENT — PAIN DESCRIPTION - PROGRESSION: CLINICAL_PROGRESSION: NOT CHANGED

## 2022-01-06 ASSESSMENT — PAIN DESCRIPTION - PAIN TYPE: TYPE: CHRONIC PAIN

## 2022-01-06 ASSESSMENT — PAIN DESCRIPTION - LOCATION: LOCATION: GENERALIZED

## 2022-01-06 ASSESSMENT — PAIN DESCRIPTION - DESCRIPTORS: DESCRIPTORS: ACHING

## 2022-01-06 ASSESSMENT — PAIN DESCRIPTION - FREQUENCY: FREQUENCY: CONTINUOUS

## 2022-01-06 ASSESSMENT — PAIN - FUNCTIONAL ASSESSMENT: PAIN_FUNCTIONAL_ASSESSMENT: PREVENTS OR INTERFERES SOME ACTIVE ACTIVITIES AND ADLS

## 2022-01-06 NOTE — PROGRESS NOTES
Comprehensive Nutrition Assessment    Type and Reason for Visit:  Initial,RD Nutrition Re-Screen/LOS    Nutrition Recommendations/Plan:   · Start low calorie, high calorie oral nutrition supplements with yogurt snacks once daily   · Continue current diet and encourage adequate nutrition for healing   · Please document all PO intakes   · Will monitor fluid status, GI status, and poc     Nutrition Assessment:  Admitted with COVID 19 infection, A fib, UTI. Presented with 2-day hx of fatigue, ambulatory difficulty and reduced oral intake 2/2 acute illness. Currently on cardiac restricted, no salt added diet. Noted pt's po intake has improved during stay. Per chart, pt had 8.2% wt loss over 4 months, hx of wt fluctations r/t fluids. Will send oral supplement to optimize nutrition. Follow as moderate nutrition risk. Malnutrition Assessment:  Malnutrition Status: At risk for malnutrition (Comment)    Context:  Acute Illness (pt in droplet plus isolation 2/2 COVID 19)     Findings of the 6 clinical characteristics of malnutrition:  Energy Intake:  Mild decrease in energy intake (Comment) (PTA)  Weight Loss:  7 - Greater than 7.5% over 3 months (8.2% in 4 months, likely r/t fluids; unsure if unintentional)     Body Fat Loss:  Unable to assess     Muscle Mass Loss:  Unable to assess    Fluid Accumulation:  No significant fluid accumulation Extremities   Strength:  Not Performed    Estimated Daily Nutrient Needs:  Energy (kcal):  8733-1086 ([address] jeor); Weight Used for Energy Requirements:  Current     Protein (g):  60-71 (1-1.2 g/kg IBW); Weight Used for Protein Requirements:  Ideal        Fluid (ml/day):  8532-8291; Method Used for Fluid Requirements:  1 ml/kcal      Nutrition Related Findings:  Pt with diarrhea during stay. Last BM 1/5/22; Glu 115, Cr 1.2      Wounds:  None       Current Nutrition Therapies:    ADULT DIET;  Regular; Low Fat/Low Chol/High Fiber/NADIA    Anthropometric Measures:  · Height: 5' 4\" (162.6 cm)  · Current Body Weight: 225 lb (102.1 kg)   · Admission Body Weight:  (stated)    · Usual Body Weight: 245 lb (111.1 kg) (9/8/21)     · Ideal Body Weight: 120 lbs; % Ideal Body Weight 187.5 %   · BMI: 38.6  · Adjusted Body Weight:  ; No Adjustment   · Adjusted BMI:      · BMI Categories: Obese Class 2 (BMI 35.0 -39.9)       Nutrition Diagnosis:   · Predicted inadequate energy intake related to acute injury/trauma,impaired respiratory function (reduced intake r/t acute illness) as evidenced by poor intake prior to admission,weight loss    Nutrition Interventions:   Food and/or Nutrient Delivery:  Continue Current Diet,Start Oral Nutrition Supplement  Nutrition Education/Counseling:  No recommendation at this time   Coordination of Nutrition Care:  Continue to monitor while inpatient,Coordination of Community Care    Goals:  Pt will consume at least 50% of meals and supplements       Nutrition Monitoring and Evaluation:   Behavioral-Environmental Outcomes:  None Identified   Food/Nutrient Intake Outcomes:  Food and Nutrient Intake,Supplement Intake  Physical Signs/Symptoms Outcomes:  Biochemical Data,Weight,GI Status,Diarrhea     Discharge Planning:     Too soon to determine     Electronically signed by Pierre Cheung RD, LD on 1/6/22 at 10:22 AM EST    Contact: [phone]

## 2022-01-06 NOTE — PLAN OF CARE
Nutrition Problem #1: Predicted inadequate energy intake  Intervention: Food and/or Nutrient Delivery: Continue Current Diet,Start Oral Nutrition Supplement  Nutritional Goals: Pt will consume at least 50% of meals and supplements

## 2022-01-06 NOTE — PROGRESS NOTES
Physical Therapy    Physical Therapy Treatment Note  Name: Mendy Lyle MRN: [id number] :   1935   Date:  2022   Admission Date: 2021 Room:  [address] [place] [address]   Restrictions/Precautions:        droplet plus, fall risk, general precautions, suprapubic catheter  Chart reviewed, patient cleared for activity. Subjective:  Patient states:  Amenable to therapy visit. \"I'm ready to get outta this chair\", \"Get that (blanket) over my feet! \"  Pain:   Location, Type, Intensity (0/10 to 10/10):  No c/o  Objective: Therapeutic Activity Training:   PT changes bed sheets per pt request while pt seated in recliner. Cues given for safety, sequence, UE/LE placement, awareness, and balance. Bed mobility:  End of session return to supine with Mod A for LE advancement. Poor positioning achieved in bed. PT places bed in Trendelenburg with v/c for bridging and scooting to [facility], with chucks pad and Max A pt scoots to [facility] x3 reps required. Sit-stand:  Pt performs STS from recliner to RW with Mod A, increased time required for anterior WS, increased time and effort for transition to upright. BUE support on arm rests, PT v/c for knee extension TC. Pt transitions UE to RW slowly with min postural sway. In standing at recliner PT encourages full upright, pt c/o min dizziness, PT instructs to maintain standing at chair for safety, x1'. AMB: Pt able to AMB x3' from recliner to EOB with RW, chair follow, CGA. Increased time required d/t pt slow jim and difficult LE advancement. Return to seated at EOB with CGA, fair- eccentric control. Pt left in semi-fowlers with bed exit on, tray, call light, all needs. Assessment / Impression: Tolerance of treatment:  Fair   Adverse Reaction: None    Plan for Next Session:    Train and educate using established plan to improve functional mobility, safety, and independence.   Time in:  10:03  Time out:  10:26  Timed treatment minutes:  23  Total treatment time: 23  Electronically signed by:    Dallin San, PT  1/6/2022, 1:18 PM

## 2022-01-06 NOTE — CARE COORDINATION
Chart reviewed. Cm attempted to call the patient as she is on the isolation floor and the discharge order is in. She did not answer her phone. [facility] Please call if needs arise.

## 2022-01-06 NOTE — DISCHARGE SUMMARY
Discharge Summary           Name:  Ifeanyi Madden /Age/Sex: 1935  (82 y.o. female)   MRN & CSN:  [id number] & [id number] Admission Date/Time: 2021 10:38 AM   Attending:  Lynnette Hensley DO Discharging Physician: Lynnette Hensley DO     Hospital Course:   Ifeanyi Madden is a 82 y.o.  female with history of DVT, hypothyroidismv hypertension, Hyperlipidemia, congestive heart failure, Obesity,  GERD, A. fib who presents with 2-day history of fatigue, ambulatory difficulty  and reduced oral intake. She was admitted and managed for the following conditions as below:     #RCPBT-53 illness  #Complicated UTI  #Chronic diastolic CHF: EF 50 to 42%.  Compensated  #Atrial fibrillation  #CKD (baselin 1.3)  Chronic problems: hyperlipidemia and obesity. Hypothyroidism.     UTI treated. Cefepime  to . CBC & Procalcitonin reassuring. Blood culture negative to date. Urine culture is contaminated  Continue Multaq.  Start on aspirin prior to discharge for stroke prophylaxis. Resume Lasix/KCl prior to discharge. Discharge home health for OT as preferred by patient and family. The patient expressed appropriate understanding of and agreement with the discharge recommendations, medications, and plan.      Consults this admission:  IP CONSULT TO HOSPITALIST  IP CONSULT TO PHARMACY    Discharge Instruction:   Follow up appointments:   Primary care physician:  Within 1 weeks    Diet:  regular diet   Activity: activity as tolerated  Disposition: Discharged to:   []Home, [x]C, []SNF, []Acute Rehab, []Hospice   Condition on discharge: Stable    Discharge Medications:        Medication List      START taking these medications    aspirin 81 MG chewable tablet  Take 1 tablet by mouth daily     vitamin D 25 MCG (1000 UT) Caps  Take 5 capsules by mouth daily  Start taking on: 2022        Carol Cody taking these medications    acetaminophen 325 MG tablet  Commonly known as: TYLENOL     dronedarone hcl 400 MG Tabs  Commonly known as: Multaq  TAKE 1 TABLET TWICE A DAY WITH MEALS     fenofibrate micronized 67 MG capsule  Commonly known as: LOFIBRA  Take 1 capsule by mouth every morning (before breakfast)     furosemide 20 MG tablet  Commonly known as: LASIX  Take 1 pill on Mon, Weds, Fri     levothyroxine 175 MCG tablet  Commonly known as: SYNTHROID  Take 1 tablet by mouth daily     loperamide 2 MG capsule  Commonly known as: IMODIUM     loratadine 10 MG tablet  Commonly known as: CLARITIN     potassium chloride 10 MEQ extended release capsule  Commonly known as: MICRO-K  Take 1 capsule by mouth daily     pravastatin 10 MG tablet  Commonly known as: PRAVACHOL  TAKE 1 TABLET AT BEDTIME           Where to Get Your Medications      These medications were sent to [address] [place] [address] [address], [address] [facility] [address] [id number]    Phone: [phone]   · aspirin 81 MG chewable tablet  · vitamin D 25 MCG (1000 UT) Caps         Objective Findings at Discharge:   BP (!) 135/56   Pulse 71   Temp 97.4 °F (36.3 °C) (Oral)   Resp 15   Ht 5' 4\" (1.626 m)   Wt 225 lb (102.1 kg)   SpO2 94%   BMI 38.62 kg/m²            PHYSICAL EXAM   GEN Awake female, sitting upright in bed in no apparent distress. Appears given age. EYES Pupils are equally round. No scleral erythema, discharge, or conjunctivitis. HENT Mucous membranes are moist. Oral pharynx without exudates, no evidence of thrush. NECK Supple, no apparent thyromegaly or masses. RESP Clear to auscultation, no wheezes, rales or rhonchi. Symmetric chest movement while on room air. CARDIO/VASC S1/S2 auscultated. Regular rate without appreciable murmurs, rubs, or gallops. No JVD or carotid bruits. Peripheral pulses equal bilaterally and palpable. No peripheral edema. GI Abdomen is soft without significant tenderness, masses, or guarding. Bowel sounds are normoactive.  Rectal exam deferred.  No costovertebral angle tenderness. Normal appearing external genitalia. Pelaez catheter is not present. HEME/LYMPH No palpable cervical lymphadenopathy and no hepatosplenomegaly. No petechiae or ecchymoses. MSK No gross joint deformities. SKIN Normal coloration, warm, dry. NEURO Cranial nerves appear grossly intact, normal speech, no lateralizing weakness. PSYCH Awake, alert, oriented x 4. Affect appropriate. BMP/CBC  Recent Labs     01/05/22  1703 01/06/22  0223     --    K 4.4  --      --    CO2 23  --    BUN 12  --    CREATININE 1.2*  --    WBC  --  6.2   HCT  --  34.8*   PLT  --  164       IMAGING:  VL DUP LOWER EXTREMITY VENOUS BILATERAL [[id number]] Collected: 12/30/21 1253      Order Status: Completed Updated: 12/30/21 1257     Narrative:       EXAMINATION:   DUPLEX VENOUS ULTRASOUND OF THE BILATERAL LOWER [id number]/30/2021 12:03 pm     TECHNIQUE:   Duplex ultrasound using B-mode/gray scaled imaging, Doppler spectral analysis   and color flow Doppler was obtained of the deep venous structures of the   lower bilateral extremities. COMPARISON:   None. HISTORY:   ORDERING SYSTEM PROVIDED HISTORY: leg pain, swelling, left more than right,   hx DVT   TECHNOLOGIST PROVIDED HISTORY:   Reason for exam:->leg pain, swelling, left more than right, hx DVT   Reason for Exam: leg pain, swelling, left more than right, hx DVT     FINDINGS:   The visualized veins of the bilateral lower extremities are patent and free   of echogenic thrombus. The veins demonstrate good compressibility with normal   color flow study and spectral analysis.      Impression:       No evidence of DVT in either lower extremity.      RECOMMENDATIONS:   Unavailable      XR CHEST PORTABLE [[id number]] Collected: 12/30/21 1150     Order Status: Completed Updated: 12/30/21 1159     Narrative:       EXAMINATION:   ONE XRAY VIEW OF THE CHEST     12/30/2021 11:04 am     COMPARISON:   11/22/2018     HISTORY: ORDERING SYSTEM PROVIDED HISTORY: fatigue   TECHNOLOGIST PROVIDED HISTORY:   Reason for exam:->fatigue   Reason for Exam: fatigue     FINDINGS:   Cardiac and mediastinal silhouettes appear stable.  No focal consolidation or   pleural effusion.  No pneumothorax is identified.  No acute osseous   abnormality.  Degenerative changes are seen within the shoulders and spine.      Impression:       Stable appearing chest without acute focal infiltrate.              Discharge Time of 35 minutes    Electronically signed by Regina Natarajan DO on 1/6/2022 at 2:05 PM

## 2022-01-06 NOTE — PROGRESS NOTES
Progress Note        Name:  Adenike Agustin /Age/Sex: 1935  (82 y.o. female)   MRN & CSN:  [id number] & [id number] Admission Date/Time: 2021 10:38 AM   Location:  [address] [place] [address] PCP: Brandie Gabriel MD       Hospital Day: 7    Assessment and Plan:   Adenike Agustin is a 82 y.o.  female  who presents with COVID-19    #COVID-19 illness  #Possible complicated UTI  #Chronic diastolic CHF: EF 50 to 97%. Compensated  #Atrial fibrillation  #CKD (baselin 1.3)  Chronic problems: hyperlipidemia and obesity. Hypothyroidism. UTI treated. Stop Cefepime in the morning pending CBC. Procalcitonin reassuring. Blood culture negative to date. Urine culture is contaminated  Continue Multaq. Start on aspirin prior to discharge for stroke prophylaxis. Will resume Lasix/KCl prior to discharge. Discharge pending ECF placement for OT versus home health    Diet ADULT DIET; Regular; Low Fat/Low Chol/High Fiber/NADIA    DVT Prophylaxis [x] Lovenox, []  Heparin, [] SCDs, [] Ambulation  [] Long term AC   GI Prophylaxis [] PPI,  [] H2 Blocker,  [] Carafate,  [] Diet,  [x] No GI prophylaxis, N/A: patient is not under significant medical stress, non-ICU or is receiving a diet/tube feeds   Code Status Full Code Full CODE   Disposition Patient requires continued admission due to COVID-19.    Discharge Plan: back to home / [facility] / when able / discharge to hospice after seen by case management team. Patient plans to return home upon discharge   MDM [] Low, [] Moderate,[x]  High  Patient's risk as above due to:      [] One or more chronic illnesses with mild exacerbation progression      [x] Two or more stable chronic illnesses      [] Undiagnosed new problem with uncertain prognosis      [] Elective major surgery      [x]Prescription drug management       History of Present Illness:     Chief Complaint: COVID-19  Adenike Agustin is a 82 y.o.  female  who presents with fatigue, ambulatory difficulty  and reduced oral intake. Patient voices no complaints and is tolerating room air. Patient pending placement. Ten point ROS reviewed negative, unless as noted above    Objective: Intake/Output Summary (Last 24 hours) at 1/5/2022 2015  Last data filed at 1/5/2022 1730  Gross per 24 hour   Intake --   Output 1000 ml   Net -1000 ml      Vitals:   Vitals:    01/05/22 1646   BP: (!) 134/54   Pulse: 59   Resp: 20   Temp:    SpO2: 98%     Physical Exam:   GEN Awake female, sitting upright in bed in no apparent distress. Appears given age. EYES Pupils are equally round. No scleral erythema, discharge, or conjunctivitis. HENT Mucous membranes are moist. Oral pharynx without exudates, no evidence of thrush. NECK Supple, no apparent thyromegaly or masses. RESP Clear to auscultation, no wheezes, rales or rhonchi. Symmetric chest movement while on room air. CARDIO/VASC S1/S2 auscultated. Regular rate without appreciable murmurs, rubs, or gallops. No JVD or carotid bruits. Peripheral pulses equal bilaterally and palpable. No peripheral edema. GI Abdomen is soft without significant tenderness, masses, or guarding. Bowel sounds are normoactive. Rectal exam deferred.  No costovertebral angle tenderness. Normal appearing external genitalia. Pelaez catheter is not present. HEME/LYMPH No palpable cervical lymphadenopathy and no hepatosplenomegaly. No petechiae or ecchymoses. MSK No gross joint deformities. SKIN Normal coloration, warm, dry. NEURO Cranial nerves appear grossly intact, normal speech, no lateralizing weakness. PSYCH Awake, alert, oriented x 4. Affect appropriate.     Past Medical History:      Past Medical History:   Diagnosis Date    Allergic rhinitis     Arthritis     CHF [facility]congestive heart failure) ([facility] [address]     DVT, lower extremity, proximal, acute, left (HCC)     Edema leg     Gastroesophageal reflux disease 3/30/2019    H/O 24 hour EKG monitoring 6/10 1/09 11/08    6/7/10- predominant rhythm is a-fib, freq PVCs    H/O cardiac catheterization 7/20/00    H/O cardiac catheterization 7/20/00    right dominant system, left main is short but angiographically normal, LAD and CX reveal sign  tortuosities distally  but no sign angiographic atherosclerotic disease noted except luminal  irregularities here and there. LVSF normal, EF  55%    H/O cardiovascular stress test 5/2/2016    lexiscan-normal,EF70%    H/O Doppler echocardiogram 7/12/10    intimal thickening but no sign. atherosclerotic plaque noted in [facility]  LICA.  H/O echocardiogram 5/2/2016 9/10     5/16 EF60% mild MR, TR, AR 9/20/10 Complete 2 dim transthoracic echo, techn. difficult. The study was  tech limited, LVSF is normal    History of atrial fibrillation     History of cardiovascular stress test 7/26/13, 7/10 8/08    7/13-WNL Observed defect consistent with breast attenuation. EF 70%. 7/10-post stress myocardial images show a normal pattern of perfusion in all regions. LV is normal  in size. EF is 62%, global  LVSF is normal    History of falling     per old chart    History of nuclear stress test 10/08/2020    EF 60%, Normal    Hx of blood clots     per old chart  hx of DVT left leg    Hx: UTI (urinary tract infection)     Hyperglycemia 3/30/2019    Hyperlipidemia     Hypertension     Hypothyroidism     Incontinence of bowel     Incontinence of urine     Sepsis ([facility] [address] 2015    Tremor 3/30/2019    Wears glasses     to read    Wears partial dentures     upper     PSHX:  has a past surgical history that includes Total knee arthroplasty (2005, 2006); Breast surgery; joint replacement (4/2005 2/2006); Cardioversion (10/17/2008); Cardioversion (12/15/2008); other surgical history (10/2009); pr total hip arthroplasty (Left, 02/23/2015); Hysterectomy (age 21's); IR GUIDED FLUID DRAINAGE BY CATH SOFT TISSUE PERC (8/21/2020); and IR GUIDED FLUID DRAINAGE BY CATH SOFT TISSUE PERC (9/8/2020). Allergies:    Allergies   Allergen Reactions    Tape Madisyn Bicker Tape] Itching    Codeine Rash    Sulfa Antibiotics Nausea And Vomiting       FAM HX: family history includes Diabetes in her father and mother; Heart Disease in her father. Soc HX:   Social History     Socioeconomic History    Marital status:      Spouse name: None    Number of children: None    Years of education: None    Highest education level: None   Occupational History    None   Tobacco Use    Smoking status: Former Smoker     Years: 1.00    Smokeless tobacco: Never Used    Tobacco comment: \"quit yrs ago only smoked for 2-3 yrs\"   Vaping Use    Vaping Use: Never used   Substance and Sexual Activity    Alcohol use: No     Alcohol/week: 0.0 standard drinks    Drug use: No    Sexual activity: Not Currently   Other Topics Concern    None   Social History Narrative    None     Social Determinants of Health     Financial Resource Strain: Low Risk     Difficulty of Paying Living Expenses: Not hard at all   Food Insecurity: No Food Insecurity    Worried About Running Out of Food in the Last Year: Never true    Jerica of Food in the Last Year: Never true   Transportation Needs: No Transportation Needs    Lack of Transportation (Medical): No    Lack of Transportation (Non-Medical):  No   Physical Activity:     Days of Exercise per Week: Not on file    Minutes of Exercise per Session: Not on file   Stress:     Feeling of Stress : Not on file   Social Connections:     Frequency of Communication with Friends and Family: Not on file    Frequency of Social Gatherings with Friends and Family: Not on file    Attends Taoism Services: Not on file    Active Member of Clubs or Organizations: Not on file    Attends Club or Organization Meetings: Not on file    Marital Status: Not on file   Intimate Partner Violence:     Fear of Current or Ex-Partner: Not on file    Emotionally Abused: Not on file    Physically Abused: Not on file    Sexually Abused: Not on file   Housing Stability:     Unable to Pay for Housing in the Last Year: Not on file    Number of Places Lived in the Last Year: Not on file    Unstable Housing in the Last Year: Not on file       Medications:   Medications:    vitamin D  2,000 Units Oral Daily    acetaminophen  325 mg Oral Daily    dronedarone hcl  400 mg Oral BID WC    fenofibrate  54 mg Oral QAM AC    levothyroxine  175 mcg Oral Daily    cetirizine  5 mg Oral Daily    pravastatin  10 mg Oral Nightly    sodium chloride flush  5-40 mL IntraVENous 2 times per day    enoxaparin  30 mg SubCUTAneous BID    sodium chloride flush  5-40 mL IntraVENous 2 times per day    cefepime  1,000 mg IntraVENous Q12H      Infusions:    sodium chloride 25 mL (01/03/22 1630)    sodium chloride Stopped (01/05/22 1601)     PRN Meds: sodium chloride flush, 5-40 mL, PRN  sodium chloride, 25 mL, PRN  ondansetron, 4 mg, Q8H PRN   Or  ondansetron, 4 mg, Q6H PRN  polyethylene glycol, 17 g, Daily PRN  acetaminophen, 650 mg, Q6H PRN   Or  acetaminophen, 650 mg, Q6H PRN  guaiFENesin-dextromethorphan, 5 mL, Q4H PRN  sodium chloride flush, 5-40 mL, PRN  sodium chloride, 25 mL, PRN          Electronically signed by Carmelo Green DO on 1/5/2022 at 8:15 PM

## 2022-01-06 NOTE — TELEPHONE ENCOUNTER
Requesting:  (Verbal is fine)      Home Care ---Patient discharging today from  [facility]       Skilled Nursing    Physical Therapy

## 2022-01-07 ENCOUNTER — CARE COORDINATION (OUTPATIENT)
Dept: CASE MANAGEMENT | Age: 87
End: 2022-01-07

## 2022-01-07 DIAGNOSIS — U07.1 COVID-19: Primary | ICD-10-CM

## 2022-01-07 PROCEDURE — 1111F DSCHRG MED/CURRENT MED MERGE: CPT | Performed by: FAMILY MEDICINE

## 2022-01-07 NOTE — CARE COORDINATION
Susanne [facility] Transitions Initial Follow Up Call    Call within 2 business days of discharge: Yes    Patient: Aime Mixon Patient : 1935   MRN: [id number]  Reason for Admission: Alida Abo, UTI  Discharge Date: 22 RARS: Readmission Risk Score: 11.6 ( )    Last Discharge [facility]       Complaint Diagnosis Description Type Department Provider    21 Fatigue COVID-19 . .. ED to Hosp-Admission (Discharged) (ADMITTED) [address], DO; [address] .. Care Transition Nurse contacted patient by telephone to perform post discharge assessment. Verified name and  with patient as identifiers. Patient not engaged. Provided introduction to self, and explanation of the CTN role, and reason for call due to risk factors for infection and/or diagnosis of COVID-19. Discussed COVID-19 related testing which was available at this time. Test results were positive. Patient informed of results, if available? Yes. Phone Assessment: Symptoms reviewed with patient who verbalized the following symptoms: cough, n/p. Denies resp distress, sob, fever, chills, malaise. Reports fatigue and weakness improved. Ambulating in home. Eating and drinking well w/ b&b wnl. [address] services initiated per Demetrio Oropeza RN; Patient awaiting scheduling of intake visit. Due to no new or worsening symptoms encounter was not routed to provider for escalation. Challenges to be reviewed by the provider   Additional needs identified to be addressed with provider No         Confirmed copy of AVS received. Educated patient about risk for severe BEGLQ-03 sx/complications due to risk factors according to CDC guidelines. CTN reviewed discharge instructions, medical action plan and red flag symptoms such as increased shortness of breath, increasing fever and signs of decompensation with the patient who verbalized understanding.  Advised rest, pacing activity, adequate hydration/nutrition (within any MD recommended dietary/fluid restrictions). Discussed increased risk of blood clots associated w/ Covid19, sx and preventative measures. Discussed COVID vaccination status: Yes--Pfizer x 2. Education provided on COVID-19 vaccination as appropriate, advised to speak w/ PCP re: vaccine, booster. Discussed exposure protocols and quarantine with CDC Guidelines. Confirmed Patient has family/friends who are able to drop off food, supplies as needed. Patient was given an opportunity to verbalize any questions and concerns and agrees to contact CTN or health care provider for questions related to their healthcare. Advised continued Covid19 preventative measures including mask covering nose and mouth, social distancing, hand washing, contacting MD at first sign of any flu-like sx. Reviewed and educated patient on any new and changed medications related to discharge diagnosis- Vit D, ASA; confirmed obtained, taking as directed. Med rec review completed w/ Patient, reports taking routine meds as directed, denies rx needs. Advised to contact pharmacy or MD for refill needs. Discussed benefits of 90 day rx supply. Was patient discharged with a pulse oximeter? Has but not checking, stressed importance. Discussed and confirmed pulse oximeter discharge instructions and when to notify provider or seek emergency care. Was patient discharged w IS/Acapella? No  Was patient discharged w/ oxygen? No  Advised cough and deep breathing exercises. Discussed follow-up appointments. If no appointment was previously scheduled, appointment scheduling offered: Yes, Patient prefers to keep following PCP appt even though out of 7 day window. [facility] follow up appointment(s): 1/19/22 3:15, Son to provide transportation  [address] follow up appointment(s): none    Resource Need Assessment:   Living Arrangements: lives alone ; Jennifer checks in frequently and spends weekend.   ADLS: ind basic adls; family help w/ meals, cleaning, groceries  DME:none, denies need   Transportation: family  [address] [facility] [address], denies need   Referrals Made per CTN with Patient/Family Approval: n/a     Advance Care Planning  Full code. No advance directives, considering. Not interested in ACP referral at this time. Healthcare Decision Maker:    Primary Decision Maker: Pamasmita Davenportcorwin - [facility] - [phone]     CTN provided contact information. Advised to contact PCP 24/7 with any health concerns for early outpt intervention. Discussed benefits of WIC, Urgent Care. Plan for follow-up call in 5-7 days based on severity of symptoms and risk factors.     [address] [facility], [facility] [phone]

## 2022-01-12 ENCOUNTER — HOSPITAL ENCOUNTER (INPATIENT)
Age: 87
LOS: 4 days | Discharge: NURSING, REHAB OR LONG-TERM CARE | DRG: 641 | End: 2022-01-18
Attending: EMERGENCY MEDICINE | Admitting: STUDENT IN AN ORGANIZED HEALTH CARE EDUCATION/TRAINING PROGRAM
Payer: MEDICARE

## 2022-01-12 ENCOUNTER — APPOINTMENT (OUTPATIENT)
Dept: CT IMAGING | Age: 87
DRG: 641 | End: 2022-01-12
Payer: MEDICARE

## 2022-01-12 ENCOUNTER — APPOINTMENT (OUTPATIENT)
Dept: GENERAL RADIOLOGY | Age: 87
DRG: 641 | End: 2022-01-12
Payer: MEDICARE

## 2022-01-12 ENCOUNTER — TELEPHONE (OUTPATIENT)
Dept: FAMILY MEDICINE CLINIC | Age: 87
End: 2022-01-12

## 2022-01-12 DIAGNOSIS — R41.82 ALTERED MENTAL STATUS, UNSPECIFIED ALTERED MENTAL STATUS TYPE: ICD-10-CM

## 2022-01-12 DIAGNOSIS — R79.89 ELEVATED BRAIN NATRIURETIC PEPTIDE (BNP) LEVEL: ICD-10-CM

## 2022-01-12 DIAGNOSIS — N39.0 URINARY TRACT INFECTION WITHOUT HEMATURIA, SITE UNSPECIFIED: ICD-10-CM
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
## 2022-01-12 DIAGNOSIS — R77.8 TROPONIN LEVEL ELEVATED: ICD-10-CM

## 2022-01-12 PROBLEM — N18.9 ACUTE KIDNEY INJURY SUPERIMPOSED ON CKD (HCC): Status: ACTIVE | Noted: 2022-01-12

## 2022-01-12 PROBLEM — N17.9 ACUTE KIDNEY INJURY SUPERIMPOSED ON CKD (HCC): Status: ACTIVE | Noted: 2022-01-12

## 2022-01-12 LAB
ALBUMIN SERPL-MCNC: 3 GM/DL (ref 3.4–5)
ALP BLD-CCNC: 47 IU/L (ref 40–129)
ALT SERPL-CCNC: 14 U/L (ref 10–40)
ANION GAP SERPL CALCULATED.3IONS-SCNC: 9 MMOL/L (ref 4–16)
AST SERPL-CCNC: 23 IU/L (ref 15–37)
BACTERIA: ABNORMAL /HPF
BASE EXCESS MIXED: 1.5 (ref 0–2.3)
BASOPHILS ABSOLUTE: 0.1 K/CU MM
BASOPHILS RELATIVE PERCENT: 0.6 % (ref 0–1)
BILIRUB SERPL-MCNC: 0.6 MG/DL (ref 0–1)
BILIRUBIN URINE: NEGATIVE MG/DL
BLOOD, URINE: NEGATIVE
BUN BLDV-MCNC: 32 MG/DL (ref 6–23)
CALCIUM SERPL-MCNC: 9 MG/DL (ref 8.3–10.6)
CHLORIDE BLD-SCNC: 102 MMOL/L (ref 99–110)
CLARITY: ABNORMAL
CO2: 23 MMOL/L (ref 21–32)
COLOR: YELLOW
COMMENT: ABNORMAL
CREAT SERPL-MCNC: 1.6 MG/DL (ref 0.6–1.1)
DIFFERENTIAL TYPE: ABNORMAL
EOSINOPHILS ABSOLUTE: 0.1 K/CU MM
EOSINOPHILS RELATIVE PERCENT: 1.4 % (ref 0–3)
GFR AFRICAN AMERICAN: 37 ML/MIN/1.73M2
GFR NON-AFRICAN AMERICAN: 31 ML/MIN/1.73M2
GLUCOSE BLD-MCNC: 112 MG/DL (ref 70–99)
GLUCOSE, URINE: NEGATIVE MG/DL
HCO3 VENOUS: 27.2 MMOL/L (ref 19–25)
HCT VFR BLD CALC: 32.9 % (ref 37–47)
HEMOGLOBIN: 10.5 GM/DL (ref 12.5–16)
HYALINE CASTS: 0 /LPF
IMMATURE NEUTROPHIL %: 0.6 % (ref 0–0.43)
KETONES, URINE: NEGATIVE MG/DL
LACTATE: 1.2 MMOL/L (ref 0.4–2)
LEUKOCYTE ESTERASE, URINE: ABNORMAL
LYMPHOCYTES ABSOLUTE: 1.3 K/CU MM
LYMPHOCYTES RELATIVE PERCENT: 14.4 % (ref 24–44)
MCH RBC QN AUTO: 30.5 PG (ref 27–31)
MCHC RBC AUTO-ENTMCNC: 31.9 % (ref 32–36)
MCV RBC AUTO: 95.6 FL (ref 78–100)
MONOCYTES ABSOLUTE: 1 K/CU MM
MONOCYTES RELATIVE PERCENT: 10.7 % (ref 0–4)
MUCUS: ABNORMAL HPF
NITRITE URINE, QUANTITATIVE: NEGATIVE
NUCLEATED RBC %: 0 %
O2 SAT, VEN: 92.6 % (ref 50–70)
PCO2, VEN: 46 MMHG (ref 38–52)
PDW BLD-RTO: 14.4 % (ref 11.7–14.9)
PH VENOUS: 7.38 (ref 7.32–7.42)
PH, URINE: 8 (ref 5–8)
PLATELET # BLD: 282 K/CU MM (ref 140–440)
PMV BLD AUTO: 11.5 FL (ref 7.5–11.1)
PO2, VEN: 73 MMHG (ref 28–48)
POTASSIUM SERPL-SCNC: 4.5 MMOL/L (ref 3.5–5.1)
PRO-BNP: 1112 PG/ML
PROTEIN UA: 100 MG/DL
RBC # BLD: 3.44 M/CU MM (ref 4.2–5.4)
RBC URINE: 1 /HPF (ref 0–6)
SARS-COV-2, NAAT: NOT DETECTED
SEGMENTED NEUTROPHILS ABSOLUTE COUNT: 6.5 K/CU MM
SEGMENTED NEUTROPHILS RELATIVE PERCENT: 72.3 % (ref 36–66)
SODIUM BLD-SCNC: 134 MMOL/L (ref 135–145)
SOURCE: NORMAL
SPECIFIC GRAVITY UA: 1.02 (ref 1–1.03)
SQUAMOUS EPITHELIAL: 3 /HPF
TOTAL CK: 200 IU/L (ref 26–140)
TOTAL IMMATURE NEUTOROPHIL: 0.05 K/CU MM
TOTAL NUCLEATED RBC: 0 K/CU MM
TOTAL PROTEIN: 6.8 GM/DL (ref 6.4–8.2)
TRICHOMONAS: ABNORMAL /HPF
TROPONIN T: 0.02 NG/ML
URIC ACID CRYSTALS: ABNORMAL /HPF
UROBILINOGEN, URINE: NEGATIVE MG/DL (ref 0.2–1)
WBC # BLD: 9 K/CU MM (ref 4–10.5)
WBC UA: 4 /HPF (ref 0–5)

## 2022-01-12 PROCEDURE — 83605 ASSAY OF LACTIC ACID: CPT

## 2022-01-12 PROCEDURE — 2580000003 HC RX 258: Performed by: EMERGENCY MEDICINE

## 2022-01-12 PROCEDURE — 82570 ASSAY OF URINE CREATININE: CPT

## 2022-01-12 PROCEDURE — 70450 CT HEAD/BRAIN W/O DYE: CPT

## 2022-01-12 PROCEDURE — 82550 ASSAY OF CK (CPK): CPT

## 2022-01-12 PROCEDURE — 80053 COMPREHEN METABOLIC PANEL: CPT

## 2022-01-12 PROCEDURE — 6360000002 HC RX W HCPCS: Performed by: EMERGENCY MEDICINE

## 2022-01-12 PROCEDURE — 72170 X-RAY EXAM OF PELVIS: CPT

## 2022-01-12 PROCEDURE — 96365 THER/PROPH/DIAG IV INF INIT: CPT

## 2022-01-12 PROCEDURE — 2580000003 HC RX 258: Performed by: STUDENT IN AN ORGANIZED HEALTH CARE EDUCATION/TRAINING PROGRAM

## 2022-01-12 PROCEDURE — 71045 X-RAY EXAM CHEST 1 VIEW: CPT

## 2022-01-12 PROCEDURE — 36415 COLL VENOUS BLD VENIPUNCTURE: CPT

## 2022-01-12 PROCEDURE — 99285 EMERGENCY DEPT VISIT HI MDM: CPT

## 2022-01-12 PROCEDURE — 83880 ASSAY OF NATRIURETIC PEPTIDE: CPT

## 2022-01-12 PROCEDURE — 93005 ELECTROCARDIOGRAM TRACING: CPT | Performed by: EMERGENCY MEDICINE

## 2022-01-12 PROCEDURE — 6370000000 HC RX 637 (ALT 250 FOR IP): Performed by: STUDENT IN AN ORGANIZED HEALTH CARE EDUCATION/TRAINING PROGRAM

## 2022-01-12 PROCEDURE — 85025 COMPLETE CBC W/AUTO DIFF WBC: CPT

## 2022-01-12 PROCEDURE — 87077 CULTURE AEROBIC IDENTIFY: CPT

## 2022-01-12 PROCEDURE — 82805 BLOOD GASES W/O2 SATURATION: CPT

## 2022-01-12 PROCEDURE — 96372 THER/PROPH/DIAG INJ SC/IM: CPT

## 2022-01-12 PROCEDURE — 87635 SARS-COV-2 COVID-19 AMP PRB: CPT

## 2022-01-12 PROCEDURE — G0378 HOSPITAL OBSERVATION PER HR: HCPCS

## 2022-01-12 PROCEDURE — 84484 ASSAY OF TROPONIN QUANT: CPT

## 2022-01-12 PROCEDURE — 87086 URINE CULTURE/COLONY COUNT: CPT

## 2022-01-12 PROCEDURE — 6360000002 HC RX W HCPCS: Performed by: STUDENT IN AN ORGANIZED HEALTH CARE EDUCATION/TRAINING PROGRAM

## 2022-01-12 PROCEDURE — 87040 BLOOD CULTURE FOR BACTERIA: CPT

## 2022-01-12 PROCEDURE — 72125 CT NECK SPINE W/O DYE: CPT

## 2022-01-12 PROCEDURE — 81001 URINALYSIS AUTO W/SCOPE: CPT

## 2022-01-12 RX ORDER — PROCHLORPERAZINE EDISYLATE 5 MG/ML
10 INJECTION INTRAMUSCULAR; INTRAVENOUS EVERY 6 HOURS PRN
Status: DISCONTINUED | OUTPATIENT
Start: 2022-01-12 | End: 2022-01-18 | Stop reason: HOSPADM

## 2022-01-12 RX ORDER — LOSARTAN POTASSIUM 100 MG/1
1 TABLET ORAL DAILY
Status: ON HOLD | COMMUNITY
Start: 2021-03-08 | End: 2022-01-18 | Stop reason: SDUPTHER

## 2022-01-12 RX ORDER — FUROSEMIDE 20 MG/1
20 TABLET ORAL
Status: DISCONTINUED | OUTPATIENT
Start: 2022-01-14 | End: 2022-01-18 | Stop reason: HOSPADM

## 2022-01-12 RX ORDER — POLYETHYLENE GLYCOL 3350 17 G/17G
17 POWDER, FOR SOLUTION ORAL DAILY PRN
Status: DISCONTINUED | OUTPATIENT
Start: 2022-01-12 | End: 2022-01-18 | Stop reason: HOSPADM

## 2022-01-12 RX ORDER — PRAVASTATIN SODIUM 10 MG
10 TABLET ORAL NIGHTLY
Status: DISCONTINUED | OUTPATIENT
Start: 2022-01-12 | End: 2022-01-18 | Stop reason: HOSPADM

## 2022-01-12 RX ORDER — ACETAMINOPHEN 325 MG/1
650 TABLET ORAL EVERY 6 HOURS PRN
Status: DISCONTINUED | OUTPATIENT
Start: 2022-01-12 | End: 2022-01-18 | Stop reason: HOSPADM

## 2022-01-12 RX ORDER — SODIUM CHLORIDE 0.9 % (FLUSH) 0.9 %
5-40 SYRINGE (ML) INJECTION EVERY 12 HOURS SCHEDULED
Status: DISCONTINUED | OUTPATIENT
Start: 2022-01-12 | End: 2022-01-18 | Stop reason: HOSPADM

## 2022-01-12 RX ORDER — FENOFIBRATE 160 MG/1
160 TABLET ORAL DAILY
Status: DISCONTINUED | OUTPATIENT
Start: 2022-01-13 | End: 2022-01-18 | Stop reason: HOSPADM

## 2022-01-12 RX ORDER — SODIUM CHLORIDE 0.9 % (FLUSH) 0.9 %
5-40 SYRINGE (ML) INJECTION PRN
Status: DISCONTINUED | OUTPATIENT
Start: 2022-01-12 | End: 2022-01-18 | Stop reason: HOSPADM

## 2022-01-12 RX ORDER — SODIUM CHLORIDE 9 MG/ML
INJECTION, SOLUTION INTRAVENOUS CONTINUOUS
Status: DISPENSED | OUTPATIENT
Start: 2022-01-12 | End: 2022-01-13

## 2022-01-12 RX ORDER — HEPARIN SODIUM 5000 [USP'U]/ML
5000 INJECTION, SOLUTION INTRAVENOUS; SUBCUTANEOUS EVERY 8 HOURS SCHEDULED
Status: DISCONTINUED | OUTPATIENT
Start: 2022-01-12 | End: 2022-01-18 | Stop reason: HOSPADM

## 2022-01-12 RX ORDER — ASPIRIN 81 MG/1
81 TABLET, CHEWABLE ORAL DAILY
Status: DISCONTINUED | OUTPATIENT
Start: 2022-01-13 | End: 2022-01-18 | Stop reason: HOSPADM

## 2022-01-12 RX ORDER — METOPROLOL TARTRATE 5 MG/5ML
5 INJECTION INTRAVENOUS EVERY 6 HOURS PRN
Status: DISCONTINUED | OUTPATIENT
Start: 2022-01-12 | End: 2022-01-18 | Stop reason: HOSPADM

## 2022-01-12 RX ORDER — ACETAMINOPHEN 650 MG/1
650 SUPPOSITORY RECTAL EVERY 6 HOURS PRN
Status: DISCONTINUED | OUTPATIENT
Start: 2022-01-12 | End: 2022-01-18 | Stop reason: HOSPADM

## 2022-01-12 RX ORDER — CETIRIZINE HYDROCHLORIDE 10 MG/1
5 TABLET ORAL DAILY
Status: DISCONTINUED | OUTPATIENT
Start: 2022-01-13 | End: 2022-01-18 | Stop reason: HOSPADM

## 2022-01-12 RX ORDER — SODIUM CHLORIDE 9 MG/ML
25 INJECTION, SOLUTION INTRAVENOUS PRN
Status: DISCONTINUED | OUTPATIENT
Start: 2022-01-12 | End: 2022-01-18 | Stop reason: HOSPADM

## 2022-01-12 RX ADMIN — SODIUM CHLORIDE, PRESERVATIVE FREE 10 ML: 5 INJECTION INTRAVENOUS at 22:25

## 2022-01-12 RX ADMIN — CEFTRIAXONE 1000 MG: 1 INJECTION, POWDER, FOR SOLUTION INTRAMUSCULAR; INTRAVENOUS at 18:57

## 2022-01-12 RX ADMIN — PRAVASTATIN SODIUM 10 MG: 10 TABLET ORAL at 22:24

## 2022-01-12 RX ADMIN — SODIUM CHLORIDE: 9 INJECTION, SOLUTION INTRAVENOUS at 22:28

## 2022-01-12 RX ADMIN — HEPARIN SODIUM 5000 UNITS: 5000 INJECTION, SOLUTION INTRAVENOUS; SUBCUTANEOUS at 22:24

## 2022-01-12 NOTE — ED TRIAGE NOTES
Pt arrived by EMS with complaints of a fall getting out of bed. Pt states she was trying to get out of bed to get up, but denies any pain at this time. No LOC.

## 2022-01-12 NOTE — TELEPHONE ENCOUNTER
Reporting:    Home Care Services were to start today (around 2:45 pm)---- Homar Rousseau found patient on the floor by her dresser and bed----patient had laid there since this early this morning----patient did not use the \"Life Alert\" button. Family called squad ---patient enroute to hospital ---family will ask hospital staff about rehab. They do not patient to be alone.     (She is very non-compliant)

## 2022-01-12 NOTE — CARE COORDINATION
CM received a consult on patient for possible rehab. CM went to patients' room and patient was getting blood drawn. Patient did appear to be disoriented and confused. CM called Pedro Varela @ [phone]. Familia Reardon is one of two POAs'. Son reported that there are two POAs' and he will speak to his brother about patient also. Julio/ELIER reported that the family was not very happy the last time that patient was in hospital b/c when patient was discharged/ patient had been sent home in gown and blanket. Patient had came to facility with clothes and it appeared that patient had gotten wet, but clothes were dry upon discharge home. Patient also lost her \"Life Alert,\" button at ED. Patients' family knows that patient had it around her neck upon coming in to the hospital, but once patient was at hospital; patient was discharged home without her alert. Patients' family had to purchase new Life Alert buttons. Patients' family notified hospital of incident. Son reported that patient had fallen today and fell between bed and closet. Patient is telling the ED that it was around 8 am that patient fell; although son believes that it may have been later than 8 am when she fell b/c patient normally does not get up that early. Son also concerned that patient may have a UTI and was told that they will check for UTI. Family does state that family did see \"spit on patients' bed from when patient had been sleeping. \"     Family also reported that patient has not been wearing her adult diaper, but patient may be having difficulty with getting it on. Patients' two sons that are both POAs' would like patient to go to a SNF in this order:    1. [facility] [address] [phone]    2. [facility] @ [address] [phone]. NADER e-faxed both facilities.  Patient will need OT/PT consult/eval and negative Covid Test.     Covid test outcome today is negative. CM placed whiteboard request for OT/PT to see patient. Patient to be admitted to hospital and will get OT/PT eval tomorrow for possible SNF placement. Case Management please reach out to POAs:  Eusebio Kellogg @ [phone] and/or brother -- 2nd POA.  Jeremy Awad [phone], [phone]

## 2022-01-12 NOTE — ED NOTES
Talked with son, Familia Reardon, to get update on pt. Pt's sons have POA over pt. Son states that pt has a normal baseline of confusion. Pt has refused rehab in the past.  Pt's son state he believes that pt has been on floor since 8 am this morning and until pt's grandson came to help with medications.        Adonay King RN  01/12/22 2243

## 2022-01-12 NOTE — ED PROVIDER NOTES
[facility]      TRIAGE CHIEF COMPLAINT:   Fall (pt trying to get out of bed, denies any pain)      [place]:  Sharon Kayser is a 82 y.o. female that presents with pain assault, getting out of bed. Apparently patient has baseline confusion at home history of UTIs has a catheter. Apparently son came home found her on the ground probably down for 6 hours but said that that apparently fell out of bed. Patient is pleasantly confused no family present on arrival she has no complaints but she also is very confused awake and talking no signs of trauma no complaints. REVIEW OF SYSTEMS:      Review of Systems   Unable to perform ROS: Mental status change   Psychiatric/Behavioral: Positive for confusion. Past Medical History:   Diagnosis Date    Allergic rhinitis     Arthritis     [facility]congest[facility] failure) ([facility])     DVT, lower extremity, proximal, acute, left ([facility])     Edema leg     Gastroesophageal reflux disease 3/30/2019    H/O 24 hour EKG monitoring 6/10 1/09 11/08    6/7/10- predominant rhythm is a-fib, freq PVCs    H/O cardiac catheterization 7/20/00    H/O cardiac catheterization 7/20/00    right dominant system, left main is short but angiographically normal, LAD and CX reveal sign  tortuosities distally  but no sign angiographic atherosclerotic disease noted except luminal  irregularities here and there. LVSF normal, EF  55%    H/O cardiovascular stress test 5/2/2016    lexiscan-normal,EF70%    H/O Doppler echocardiogram 7/12/10    intimal thickening but no sign. atherosclerotic plaque noted in [facility]  LICA.  H/O echocardiogram 5/2/2016 9/10     5/16 EF60% mild MR, TR, AR 9/20/10 Complete 2 dim transthoracic echo, techn. difficult. The study was  tech limited, LVSF is normal    History of atrial fibrillation     History of cardiovascular stress test 7/26/13, 7/10 8/08    7/13-WNL Observed defect consistent with breast attenuation. EF 70%.  7/10-post stress myocardial images show a normal pattern of perfusion in all regions. LV is normal  in size. EF is 62%, global  LVSF is normal    History of falling     per old chart    History of nuclear stress test 10/08/2020    EF 60%, Normal    Hx of blood clots     per old chart  hx of DVT left leg    Hx: UTI (urinary tract infection)     Hyperglycemia 3/30/2019    Hyperlipidemia     Hypertension     Hypothyroidism     Incontinence of bowel     Incontinence of urine     Sepsis ([address] 2015    Tremor 3/30/2019    Wears glasses     to read    Wears partial dentures     upper     Past Surgical History:   Procedure Laterality Date    BREAST SURGERY      Left breast biopsy    CARDIOVERSION  10/17/2008    CARDIOVERSION  12/15/2008    HYSTERECTOMY  age 21's    IR GUIDED DRAIN W CATHETER PLACEMENT  8/21/2020    IR GUIDED DRAIN W CATHETER PLACEMENT 8/21/2020 [facility] SPECIAL PROCEDURES    IR GUIDED DRAIN W CATHETER PLACEMENT  9/8/2020    IR GUIDED DRAIN W CATHETER PLACEMENT 9/8/2020 [facility] SPECIAL PROCEDURES    JOINT REPLACEMENT  4/2005 2/2006    bilat    OTHER SURGICAL HISTORY  10/2009     bladder stimulator - interstim II 3058 - MRI OF HEAD ONLY -STIMULATOR MUST BE OFF AND TRANSMIT RECEIVE HEAD COIL ONLY    MA TOTAL HIP ARTHROPLASTY Left 02/23/2015    Hip Replacement, Total    TOTAL KNEE ARTHROPLASTY  2005, 2006    bilateral     Family History   Problem Relation Age of Onset    Diabetes Mother     Diabetes Father     Heart Disease Father      Social History     Socioeconomic History    Marital status:       Spouse name: Not on file    Number of children: Not on file    Years of education: Not on file    Highest education level: Not on file   Occupational History    Not on file   Tobacco Use    Smoking status: Former Smoker     Years: 1.00    Smokeless tobacco: Never Used    Tobacco comment: \"quit yrs ago only smoked for 2-3 yrs\"   Vaping Use    Vaping Use: Never used   Substance and Sexual Activity  Alcohol use: No     Alcohol/week: 0.0 standard drinks    Drug use: No    Sexual activity: Not Currently   Other Topics Concern    Not on file   Social History Narrative    Not on file     Social Determinants of Health     Financial Resource Strain: Low Risk     Difficulty of Paying Living Expenses: Not hard at all   Food Insecurity: No Food Insecurity    Worried About Running Out of Food in the Last Year: Never true    [place] of Food in the Last Year: Never true   Transportation Needs: No Transportation Needs    Lack of Transportation (Medical): No    Lack of Transportation (Non-Medical): No   Physical Activity:     Days of Exercise per Week: Not on file    Minutes of Exercise per Session: Not on file   Stress:     Feeling of Stress : Not on file   Social Connections:     Frequency of Communication with Friends and Family: Not on file    Frequency of Social Gatherings with Friends and Family: Not on file    Attends Mosque Services: Not on file    Active Member of [address] [place] [address] or Organizations: Not on file    Attends Club or Organization Meetings: Not on file    Marital Status: Not on file   Intimate Partner Violence:     Fear of Current or Ex-Partner: Not on file    Emotionally Abused: Not on file    Physically Abused: Not on file    Sexually Abused: Not on file   Housing Stability:     Unable to Pay for Housing in the Last Year: Not on file    Number of [place] in the Last Year: Not on file    Unstable Housing in the Last Year: Not on file     No current facility-administered medications for this encounter.      Current Outpatient Medications   Medication Sig Dispense Refill    vitamin D 25 MCG (1000 UT) CAPS Take 5 capsules by mouth daily 30 capsule 0    aspirin 81 MG chewable tablet Take 1 tablet by mouth daily 30 tablet 0    furosemide (LASIX) 20 MG tablet Take 1 pill on Mon, Weds, Fri 90 tablet 1    fenofibrate micronized (LOFIBRA) 67 MG capsule Take 1 capsule by mouth every morning (before breakfast) 90 capsule 1    pravastatin (PRAVACHOL) 10 MG tablet TAKE 1 TABLET AT BEDTIME 90 tablet 1    levothyroxine (SYNTHROID) 175 MCG tablet Take 1 tablet by mouth daily 90 tablet 1    potassium chloride (MICRO-K) 10 MEQ extended release capsule Take 1 capsule by mouth daily 90 capsule 1    dronedarone hcl (MULTAQ) 400 MG TABS TAKE 1 TABLET TWICE A DAY WITH MEALS 180 tablet 1    loperamide (IMODIUM) 2 MG capsule Take 2 mg by mouth daily as needed for Diarrhea      loratadine (CLARITIN) 10 MG tablet Take 10 mg by mouth daily       acetaminophen (TYLENOL) 325 MG tablet Take 325 mg by mouth daily. Allergies   Allergen Reactions    Tape Lajune Gitelman Tape] Itching    Codeine Rash    Sulfa Antibiotics Nausea And Vomiting     No current facility-administered medications for this encounter. Current Outpatient Medications   Medication Sig Dispense Refill    vitamin D 25 MCG (1000 UT) CAPS Take 5 capsules by mouth daily 30 capsule 0    aspirin 81 MG chewable tablet Take 1 tablet by mouth daily 30 tablet 0    furosemide (LASIX) 20 MG tablet Take 1 pill on Mon, Weds, Fri 90 tablet 1    fenofibrate micronized (LOFIBRA) 67 MG capsule Take 1 capsule by mouth every morning (before breakfast) 90 capsule 1    pravastatin (PRAVACHOL) 10 MG tablet TAKE 1 TABLET AT BEDTIME 90 tablet 1    levothyroxine (SYNTHROID) 175 MCG tablet Take 1 tablet by mouth daily 90 tablet 1    potassium chloride (MICRO-K) 10 MEQ extended release capsule Take 1 capsule by mouth daily 90 capsule 1    dronedarone hcl (MULTAQ) 400 MG TABS TAKE 1 TABLET TWICE A DAY WITH MEALS 180 tablet 1    loperamide (IMODIUM) 2 MG capsule Take 2 mg by mouth daily as needed for Diarrhea      loratadine (CLARITIN) 10 MG tablet Take 10 mg by mouth daily       acetaminophen (TYLENOL) 325 MG tablet Take 325 mg by mouth daily.            Nursing Notes Reviewed    VITAL SIGNS:  ED Triage Vitals [01/12/22 1559]   Enc Vitals Group BP (!) 110/52      Pulse 98      Resp 19      Temp 98.5 °F (36.9 °C)      Temp Source Oral      SpO2 97 %      Weight 250 lb (113.4 kg)      Height 5' 4\" (1.626 m)      Head Circumference       Peak Flow       Pain Score       Pain Loc       Pain Edu? Excl. in [address]? PHYSICAL EXAM:  Physical Exam  Vitals and nursing note reviewed. Constitutional:       General: She is not in acute distress. Appearance: Normal appearance. She is well-developed and well-groomed. She is not ill-appearing, toxic-appearing or diaphoretic. HENT:      Head: Normocephalic and atraumatic. Right Ear: External ear normal.      Left Ear: External ear normal.      Nose: Congestion present. Eyes:      General: No scleral icterus. Right eye: No discharge. Left eye: No discharge. Extraocular Movements: Extraocular movements intact. Conjunctiva/sclera: Conjunctivae normal.   Cardiovascular:      Rate and Rhythm: Normal rate and regular rhythm. Pulses: Normal pulses. Heart sounds: Normal heart sounds. No murmur heard. No gallop. Pulmonary:      Effort: Pulmonary effort is normal. No respiratory distress. Breath sounds: Normal breath sounds. No stridor. No wheezing, rhonchi or rales. Abdominal:      General: Bowel sounds are normal. There is no distension. Palpations: Abdomen is soft. There is no mass. Tenderness: There is no abdominal tenderness. There is no guarding or rebound. Hernia: No hernia is present. Musculoskeletal:         General: No swelling, tenderness, deformity or signs of injury. Normal range of motion. Cervical back: Normal range of motion. No rigidity or tenderness. Right lower leg: No edema. Left lower leg: No edema. Skin:     General: Skin is warm. Coloration: Skin is not jaundiced or pale. Findings: No bruising, erythema, lesion or rash. Neurological:      General: No focal deficit present.       Mental Status: She is alert. She is disoriented and confused. GCS: GCS eye subscore is 4. GCS verbal subscore is 4. GCS motor subscore is 6. Cranial Nerves: Cranial nerves are intact. No cranial nerve deficit, dysarthria or facial asymmetry. Sensory: Sensation is intact. No sensory deficit. Motor: Motor function is intact. No weakness, tremor, atrophy, abnormal muscle tone or seizure activity. Coordination: Coordination normal.   Psychiatric:         Mood and Affect: Mood normal.         Behavior: Behavior normal. Behavior is cooperative.            I have reviewed andinterpreted all of the currently available lab results from this visit (if applicable):    Results for orders placed or performed during the hospital encounter of 01/12/22   COVID-19, Rapid    Specimen: Nasopharyngeal   Result Value Ref Range    Source UNKNOWN     SARS-CoV-2, NAAT NOT DETECTED NOT DETECTED   Urinalysis   Result Value Ref Range    Color, UA YELLOW YELLOW    Clarity, UA CLOUDY (A) CLEAR    Glucose, Urine NEGATIVE NEGATIVE MG/DL    Bilirubin Urine NEGATIVE NEGATIVE MG/DL    Ketones, Urine NEGATIVE NEGATIVE MG/DL    Specific Gravity, UA 1.019 1.001 - 1.035    Blood, Urine NEGATIVE NEGATIVE    pH, Urine 8.0 5.0 - 8.0    Protein,  (A) NEGATIVE MG/DL    Urobilinogen, Urine NEGATIVE 0.2 - 1.0 MG/DL    Nitrite Urine, Quantitative NEGATIVE NEGATIVE    Leukocyte Esterase, Urine LARGE (A) NEGATIVE    RBC, UA 1 0 - 6 /HPF    WBC, UA 4 0 - 5 /HPF    Bacteria, UA RARE (A) NEGATIVE /HPF    Squam Epithel, UA 3 /HPF    Mucus, UA RARE (A) NEGATIVE HPF    Trichomonas, UA NONE SEEN NONE SEEN /HPF    Hyaline Casts, UA 0 /LPF    Uric Acid Caitie, UA OCCASIONAL /HPF   CBC Auto Differential   Result Value Ref Range    WBC 9.0 4.0 - 10.5 K/CU MM    RBC 3.44 (L) 4.2 - 5.4 M/CU MM    Hemoglobin 10.5 (L) 12.5 - 16.0 GM/DL    Hematocrit 32.9 (L) 37 - 47 %    MCV 95.6 78 - 100 FL    MCH 30.5 27 - 31 PG    MCHC 31.9 (L) 32.0 - 36.0 %    RDW 14.4 11.7 - 14.9 %    Platelets 863 742 - 106 K/CU MM    MPV 11.5 (H) 7.5 - 11.1 FL    Differential Type AUTOMATED DIFFERENTIAL     Segs Relative 72.3 (H) 36 - 66 %    Lymphocytes % 14.4 (L) 24 - 44 %    Monocytes % 10.7 (H) 0 - 4 %    Eosinophils % 1.4 0 - 3 %    Basophils % 0.6 0 - 1 %    Segs Absolute 6.5 K/CU MM    Lymphocytes Absolute 1.3 K/CU MM    Monocytes Absolute 1.0 K/CU MM    Eosinophils Absolute 0.1 K/CU MM    Basophils Absolute 0.1 K/CU MM    Nucleated RBC % 0.0 %    Total Nucleated RBC 0.0 K/CU MM    Total Immature Neutrophil 0.05 K/CU MM    Immature Neutrophil % 0.6 (H) 0 - 0.43 %   CMP   Result Value Ref Range    Sodium 134 (L) 135 - 145 MMOL/L    Potassium 4.5 3.5 - 5.1 MMOL/L    Chloride 102 99 - 110 mMol/L    CO2 23 21 - 32 MMOL/L    BUN 32 (H) 6 - 23 MG/DL    CREATININE 1.6 (H) 0.6 - 1.1 MG/DL    Glucose 112 (H) 70 - 99 MG/DL    Calcium 9.0 8.3 - 10.6 MG/DL    Albumin 3.0 (L) 3.4 - 5.0 GM/DL    Total Protein 6.8 6.4 - 8.2 GM/DL    Total Bilirubin 0.6 0.0 - 1.0 MG/DL    ALT 14 10 - 40 U/L    AST 23 15 - 37 IU/L    Alkaline Phosphatase 47 40 - 129 IU/L    GFR Non- 31 (L) >60 mL/min/1.73m2    GFR  37 (L) >60 mL/min/1.73m2    Anion Gap 9 4 - 16   CK   Result Value Ref Range    Total  (H) 26 - 140 IU/L   Brain Natriuretic Peptide   Result Value Ref Range    Pro-BNP 1,112 (H) <300 PG/ML   Troponin   Result Value Ref Range    Troponin T 0.016 (H) <0.01 NG/ML   Blood Gas, Venous   Result Value Ref Range    pH, Oneil 7.38 7.32 - 7.42    pCO2, Oneil 46 38 - 52 mmHG    pO2, Oneil 73 (H) 28 - 48 mmHG    Base Exc, Mixed 1.5 0 - 2.3    HCO3, Venous 27.2 (H) 19 - 25 MMOL/L    O2 Sat, Oneil 92.6 (H) 50 - 70 %    Comment VBG    Lactic Acid, Plasma   Result Value Ref Range    Lactate 1.2 0.4 - 2.0 mMOL/L   EKG 12 Lead   Result Value Ref Range    Ventricular Rate 103 BPM    Atrial Rate 245 BPM    QRS Duration 98 ms    Q-T Interval 308 ms    QTc Calculation (Bazett) 403 ms    R Axis 85 degrees    T Axis -27 degrees    Diagnosis       Atrial flutter with variable AV block  Incomplete right bundle branch block  Nonspecific ST and T wave abnormality  Abnormal ECG  When compared with ECG of 30-DEC-2021 14:18,  Atrial flutter has replaced Sinus rhythm  Incomplete right bundle branch block is now present          Radiographs (if obtained):  [] The following radiograph was interpreted by myself in the absence of a radiologist:  [x] Radiologist's Report Reviewed:    CXR, Pelvis, CT brain, C spine    XR CHEST PORTABLE    Result Date: 12/30/2021  EXAMINATION: ONE XRAY VIEW OF THE CHEST 12/30/2021 11:04 am COMPARISON: 11/22/2018 HISTORY: ORDERING SYSTEM PROVIDED HISTORY: fatigue TECHNOLOGIST PROVIDED HISTORY: Reason for exam:->fatigue Reason for Exam: fatigue FINDINGS: Cardiac and mediastinal silhouettes appear stable. No focal consolidation or pleural effusion. No pneumothorax is identified. No acute osseous abnormality. Degenerative changes are seen within the shoulders and spine. Stable appearing chest without acute focal infiltrate. VL DUP LOWER EXTREMITY VENOUS BILATERAL    Result Date: 12/30/2021  EXAMINATION: DUPLEX VENOUS ULTRASOUND OF THE BILATERAL LOWER [id number]/30/2021 12:03 pm TECHNIQUE: Duplex ultrasound using B-mode/gray scaled imaging, Doppler spectral analysis and color flow Doppler was obtained of the deep venous structures of the lower bilateral extremities. COMPARISON: None. HISTORY: ORDERING SYSTEM PROVIDED HISTORY: leg pain, swelling, left more than right, hx DVT TECHNOLOGIST PROVIDED HISTORY: Reason for exam:->leg pain, swelling, left more than right, hx DVT Reason for Exam: leg pain, swelling, left more than right, hx DVT FINDINGS: The visualized veins of the bilateral lower extremities are patent and free of echogenic thrombus. The veins demonstrate good compressibility with normal color flow study and spectral analysis. No evidence of DVT in either lower extremity. RECOMMENDATIONS: Unavailable       EKG (if obtained): (All EKG's are interpreted by myself in the absence of a cardiologist)    12 lead EKG per my interpretation:  Atrial Flutter 103  Axis is   Normal  QTc is  403  There is no specific T wave changes appreciated. There is no specific ST wave changes appreciated. Prior EKG to compare with was not available       MDM:    Patient here with fall, altered mental status. Again possibly history of altered mental status at baseline apparently she was found down at home by her bed on the floor probably down for 6 hours. Family came home and found her that way. Sent her here for evaluation has a history of UTIs she has a Pelaez catheter in. She is pleasantly confused. She has no signs of trauma she has no complaints. We will get labs, imaging we will consult case management will work-up altered mental status. Again history of UTIs likely has UTI. Patient did have UTI given Rocephin and urine culture. BNP troponin level mildly elevated. Creatinine is mildly elevated. Did talk to case management who talked to family who would like patient admitted for nursing home placement will need PT OT which I did order. Otherwise patient stable admitted for altered mental status UTI fall and need for placement. Case management following    CLINICAL IMPRESSION:  Final diagnoses:   Fall, initial encounter   Altered mental status, unspecified altered mental status type   Urinary tract infection without hematuria, site unspecified   Elevated brain natriuretic peptide (BNP) level   Troponin level elevated       (Please note that portions of this note may have been completed with a voice recognition program. Efforts were made to edit the dictations but occasionally words aremis-transcribed.)    DISPOSITION REFERRAL (if applicable):  No follow-up provider specified.     DISPOSITION MEDICATIONS (if applicable):  New Prescriptions    No medications on file          [employer], DO           Neo Chen DO  01/12/22 1931

## 2022-01-13 ENCOUNTER — CARE COORDINATION (OUTPATIENT)
Dept: CASE MANAGEMENT | Age: 87
End: 2022-01-13

## 2022-01-13 LAB
ALBUMIN SERPL-MCNC: 3.2 GM/DL (ref 3.4–5)
ANION GAP SERPL CALCULATED.3IONS-SCNC: 10 MMOL/L (ref 4–16)
BUN BLDV-MCNC: 29 MG/DL (ref 6–23)
CALCIUM SERPL-MCNC: 8.5 MG/DL (ref 8.3–10.6)
CHLORIDE BLD-SCNC: 105 MMOL/L (ref 99–110)
CO2: 24 MMOL/L (ref 21–32)
CREAT SERPL-MCNC: 1.5 MG/DL (ref 0.6–1.1)
CREATININE URINE: 207.4 MG/DL (ref 28–217)
GFR AFRICAN AMERICAN: 40 ML/MIN/1.73M2
GFR NON-AFRICAN AMERICAN: 33 ML/MIN/1.73M2
GLUCOSE BLD-MCNC: 110 MG/DL (ref 70–99)
HCT VFR BLD CALC: 31.4 % (ref 37–47)
HEMOGLOBIN: 9.8 GM/DL (ref 12.5–16)
MCH RBC QN AUTO: 30.1 PG (ref 27–31)
MCHC RBC AUTO-ENTMCNC: 31.2 % (ref 32–36)
MCV RBC AUTO: 96.3 FL (ref 78–100)
MICROALBUMIN/CREAT 24H UR: 3.2 MG/DL
MICROALBUMIN/CREAT UR-RTO: 15.4 MG/G CREAT (ref 0–30)
PDW BLD-RTO: 14.6 % (ref 11.7–14.9)
PHOSPHORUS: 3.1 MG/DL (ref 2.5–4.9)
PLATELET # BLD: 269 K/CU MM (ref 140–440)
PMV BLD AUTO: 11.6 FL (ref 7.5–11.1)
POTASSIUM SERPL-SCNC: 4.2 MMOL/L (ref 3.5–5.1)
RBC # BLD: 3.26 M/CU MM (ref 4.2–5.4)
SODIUM BLD-SCNC: 139 MMOL/L (ref 135–145)
SODIUM URINE: 34 MMOL/L (ref 35–167)
TROPONIN T: 0.02 NG/ML
TROPONIN T: 0.02 NG/ML
WBC # BLD: 6.8 K/CU MM (ref 4–10.5)

## 2022-01-13 PROCEDURE — 6370000000 HC RX 637 (ALT 250 FOR IP): Performed by: STUDENT IN AN ORGANIZED HEALTH CARE EDUCATION/TRAINING PROGRAM

## 2022-01-13 PROCEDURE — 6360000002 HC RX W HCPCS: Performed by: STUDENT IN AN ORGANIZED HEALTH CARE EDUCATION/TRAINING PROGRAM

## 2022-01-13 PROCEDURE — 97162 PT EVAL MOD COMPLEX 30 MIN: CPT

## 2022-01-13 PROCEDURE — 97530 THERAPEUTIC ACTIVITIES: CPT

## 2022-01-13 PROCEDURE — 94761 N-INVAS EAR/PLS OXIMETRY MLT: CPT

## 2022-01-13 PROCEDURE — G0378 HOSPITAL OBSERVATION PER HR: HCPCS

## 2022-01-13 PROCEDURE — 80069 RENAL FUNCTION PANEL: CPT

## 2022-01-13 PROCEDURE — 82043 UR ALBUMIN QUANTITATIVE: CPT

## 2022-01-13 PROCEDURE — 82570 ASSAY OF URINE CREATININE: CPT

## 2022-01-13 PROCEDURE — 96372 THER/PROPH/DIAG INJ SC/IM: CPT

## 2022-01-13 PROCEDURE — 2580000003 HC RX 258: Performed by: STUDENT IN AN ORGANIZED HEALTH CARE EDUCATION/TRAINING PROGRAM

## 2022-01-13 PROCEDURE — 96366 THER/PROPH/DIAG IV INF ADDON: CPT

## 2022-01-13 PROCEDURE — 97166 OT EVAL MOD COMPLEX 45 MIN: CPT

## 2022-01-13 PROCEDURE — 85027 COMPLETE CBC AUTOMATED: CPT

## 2022-01-13 PROCEDURE — 36415 COLL VENOUS BLD VENIPUNCTURE: CPT

## 2022-01-13 PROCEDURE — 2580000003 HC RX 258: Performed by: NURSE PRACTITIONER

## 2022-01-13 PROCEDURE — 84484 ASSAY OF TROPONIN QUANT: CPT

## 2022-01-13 PROCEDURE — 84300 ASSAY OF URINE SODIUM: CPT

## 2022-01-13 RX ORDER — SODIUM CHLORIDE 9 MG/ML
INJECTION, SOLUTION INTRAVENOUS CONTINUOUS
Status: DISPENSED | OUTPATIENT
Start: 2022-01-13 | End: 2022-01-14

## 2022-01-13 RX ADMIN — HEPARIN SODIUM 5000 UNITS: 5000 INJECTION, SOLUTION INTRAVENOUS; SUBCUTANEOUS at 14:11

## 2022-01-13 RX ADMIN — SODIUM CHLORIDE, PRESERVATIVE FREE 10 ML: 5 INJECTION INTRAVENOUS at 09:10

## 2022-01-13 RX ADMIN — PRAVASTATIN SODIUM 10 MG: 10 TABLET ORAL at 20:11

## 2022-01-13 RX ADMIN — LEVOTHYROXINE SODIUM 175 MCG: 0.1 TABLET ORAL at 05:50

## 2022-01-13 RX ADMIN — ASPIRIN 81 MG 81 MG: 81 TABLET ORAL at 09:10

## 2022-01-13 RX ADMIN — SODIUM CHLORIDE: 9 INJECTION, SOLUTION INTRAVENOUS at 17:00

## 2022-01-13 RX ADMIN — DRONEDARONE 400 MG: 400 TABLET, FILM COATED ORAL at 17:01

## 2022-01-13 RX ADMIN — Medication 5000 UNITS: at 09:09

## 2022-01-13 RX ADMIN — CETIRIZINE HYDROCHLORIDE 5 MG: 10 TABLET, FILM COATED ORAL at 09:10

## 2022-01-13 RX ADMIN — FENOFIBRATE 160 MG: 160 TABLET ORAL at 09:10

## 2022-01-13 RX ADMIN — CEFTRIAXONE SODIUM 1000 MG: 1 INJECTION, POWDER, FOR SOLUTION INTRAMUSCULAR; INTRAVENOUS at 18:39

## 2022-01-13 RX ADMIN — HEPARIN SODIUM 5000 UNITS: 5000 INJECTION, SOLUTION INTRAVENOUS; SUBCUTANEOUS at 20:11

## 2022-01-13 RX ADMIN — HEPARIN SODIUM 5000 UNITS: 5000 INJECTION, SOLUTION INTRAVENOUS; SUBCUTANEOUS at 05:50

## 2022-01-13 ASSESSMENT — PAIN DESCRIPTION - PROGRESSION
CLINICAL_PROGRESSION: NOT CHANGED

## 2022-01-13 NOTE — PROGRESS NOTES
Pt arrived to floor via stretcher. Pt oriented too self, place, and somewhat oriented to situation. Skin assessment performed by this nurse and Evcelesteen Coup. Skin excoriated under abd folds and breasts. Areas cleansed and interdry placed.

## 2022-01-13 NOTE — CONSULTS
[place], 1935, 4104/4104-A, 1/13/2022    History  [place]:  The primary encounter diagnosis was Fall, initial encounter. Diagnoses of Altered mental status, unspecified altered mental status type, Urinary tract infection without hematuria, site unspecified, Elevated brain natriuretic peptide (BNP) level, and Troponin level elevated were also pertinent to this visit. Patient  has a past medical history of Allergic rhinitis, Arthritis, CHF (congestive heart failure) ([address] DVT, lower extremity, proximal, acute, left (HCC), Edema leg, Gastroesophageal reflux disease, H/O 24 hour EKG monitoring, H/O cardiac catheterization, H/O cardiac catheterization, H/O cardiovascular stress test, H/O Doppler echocardiogram, H/O echocardiogram, History of atrial fibrillation, History of cardiovascular stress test, History of falling, History of nuclear stress test, Hx of blood clots, Hx: UTI (urinary tract infection), Hyperglycemia, Hyperlipidemia, Hypertension, Hypothyroidism, Incontinence of bowel, Incontinence of urine, Sepsis ([address] Tremor, Wears glasses, and Wears partial dentures. Patient  has a past surgical history that includes Total knee arthroplasty (2005, 2006); Breast surgery; joint replacement (4/2005 2/2006); Cardioversion (10/17/2008); Cardioversion (12/15/2008); other surgical history (10/2009); pr total hip arthroplasty (Left, 02/23/2015); Hysterectomy (age 21's); IR GUIDED FLUID DRAINAGE BY CATH SOFT TISSUE PERC (8/21/2020); and IR GUIDED FLUID DRAINAGE BY CATH SOFT TISSUE PERC (9/8/2020). Subjective:  Patient states:  \"I am very tired. \"    Pain:  Pt c/o pain on back of hip, did not rate.      Communication with other providers: N/A  Restrictions: General precautions, fall risk    Home Setup/Prior level of function  Social/Functional History  Lives With: Alone (Grandson is over [place] lot\")  Type of Home: House  Home Layout: One level  Home Access: Level entry  Bathroom Shower/Tub: Walk-in shower  Bathroom Toilet: Handicap height  Bathroom Equipment: Shower chair,Grab bars in shower  Bathroom Accessibility: Walker accessible  Home Equipment: 4 wheeled walker,Wheelchair-manual  ADL Assistance: Independent  Homemaking Responsibilities: No (Grandson does household chores)  Ambulation Assistance: Independent (4ww)  Transfer Assistance: Independent  Active : No  Patient's  Info: son  Leisure & Hobbies: sleep  Additional Comments: no other hx of falls    Examination of body systems (includes body structures/functions, activity/participation limitations):  · Observation:  Asleep supine in bed upon arrival. Pt had to be aroused but was agreeable to PT. · [facility]:  [facility] [employer]  · Hearing:  [facility]  · Cardiopulmonary:  WFL. No O2  · Cognition: A&O x 3, disoriented to time, pt somnolent at start of session see OT/SLP note for further evaluation. Musculoskeletal  · ROM R/L:  [facility]    · Strength R/L: B LEs 4+/5, impaired in function and endurance. · Neuro:  [facility]      Mobility/Treatment:  · Rolling L/R:  Pt rolled to L w Supa, elevated HOB, and use of bed rail. Increased time and effort to complete. · Supine to sit:  Pt completed transfer w modA for scooting EOB, elevated HOB, and use of bed rail. Increased time and effort to complete. · Transfers:   · Sit<>stand: Pt performed 2x STS from EOB w modA and use of RW. Pt required verbal cues for hand placement and sequencing. · Bed-to-chair: Pt performed transfer w modA and use of RW.   · Sitting balance:  Pt sat EOB ~10 min and required CGA for sitting balance. B UE used to assist.     · Standing balance:  Pt stood ~3 min and required CGA and RW for safety and balance. · Gait: Pt ambulated ~5' to chair with modA and use of RW. Pt presented with forward flex posture and decreased step length. Pt required verbal cues for RW management with some assist for sequencing.    · Pt educated on POC, RW management, and d/c planning. [facility] 6 Clicks Inpatient Mobility:  AM-PAC Inpatient Mobility Raw Score : 13    Safety: patient left in recliner chair, call light within reach, gait belt used. Assessment:    Pt is an 82year old female who was admitted to [facility] after being found on the floor after apparent fall out of bed on 1/12/2022. Pt currently being treated for ARJUN, mechanical fall, elevated CK, dementia, and UTI. At baseline, pt is indep for ambulation, transfers, and ADLs, and needs assistance with IADLs. Pt is currently modA for bed mobility, transfers, and ambulation. Pt presents with impaired endurance, strength, transfers, bed mobility, and ambulation. She would benefit from skilled PT services in acute care and continued services in the SNF setting. Complexity: Moderate  Prognosis: Good, no significant barriers to participation at this time. Plan Times per week: 3+  Discharge Recommendations: Subacute/Skilled Nursing Facility  Equipment: Continue to assess at next level of care.      Goals:  Short term goals  Time Frame for Short term goals: 1 week  Short term goal 1: Pt will ambulate 27' with Supa and use of RW  Short term goal 2: Pt will perform bed mobility Supa with use of bed rail  Short term goal 3: Pt will perform STS transfer Supa with use of RW       Treatment plan:  Bed mobility, transfers, balance, gait, TA, TX    Recommendations for NURSING mobility: modA and RW to [facility]    Time:   Time in: 1248  Time out: 1316  Timed treatment minutes: 18  Total time: 28    Electronically signed by:    Randy Gill [id number]  1/13/2022, 3:21 PM

## 2022-01-13 NOTE — H&P
History and Physical      Name:  Clair Tellez /Age/Sex: 1935  (82 y.o. female)   MRN & CSN:  [id number] & [id number] Admission Date/Time: 2022  3:34 PM   Location:  ED16/ED-16 PCP: Silvia Ortiz MD       Hospital Day: 1    Assessment and Plan:   Clair Tellez is a 82 y.o.  female  who presents with Acute kidney injury superimposed on CKD ([facility] [address]    1. ARJUN likely due to prerenal azotemia, on CKD stage 3a  -Admit to observation services  -Cr 1.6 in ED w baseline ~1.2 per EMR  -GFR 31 in ED w baseline ~47 per EMR  -IVF with [employer]JUNIQE ml/hr for 10 hrs  -Follow Cr with daily labs, check UA, monitor I & O, check urine osmolality, urine sodium, and urine creatinine  -Hold home Lasix and losartan. We will add labetalol 5 mg IV push for HTN coverage with parameters  -Heparin for DVT ppx  -Consideration for renal US / nephrology consultation if no improvement in 24h     2. Mechanical fall  3. Elevated CK, without rhabdomyolysis  -Patient had a mechanical fall at home. Patient states she slipped while getting out of bed and slid down slowly. Was unable to get up for approximately 6 hours without help. Denies head trauma or loss of consciousness  -CK is elevated at 200. UA not consistent with rhabdomyolysis  -PT and OT when appropriate for placement    4. Dementia, acutely worsened by UTI  5. UTI, complicated, suprapubic catheter, POA  -Patient has baseline dementia with family saying slight worsening recently  -Patient has suprapubic catheter with UA consistent with UTI  -ED started ceftriaxone and we can continue this    6. COVID-19  -Patient tested positive for COVID-19 on 2021  -No symptoms today. Given CDC guidelines patient does not require COVID precautions since has been greater than 10 days  -Continue to monitor    7. Elevated troponin  -Troponin elevated 0.016, trend troponins x2.   Denies chest pain  -Likely demand based ischemia versus decreased renal clearance given ARJUN on CKD  -Patient has multiple nonzero troponins in the past.  -The patient developed chest pain without significant elevation in troponins would recommend urgent consult to cardiology and started on heparin drip protocol    8. Anemia of chronic disease  -Hgb 10.5 in ED, baseline hgb ~10.7 g/dL per EMR  -MCV 95.6/ MCHC 31.9  -Monitor closely and transfuse as needed to keep hgb > 7 g/dL  -Does not appear to be actively bleeding  -A. m. labs    Other chronic medical conditions:   Continue all home meds except stated above or contraindicated.  Chronic afib   htn   hld   Chronic HFpEF: Not currently decompensated   Hypothyroidism   Morbid obesity   Vitamin D deficiency    Diet ADULT DIET; Regular; Low Fat/Low Chol/High Fiber/NADIA; No Added Salt (3-4 gm); Low Phosphorus (Less than 1000 mg); Less than 60 gm   DVT Prophylaxis [] Lovenox, [x]  Heparin, [] SCDs, [] Ambulation   GI Prophylaxis [] PPI,  [] H2 Blocker,  [] Carafate,  [] Diet/Tube Feeds   Code Status Full Code   Disposition Patient requires continued admission due to Acute kidney injury superimposed on CKD ([facility] [address]   MDM [] Low, [] Moderate,[x]  High  Patient's risk as above due to acuity of condition with potential for decompensation. History of Present Illness:     Chief Complaint: Acute kidney injury superimposed on CKD ([facility] [address]    Luciana Sargent is a 82 y.o.  female with a reviewed and a noncontributory family history and a PMH as stated above, who presents status post fall at home with prolonged downtime. Patient is demented at baseline but does remember that today she was getting out of bed and slipped. Patient states she slid down slowly and did not hit head. Patient was unable to get up and was found by grandson who stated she had probably been around for about 6 hours. Per ED provider family stated she seemed to have a mild increase in confusion from baseline. Otherwise patient is alert and oriented to person, place, and events but not time. Patient denies any fever, chills, headache, chest pain, shortness of breath, cough, abdominal pain, nausea, emesis, diarrhea, melena, or hematochezia. Discussed case with ED provider. ROS:   Review of Systems   Unable to perform ROS: Dementia       Objective:   No intake or output data in the 24 hours ending 01/12/22 2138   Vitals:   Vitals:    01/12/22 2000   BP: (!) 164/76   Pulse: 94   Resp: 17   Temp:    SpO2: 97%     BP (!) 164/76   Pulse 94   Temp 98.5 °F (36.9 °C) (Oral)   Resp 17   Ht 5' 4\" (1.626 m)   Wt 250 lb (113.4 kg)   SpO2 97%   BMI 42.91 kg/m²   Physical Exam:   Physical Exam  Vitals and nursing note reviewed. Constitutional:       General: She is awake. She is not in acute distress. Appearance: Normal appearance. She is morbidly obese. She is not ill-appearing, toxic-appearing or diaphoretic. Interventions: She is not intubated. HENT:      Head: Atraumatic. No raccoon eyes or Lozano's sign. Right Ear: External ear normal.      Left Ear: External ear normal.      Nose: Nose normal. No rhinorrhea. Mouth/Throat:      Mouth: Mucous membranes are dry. Eyes:      Extraocular Movements: Extraocular movements intact. Cardiovascular:      Rate and Rhythm: Normal rate. Rhythm irregularly irregular. Pulses: Normal pulses. Heart sounds: Normal heart sounds. No murmur heard. No gallop. Pulmonary:      Effort: Pulmonary effort is normal. No tachypnea or respiratory distress. She is not intubated. Breath sounds: Normal breath sounds. No wheezing, rhonchi or rales. Abdominal:      General: Abdomen is protuberant. Bowel sounds are normal.      Palpations: Abdomen is soft. Tenderness: There is no abdominal tenderness. There is no guarding or rebound. Musculoskeletal:         General: Normal range of motion. Cervical back: Neck supple. Right lower leg: No edema. Left lower leg: No edema. Skin:     General: Skin is warm and dry. Capillary Refill: Capillary refill takes less than 2 seconds. Neurological:      Mental Status: She is disoriented and confused. Cranial Nerves: No dysarthria or facial asymmetry. Motor: No tremor or seizure activity. Psychiatric:         Attention and Perception: She is attentive. Mood and Affect: Mood is not anxious. Speech: She is communicative. Speech is not slurred. Behavior: Behavior is cooperative. Cognition and Memory: Memory is impaired. Past Medical History:      Past Medical History:   Diagnosis Date    Allergic rhinitis     Arthritis     CHF (congestive heart failure) ([facility] [address]     DVT, lower extremity, proximal, acute, left (HCC)     Edema leg     Gastroesophageal reflux disease 3/30/2019    H/O 24 hour EKG monitoring 6/10 1/09 11/08    6/7/10- predominant rhythm is a-fib, freq PVCs    H/O cardiac catheterization 7/20/00    H/O cardiac catheterization 7/20/00    right dominant system, left main is short but angiographically normal, LAD and CX reveal sign  tortuosities distally  but no sign angiographic atherosclerotic disease noted except luminal  irregularities here and there. LVSF normal, EF  55%    H/O cardiovascular stress test 5/2/2016    lexiscan-normal,EF70%    H/O Doppler echocardiogram 7/12/10    intimal thickening but no sign. atherosclerotic plaque noted in [facility]  LICA.  H/O echocardiogram 5/2/2016 9/10     5/16 EF60% mild MR, TR, AR 9/20/10 Complete 2 dim transthoracic echo, techn. difficult. The study was  tech limited, LVSF is normal    History of atrial fibrillation     History of cardiovascular stress test 7/26/13, 7/10 8/08    7/13-WNL Observed defect consistent with breast attenuation. EF 70%. 7/10-post stress myocardial images show a normal pattern of perfusion in all regions. LV is normal  in size.   EF is 62%, global  LVSF is normal    History of falling     per old chart    History of nuclear stress test 10/08/2020    EF 60%, Normal    Hx of blood clots     per old chart  hx of DVT left leg    Hx: UTI (urinary tract infection)     Hyperglycemia 3/30/2019    Hyperlipidemia     Hypertension     Hypothyroidism     Incontinence of bowel     Incontinence of urine     Sepsis ([facility] [address] 2015    Tremor 3/30/2019    Wears glasses     to read    Wears partial dentures     upper     PSHX:  has a past surgical history that includes Total knee arthroplasty (2005, 2006); Breast surgery; joint replacement (4/2005 2/2006); Cardioversion (10/17/2008); Cardioversion (12/15/2008); other surgical history (10/2009); pr total hip arthroplasty (Left, 02/23/2015); Hysterectomy (age 21's); IR GUIDED FLUID DRAINAGE BY CATH SOFT TISSUE PERC (8/21/2020); and IR GUIDED FLUID DRAINAGE BY CATH SOFT TISSUE PERC (9/8/2020). Allergies: Allergies   Allergen Reactions    Tape [employer] Tape] Itching    Codeine Rash    Sulfa Antibiotics Nausea And Vomiting       FAM HX: family history includes Diabetes in her father and mother; Heart Disease in her father. Soc HX:   Social History     Socioeconomic History    Marital status:       Spouse name: None    Number of children: None    Years of education: None    Highest education level: None   Occupational History    None   Tobacco Use    Smoking status: Former Smoker     Years: 1.00    Smokeless tobacco: Never Used    Tobacco comment: \"quit yrs ago only smoked for 2-3 yrs\"   Vaping Use    Vaping Use: Never used   Substance and Sexual Activity    Alcohol use: No     Alcohol/week: 0.0 standard drinks    Drug use: No    Sexual activity: Not Currently   Other Topics Concern    None   Social History Narrative    None     Social Determinants of Health     Financial Resource Strain: Low Risk     Difficulty of Paying Living Expenses: Not hard at all   Food Insecurity: No Food Insecurity    Worried About Running Out of Food in the Last Year: Never true    [place] of Food in 01/12/2022    PROT 7.4 08/20/2012    LABALBU 3.0 01/12/2022    LABALBU 100 02/16/2011    LABALBU  02/16/2011     CORRECTED ON 02/18 AT 1325: PREVIOUSLY REPORTED AS: 0    CALCIUM 9.0 01/12/2022    BILITOT 0.6 01/12/2022    BILITOT 0.4 02/11/2015    ALKPHOS 47 01/12/2022    AST 23 01/12/2022    ALT 14 01/12/2022       Troponin:  Lab Results   Component Value Date    TROPONINT 0.016 01/12/2022       U/A:    Lab Results   Component Value Date    NITRITE positive 01/31/2014    COLORU YELLOW 01/12/2022    PROTEINU 100 01/12/2022    PHUR 6.0 07/01/2014    WBCUA 4 01/12/2022    RBCUA 1 01/12/2022    MUCUS RARE 01/12/2022    TRICHOMONAS NONE SEEN 01/12/2022    YEAST FEW 07/06/2018    BACTERIA RARE 01/12/2022    CLARITYU CLOUDY 01/12/2022    SPECGRAV 1.019 01/12/2022    LEUKOCYTESUR LARGE 01/12/2022    UROBILINOGEN NEGATIVE 01/12/2022    BILIRUBINUR NEGATIVE 01/12/2022    BILIRUBINUR neg 01/31/2014    BLOODU NEGATIVE 01/12/2022    GLUCOSEU Negative 07/01/2014    AMORPHOUS RARE 02/23/2018       Radiology results:  CT HEAD WO CONTRAST   Final Result      Cervical spine CT: No acute osseous abnormality of the cervical spine. Unchanged advanced degenerative disease within the odontoid process of C2. Unchanged extensive degenerative change in within C5-C6 and C6-C7. Head CT: No evidence for acute intracranial hemorrhage, territorial   infarction or intracranial mass lesion. Severe chronic microangiopathic ischemic disease. Moderate generalized volume loss. RECOMMENDATIONS:   Unavailable         CT CERVICAL SPINE WO CONTRAST   Final Result      Cervical spine CT: No acute osseous abnormality of the cervical spine. Unchanged advanced degenerative disease within the odontoid process of C2. Unchanged extensive degenerative change in within C5-C6 and C6-C7. Head CT: No evidence for acute intracranial hemorrhage, territorial   infarction or intracranial mass lesion.       Severe chronic microangiopathic ischemic disease. Moderate generalized volume loss. RECOMMENDATIONS:   Unavailable         XR PELVIS (1-2 VIEWS)   Final Result   No acute osseous abnormality. XR CHEST PORTABLE   Final Result   Linear pulmonary opacities in the left mid and lower lung could represent   subsegmental atelectasis or infection                Medications:   Home Medications:   Prior to Admission medications    Medication Sig Start Date End Date Taking? Authorizing Provider   losartan (COZAAR) 100 MG tablet Take 1 tablet by mouth daily 3/8/21  Yes Historical Provider, MD   vitamin D 25 MCG (1000 UT) CAPS Take 5 capsules by mouth daily 1/7/22 2/6/22  Iftikhar Hsieh DO   aspirin 81 MG chewable tablet Take 1 tablet by mouth daily 1/6/22 2/5/22  Iftikhar Hsieh DO   furosemide (LASIX) 20 MG tablet Take 1 pill on Mon, Weds, Fri 11/29/21   Balaji Clark MD   fenofibrate micronized (LOFIBRA) 67 MG capsule Take 1 capsule by mouth every morning (before breakfast) 9/8/21   Navarro Messer MD   pravastatin (PRAVACHOL) 10 MG tablet TAKE 1 TABLET AT BEDTIME 9/8/21 4/3/22  Navarro Messer MD   levothyroxine (SYNTHROID) 175 MCG tablet Take 1 tablet by mouth daily 9/8/21   Navarro Messer MD   potassium chloride (MICRO-K) 10 MEQ extended release capsule Take 1 capsule by mouth daily 9/8/21   Navarro Messer MD   dronedarone hcl (MULTAQ) 400 MG TABS TAKE 1 TABLET TWICE A DAY WITH MEALS 3/8/21 1/7/22  Navarro Messer MD   loperamide (IMODIUM) 2 MG capsule Take 2 mg by mouth daily as needed for Diarrhea    Historical Provider, MD   loratadine (CLARITIN) 10 MG tablet Take 10 mg by mouth daily     Historical Provider, MD   acetaminophen (TYLENOL) 325 MG tablet Take 325 mg by mouth daily.       Historical Provider, MD     Medications:    Infusions:   PRN Meds:     Electronically signed by Ranelle Heimlich, DO on 1/12/2022 at 9:38 PM      This dictation was created with voice recognition software. While attempts have been made to review the dictation as it is transcribed, on occasion the spoken word can be misinterpreted by the technology leading to omissions or inappropriate words, phrases or sentences.

## 2022-01-13 NOTE — PROGRESS NOTES
Spoke to Dr. Angel Redman who stated patient has no symptoms of covid, and since she tested negative she can be out of isolation.

## 2022-01-13 NOTE — CONSULTS
[address] [facility], 1935, [id number]/[id number], 1/13/2022    Discharge Recommendation: Richradson Salomon      History:  [place]:  The primary encounter diagnosis was Fall, initial encounter. Diagnoses of Altered mental status, unspecified altered mental status type, Urinary tract infection without hematuria, site unspecified, Elevated brain natriuretic peptide (BNP) level, and Troponin level elevated were also pertinent to this visit. Subjective:  Patient states: Agreeable to therapy. Pain: Pt denied pain this date (0/10). Communication with other providers: PT, RN, LSW  Restrictions: General Precautions, Fall Risk    Home Setup/Prior level of function:  Social/Functional History  Lives With: Alone (Massiel is over [place] lot\")  Type of Home: House  Home Layout: One level  Home Access: Level entry  Bathroom Shower/Tub: Walk-in shower  Bathroom Toilet: Handicap height  Bathroom Equipment: Shower chair,Grab bars in shower  Bathroom Accessibility: Walker accessible  Home Equipment: 4 wheeled walker,Wheelchair-manual  ADL Assistance: Independent  Homemaking Responsibilities: No (Massiel does household chores)  Ambulation Assistance: Independent (4ww)  Transfer Assistance: Independent  Active : No  Patient's  Info: son  Leisure & Hobbies: sleep  Additional Comments: no other hx of falls    Examination:  · Observation: Supine in bed upon arrival  · Vision: [facility]  · Hearing: WFL  · Vitals: Stable vitals throughout session    Body Systems and functions:  · ROM: WFL   · Strength: WFL  · Sensation: WFL  · Tone: Normal  · Coordination: WFL  · Perception: WNL    Activities of Daily Living (ADLs):  · Feeding: Damion  setup and increased time. · Grooming: Anna in standing with assistance for dynamic standing balance, setup, increased time, VC, and assist to ensure task is performed thoroughly.    · UB bathing: CGA in seated with setup, increased of personal grooming such as brushing teeth? [] 1   [] 2    [] 2 [x] 3    [] 3   [] 4      6. Eating meals? [] 1   [] 2   [] 2   [] 3   [] 3   [x] 4      Raw Score:  16    [24=0% impaired(CH), 23=1-19%(CI), 20-22=20-39%(CJ), 15-19=40-59%(CK), 10-14=60-79%(CL), 7-9=80-99%(CM), 6=100%(CN)]    Treatment:  Therapeutic Activity Training:   Therapeutic activity training was instructed today. Cues were given for safety, sequence, UE/LE placement, awareness, and balance. Activities performed today included bed mobility training, sup-sit, sit-stand, ambulation. Safety Measures: Gait belt used, Left in bed, Alarm in place    Assessment:  Assessment  Performance deficits / Impairments: Decreased functional mobility ,Decreased strength,Decreased ADL status,Decreased high-level IADLs,Decreased balance,Decreased cognition,Decreased endurance  Treatment Diagnosis: acute kidney injury superimposed on CKD. Prognosis: Good  Decision Making: Medium Complexity  REQUIRES OT FOLLOW UP: Yes  Discharge Recommendations: [address]    Pt is an 82year old F admitted for acute kidney injury superimposed on CKD. Pt reports that prior to admission she was IND in ADLs, not responsible for IADLs, and Damion for functional mobility. This date, pt demo decreased balance, strength, activity tolerance, and overall functional mobility impacting ADL status. Pt is currently functioning below baseline and would benefit from skilled OT services at SNF. Plan:  Plan  Times per week: 2+  Times per day: Daily      Goals:  1. Pt will complete all aspects of bed mobility for EOB/OOB ADLs Anna. 2. Pt will complete LB bathing with CGA and AE as needed. 3. Pt will complete all aspects of LB dressing with Anna and AE as needed. 4. Pt will complete all functional transfers to and from bed, chair, toilet, shower chair with Anna and AD. 5. Pt will ambulate HH distance to bathroom for toileting with CGA and AD.   6. Pt will complete all aspects of toileting task with CGA. 7. Pt will complete oral hygiene/grooming routine in standing at sink with SBA demo good dynamic standing balance for approx 8 minutes. 8. Pt will complete ther ex/ther act with focus on UB strengthening. Time:   Time in: 1418  Time out: 1436  Timed treatment minutes: 8  Total time: 18      Electronically signed by:       Media Campanile, OTR/L, [address] [place], [place] [id number]

## 2022-01-13 NOTE — CARE COORDINATION
Care Transitions Outreach Attempt    Patient: Anjel Duff Patient : 1935 MRN: [id number]    Last Discharge [address] [address]       Complaint Diagnosis Description Type Department Provider    22 Fall Fall, initial encounter . .. ED to Hosp-Admission (Current) (ADMITTED) [facility] [address] Reed Licona MD; Vivien Funk,... Patient noted to have been readmitted to [facility] yesterday with dx of: ARJUN, UTI, Mech Fall. Current episode closed per protocol. CT will follow if criteria met upon discharge.   [address] [place] [address] [phone]

## 2022-01-13 NOTE — CARE COORDINATION
Reviewed chart and spoke with Krista Hernandez at [facility], she was awaiting call for referral. Pt has been with them before, she will review and get back with this CM .

## 2022-01-13 NOTE — PROGRESS NOTES
Infection prevention paged to verify what unit pt should be placed on since pt tested positive for covid 12/30, negative 1/12. Infection prevention stated \"We generally leave that decision up to the physician\" Will inform Dr. James Coates.

## 2022-01-13 NOTE — PROGRESS NOTES
Hospitalist Progress Note      Name:  Gregory Nunez /Age/Sex: 1935  (82 y.o. female)   MRN & CSN:  [id number] & [id number] Admission Date/Time: 2022  3:34 PM   Location:  [address] [place] [address]- PCP: Elena Hansen, [address] Day: 2                                               Attending Physician Dr. Yobani Wood and Plan:   Gregory Nunez is a 82 y.o.  female  who presents with Acute kidney injury superimposed on CKD ([address]    Acute kidney injury superimposed on CKD stage III  Elevated troponin- likely in the setting of above. Without chest pain   sCr improved slightly to 1.5   Urine indices    Restart IVF   Recheck sCr in am if no improvement will consult nephrology    Previous evaluation by Dr Ale Scott (2018)    Ambulatory dysfunction  Mechanical fall-found on the floor in her home estimated approx 6 hrs down   PT/OT for ECF placement   CM to assist with discharge planning     Acute cystitis  Catheter associated UTI  Suprapubic catheter present on admission   Pending culture   Previous micro reviewed Proteus mirabilis (2018)   IV Rocephin    Elevated CK (200) in the setting of immobility for approximately 6 hours but low suspicion for rhabdomyolysis   Trend labs   IVF    Chronic atrial fibrillation. [id number] Score: 5   Continue Multaq   Recent cardiology visit 2021 has Eliquis 2.5mg BID on medication regimen but not currently on home list   Continue to hold [facility] due to recurrent falls    HFpEF    Appears stable/ compensated    Last TTE (10/2021) EF 50-55%, mild LVH   Will restart PO lasix pending renal function     COVID-19 infection-initial diagnosis 2021   Currently on room air    Anemia - chronic. H/H 9.8/31.4. Declining trend noted. Baseline appears 10-11   Monitor labs      Diet ADULT DIET; Regular; Low Fat/Low Chol/High Fiber/NADIA; No Added Salt (3-4 gm); Low Phosphorus (Less than 1000 mg);  Less than 60 gm   DVT Prophylaxis [] Lovenox, [x]  Heparin, [] SCDs, [] Ambulation   GI Prophylaxis [] PPI,  [x] H2 Blocker,  [] Carafate,  [] Diet/Tube Feeds   Code Status Full Code     -Patient assessment and plan discussed with supervising physician-  Subjective 1/13/2022     Issac Arvizu is a 82 y.o.  female, who presented with  Fall (pt trying to get out of bed, denies any pain)    Patient does not provide meaningful HPI information. She denies falling and does not recall events of yesterday. Denies pain at present    Ten point ROS reviewed negative, unless as noted above    Objective: Intake/Output Summary (Last 24 hours) at 1/13/2022 1505  Last data filed at 1/13/2022 1245  Gross per 24 hour   Intake 10 ml   Output 1000 ml   Net -990 ml      Vitals:   Vitals:    01/13/22 0724 01/13/22 0730 01/13/22 0755 01/13/22 1400   BP: (!) 107/59 (!) 118/58  (!) 110/56   Pulse: 82 85  83   Resp: 16 16 16 16   Temp: 97.9 °F (36.6 °C) 98.2 °F (36.8 °C)  97.4 °F (36.3 °C)   TempSrc: Axillary Axillary  Axillary   SpO2: 92% 94% 94% 92%   Weight:       Height:         Physical Exam: 01/13/22     GEN -Awake chronically ill appearing female, sitting upright in bed , NAD. Obese body habitus. Appears given age. EYES -Pupils are equally round. No scleral erythema, discharge, or conjunctivitis. HENT -MM are moist. Oral pharynx without exudates, no evidence of thrush. NECK -Supple, no apparent thyromegaly or masses. RESP -CTA, no wheezes, rales or rhonchi. Symmetric chest movement while on room air. C/V -S1/S2 auscultated. RRR without appreciable M/R/G. No JVD or carotid bruits. Peripheral pulses equal bilaterally and palpable. No peripheral edema. GI -Abdomen is soft non distended and without significant tenderness. No masses or guarding. + BS Rectal exam deferred.  -No CVA tenderness. Suprapubic catheter present LYMPH-No palpable cervical lymphadenopathy and no hepatosplenomegaly. No petechiae or ecchymoses. MS -No gross joint deformities.   SKIN -Normal tissue abnormality. Status post left total hip arthroplasty. No acute osseous abnormality. CT HEAD WO CONTRAST    Result Date: 1/12/2022  EXAMINATION: CT OF THE HEAD WITHOUT CONTRAST; CT OF THE CERVICAL SPINE WITHOUT CONTRAST 1/12/2022 5:09 pm TECHNIQUE: CT of the head was performed without the administration of intravenous contrast. Dose modulation, iterative reconstruction, and/or weight based adjustment of the mA/kV was utilized to reduce the radiation dose to as low as reasonably achievable.; CT of the cervical spine was performed without the administration of intravenous contrast. Multiplanar reformatted images are provided for review. Dose modulation, iterative reconstruction, and/or weight based adjustment of the mA/kV was utilized to reduce the radiation dose to as low as reasonably achievable. COMPARISON: 07/06/2018 HISTORY: ORDERING SYSTEM PROVIDED HISTORY: fall TECHNOLOGIST PROVIDED HISTORY: Has a \"code stroke\" or \"stroke alert\" been called? ->No Reason for exam:->fall Decision Support Exception - unselect if not a suspected or confirmed emergency medical condition->Emergency Medical Condition (MA) Reason for Exam: trauma/ fall FINDINGS: Cervical spine: BONES/ALIGNMENT: There is no acute fracture or traumatic malalignment. DEGENERATIVE CHANGES: Multilevel disc and facet degenerative disease most pronounced at C5-C6 and C6-C7. Subchondral cystic change within the odontoid process of C2 vertebral body most likely secondary to extensive degenerative change. Calcific density anterior to the cortex of dense process of C2 is unchanged. Finding may be suggestive of heterotopic calcification or sequela to old trauma. SOFT TISSUES: There is no prevertebral soft tissue swelling. Head CT: There is no acute infarction, intracranial hemorrhage or intracranial mass lesion. No mass effect, midline shift or extra-axial collection is noted.  There are severe nonspecific foci of periventricular and subcortical cerebral white matter hypodensity, most likely representing chronic microangiopathic disease in this age group. The brain parenchyma is otherwise normal. The cerebellar tonsils are in normal position. The ventricles, sulci, and cisterns are prominent suggestive of moderate generalized volume loss. The globes and orbits are within normal limits. Mild mucosal thickening of maxillary sinuses and ethmoidal air cells. The visualized extracranial structures including paranasal sinuses and mastoid air cells are unremarkable. No fracture is identified. Cervical spine CT: No acute osseous abnormality of the cervical spine. Unchanged advanced degenerative disease within the odontoid process of C2. Unchanged extensive degenerative change in within C5-C6 and C6-C7. Head CT: No evidence for acute intracranial hemorrhage, territorial infarction or intracranial mass lesion. Severe chronic microangiopathic ischemic disease. Moderate generalized volume loss. RECOMMENDATIONS: Unavailable     CT CERVICAL SPINE WO CONTRAST    Result Date: 1/12/2022  EXAMINATION: CT OF THE HEAD WITHOUT CONTRAST; CT OF THE CERVICAL SPINE WITHOUT CONTRAST 1/12/2022 5:09 pm TECHNIQUE: CT of the head was performed without the administration of intravenous contrast. Dose modulation, iterative reconstruction, and/or weight based adjustment of the mA/kV was utilized to reduce the radiation dose to as low as reasonably achievable.; CT of the cervical spine was performed without the administration of intravenous contrast. Multiplanar reformatted images are provided for review. Dose modulation, iterative reconstruction, and/or weight based adjustment of the mA/kV was utilized to reduce the radiation dose to as low as reasonably achievable. COMPARISON: 07/06/2018 HISTORY: ORDERING SYSTEM PROVIDED HISTORY: fall TECHNOLOGIST PROVIDED HISTORY: Has a \"code stroke\" or \"stroke alert\" been called? ->No Reason for exam:->fall Decision Support Exception - unselect if not a suspected or confirmed emergency medical condition->Emergency Medical Condition (MA) Reason for Exam: trauma/ fall FINDINGS: Cervical spine: BONES/ALIGNMENT: There is no acute fracture or traumatic malalignment. DEGENERATIVE CHANGES: Multilevel disc and facet degenerative disease most pronounced at C5-C6 and C6-C7. Subchondral cystic change within the odontoid process of C2 vertebral body most likely secondary to extensive degenerative change. Calcific density anterior to the cortex of dense process of C2 is unchanged. Finding may be suggestive of heterotopic calcification or sequela to old trauma. SOFT TISSUES: There is no prevertebral soft tissue swelling. Head CT: There is no acute infarction, intracranial hemorrhage or intracranial mass lesion. No mass effect, midline shift or extra-axial collection is noted. There are severe nonspecific foci of periventricular and subcortical cerebral white matter hypodensity, most likely representing chronic microangiopathic disease in this age group. The brain parenchyma is otherwise normal. The cerebellar tonsils are in normal position. The ventricles, sulci, and cisterns are prominent suggestive of moderate generalized volume loss. The globes and orbits are within normal limits. Mild mucosal thickening of maxillary sinuses and ethmoidal air cells. The visualized extracranial structures including paranasal sinuses and mastoid air cells are unremarkable. No fracture is identified. Cervical spine CT: No acute osseous abnormality of the cervical spine. Unchanged advanced degenerative disease within the odontoid process of C2. Unchanged extensive degenerative change in within C5-C6 and C6-C7. Head CT: No evidence for acute intracranial hemorrhage, territorial infarction or intracranial mass lesion. Severe chronic microangiopathic ischemic disease. Moderate generalized volume loss.  RECOMMENDATIONS: Unavailable     XR CHEST PORTABLE    Result Date: 1/12/2022  EXAMINATION: ONE XRAY VIEW OF THE CHEST 1/12/2022 5:00 pm COMPARISON: 12/30/2021 HISTORY: ORDERING SYSTEM PROVIDED HISTORY: fall TECHNOLOGIST PROVIDED HISTORY: Reason for exam:->fall Reason for Exam: fall FINDINGS: Linear pulmonary opacities in the left mid and lower lung. There is no effusion or pneumothorax. The cardiomediastinal silhouette is stable. The osseous structures are stable. Linear pulmonary opacities in the left mid and lower lung could represent subsegmental atelectasis or infection     XR CHEST PORTABLE    Result Date: 12/30/2021  EXAMINATION: ONE XRAY VIEW OF THE CHEST 12/30/2021 11:04 am COMPARISON: 11/22/2018 HISTORY: ORDERING SYSTEM PROVIDED HISTORY: fatigue TECHNOLOGIST PROVIDED HISTORY: Reason for exam:->fatigue Reason for Exam: fatigue FINDINGS: Cardiac and mediastinal silhouettes appear stable. No focal consolidation or pleural effusion. No pneumothorax is identified. No acute osseous abnormality. Degenerative changes are seen within the shoulders and spine. Stable appearing chest without acute focal infiltrate. VL DUP LOWER EXTREMITY VENOUS BILATERAL    Result Date: 12/30/2021  EXAMINATION: DUPLEX VENOUS ULTRASOUND OF THE BILATERAL LOWER [id number]/30/2021 12:03 pm TECHNIQUE: Duplex ultrasound using B-mode/gray scaled imaging, Doppler spectral analysis and color flow Doppler was obtained of the deep venous structures of the lower bilateral extremities. COMPARISON: None. HISTORY: ORDERING SYSTEM PROVIDED HISTORY: leg pain, swelling, left more than right, hx DVT TECHNOLOGIST PROVIDED HISTORY: Reason for exam:->leg pain, swelling, left more than right, hx DVT Reason for Exam: leg pain, swelling, left more than right, hx DVT FINDINGS: The visualized veins of the bilateral lower extremities are patent and free of echogenic thrombus. The veins demonstrate good compressibility with normal color flow study and spectral analysis.      No evidence of DVT in either lower extremity.  RECOMMENDATIONS: Unavailable         Electronically signed by RAHAT Bower CNP on 1/13/2022 at 3:05 PM

## 2022-01-14 LAB
ALBUMIN SERPL-MCNC: 3.4 GM/DL (ref 3.4–5)
ALP BLD-CCNC: 46 IU/L (ref 40–128)
ALT SERPL-CCNC: 12 U/L (ref 10–40)
ANION GAP SERPL CALCULATED.3IONS-SCNC: 13 MMOL/L (ref 4–16)
AST SERPL-CCNC: 20 IU/L (ref 15–37)
BILIRUB SERPL-MCNC: 0.3 MG/DL (ref 0–1)
BUN BLDV-MCNC: 23 MG/DL (ref 6–23)
CALCIUM SERPL-MCNC: 8.7 MG/DL (ref 8.3–10.6)
CHLORIDE BLD-SCNC: 104 MMOL/L (ref 99–110)
CO2: 24 MMOL/L (ref 21–32)
CREAT SERPL-MCNC: 1.3 MG/DL (ref 0.6–1.1)
CULTURE: NORMAL
EKG ATRIAL RATE: 245 BPM
EKG DIAGNOSIS: NORMAL
EKG Q-T INTERVAL: 308 MS
EKG QRS DURATION: 98 MS
EKG QTC CALCULATION (BAZETT): 403 MS
EKG R AXIS: 85 DEGREES
EKG T AXIS: -27 DEGREES
EKG VENTRICULAR RATE: 103 BPM
GFR AFRICAN AMERICAN: 47 ML/MIN/1.73M2
GFR NON-AFRICAN AMERICAN: 39 ML/MIN/1.73M2
GLUCOSE BLD-MCNC: 134 MG/DL (ref 70–99)
HCT VFR BLD CALC: 32.9 % (ref 37–47)
HEMOGLOBIN: 10.1 GM/DL (ref 12.5–16)
Lab: NORMAL
MAGNESIUM: 2.1 MG/DL (ref 1.8–2.4)
MCH RBC QN AUTO: 30.5 PG (ref 27–31)
MCHC RBC AUTO-ENTMCNC: 30.7 % (ref 32–36)
MCV RBC AUTO: 99.4 FL (ref 78–100)
PDW BLD-RTO: 14.7 % (ref 11.7–14.9)
PLATELET # BLD: 286 K/CU MM (ref 140–440)
PMV BLD AUTO: 11.5 FL (ref 7.5–11.1)
POTASSIUM SERPL-SCNC: 4.3 MMOL/L (ref 3.5–5.1)
RBC # BLD: 3.31 M/CU MM (ref 4.2–5.4)
SODIUM BLD-SCNC: 141 MMOL/L (ref 135–145)
SPECIMEN: NORMAL
TOTAL CK: 117 IU/L (ref 26–140)
TOTAL PROTEIN: 6.4 GM/DL (ref 6.4–8.2)
TROPONIN T: <0.01 NG/ML
WBC # BLD: 6.1 K/CU MM (ref 4–10.5)

## 2022-01-14 PROCEDURE — 1200000000 HC SEMI PRIVATE

## 2022-01-14 PROCEDURE — 2580000003 HC RX 258: Performed by: STUDENT IN AN ORGANIZED HEALTH CARE EDUCATION/TRAINING PROGRAM

## 2022-01-14 PROCEDURE — G0378 HOSPITAL OBSERVATION PER HR: HCPCS

## 2022-01-14 PROCEDURE — 82550 ASSAY OF CK (CPK): CPT

## 2022-01-14 PROCEDURE — 84484 ASSAY OF TROPONIN QUANT: CPT

## 2022-01-14 PROCEDURE — 36415 COLL VENOUS BLD VENIPUNCTURE: CPT

## 2022-01-14 PROCEDURE — 80053 COMPREHEN METABOLIC PANEL: CPT

## 2022-01-14 PROCEDURE — 93010 ELECTROCARDIOGRAM REPORT: CPT | Performed by: INTERNAL MEDICINE

## 2022-01-14 PROCEDURE — 83735 ASSAY OF MAGNESIUM: CPT

## 2022-01-14 PROCEDURE — 6360000002 HC RX W HCPCS: Performed by: STUDENT IN AN ORGANIZED HEALTH CARE EDUCATION/TRAINING PROGRAM

## 2022-01-14 PROCEDURE — 85027 COMPLETE CBC AUTOMATED: CPT

## 2022-01-14 PROCEDURE — 6370000000 HC RX 637 (ALT 250 FOR IP): Performed by: STUDENT IN AN ORGANIZED HEALTH CARE EDUCATION/TRAINING PROGRAM

## 2022-01-14 PROCEDURE — 94761 N-INVAS EAR/PLS OXIMETRY MLT: CPT

## 2022-01-14 RX ADMIN — HEPARIN SODIUM 5000 UNITS: 5000 INJECTION, SOLUTION INTRAVENOUS; SUBCUTANEOUS at 13:58

## 2022-01-14 RX ADMIN — DRONEDARONE 400 MG: 400 TABLET, FILM COATED ORAL at 16:53

## 2022-01-14 RX ADMIN — CETIRIZINE HYDROCHLORIDE 5 MG: 10 TABLET, FILM COATED ORAL at 08:32

## 2022-01-14 RX ADMIN — ASPIRIN 81 MG 81 MG: 81 TABLET ORAL at 08:32

## 2022-01-14 RX ADMIN — DRONEDARONE 400 MG: 400 TABLET, FILM COATED ORAL at 08:32

## 2022-01-14 RX ADMIN — HEPARIN SODIUM 5000 UNITS: 5000 INJECTION, SOLUTION INTRAVENOUS; SUBCUTANEOUS at 20:30

## 2022-01-14 RX ADMIN — Medication 5000 UNITS: at 08:33

## 2022-01-14 RX ADMIN — CEFTRIAXONE SODIUM 1000 MG: 1 INJECTION, POWDER, FOR SOLUTION INTRAMUSCULAR; INTRAVENOUS at 20:24

## 2022-01-14 RX ADMIN — SODIUM CHLORIDE 25 ML: 9 INJECTION, SOLUTION INTRAVENOUS at 20:24

## 2022-01-14 RX ADMIN — SODIUM CHLORIDE, PRESERVATIVE FREE 10 ML: 5 INJECTION INTRAVENOUS at 08:33

## 2022-01-14 RX ADMIN — PRAVASTATIN SODIUM 10 MG: 10 TABLET ORAL at 20:30

## 2022-01-14 RX ADMIN — FENOFIBRATE 160 MG: 160 TABLET ORAL at 08:32

## 2022-01-14 NOTE — PROGRESS NOTES
Hospitalist Progress Note      Name:  Charlie Bajwa /Age/Sex: 1935  (82 y.o. female)   MRN & CSN:  [id number] & [id number] Admission Date/Time: 2022  3:34 PM   [facility]:  [facility] PCP: Karin Power, [address] Day: 3                                               Attending Physician Dr. Shanice Borja and Plan:   Charlie Bajwa is a 82 y.o.  female  who presents with Acute kidney injury superimposed on CKD ([address]    Acute kidney injury superimposed on CKD stage III  Elevated troponin- likely in the setting of above. Without chest pain   sCr improved to 1.3 and near baseline    Urine indices    Previous evaluation by Dr Jacki Simpson (2018)    Ambulatory dysfunction  Mechanical fall-found on the floor in her home estimated approx 6 hrs down   PT/OT for ECF placement   CM to assist with discharge planning [facility] as primary     Acute cystitis  Catheter associated UTI  Suprapubic catheter present on admission   Pending culture- preliminary culture Myroides species    Previous micro reviewed Proteus mirabilis (2018)   IV Rocephin will change to PO with sensitivity    Elevated CK  in the setting of immobility for approximately 6 hours but low suspicion for rhabdomyolysis   Improved 117     Chronic atrial fibrillation. [id number] Score: 5   Continue Multaq   Recent cardiology visit 2021 has Eliquis 2.5mg BID on medication regimen but not currently on home list   [facility] to [facility]    HFpEF    Appears stable/ compensated    Last TTE (10/2021) EF 50-55%, mild LVH   Will restart PO lasix pending renal function     COVID-19 infection-initial diagnosis 2021   Currently on room air    Anemia - chronic. H/H 9.8/31.4. Declining trend noted. Baseline appears 10-11   Monitor labs    This patient was seen and examined autonomously  A hospitalist attending physician was available for questions/consultation as needed.        Diet ADULT DIET; Regular; Low Fat/Low Chol/High Fiber/NADIA; No Added Salt (3-4 gm); Low Phosphorus (Less than 1000 mg); Less than 60 gm   DVT Prophylaxis [] Lovenox, [x]  Heparin, [] SCDs, [] Ambulation   GI Prophylaxis [] PPI,  [x] H2 Blocker,  [] Carafate,  [] Diet/Tube Feeds   Code Status Full Code     -Patient assessment and plan discussed with supervising physician-  Subjective 1/14/2022     Anjel Duff is a 82 y.o.  female, who presented with  Fall (pt trying to get out of bed, denies any pain)    Patient seen and examined while sitting in chair at bedside. No significant events reported. Patient is more alert and oriented today remembers falling. States she got wedged between her bed and dresser. Does not know why she did not push her life alert. Denies pain at present    Ten point ROS reviewed negative, unless as noted above    Objective: Intake/Output Summary (Last 24 hours) at 1/14/2022 1304  Last data filed at 1/13/2022 2115  Gross per 24 hour   Intake 120 ml   Output 300 ml   Net -180 ml      Vitals:   Vitals:    01/13/22 1400 01/13/22 2010 01/14/22 0753 01/14/22 0826   BP: (!) 110/56 120/60 116/61    Pulse: 83 82 80    Resp: 16 18 16 16   Temp: 97.4 °F (36.3 °C) 97.5 °F (36.4 °C) 97.7 °F (36.5 °C)    TempSrc: Axillary Oral Oral    SpO2: 92% 95% 100% 100%   Weight:       Height:         Physical Exam: 01/14/22     GEN -Awake chronically ill appearing female, sitting upright in bed , NAD. Obese body habitus. Appears given age. EYES -Pupils are equally round. No scleral erythema, discharge, or conjunctivitis. HENT -MM are moist. Oral pharynx without exudates, no evidence of thrush. NECK -Supple, no apparent thyromegaly or masses. RESP -CTA, no wheezes, rales or rhonchi. Symmetric chest movement while on room air. C/V -S1/S2 auscultated. RRR without appreciable M/R/G. No JVD or carotid bruits. Peripheral pulses equal bilaterally and palpable. No peripheral edema.    GI -Abdomen is soft non distended and without significant tenderness. No masses or guarding. + BS Rectal exam deferred.  -No CVA tenderness. Suprapubic catheter present LYMPH-No palpable cervical lymphadenopathy and no hepatosplenomegaly. No petechiae or ecchymoses. MS -No gross joint deformities. SKIN -Normal coloration, warm, dry. NEURO-Cranial nerves appear grossly intact, normal speech, no lateralizing weakness. PSYC-Awake, alert, oriented x 1. Situational awareness appropriate affect. Medications:   Medications:    cefTRIAXone (ROCEPHIN) IV  1,000 mg IntraVENous Q24H    aspirin  81 mg Oral Daily    dronedarone hcl  400 mg Oral BID WC    fenofibrate  160 mg Oral Daily    [Held by provider] furosemide  20 mg Oral Q MWF    levothyroxine  175 mcg Oral Daily    cetirizine  5 mg Oral Daily    pravastatin  10 mg Oral Nightly    vitamin D  5,000 Units Oral Daily    sodium chloride flush  5-40 mL IntraVENous 2 times per day    heparin (porcine)  5,000 Units SubCUTAneous 3 times per day      Infusions:    sodium chloride       PRN Meds: sodium chloride flush, 5-40 mL, PRN  sodium chloride, 25 mL, PRN  acetaminophen, 650 mg, Q6H PRN   Or  acetaminophen, 650 mg, Q6H PRN  metoprolol, 5 mg, Q6H PRN  polyethylene glycol, 17 g, Daily PRN  prochlorperazine, 10 mg, Q6H PRN      LABS  CBC   Recent Labs     01/12/22 1844 01/13/22 0445 01/14/22  0905   WBC 9.0 6.8 6.1   HGB 10.5* 9.8* 10.1*   HCT 32.9* 31.4* 32.9*    269 286      RENAL  Recent Labs     01/12/22 1844 01/13/22  0445 01/14/22  0905   * 139 141   K 4.5 4.2 4.3    105 104   CO2 23 24 24   PHOS  --  3.1  --    BUN 32* 29* 23   CREATININE 1.6* 1.5* 1.3*     LFT'S  Recent Labs     01/12/22 1844 01/14/22  0905   AST 23 20   ALT 14 12   BILITOT 0.6 0.3   ALKPHOS 47 46     COAG  No results for input(s): INR in the last 72 hours. CARDIAC ENZYMES  Recent Labs     01/12/22 1844 01/14/22  0905   CKTOTAL 200* 117     Radiology this visit:  Reviewed.     XR PELVIS (1-2 VIEWS)    Result Date: 1/12/2022  EXAMINATION: ONE XRAY VIEW OF THE PELVIS 1/12/2022 4:59 pm COMPARISON: None. HISTORY: ORDERING SYSTEM PROVIDED HISTORY: trauma TECHNOLOGIST PROVIDED HISTORY: Reason for exam:->trauma Reason for Exam: pain due to fall FINDINGS: There is no evidence of acute fracture. There is normal alignment. No acute joint abnormality. No focal osseous lesion. No focal soft tissue abnormality. Status post left total hip arthroplasty. No acute osseous abnormality. CT HEAD WO CONTRAST    Result Date: 1/12/2022  EXAMINATION: CT OF THE HEAD WITHOUT CONTRAST; CT OF THE CERVICAL SPINE WITHOUT CONTRAST 1/12/2022 5:09 pm TECHNIQUE: CT of the head was performed without the administration of intravenous contrast. Dose modulation, iterative reconstruction, and/or weight based adjustment of the mA/kV was utilized to reduce the radiation dose to as low as reasonably achievable.; CT of the cervical spine was performed without the administration of intravenous contrast. Multiplanar reformatted images are provided for review. Dose modulation, iterative reconstruction, and/or weight based adjustment of the mA/kV was utilized to reduce the radiation dose to as low as reasonably achievable. COMPARISON: 07/06/2018 HISTORY: ORDERING SYSTEM PROVIDED HISTORY: fall TECHNOLOGIST PROVIDED HISTORY: Has a \"code stroke\" or \"stroke alert\" been called? ->No Reason for exam:->fall Decision Support Exception - unselect if not a suspected or confirmed emergency medical condition->Emergency Medical Condition (MA) Reason for Exam: trauma/ fall FINDINGS: Cervical spine: BONES/ALIGNMENT: There is no acute fracture or traumatic malalignment. DEGENERATIVE CHANGES: Multilevel disc and facet degenerative disease most pronounced at C5-C6 and C6-C7. Subchondral cystic change within the odontoid process of C2 vertebral body most likely secondary to extensive degenerative change.   Calcific density anterior to the cortex of dense process of C2 is unchanged. Finding may be suggestive of heterotopic calcification or sequela to old trauma. SOFT TISSUES: There is no prevertebral soft tissue swelling. Head CT: There is no acute infarction, intracranial hemorrhage or intracranial mass lesion. No mass effect, midline shift or extra-axial collection is noted. There are severe nonspecific foci of periventricular and subcortical cerebral white matter hypodensity, most likely representing chronic microangiopathic disease in this age group. The brain parenchyma is otherwise normal. The cerebellar tonsils are in normal position. The ventricles, sulci, and cisterns are prominent suggestive of moderate generalized volume loss. The globes and orbits are within normal limits. Mild mucosal thickening of maxillary sinuses and ethmoidal air cells. The visualized extracranial structures including paranasal sinuses and mastoid air cells are unremarkable. No fracture is identified. Cervical spine CT: No acute osseous abnormality of the cervical spine. Unchanged advanced degenerative disease within the odontoid process of C2. Unchanged extensive degenerative change in within C5-C6 and C6-C7. Head CT: No evidence for acute intracranial hemorrhage, territorial infarction or intracranial mass lesion. Severe chronic microangiopathic ischemic disease. Moderate generalized volume loss.  RECOMMENDATIONS: Unavailable     CT CERVICAL SPINE WO CONTRAST    Result Date: 1/12/2022  EXAMINATION: CT OF THE HEAD WITHOUT CONTRAST; CT OF THE CERVICAL SPINE WITHOUT CONTRAST 1/12/2022 5:09 pm TECHNIQUE: CT of the head was performed without the administration of intravenous contrast. Dose modulation, iterative reconstruction, and/or weight based adjustment of the mA/kV was utilized to reduce the radiation dose to as low as reasonably achievable.; CT of the cervical spine was performed without the administration of intravenous contrast. Multiplanar reformatted images are provided for review. Dose modulation, iterative reconstruction, and/or weight based adjustment of the mA/kV was utilized to reduce the radiation dose to as low as reasonably achievable. COMPARISON: 07/06/2018 HISTORY: ORDERING SYSTEM PROVIDED HISTORY: fall TECHNOLOGIST PROVIDED HISTORY: Has a \"code stroke\" or \"stroke alert\" been called? ->No Reason for exam:->fall Decision Support Exception - unselect if not a suspected or confirmed emergency medical condition->Emergency Medical Condition (MA) Reason for Exam: trauma/ fall FINDINGS: Cervical spine: BONES/ALIGNMENT: There is no acute fracture or traumatic malalignment. DEGENERATIVE CHANGES: Multilevel disc and facet degenerative disease most pronounced at C5-C6 and C6-C7. Subchondral cystic change within the odontoid process of C2 vertebral body most likely secondary to extensive degenerative change. Calcific density anterior to the cortex of dense process of C2 is unchanged. Finding may be suggestive of heterotopic calcification or sequela to old trauma. SOFT TISSUES: There is no prevertebral soft tissue swelling. Head CT: There is no acute infarction, intracranial hemorrhage or intracranial mass lesion. No mass effect, midline shift or extra-axial collection is noted. There are severe nonspecific foci of periventricular and subcortical cerebral white matter hypodensity, most likely representing chronic microangiopathic disease in this age group. The brain parenchyma is otherwise normal. The cerebellar tonsils are in normal position. The ventricles, sulci, and cisterns are prominent suggestive of moderate generalized volume loss. The globes and orbits are within normal limits. Mild mucosal thickening of maxillary sinuses and ethmoidal air cells. The visualized extracranial structures including paranasal sinuses and mastoid air cells are unremarkable. No fracture is identified. Cervical spine CT: No acute osseous abnormality of the cervical spine.  Unchanged advanced degenerative disease within the odontoid process of C2. Unchanged extensive degenerative change in within C5-C6 and C6-C7. Head CT: No evidence for acute intracranial hemorrhage, territorial infarction or intracranial mass lesion. Severe chronic microangiopathic ischemic disease. Moderate generalized volume loss. RECOMMENDATIONS: Unavailable     XR CHEST PORTABLE    Result Date: 1/12/2022  EXAMINATION: ONE XRAY VIEW OF THE CHEST 1/12/2022 5:00 pm COMPARISON: 12/30/2021 HISTORY: ORDERING SYSTEM PROVIDED HISTORY: fall TECHNOLOGIST PROVIDED HISTORY: Reason for exam:->fall Reason for Exam: fall FINDINGS: Linear pulmonary opacities in the left mid and lower lung. There is no effusion or pneumothorax. The cardiomediastinal silhouette is stable. The osseous structures are stable. Linear pulmonary opacities in the left mid and lower lung could represent subsegmental atelectasis or infection     XR CHEST PORTABLE    Result Date: 12/30/2021  EXAMINATION: ONE XRAY VIEW OF THE CHEST 12/30/2021 11:04 am COMPARISON: 11/22/2018 HISTORY: ORDERING SYSTEM PROVIDED HISTORY: fatigue TECHNOLOGIST PROVIDED HISTORY: Reason for exam:->fatigue Reason for Exam: fatigue FINDINGS: Cardiac and mediastinal silhouettes appear stable. No focal consolidation or pleural effusion. No pneumothorax is identified. No acute osseous abnormality. Degenerative changes are seen within the shoulders and spine. Stable appearing chest without acute focal infiltrate. VL DUP LOWER EXTREMITY VENOUS BILATERAL    Result Date: 12/30/2021  EXAMINATION: DUPLEX VENOUS ULTRASOUND OF THE BILATERAL LOWER [id number]/30/2021 12:03 pm TECHNIQUE: Duplex ultrasound using B-mode/gray scaled imaging, Doppler spectral analysis and color flow Doppler was obtained of the deep venous structures of the lower bilateral extremities. COMPARISON: None.  HISTORY: ORDERING SYSTEM PROVIDED HISTORY: leg pain, swelling, left more than right, hx DVT TECHNOLOGIST PROVIDED HISTORY: Reason for exam:->leg pain, swelling, left more than right, hx DVT Reason for Exam: leg pain, swelling, left more than right, hx DVT FINDINGS: The visualized veins of the bilateral lower extremities are patent and free of echogenic thrombus. The veins demonstrate good compressibility with normal color flow study and spectral analysis. No evidence of DVT in either lower extremity.  RECOMMENDATIONS: Unavailable         Electronically signed by RAHAT Chapman CNP on 1/14/2022 at 1:04 PM

## 2022-01-15 LAB
ALBUMIN SERPL-MCNC: 3.3 GM/DL (ref 3.4–5)
ALP BLD-CCNC: 45 IU/L (ref 40–128)
ALT SERPL-CCNC: 12 U/L (ref 10–40)
ANION GAP SERPL CALCULATED.3IONS-SCNC: 9 MMOL/L (ref 4–16)
AST SERPL-CCNC: 19 IU/L (ref 15–37)
BILIRUB SERPL-MCNC: 0.3 MG/DL (ref 0–1)
BUN BLDV-MCNC: 16 MG/DL (ref 6–23)
CALCIUM SERPL-MCNC: 8.6 MG/DL (ref 8.3–10.6)
CHLORIDE BLD-SCNC: 102 MMOL/L (ref 99–110)
CO2: 26 MMOL/L (ref 21–32)
CREAT SERPL-MCNC: 1.3 MG/DL (ref 0.6–1.1)
GFR AFRICAN AMERICAN: 47 ML/MIN/1.73M2
GFR NON-AFRICAN AMERICAN: 39 ML/MIN/1.73M2
GLUCOSE BLD-MCNC: 101 MG/DL (ref 70–99)
HCT VFR BLD CALC: 32.9 % (ref 37–47)
HEMOGLOBIN: 10.4 GM/DL (ref 12.5–16)
MCH RBC QN AUTO: 30.4 PG (ref 27–31)
MCHC RBC AUTO-ENTMCNC: 31.6 % (ref 32–36)
MCV RBC AUTO: 96.2 FL (ref 78–100)
PDW BLD-RTO: 14.8 % (ref 11.7–14.9)
PLATELET # BLD: 310 K/CU MM (ref 140–440)
PMV BLD AUTO: 11.7 FL (ref 7.5–11.1)
POTASSIUM SERPL-SCNC: 4.8 MMOL/L (ref 3.5–5.1)
PRO-BNP: 1418 PG/ML
RBC # BLD: 3.42 M/CU MM (ref 4.2–5.4)
SODIUM BLD-SCNC: 137 MMOL/L (ref 135–145)
TOTAL PROTEIN: 6.4 GM/DL (ref 6.4–8.2)
WBC # BLD: 6.2 K/CU MM (ref 4–10.5)

## 2022-01-15 PROCEDURE — 85027 COMPLETE CBC AUTOMATED: CPT

## 2022-01-15 PROCEDURE — 83880 ASSAY OF NATRIURETIC PEPTIDE: CPT

## 2022-01-15 PROCEDURE — 80053 COMPREHEN METABOLIC PANEL: CPT

## 2022-01-15 PROCEDURE — 6360000002 HC RX W HCPCS: Performed by: STUDENT IN AN ORGANIZED HEALTH CARE EDUCATION/TRAINING PROGRAM

## 2022-01-15 PROCEDURE — 94761 N-INVAS EAR/PLS OXIMETRY MLT: CPT

## 2022-01-15 PROCEDURE — 1200000000 HC SEMI PRIVATE

## 2022-01-15 PROCEDURE — 36415 COLL VENOUS BLD VENIPUNCTURE: CPT

## 2022-01-15 PROCEDURE — 6370000000 HC RX 637 (ALT 250 FOR IP): Performed by: STUDENT IN AN ORGANIZED HEALTH CARE EDUCATION/TRAINING PROGRAM

## 2022-01-15 PROCEDURE — 2580000003 HC RX 258: Performed by: STUDENT IN AN ORGANIZED HEALTH CARE EDUCATION/TRAINING PROGRAM

## 2022-01-15 RX ADMIN — FENOFIBRATE 160 MG: 160 TABLET ORAL at 09:35

## 2022-01-15 RX ADMIN — Medication 5000 UNITS: at 09:35

## 2022-01-15 RX ADMIN — PRAVASTATIN SODIUM 10 MG: 10 TABLET ORAL at 20:37

## 2022-01-15 RX ADMIN — CETIRIZINE HYDROCHLORIDE 5 MG: 10 TABLET, FILM COATED ORAL at 09:35

## 2022-01-15 RX ADMIN — SODIUM CHLORIDE, PRESERVATIVE FREE 10 ML: 5 INJECTION INTRAVENOUS at 20:37

## 2022-01-15 RX ADMIN — ASPIRIN 81 MG 81 MG: 81 TABLET ORAL at 09:35

## 2022-01-15 RX ADMIN — CEFTRIAXONE SODIUM 1000 MG: 1 INJECTION, POWDER, FOR SOLUTION INTRAMUSCULAR; INTRAVENOUS at 18:35

## 2022-01-15 RX ADMIN — HEPARIN SODIUM 5000 UNITS: 5000 INJECTION, SOLUTION INTRAVENOUS; SUBCUTANEOUS at 05:31

## 2022-01-15 RX ADMIN — HEPARIN SODIUM 5000 UNITS: 5000 INJECTION, SOLUTION INTRAVENOUS; SUBCUTANEOUS at 20:37

## 2022-01-15 RX ADMIN — LEVOTHYROXINE SODIUM 175 MCG: 0.1 TABLET ORAL at 05:31

## 2022-01-15 ASSESSMENT — PAIN DESCRIPTION - PROGRESSION: CLINICAL_PROGRESSION: NOT CHANGED

## 2022-01-15 NOTE — PROGRESS NOTES
Hospitalist Progress Note      Name:  Ruddy Dunbar /Age/Sex: 1935  (82 y.o. female)   MRN & CSN:  [id number] & [id number] Admission Date/Time: 2022  3:34 PM   Location:  [address] [place] [address]- PCP: Melly Au, [address] Day: 4                                               Attending Physician Dr. Pepper Bolanos and Plan:   Ruddy Dunbar is a 82 y.o.  female  who presents with Acute kidney injury superimposed on CKD ([address]    Acute kidney injury superimposed on CKD stage III  Elevated troponin- likely in the setting of above. Without chest pain. Now undetectable    sCr improved to 1.3 and at baseline     Urine indices completed    Previous evaluation by Dr Mehgan Zapata (2018)    Ambulatory dysfunction  Mechanical fall-found on the floor in her home estimated approx 6 hrs down   PT/OT for ECF placement   CM to assist with discharge planning    Medically stable for discharge once accepted by facility     Acute cystitis  Catheter associated UTI  Suprapubic catheter present on admission   Pending culture- preliminary culture Myroides species likely contaminate    Previous micro reviewed Proteus mirabilis (2018)   IV Rocephin day 3 will change to PO with sensitivity    Elevated CK  in the setting of immobility for approximately 6 hours but low suspicion for rhabdomyolysis   Improved 117     Chronic atrial fibrillation. [id number] Score: 5   Continue Multaq   Recent cardiology visit 2021 has Eliquis 2.5mg BID on medication regimen but not currently on home list   Continue to hold [facility] [place] due to recurrent falls    HFpEF    Appears stable/ compensated    Last TTE (10/2021) EF 50-55%, mild LVH   Restart PO lasix      COVID-19 infection-initial diagnosis 2021   Currently on room air    Anemia - chronic. H/H 9.8/31.4. Declining trend noted. Baseline appears 10-11   Monitor labs    Diet ADULT DIET; Regular; Low Fat/Low Chol/High Fiber/NADIA; No Added Salt (3-4 gm);  Low Phosphorus (Less than 1000 mg); Less than 60 gm   DVT Prophylaxis [] Lovenox, [x]  Heparin, [] SCDs, [] Ambulation   GI Prophylaxis [] PPI,  [x] H2 Blocker,  [] Carafate,  [] Diet/Tube Feeds   Code Status Full Code     -Patient assessment and plan discussed with supervising physician-  Subjective 1/15/2022     Sagar Kaiser is a 82 y.o.  female, who presented with  Fall (pt trying to get out of bed, denies any pain)    Patient seen and examined at bedside  Denies new concerns  Seems improved today but still poor historian. States she knew she was going to fall out of bed but couldn't prevent it. Receptive to rehab if it gets her back home to stay there    Ten point ROS reviewed negative, unless as noted above    Objective: Intake/Output Summary (Last 24 hours) at 1/15/2022 1153  Last data filed at 1/15/2022 0319  Gross per 24 hour   Intake --   Output 1550 ml   Net -1550 ml      Vitals:   Vitals:    01/14/22 2013 01/14/22 2026 01/15/22 0319 01/15/22 0726   BP: 125/72  129/72 127/68   Pulse: 78 78 79 79   Resp: 16  18    Temp: 97.7 °F (36.5 °C)  97.8 °F (36.6 °C) 98.1 °F (36.7 °C)   TempSrc: Oral  Oral    SpO2: 97%  94% 96%   Weight:   219 lb 6.4 oz (99.5 kg)    Height:         Physical Exam: 01/15/22     GEN -Awake chronically ill appearing female, sitting upright in bed , NAD. Obese body habitus. Appears given age. EYES -Pupils are equally round. No scleral erythema, discharge, or conjunctivitis. HENT -MM are moist. Oral pharynx without exudates, no evidence of thrush. NECK -Supple, no apparent thyromegaly or masses. RESP -CTA, no wheezes, rales or rhonchi. Symmetric chest movement while on room air. C/V -S1/S2 auscultated. RRR without appreciable M/R/G. No JVD or carotid bruits. Peripheral pulses equal bilaterally and palpable. No peripheral edema. GI -Abdomen is soft non distended and without significant tenderness. No masses or guarding. + BS Rectal exam deferred.  -No CVA tenderness.   Suprapubic catheter present LYMPH-No palpable cervical lymphadenopathy and no hepatosplenomegaly. No petechiae or ecchymoses. MS -No gross joint deformities. SKIN -Normal coloration, warm, dry. NEURO-Cranial nerves appear grossly intact, normal speech, no lateralizing weakness. PSYC-Awake, alert, oriented x 2. Improved situational awareness appropriate affect. Medications:   Medications:    cefTRIAXone (ROCEPHIN) IV  1,000 mg IntraVENous Q24H    aspirin  81 mg Oral Daily    dronedarone hcl  400 mg Oral BID WC    fenofibrate  160 mg Oral Daily    [Held by provider] furosemide  20 mg Oral Q MWF    levothyroxine  175 mcg Oral Daily    cetirizine  5 mg Oral Daily    pravastatin  10 mg Oral Nightly    vitamin D  5,000 Units Oral Daily    sodium chloride flush  5-40 mL IntraVENous 2 times per day    heparin (porcine)  5,000 Units SubCUTAneous 3 times per day      Infusions:    sodium chloride 25 mL (01/14/22 2024)     PRN Meds: sodium chloride flush, 5-40 mL, PRN  sodium chloride, 25 mL, PRN  acetaminophen, 650 mg, Q6H PRN   Or  acetaminophen, 650 mg, Q6H PRN  metoprolol, 5 mg, Q6H PRN  polyethylene glycol, 17 g, Daily PRN  prochlorperazine, 10 mg, Q6H PRN      LABS  CBC   Recent Labs     01/13/22  0445 01/14/22  0905 01/15/22  0841   WBC 6.8 6.1 6.2   HGB 9.8* 10.1* 10.4*   HCT 31.4* 32.9* 32.9*    286 310      RENAL  Recent Labs     01/13/22  0445 01/14/22  0905 01/15/22  0841    141 137   K 4.2 4.3 4.8    104 102   CO2 24 24 26   PHOS 3.1  --   --    BUN 29* 23 16   CREATININE 1.5* 1.3* 1.3*     LFT'S  Recent Labs     01/12/22  1844 01/14/22  0905 01/15/22  0841   AST 23 20 19   ALT 14 12 12   BILITOT 0.6 0.3 0.3   ALKPHOS 47 46 45     COAG  No results for input(s): INR in the last 72 hours. CARDIAC ENZYMES  Recent Labs     01/12/22  1844 01/14/22  0905   CKTOTAL 200* 117     Radiology this visit:  Reviewed.     XR PELVIS (1-2 VIEWS)    Result Date: 1/12/2022  EXAMINATION: ONE XRAY VIEW OF THE PELVIS 1/12/2022 4:59 pm COMPARISON: None. HISTORY: ORDERING SYSTEM PROVIDED HISTORY: trauma TECHNOLOGIST PROVIDED HISTORY: Reason for exam:->trauma Reason for Exam: pain due to fall FINDINGS: There is no evidence of acute fracture. There is normal alignment. No acute joint abnormality. No focal osseous lesion. No focal soft tissue abnormality. Status post left total hip arthroplasty. No acute osseous abnormality. CT HEAD WO CONTRAST    Result Date: 1/12/2022  EXAMINATION: CT OF THE HEAD WITHOUT CONTRAST; CT OF THE CERVICAL SPINE WITHOUT CONTRAST 1/12/2022 5:09 pm TECHNIQUE: CT of the head was performed without the administration of intravenous contrast. Dose modulation, iterative reconstruction, and/or weight based adjustment of the mA/kV was utilized to reduce the radiation dose to as low as reasonably achievable.; CT of the cervical spine was performed without the administration of intravenous contrast. Multiplanar reformatted images are provided for review. Dose modulation, iterative reconstruction, and/or weight based adjustment of the mA/kV was utilized to reduce the radiation dose to as low as reasonably achievable. COMPARISON: 07/06/2018 HISTORY: ORDERING SYSTEM PROVIDED HISTORY: fall TECHNOLOGIST PROVIDED HISTORY: Has a \"code stroke\" or \"stroke alert\" been called? ->No Reason for exam:->fall Decision Support Exception - unselect if not a suspected or confirmed emergency medical condition->Emergency Medical Condition (MA) Reason for Exam: trauma/ fall FINDINGS: Cervical spine: BONES/ALIGNMENT: There is no acute fracture or traumatic malalignment. DEGENERATIVE CHANGES: Multilevel disc and facet degenerative disease most pronounced at C5-C6 and C6-C7. Subchondral cystic change within the odontoid process of C2 vertebral body most likely secondary to extensive degenerative change. Calcific density anterior to the cortex of dense process of C2 is unchanged.   Finding may be suggestive of heterotopic calcification or sequela to old trauma. SOFT TISSUES: There is no prevertebral soft tissue swelling. Head CT: There is no acute infarction, intracranial hemorrhage or intracranial mass lesion. No mass effect, midline shift or extra-axial collection is noted. There are severe nonspecific foci of periventricular and subcortical cerebral white matter hypodensity, most likely representing chronic microangiopathic disease in this age group. The brain parenchyma is otherwise normal. The cerebellar tonsils are in normal position. The ventricles, sulci, and cisterns are prominent suggestive of moderate generalized volume loss. The globes and orbits are within normal limits. Mild mucosal thickening of maxillary sinuses and ethmoidal air cells. The visualized extracranial structures including paranasal sinuses and mastoid air cells are unremarkable. No fracture is identified. Cervical spine CT: No acute osseous abnormality of the cervical spine. Unchanged advanced degenerative disease within the odontoid process of C2. Unchanged extensive degenerative change in within C5-C6 and C6-C7. Head CT: No evidence for acute intracranial hemorrhage, territorial infarction or intracranial mass lesion. Severe chronic microangiopathic ischemic disease. Moderate generalized volume loss. RECOMMENDATIONS: Unavailable     CT CERVICAL SPINE WO CONTRAST    Result Date: 1/12/2022  EXAMINATION: CT OF THE HEAD WITHOUT CONTRAST; CT OF THE CERVICAL SPINE WITHOUT CONTRAST 1/12/2022 5:09 pm TECHNIQUE: CT of the head was performed without the administration of intravenous contrast. Dose modulation, iterative reconstruction, and/or weight based adjustment of the mA/kV was utilized to reduce the radiation dose to as low as reasonably achievable.; CT of the cervical spine was performed without the administration of intravenous contrast. Multiplanar reformatted images are provided for review.  Dose modulation, iterative reconstruction, and/or weight based adjustment of the mA/kV was utilized to reduce the radiation dose to as low as reasonably achievable. COMPARISON: 07/06/2018 HISTORY: ORDERING SYSTEM PROVIDED HISTORY: fall TECHNOLOGIST PROVIDED HISTORY: Has a \"code stroke\" or \"stroke alert\" been called? ->No Reason for exam:->fall Decision Support Exception - unselect if not a suspected or confirmed emergency medical condition->Emergency Medical Condition (MA) Reason for Exam: trauma/ fall FINDINGS: Cervical spine: BONES/ALIGNMENT: There is no acute fracture or traumatic malalignment. DEGENERATIVE CHANGES: Multilevel disc and facet degenerative disease most pronounced at C5-C6 and C6-C7. Subchondral cystic change within the odontoid process of C2 vertebral body most likely secondary to extensive degenerative change. Calcific density anterior to the cortex of dense process of C2 is unchanged. Finding may be suggestive of heterotopic calcification or sequela to old trauma. SOFT TISSUES: There is no prevertebral soft tissue swelling. Head CT: There is no acute infarction, intracranial hemorrhage or intracranial mass lesion. No mass effect, midline shift or extra-axial collection is noted. There are severe nonspecific foci of periventricular and subcortical cerebral white matter hypodensity, most likely representing chronic microangiopathic disease in this age group. The brain parenchyma is otherwise normal. The cerebellar tonsils are in normal position. The ventricles, sulci, and cisterns are prominent suggestive of moderate generalized volume loss. The globes and orbits are within normal limits. Mild mucosal thickening of maxillary sinuses and ethmoidal air cells. The visualized extracranial structures including paranasal sinuses and mastoid air cells are unremarkable. No fracture is identified. Cervical spine CT: No acute osseous abnormality of the cervical spine. Unchanged advanced degenerative disease within the odontoid process of C2. Unchanged extensive degenerative change in within C5-C6 and C6-C7. Head CT: No evidence for acute intracranial hemorrhage, territorial infarction or intracranial mass lesion. Severe chronic microangiopathic ischemic disease. Moderate generalized volume loss. RECOMMENDATIONS: Unavailable     XR CHEST PORTABLE    Result Date: 1/12/2022  EXAMINATION: ONE XRAY VIEW OF THE CHEST 1/12/2022 5:00 pm COMPARISON: 12/30/2021 HISTORY: ORDERING SYSTEM PROVIDED HISTORY: fall TECHNOLOGIST PROVIDED HISTORY: Reason for exam:->fall Reason for Exam: fall FINDINGS: Linear pulmonary opacities in the left mid and lower lung. There is no effusion or pneumothorax. The cardiomediastinal silhouette is stable. The osseous structures are stable. Linear pulmonary opacities in the left mid and lower lung could represent subsegmental atelectasis or infection     XR CHEST PORTABLE    Result Date: 12/30/2021  EXAMINATION: ONE XRAY VIEW OF THE CHEST 12/30/2021 11:04 am COMPARISON: 11/22/2018 HISTORY: ORDERING SYSTEM PROVIDED HISTORY: fatigue TECHNOLOGIST PROVIDED HISTORY: Reason for exam:->fatigue Reason for Exam: fatigue FINDINGS: Cardiac and mediastinal silhouettes appear stable. No focal consolidation or pleural effusion. No pneumothorax is identified. No acute osseous abnormality. Degenerative changes are seen within the shoulders and spine. Stable appearing chest without acute focal infiltrate. VL DUP LOWER EXTREMITY VENOUS BILATERAL    Result Date: 12/30/2021  EXAMINATION: DUPLEX VENOUS ULTRASOUND OF THE BILATERAL LOWER [id number]/30/2021 12:03 pm TECHNIQUE: Duplex ultrasound using B-mode/gray scaled imaging, Doppler spectral analysis and color flow Doppler was obtained of the deep venous structures of the lower bilateral extremities. COMPARISON: None.  HISTORY: ORDERING SYSTEM PROVIDED HISTORY: leg pain, swelling, left more than right, hx DVT TECHNOLOGIST PROVIDED HISTORY: Reason for exam:->leg pain, swelling, left more than right, hx DVT Reason for Exam: leg pain, swelling, left more than right, hx DVT FINDINGS: The visualized veins of the bilateral lower extremities are patent and free of echogenic thrombus. The veins demonstrate good compressibility with normal color flow study and spectral analysis. No evidence of DVT in either lower extremity.  RECOMMENDATIONS: Unavailable         Electronically signed by RAHAT Londono CNP on 1/15/2022 at 11:53 AM

## 2022-01-16 PROCEDURE — 6360000002 HC RX W HCPCS: Performed by: STUDENT IN AN ORGANIZED HEALTH CARE EDUCATION/TRAINING PROGRAM

## 2022-01-16 PROCEDURE — 6370000000 HC RX 637 (ALT 250 FOR IP): Performed by: STUDENT IN AN ORGANIZED HEALTH CARE EDUCATION/TRAINING PROGRAM

## 2022-01-16 PROCEDURE — 94761 N-INVAS EAR/PLS OXIMETRY MLT: CPT

## 2022-01-16 PROCEDURE — 2580000003 HC RX 258: Performed by: STUDENT IN AN ORGANIZED HEALTH CARE EDUCATION/TRAINING PROGRAM

## 2022-01-16 PROCEDURE — 1200000000 HC SEMI PRIVATE

## 2022-01-16 RX ADMIN — DRONEDARONE 400 MG: 400 TABLET, FILM COATED ORAL at 08:42

## 2022-01-16 RX ADMIN — PRAVASTATIN SODIUM 10 MG: 10 TABLET ORAL at 22:21

## 2022-01-16 RX ADMIN — FENOFIBRATE 160 MG: 160 TABLET ORAL at 08:42

## 2022-01-16 RX ADMIN — LEVOTHYROXINE SODIUM 175 MCG: 0.1 TABLET ORAL at 05:55

## 2022-01-16 RX ADMIN — Medication 5000 UNITS: at 08:38

## 2022-01-16 RX ADMIN — HEPARIN SODIUM 5000 UNITS: 5000 INJECTION, SOLUTION INTRAVENOUS; SUBCUTANEOUS at 14:37

## 2022-01-16 RX ADMIN — ASPIRIN 81 MG 81 MG: 81 TABLET ORAL at 08:42

## 2022-01-16 RX ADMIN — SODIUM CHLORIDE, PRESERVATIVE FREE 10 ML: 5 INJECTION INTRAVENOUS at 08:22

## 2022-01-16 RX ADMIN — CETIRIZINE HYDROCHLORIDE 5 MG: 10 TABLET, FILM COATED ORAL at 08:33

## 2022-01-16 RX ADMIN — HEPARIN SODIUM 5000 UNITS: 5000 INJECTION, SOLUTION INTRAVENOUS; SUBCUTANEOUS at 05:55

## 2022-01-16 NOTE — PROGRESS NOTES
Hospitalist Progress Note      Name:  Irais Roy /Age/Sex: 1935  (82 y.o. female)   MRN & CSN:  [id number] & [id number] Admission Date/Time: 2022  3:34 PM   Location:  [address] [place] [address] PCP: Emory Smith MD         Hospital Day: 5                                                  Assessment and Plan:   Irais Roy is a 82 y.o.  female  who presents with Acute kidney injury superimposed on CKD ([address]    Acute kidney injury superimposed on CKD stage III  Elevated troponin- likely in the setting of above. Without chest pain. Now undetectable    sCr improved to 1.3 and at baseline     Urine indices completed    Previous evaluation by Dr Donald Ward (2018)    Ambulatory dysfunction  Mechanical fall-found on the floor in her home estimated approx 6 hrs down   PT/OT for ECF placement   CM to assist with discharge planning    Medically stable for discharge once accepted by facility     Acute cystitis  Catheter associated UTI  Suprapubic catheter present on admission   Pending culture- preliminary culture Myroides species likely contaminate    Previous micro reviewed Proteus mirabilis (2018)   IV Rocephin  -UCx: Mixed yazmin.,  Consider if need to continue IV antibiotics or can stop. Elevated CK  in the setting of immobility for approximately 6 hours but low suspicion for rhabdomyolysis   Improved 117     Chronic atrial fibrillation. [id number] Score: 5   Continue Multaq   Recent cardiology visit 2021 has Eliquis 2.5mg BID on medication regimen but not currently on home list   Continue to hold [facility] due to recurrent falls    HFpEF    Appears stable/ compensated    Last TTE (10/2021) EF 50-55%, mild LVH   Restart PO lasix      COVID-19 infection-initial diagnosis 2021   Currently on room air    Anemia - chronic. H/H 9.8/31.4. Declining trend noted. Baseline appears 10-11   Monitor labs    Reason for continued admission: Awaiting SNF placement. Diet ADULT DIET; Regular;  Low Fat/Low Chol/High Fiber/NADIA; No Added Salt (3-4 gm); Low Phosphorus (Less than 1000 mg); Less than 60 gm   DVT Prophylaxis [] Lovenox, [x]  Heparin, [] SCDs, [] Ambulation   GI Prophylaxis [] PPI,  [x] H2 Blocker,  [] Carafate,  [] Diet/Tube Feeds   Code Status Full Code       Subjective 1/16/2022     Charlie Bajwa is a 82 y.o.  female, who presented with  Fall (pt trying to get out of bed, denies any pain)    Patient states she is doing ok. She denies any dysuria or abdominal pain. Ten point ROS reviewed negative, unless as noted above    Objective: Intake/Output Summary (Last 24 hours) at 1/16/2022 1427  Last data filed at 1/16/2022 8288  Gross per 24 hour   Intake 10 ml   Output 2400 ml   Net -2390 ml      Vitals:   Vitals:    01/15/22 1947 01/16/22 0235 01/16/22 0600 01/16/22 0900   BP: 137/70 (!) 150/92  122/73   Pulse: 80 79  83   Resp: 20 18  18   Temp: 97.8 °F (36.6 °C) 98.2 °F (36.8 °C)  98 °F (36.7 °C)   TempSrc: Oral Oral     SpO2: 97% 95%  97%   Weight:   220 lb 12.8 oz (100.2 kg)    Height:         Physical Exam: 01/16/22     GEN -no acute distress  EYES -no discharge  HENT -NCAT  RESP -CTA, no wheezes, rales or rhonchi. Symmetric chest movement while on room air. C/V -S1/S2 auscultated. RRR   GI -soft nt nd   -No CVA tenderness. Suprapubic catheter present   MS -No gross joint deformities. SKIN -Normal coloration, warm, dry. NEURO-Cranial nerves appear grossly intact, normal speech, no lateralizing weakness. PSYC-Awake, alert, oriented x 2. Improved situational awareness appropriate affect.     Medications:   Medications:    cefTRIAXone (ROCEPHIN) IV  1,000 mg IntraVENous Q24H    aspirin  81 mg Oral Daily    dronedarone hcl  400 mg Oral BID WC    fenofibrate  160 mg Oral Daily    furosemide  20 mg Oral Q MWF    levothyroxine  175 mcg Oral Daily    cetirizine  5 mg Oral Daily    pravastatin  10 mg Oral Nightly    vitamin D  5,000 Units Oral Daily    sodium chloride flush 5-40 mL IntraVENous 2 times per day    heparin (porcine)  5,000 Units SubCUTAneous 3 times per day      Infusions:    sodium chloride 25 mL (01/14/22 2024)     PRN Meds: sodium chloride flush, 5-40 mL, PRN  sodium chloride, 25 mL, PRN  acetaminophen, 650 mg, Q6H PRN   Or  acetaminophen, 650 mg, Q6H PRN  metoprolol, 5 mg, Q6H PRN  polyethylene glycol, 17 g, Daily PRN  prochlorperazine, 10 mg, Q6H PRN      LABS  CBC   Recent Labs     01/14/22  0905 01/15/22  0841   WBC 6.1 6.2   HGB 10.1* 10.4*   HCT 32.9* 32.9*    310      RENAL  Recent Labs     01/14/22  0905 01/15/22  0841    137   K 4.3 4.8    102   CO2 24 26   BUN 23 16   CREATININE 1.3* 1.3*     LFT'S  Recent Labs     01/14/22  0905 01/15/22  0841   AST 20 19   ALT 12 12   BILITOT 0.3 0.3   ALKPHOS 46 45     COAG  No results for input(s): INR in the last 72 hours.   CARDIAC ENZYMES  Recent Labs     01/14/22 0905   CKTOTAL 117         Electronically signed by Reed Licona MD on 1/16/2022 at 2:27 PM

## 2022-01-17 LAB
CULTURE: NORMAL
CULTURE: NORMAL
Lab: NORMAL
Lab: NORMAL
SPECIMEN: NORMAL
SPECIMEN: NORMAL

## 2022-01-17 PROCEDURE — 97530 THERAPEUTIC ACTIVITIES: CPT

## 2022-01-17 PROCEDURE — 2580000003 HC RX 258: Performed by: STUDENT IN AN ORGANIZED HEALTH CARE EDUCATION/TRAINING PROGRAM

## 2022-01-17 PROCEDURE — 6370000000 HC RX 637 (ALT 250 FOR IP): Performed by: STUDENT IN AN ORGANIZED HEALTH CARE EDUCATION/TRAINING PROGRAM

## 2022-01-17 PROCEDURE — 6360000002 HC RX W HCPCS: Performed by: STUDENT IN AN ORGANIZED HEALTH CARE EDUCATION/TRAINING PROGRAM

## 2022-01-17 PROCEDURE — 1200000000 HC SEMI PRIVATE

## 2022-01-17 PROCEDURE — 97116 GAIT TRAINING THERAPY: CPT

## 2022-01-17 PROCEDURE — 76937 US GUIDE VASCULAR ACCESS: CPT

## 2022-01-17 PROCEDURE — 94761 N-INVAS EAR/PLS OXIMETRY MLT: CPT

## 2022-01-17 RX ADMIN — PRAVASTATIN SODIUM 10 MG: 10 TABLET ORAL at 20:30

## 2022-01-17 RX ADMIN — FUROSEMIDE 20 MG: 20 TABLET ORAL at 10:06

## 2022-01-17 RX ADMIN — HEPARIN SODIUM 5000 UNITS: 5000 INJECTION, SOLUTION INTRAVENOUS; SUBCUTANEOUS at 20:30

## 2022-01-17 RX ADMIN — DRONEDARONE 400 MG: 400 TABLET, FILM COATED ORAL at 17:58

## 2022-01-17 RX ADMIN — CEFTRIAXONE SODIUM 1000 MG: 1 INJECTION, POWDER, FOR SOLUTION INTRAMUSCULAR; INTRAVENOUS at 18:00

## 2022-01-17 RX ADMIN — LEVOTHYROXINE SODIUM 175 MCG: 0.1 TABLET ORAL at 10:05

## 2022-01-17 RX ADMIN — FENOFIBRATE 160 MG: 160 TABLET ORAL at 10:06

## 2022-01-17 RX ADMIN — SODIUM CHLORIDE, PRESERVATIVE FREE 10 ML: 5 INJECTION INTRAVENOUS at 10:06

## 2022-01-17 RX ADMIN — SODIUM CHLORIDE, PRESERVATIVE FREE 10 ML: 5 INJECTION INTRAVENOUS at 20:31

## 2022-01-17 RX ADMIN — ASPIRIN 81 MG 81 MG: 81 TABLET ORAL at 10:06

## 2022-01-17 RX ADMIN — HEPARIN SODIUM 5000 UNITS: 5000 INJECTION, SOLUTION INTRAVENOUS; SUBCUTANEOUS at 14:54

## 2022-01-17 RX ADMIN — Medication 5000 UNITS: at 10:06

## 2022-01-17 RX ADMIN — CETIRIZINE HYDROCHLORIDE 5 MG: 10 TABLET, FILM COATED ORAL at 10:05

## 2022-01-17 ASSESSMENT — PAIN DESCRIPTION - LOCATION
LOCATION: GENERALIZED

## 2022-01-17 ASSESSMENT — PAIN SCALES - GENERAL
PAINLEVEL_OUTOF10: 2
PAINLEVEL_OUTOF10: 0
PAINLEVEL_OUTOF10: 0

## 2022-01-17 ASSESSMENT — PAIN DESCRIPTION - FREQUENCY
FREQUENCY: CONTINUOUS

## 2022-01-17 ASSESSMENT — PAIN DESCRIPTION - PAIN TYPE
TYPE: CHRONIC PAIN

## 2022-01-17 ASSESSMENT — PAIN DESCRIPTION - DESCRIPTORS
DESCRIPTORS: ACHING

## 2022-01-17 ASSESSMENT — PAIN DESCRIPTION - ONSET
ONSET: ON-GOING

## 2022-01-17 NOTE — CARE COORDINATION
Attempted to speak with pt about SNU pick but sleeping soundly. 1400 Received call from pt's son Lisseth Murillo, discussed SNU options with Lisseth Murillo and pt. Pt open to [facility], and Lisseth Murillo would like [facility] as back up plan. VM left for [facility] admissions for [facility].

## 2022-01-17 NOTE — CONSULTS
Consult completed. PIV initiated and dressing applied. Pt tolerated well. Mona Raines, primary RN notified.

## 2022-01-17 NOTE — PROGRESS NOTES
Physical Therapy    Physical Therapy Treatment Note  Name: Bree Cedillo MRN: [id number] :   1935   Date:  2022   Admission Date: 2022 Room:  [address] [place] [address]   Restrictions/Precautions:  General precautions, fall risk  Communication with other providers:  NADER  Subjective:  Patient states:  Pt agreeable to therapy. Pain:   Location, Type, Intensity (0/10 to 10/10): Not reported  Objective:    Observation:  Pt supine in bed upon arrival.   Treatment, including education/measures:  Supine-to-sit: Pt required modA for scooting and trunk support, and elevated HOB. Increased time and effort to complete. Sit-to-stand: Pt performed transfer 1x from EOB with Supa and use of RW. Pt performed additional transfer 1x from recliner chair and required modA and use of RW. Increased time and effort to complete. Verbal cues for hand placement and sequencing. Stand-to-sit: Pt performed transfer 2x to recliner chair. Pt required Supa and use of RW. Verbal cues for hand placement and sequencing. Gait: Pt ambulated 5' + 10' Supa and use of RW. Pt presented with forward flex posture and decreased step length. Pt required ~5 min rest break during ambulation. Sitting balance: Pt sat EOB x ~3 min with SBA and B UE to support. Pt educated on POC, RW management, role of PT. Assessment / Impression:    Patient's tolerance of treatment:  Fair/good   Adverse Reaction: n/a  Significant change in status and impact:  n/a  Barriers to improvement:  Impaired functional endurance, strength, balance    Plan for Next Session:    Continue working towards goals. Activity tolerance, strength, balance, transfers, ambulation, and bed mobility.   Time in:  1325  Time out:  1349  Timed treatment minutes:  24  Total treatment time:  24    Previously filed items:  Social/Functional History  Lives With: Alone (Grandson is over [place] lot\")  Type of Home: House  Home Layout: One level  Home Access: Level entry  Bathroom Shower/Tub: Walk-in shower  Bathroom Toilet: Handicap height  Bathroom Equipment: Shower chair,Grab bars in shower  Bathroom Accessibility: Walker accessible  Home Equipment: 4 wheeled walker,Wheelchair-manual  ADL Assistance: Independent  Homemaking Responsibilities: No (Grandson does household chores)  Ambulation Assistance: Independent (4ww)  Transfer Assistance: Independent  Active : No  Patient's  Info: son  Leisure & Hobbies: sleep  Additional Comments: no other hx of falls  Short term goals  Time Frame for Short term goals: 1 week  Short term goal 1: Pt will ambulate 27' with Supa and use of RW  Short term goal 2: Pt will perform bed mobility Supa with use of bed rail  Short term goal 3: Pt will perform STS transfer Supa with use of RW       Electronically signed by:    Beto Hernandez [id number]  1/17/2022, 2:24 PM

## 2022-01-17 NOTE — PROGRESS NOTES
Patient up to chair watching tv. Requesting hair be combed and cleaned. This nurse did so. Latanya emptied. Paged  for micotin powder for breast folds.

## 2022-01-18 VITALS
SYSTOLIC BLOOD PRESSURE: 139 MMHG | BODY MASS INDEX: 36.82 KG/M2 | HEART RATE: 62 BPM | HEIGHT: 64 IN | TEMPERATURE: 97.5 F | RESPIRATION RATE: 18 BRPM | WEIGHT: 215.7 LBS | OXYGEN SATURATION: 95 % | DIASTOLIC BLOOD PRESSURE: 74 MMHG

## 2022-01-18 PROCEDURE — 2580000003 HC RX 258: Performed by: STUDENT IN AN ORGANIZED HEALTH CARE EDUCATION/TRAINING PROGRAM

## 2022-01-18 PROCEDURE — 6360000002 HC RX W HCPCS: Performed by: STUDENT IN AN ORGANIZED HEALTH CARE EDUCATION/TRAINING PROGRAM

## 2022-01-18 PROCEDURE — 94761 N-INVAS EAR/PLS OXIMETRY MLT: CPT

## 2022-01-18 PROCEDURE — 6370000000 HC RX 637 (ALT 250 FOR IP): Performed by: STUDENT IN AN ORGANIZED HEALTH CARE EDUCATION/TRAINING PROGRAM

## 2022-01-18 RX ORDER — LOSARTAN POTASSIUM 100 MG/1
100 TABLET ORAL DAILY
Qty: 30 TABLET | Refills: 3
Start: 2022-01-21 | End: 2022-08-30

## 2022-01-18 RX ADMIN — HEPARIN SODIUM 5000 UNITS: 5000 INJECTION, SOLUTION INTRAVENOUS; SUBCUTANEOUS at 13:43

## 2022-01-18 RX ADMIN — Medication 5000 UNITS: at 08:51

## 2022-01-18 RX ADMIN — CETIRIZINE HYDROCHLORIDE 5 MG: 10 TABLET, FILM COATED ORAL at 08:51

## 2022-01-18 RX ADMIN — DRONEDARONE 400 MG: 400 TABLET, FILM COATED ORAL at 16:41

## 2022-01-18 RX ADMIN — HEPARIN SODIUM 5000 UNITS: 5000 INJECTION, SOLUTION INTRAVENOUS; SUBCUTANEOUS at 06:20

## 2022-01-18 RX ADMIN — FENOFIBRATE 160 MG: 160 TABLET ORAL at 08:51

## 2022-01-18 RX ADMIN — SODIUM CHLORIDE, PRESERVATIVE FREE 10 ML: 5 INJECTION INTRAVENOUS at 08:52

## 2022-01-18 RX ADMIN — ASPIRIN 81 MG 81 MG: 81 TABLET ORAL at 08:51

## 2022-01-18 RX ADMIN — DRONEDARONE 400 MG: 400 TABLET, FILM COATED ORAL at 08:51

## 2022-01-18 RX ADMIN — LEVOTHYROXINE SODIUM 175 MCG: 0.1 TABLET ORAL at 06:20

## 2022-01-18 ASSESSMENT — PAIN SCALES - GENERAL
PAINLEVEL_OUTOF10: 0
PAINLEVEL_OUTOF10: 0

## 2022-01-18 NOTE — PROGRESS NOTES
Occupational Therapy Treatment Note      Name: Candelario Landau MRN: [id number] :   1935   Date:  2022   Admission Date: 2022 Room:  [address] [place] [address]-A     Attempted visit this afternoon. Pt is being d/c from [place] [address] and deny therapy due to being d/c.       Electronically signed by:    MARLA Ahn,   2022, 4:44 PM

## 2022-01-18 NOTE — PLAN OF CARE
Problem: Falls - Risk of:  Goal: Will remain free from falls  Description: Will remain free from falls  1/18/2022 1020 by Juli Paez RN  Outcome: Ongoing  1/18/2022 0051 by Isabel Shrestha RN  Outcome: Ongoing  Goal: Absence of physical injury  Description: Absence of physical injury  1/18/2022 1020 by Juli Paez RN  Outcome: Ongoing  1/18/2022 0051 by Isabel Shrestha RN  Outcome: Ongoing     Problem: Pain:  Goal: Pain level will decrease  Description: Pain level will decrease  1/18/2022 1020 by Juli Paez RN  Outcome: Ongoing  1/18/2022 0051 by Isabel Shrestha RN  Outcome: Ongoing  Goal: Control of acute pain  Description: Control of acute pain  1/18/2022 1020 by Juli Paez RN  Outcome: Ongoing  1/18/2022 0051 by Isabel Shrestha RN  Outcome: Ongoing  Goal: Control of chronic pain  Description: Control of chronic pain  1/18/2022 1020 by Juli Paez RN  Outcome: Ongoing  1/18/2022 0051 by Isabel Shrestha RN  Outcome: Ongoing     Problem: Skin Integrity:  Goal: Will show no infection signs and symptoms  Description: Will show no infection signs and symptoms  1/18/2022 1020 by Juli Paez RN  Outcome: Ongoing  1/18/2022 0051 by Isabel Shrestha RN  Outcome: Ongoing  Goal: Absence of new skin breakdown  Description: Absence of new skin breakdown  1/18/2022 1020 by Juli Paez RN  Outcome: Ongoing  1/18/2022 0051 by Isabel Shrestha RN  Outcome: Ongoing

## 2022-01-18 NOTE — DISCHARGE SUMMARY
Discharge Summary  Kaylin Ren MD     Name:  Adenike Agustin /Age/Sex: 1935  (82 y.o. female)   MRN & CSN:  [id number] & [id number] Admission Date/Time: 2022  3:34 PM   Attending:  Kaylin Ren MD Discharging Physician: Kaylin Ren MD     Hospital Course:        82 y.o.  female  who presents with Acute kidney injury superimposed on CKD ([address]     1. Acute kidney injury superimposed on CKD stage IIIb. Now resovled, back to baseline. 2. Elevated troponin- likely in the setting of above. Without chest pain. Now undetectable. No further workup planned at this time                  3. Ambulatory dysfunction/Mechanical fall-found on the floor in her home estimated approx 6 hrs down. PT/OT for ECF placement    4. Catheter associated UTI  Ruled out, Cx remained negative. Continue suprapubic catheter care as you were doing prior to this admission      5. Mildly elevated CK  in the setting of immobility for approximately 6 hours. Ruled out rhabdomyolysis. Ck 200-->117     6. Chronic atrial fibrillation. [id number] Score: 5. Continue Multaq. Recent cardiology visit 2021 has Eliquis 2.5mg BID on medication regimen but not currently on home list. On  patient is recommended to start aspirin for stroke prophylaxis. 7. HFpEF Appears stable/ compensated      8. COVID-19 infection-initial diagnosis 2021. Currently on room air     9. Anemia - chronic. The patient expressed appropriate understanding of and agreement with the discharge recommendations, medications, and plan.      Consults this admission:  IP CONSULT TO CASE MANAGEMENT  IP CONSULT TO HOSPITALIST  IP CONSULT TO IV TEAM    Discharge Instruction:     Follow up appointments     Brandie Gabriel MD  [facility]  [phone]    Schedule an appointment as soon as possible for a visit in 1 week        Primary care physician:  within 2 weeks    Diet: Low sodium diet    Activity: activity as tolerated    Disposition: Discharged to:   []Home[facility], [x]SNF, []Acute Rehab, []Hospice     Condition on discharge: Stable    Discharge Medications:        Medication List      CHANGE how you take these medications    losartan 100 MG tablet  Commonly known as: COZAAR  Take 1 tablet by mouth daily  Start taking on: January 21, 2022  What changed: These instructions start on January 21, 2022. If you are unsure what to do until then, ask your doctor or other care provider. CONTINUE taking these medications    acetaminophen 325 MG tablet  Commonly known as: TYLENOL     aspirin 81 MG chewable tablet  Take 1 tablet by mouth daily     dronedarone hcl 400 MG Tabs  Commonly known as: Multaq  TAKE 1 TABLET TWICE A DAY WITH MEALS     fenofibrate micronized 67 MG capsule  Commonly known as: LOFIBRA  Take 1 capsule by mouth every morning (before breakfast)     furosemide 20 MG tablet  Commonly known as: LASIX  Take 1 pill on Mon, Weds, Fri     levothyroxine 175 MCG tablet  Commonly known as: SYNTHROID  Take 1 tablet by mouth daily     loperamide 2 MG capsule  Commonly known as: IMODIUM     loratadine 10 MG tablet  Commonly known as: CLARITIN     potassium chloride 10 MEQ extended release capsule  Commonly known as: MICRO-K  Take 1 capsule by mouth daily     pravastatin 10 MG tablet  Commonly known as: PRAVACHOL  TAKE 1 TABLET AT BEDTIME     vitamin D 25 MCG (1000 UT) Caps  Take 5 capsules by mouth daily           Where to Get Your Medications      Information about where to get these medications is not yet available    Ask your nurse or doctor about these medications  · losartan 100 MG tablet          Objective Findings at Discharge:     /67   Pulse 62   Temp 97.3 °F (36.3 °C) (Axillary)   Resp 18   Ht 5' 4\" (1.626 m)   Wt 215 lb 11.2 oz (97.8 kg)   SpO2 95%   BMI 37.02 kg/m²            PHYSICAL EXAM     GEN:  Awake female, sitting upright in bed in no apparent distress.   RESP:  CTAB, no wheezes, rales or rhonchi. CVS:  RRR. No peripheral edema. GI/:  S/NT/ND BS+   NEURO: No focal defecits. PSYCH:  Awake, alert, oriented x 3. Affect appropriate. LABS: Reviewd    IMAGING: Reviwed    32 Minutes spent in preparation of discharging this patient including face to face encounter, discussions with the patient/family, medication reconciliation, and transition of care preparation.      Electronically signed by Marifer Gautam MD on 1/18/2022 at 12:19 PM

## 2022-01-18 NOTE — PLAN OF CARE
Problem: Falls - Risk of:  Goal: Will remain free from falls  Description: Will remain free from falls  1/18/2022 1220 by Juli Paez RN  Outcome: Completed  1/18/2022 1020 by Juli Paez RN  Outcome: Ongoing  1/18/2022 0051 by Isabel Shrestha RN  Outcome: Ongoing  Goal: Absence of physical injury  Description: Absence of physical injury  1/18/2022 1220 by Juli Paez RN  Outcome: Completed  1/18/2022 1020 by Juli Paez RN  Outcome: Ongoing  1/18/2022 0051 by Isabel Shrestha RN  Outcome: Ongoing     Problem: Pain:  Goal: Pain level will decrease  Description: Pain level will decrease  1/18/2022 1220 by Juli Paez RN  Outcome: Completed  1/18/2022 1020 by Juli Paez RN  Outcome: Ongoing  1/18/2022 0051 by Isabel Shrestha RN  Outcome: Ongoing  Goal: Control of acute pain  Description: Control of acute pain  1/18/2022 1220 by Juli Paez RN  Outcome: Completed  1/18/2022 1020 by Juli Paez RN  Outcome: Ongoing  1/18/2022 0051 by Isabel Shrestha RN  Outcome: Ongoing  Goal: Control of chronic pain  Description: Control of chronic pain  1/18/2022 1220 by Juli Paez RN  Outcome: Completed  1/18/2022 1020 by Juli Paez RN  Outcome: Ongoing  1/18/2022 0051 by Isabel Shrestha RN  Outcome: Ongoing     Problem: Skin Integrity:  Goal: Will show no infection signs and symptoms  Description: Will show no infection signs and symptoms  1/18/2022 1220 by Juli Paez RN  Outcome: Completed  1/18/2022 1020 by Juli Paez RN  Outcome: Ongoing  1/18/2022 0051 by Isabel Shrestha RN  Outcome: Ongoing  Goal: Absence of new skin breakdown  Description: Absence of new skin breakdown  1/18/2022 1220 by Juli Paez RN  Outcome: Completed  1/18/2022 1020 by Juli Paez RN  Outcome: Ongoing  1/18/2022 0051 by Isabel Shrestha RN  Outcome: Ongoing

## 2022-01-18 NOTE — PROGRESS NOTES
Hospitalist Progress Note      Name:  Dana Morley /Age/Sex: 1935  (82 y.o. female)   MRN & CSN:  [id number] & [id number] Admission Date/Time: 2022  3:34 PM   Location:  [address] [place] [address] PCP: Ani Wilkins MD         Hospital Day: 6                                                  Assessment and Plan:   Dana Morley is a 82 y.o.  female  who presents with Acute kidney injury superimposed on CKD ([address]    Acute kidney injury superimposed on CKD stage III  Elevated troponin- likely in the setting of above. Without chest pain. Now undetectable    sCr improved to 1.3 and at baseline     Urine indices completed    Previous evaluation by Dr Manoj Brambila (2018)    Ambulatory dysfunction  Mechanical fall-found on the floor in her home estimated approx 6 hrs down   PT/OT for ECF placement   CM to assist with discharge planning    Medically stable for discharge once accepted by facility     Acute cystitis  Catheter associated UTI  Suprapubic catheter present on admission   Pending culture- preliminary culture Myroides species likely contaminate    Previous micro reviewed Proteus mirabilis (2018)   IV Rocephin  -UCx: Mixed yazmin.  -Stop antibiotics. Elevated CK  in the setting of immobility for approximately 6 hours but low suspicion for rhabdomyolysis   Improved 117     Chronic atrial fibrillation. [id number]-ZVCo Score: 5   Continue Multaq   Recent cardiology visit 2021 has Eliquis 2.5mg BID on medication regimen but not currently on home list   [facility] due to recurrent falls    HFpEF    Appears stable/ compensated    Last TTE (10/2021) EF 50-55%, mild LVH   Restarted PO lasix      COVID-19 infection-initial diagnosis 2021   Currently on room air    Anemia - chronic. Declining trend noted. Baseline appears 10-   Monitor labs    Reason for continued admission: Awaiting SNF placement. Diet ADULT DIET; Regular; Low Fat/Low Chol/High Fiber/NADIA; No Added Salt (3-4 gm);  Low Phosphorus (Less than 1000 mg); Less than 60 gm   DVT Prophylaxis [] Lovenox, [x]  Heparin, [] SCDs, [] Ambulation   GI Prophylaxis [] PPI,  [x] H2 Blocker,  [] Carafate,  [] Diet/Tube Feeds   Code Status Full Code       Subjective 1/17/2022     Sagar Kaiser is a 82 y.o.  female, who presented with  Fall (pt trying to get out of bed, denies any pain)    No complaints, making urine, no abdominal pain    Ten point ROS reviewed negative, unless as noted above    Objective: Intake/Output Summary (Last 24 hours) at 1/17/2022 1930  Last data filed at 1/17/2022 1804  Gross per 24 hour   Intake 480 ml   Output 1675 ml   Net -1195 ml      Vitals:   Vitals:    01/17/22 0202 01/17/22 0809 01/17/22 0819 01/17/22 1434   BP: 133/71  139/66 (!) 141/84   Pulse: 83  82 66   Resp: 16 16 16 19   Temp: 98.5 °F (36.9 °C)  97.3 °F (36.3 °C) 97.7 °F (36.5 °C)   TempSrc: Oral  Oral    SpO2: 92% 95% 100% 94%   Weight:       Height:         Physical Exam: 01/17/22     GEN -no acute distress, laying in bed  EYES -no discharge  HENT -NCAT  RESP -CTA, no wheezes, rales or rhonchi. Symmetric chest movement while on room air. C/V -S1/S2 auscultated. RRR   GI -soft nt nd   -No CVA tenderness. Suprapubic catheter present   MS -No gross joint deformities. SKIN -Normal coloration, warm, dry. NEURO-Cranial nerves appear grossly intact, normal speech, no lateralizing weakness. PSYC-Awake, alert, oriented x 2. Improved situational awareness appropriate affect.     Medications:   Medications:    cefTRIAXone (ROCEPHIN) IV  1,000 mg IntraVENous Q24H    aspirin  81 mg Oral Daily    dronedarone hcl  400 mg Oral BID WC    fenofibrate  160 mg Oral Daily    furosemide  20 mg Oral Q MWF    levothyroxine  175 mcg Oral Daily    cetirizine  5 mg Oral Daily    pravastatin  10 mg Oral Nightly    vitamin D  5,000 Units Oral Daily    sodium chloride flush  5-40 mL IntraVENous 2 times per day    heparin (porcine)  5,000 Units SubCUTAneous 3 times per day      Infusions:    sodium chloride 25 mL (01/14/22 2024)     PRN Meds: sodium chloride flush, 5-40 mL, PRN  sodium chloride, 25 mL, PRN  acetaminophen, 650 mg, Q6H PRN   Or  acetaminophen, 650 mg, Q6H PRN  metoprolol, 5 mg, Q6H PRN  polyethylene glycol, 17 g, Daily PRN  prochlorperazine, 10 mg, Q6H PRN      LABS  CBC   Recent Labs     01/15/22  0841   WBC 6.2   HGB 10.4*   HCT 32.9*         RENAL  Recent Labs     01/15/22  0841      K 4.8      CO2 26   BUN 16   CREATININE 1.3*     LFT'S  Recent Labs     01/15/22  0841   AST 19   ALT 12   BILITOT 0.3   ALKPHOS 45     COAG  No results for input(s): INR in the last 72 hours. CARDIAC ENZYMES  No results for input(s): CKTOTAL, CKMB, CKMBINDEX, TROPONINI in the last 72 hours.       Electronically signed by Earlene Gowers, MD on 1/17/2022 at 7:30 PM

## 2022-01-18 NOTE — PLAN OF CARE
Problem: Falls - Risk of:  Goal: Will remain free from falls  Description: Will remain free from falls  1/18/2022 0051 by Lauren Reynaga RN  Outcome: Ongoing  1/17/2022 1558 by Renée Driscoll RN  Outcome: Ongoing  Goal: Absence of physical injury  Description: Absence of physical injury  1/18/2022 0051 by Lauren Reynaga RN  Outcome: Ongoing  1/17/2022 1558 by Renée Driscoll RN  Outcome: Ongoing     Problem: Pain:  Goal: Pain level will decrease  Description: Pain level will decrease  1/18/2022 0051 by Lauren Reynaga RN  Outcome: Ongoing  1/17/2022 1558 by Renée Driscoll RN  Outcome: Ongoing  Goal: Control of acute pain  Description: Control of acute pain  1/18/2022 0051 by Lauren Reynaga RN  Outcome: Ongoing  1/17/2022 1558 by Renée Driscoll RN  Outcome: Ongoing  Goal: Control of chronic pain  Description: Control of chronic pain  1/18/2022 0051 by Lauren Reynaga RN  Outcome: Ongoing  1/17/2022 1558 by Renée Driscoll RN  Outcome: Ongoing     Problem: Skin Integrity:  Goal: Will show no infection signs and symptoms  Description: Will show no infection signs and symptoms  1/18/2022 0051 by Lauren Reynaga RN  Outcome: Ongoing  1/17/2022 1558 by Renée Driscoll RN  Outcome: Ongoing  Goal: Absence of new skin breakdown  Description: Absence of new skin breakdown  1/18/2022 0051 by Lauren Reynaga RN  Outcome: Ongoing  1/17/2022 1558 by Renée Driscoll RN  Outcome: Ongoing

## 2022-01-18 NOTE — DISCHARGE INSTR - COC
Continuity of Care Form    Patient Name: Bharti Alexander   :  1935  MRN:  [id number]    Admit date:  2022  Discharge date:  ***    Code Status Order: Full Code   Advance Directives:      Admitting Physician:  Elijah Krueger DO  PCP: Pranay Hong MD    Discharging Nurse: [facility] Unit/Room#: 4104/4104-A  Discharging Unit Phone Number: ***    Emergency Contact:   Extended Emergency Contact Information  Primary Emergency Contact: Lázaro Alvarado, [address] Phone: [phone]  Work Phone: [employer]  Phone: [phone]  Relation: Brother/Sister  Secondary Emergency Contact: Ciarra Hutchison 1953  Home Phone: [phone]  Work Phone: [phone]  Mobile Phone: [phone]  Relation: Child    Past Surgical History:  Past Surgical History:   Procedure Laterality Date    BREAST SURGERY      Left breast biopsy    CARDIOVERSION  10/17/2008    CARDIOVERSION  12/15/2008    HYSTERECTOMY  age 21's    IR [address]  2020    IR GUIDED DRAIN W CATHETER PLACEMENT 2020 [facility] PROCED[facility]    IR GUIDED DRAIN W CATHETER PLACEMENT  2020    IR GUIDED DRAIN W CATHETER PLACEMENT 2020 [facility] PROCEDURES    JOINT REPLACEMENT  2005    bilat    OTHER SURGICAL HISTORY  10/2009     bladder stimulator - interstim II 3051 - MRI OF HEAD ONLY -STIMULATOR MUST BE OFF AND TRANSMIT RECEIVE HEAD COIL ONLY    OK TOTAL HIP ARTHROPLASTY Left 2015    Hip Replacement, Total    TOTAL KNEE ARTHROPLASTY  ,     bilateral       Immunization History:   Immunization History   Administered Date(s) Administered    COVID-19, Pfizer, PF, 30mcg/0.3mL 2021, 2021    Influenza Virus Vaccine 2012, 10/08/2013    Influenza Whole 2003    Influenza, High Dose (Fluzone 65 yrs and older) 10/08/2013, 10/01/2014, 2016    Influenza, Quadv, IM, PF (6 mo and older Fluzone, Flulaval, Fluarix, and 3 yrs and older Afluria) 01/15/2020    Influenza, Quadv, adjuvanted, 65 yrs +, IM, PF (Fluad) 03/08/2021, 09/08/2021    Pneumococcal Conjugate 13-valent ([id number]) 09/22/2015    Pneumococcal Polysaccharide ([id number]) 07/21/1999, 10/08/2013, 02/24/2015    Td vaccine (adult) 03/20/2009       Active Problems:  Patient Active Problem List   Diagnosis Code    Chronic anticoagulation Z79.01    Hypertension I10    Hypothyroidism E03.9    Encounter for long-term (current) use of other medications Z79.899    Recurrent UTI N39.0    Multiple falls R29.6    Anemia D64.9    Sepsis ([facility] A41.9    Leg weakness R29.898    Abnormality of gait R26.9    CHF (congestive heart failure) ([facility]) I50.9    Rhabdomyolysis M62.82    Sepsis associated hypotension ([facility]) A41.9, I95.9    Acute cystitis without hematuria N30.00    DVT (deep vein thrombosis) in pregnancy O22.30    Progression of deep vein thrombosis (DVT) ([facility]) I82.409    EKG abnormalities R94.31    Noncompliance with medication regimen Z91.14    Chronic atrial fibrillation ([facility]) X82.39    Acute metabolic encephalopathy P87.58    Urinary tract infection associated with catheterization of urinary tract ([facility]) T83.511A, N39.0    Fall at home, initial encounter W19. XXXA, Y92.009    Frequent falls R29.6    Muscle weakness of lower extremity M62.81    Tremor R25.1    Gastroesophageal reflux disease K21.9    Hyperglycemia R73.9    Morbid obesity with BMI of 40.0-44.9, adult ([facility]) E66.01, Z68.41    CKD (chronic kidney disease) stage 4, GFR 15-29 ml/min ([facility]) N18.4    COVID-19 U07.1    Urinary tract infection N39.0    Hyponatremia E87.1    Acute kidney injury superimposed on CKD ([facility]) N17.9, N18.9       Isolation/Infection:   Isolation            No Isolation          Patient Infection Status       Infection Onset Added Last Indicated Last Indicated By Review Planned Expiration Resolved Resolved By    None active    Resolved    COVID-19 12/30/21 12/30/21 12/30/21 COVID-19, Rapid   01/13/22 Ro Lipscomb RN    10 days of isolation completed. Never required O2 since diagnosis    COVID-19 (Rule Out) 12/30/21 12/30/21 12/30/21 COVID-19, Rapid (Ordered)   12/30/21 Rule-Out Test Resulted    COVID-19 (Rule Out) 08/28/20 08/28/20 08/28/20 Covid-19 Ambulatory (Ordered)   08/30/20 Rule-Out Test Resulted    COVID-19 (Rule Out) 08/17/20 08/17/20 08/17/20 Covid-19 Ambulatory (Ordered)   08/18/20 Rule-Out Test Resulted    VRE  06/01/15 06/01/15 Thomasena Sos   08/29/17 Jeferson Gaona LPN    6/98/6370, urine            Nurse Assessment:  Last Vital Signs: /67   Pulse 62   Temp 97.3 °F (36.3 °C) (Axillary)   Resp 18   Ht 5' 4\" (1.626 m)   Wt 215 lb 11.2 oz (97.8 kg)   SpO2 95%   BMI 37.02 kg/m²     Last documented pain score (0-10 scale): Pain Level: 0  Last Weight:   Wt Readings from Last 1 Encounters:   01/18/22 215 lb 11.2 oz (97.8 kg)     Mental Status:  disoriented    IV Access:  - None    Nursing Mobility/ADLs:  Walking   Assisted  Transfer  Assisted  Bathing  Assisted  Dressing  Assisted  Toileting  Assisted  Feeding  Assisted  Med Admin  Assisted  Med Delivery   whole    Wound Care Documentation and Therapy:        Elimination:  Continence: Bowel: No  Bladder: No  Urinary Catheter: None   Colostomy/Ileostomy/Ileal Conduit: No       Date of Last BM: 1/17/22      Intake/Output Summary (Last 24 hours) at 1/18/2022 1133  Last data filed at 1/18/2022 0322  Gross per 24 hour   Intake 480 ml   Output 1775 ml   Net -1295 ml     I/O last 3 completed shifts: In: 480 [P.O.:480]  Out: 2525 [Urine:2525]    Safety Concerns:      At Risk for Falls    Impairments/Disabilities:      None      Patient's personal belongings (please select all that are sent with patient):  None    RN SIGNATURE:  Electronically signed by Lenin Moore RN on 1/18/22 at 12:25 PM EST    CASE MANAGEMENT/SOCIAL WORK SECTION    Inpatient Status Date: ***    Readmission Risk Assessment Score:  Readmission Risk              Risk of Unplanned Readmission:  18           Discharging to Facility/ Agency   Name:   Address:  Phone:  Fax:    Dialysis Facility (if applicable)   Name:  Address:  Dialysis Schedule:  Phone:  Fax:    / signature: {Esignature:[id number]}        PHYSICIAN SECTION    Nutrition Therapy:  Current Nutrition Therapy: Low sodium diet  - Oral Nutrition Supplement:      Routes of Feeding: Oral  Liquids: No Restrictions  Daily Fluid Restriction: yes - amount 2L  Last Modified Barium Swallow with Video (Video Swallowing Test): not done    Treatments at the Time of Hospital Discharge:   Respiratory Treatments:    Oxygen Therapy:  is not on home oxygen therapy. Ventilator:        Rehab Therapies: Physical Therapy and Occupational Therapy  Weight Bearing Status/Restrictions: No weight bearing restirctions  Other Medical Equipment (for information only, NOT a DME order):          Prognosis: Good    Condition at Discharge: Stable    Rehab Potential (if transferring to Rehab): Good    Recommended Labs or Other Treatments After Discharge:      Physician Certification: I certify the above information and transfer of [place]  is necessary for the continuing treatment of the diagnosis listed and that she requires [facility] for greater 30 days.      Update Admission H&P: No change in H&P    PHYSICIAN SIGNATURE:  Electronically signed by Dena Sanchez MD on 1/18/22 at 12:23 PM EST

## 2022-01-21 NOTE — PROGRESS NOTES
Physician Progress Note      Nedra Hills  CSN #:                  [id number]  :                       1935  ADMIT DATE:       2022 3:34 PM  [address] [place] DATE:        2022 6:54 PM  RESPONDING  PROVIDER #:        Verner Days          QUERY TEXT:    Pt admitted with ARJUN on CKD3. Per H&P documentation, \"Per ED provider family   stated she seemed to have a mild increase in confusion from baseline. \" Can the   patient's AMS/confusion be further clarified as: The medical record reflects the following:  Risk Factors: ARJUN  Clinical Indicators: Head CT was negative for acute changes. The patient was   noted to be more alert and oriented by  discharge. ? Per nursing assessment, \"disoriented to place and time. \"  Treatment: IVF, bed alarm    Julene Meckel, BSN, RN  Clinical   [phone]  Options provided:  -- Metabolic encephalopathy  -- Delirium  -- Other - I will add my own diagnosis  -- Disagree - Not applicable / Not valid  -- Disagree - Clinically unable to determine / Unknown  -- Refer to Clinical Documentation Reviewer    PROVIDER RESPONSE TEXT:    This patient has delirium. Query created by: Harriet Kinney on 2022 8:43 AM      QUERY TEXT:    Patient admitted with AMS in the setting of ARJUN on CKD. After study, can the   patient's condition be further clarified as: The medical record reflects the following:  Risk Factors: dementia, ARJUN, CKD3, recent history of Covid on   Clinical Indicators: creatinine was 1.6 on admission with a BUN of 32. She   was treated with fluids with improvement of creatinine to 1.3 and BUN of 16 by   discharge. Dry mucous membranes on admission.   Treatment: IVF, labs    Julene Meckel, BSN, RN  Clinical   [phone]  Options provided:  -- Dehydration  -- Hypovolemia  -- Other - I will add my own diagnosis  -- Disagree - Not applicable / Not valid  -- Disagree - Clinically unable to determine / Unknown  -- Refer to Clinical Documentation Reviewer    PROVIDER RESPONSE TEXT:    This patient has dehydration.     Query created by: Jenn Ballard on 1/21/2022 9:01 AM      Electronically signed by:  Nohemi Lagos 1/21/2022 9:09 AM

## 2022-01-29 PROBLEM — N39.0 URINARY TRACT INFECTION: Status: RESOLVED | Noted: 2021-12-30 | Resolved: 2022-01-29

## 2022-03-24 ENCOUNTER — HOSPITAL ENCOUNTER (EMERGENCY)
Age: 87
Discharge: HOME | End: 2022-03-25
Attending: EMERGENCY MEDICINE
Payer: MEDICARE

## 2022-03-24 DIAGNOSIS — B35.4 TINEA CORPORIS: Primary | ICD-10-CM

## 2022-03-24 LAB
ALBUMIN SERPL-MCNC: 2.9 GM/DL (ref 3.4–5)
ALP BLD-CCNC: 47 IU/L (ref 40–128)
ALT SERPL-CCNC: 9 U/L (ref 10–40)
ANION GAP SERPL CALCULATED.3IONS-SCNC: 11 MMOL/L (ref 4–16)
AST SERPL-CCNC: 13 IU/L (ref 15–37)
BASOPHILS ABSOLUTE: 0.1 K/CU MM
BASOPHILS RELATIVE PERCENT: 0.4 % (ref 0–1)
BILIRUB SERPL-MCNC: 0.3 MG/DL (ref 0–1)
BUN BLDV-MCNC: 26 MG/DL (ref 6–23)
CALCIUM SERPL-MCNC: 8.6 MG/DL (ref 8.3–10.6)
CHLORIDE BLD-SCNC: 99 MMOL/L (ref 99–110)
CO2: 23 MMOL/L (ref 21–32)
CREAT SERPL-MCNC: 1.9 MG/DL (ref 0.6–1.1)
DIFFERENTIAL TYPE: ABNORMAL
EOSINOPHILS ABSOLUTE: 0.4 K/CU MM
EOSINOPHILS RELATIVE PERCENT: 3 % (ref 0–3)
GFR AFRICAN AMERICAN: 30 ML/MIN/1.73M2
GFR NON-AFRICAN AMERICAN: 25 ML/MIN/1.73M2
GLUCOSE BLD-MCNC: 85 MG/DL (ref 70–99)
HCT VFR BLD CALC: 32.6 % (ref 37–47)
HEMOGLOBIN: 9.9 GM/DL (ref 12.5–16)
IMMATURE NEUTROPHIL %: 0.4 % (ref 0–0.43)
LYMPHOCYTES ABSOLUTE: 1.6 K/CU MM
LYMPHOCYTES RELATIVE PERCENT: 11.7 % (ref 24–44)
MCH RBC QN AUTO: 30.5 PG (ref 27–31)
MCHC RBC AUTO-ENTMCNC: 30.4 % (ref 32–36)
MCV RBC AUTO: 100.3 FL (ref 78–100)
MONOCYTES ABSOLUTE: 0.9 K/CU MM
MONOCYTES RELATIVE PERCENT: 6.4 % (ref 0–4)
NUCLEATED RBC %: 0 %
PDW BLD-RTO: 14.5 % (ref 11.7–14.9)
PLATELET # BLD: 290 K/CU MM (ref 140–440)
PMV BLD AUTO: 11.3 FL (ref 7.5–11.1)
POTASSIUM SERPL-SCNC: 4.7 MMOL/L (ref 3.5–5.1)
RBC # BLD: 3.25 M/CU MM (ref 4.2–5.4)
SEGMENTED NEUTROPHILS ABSOLUTE COUNT: 10.9 K/CU MM
SEGMENTED NEUTROPHILS RELATIVE PERCENT: 78.1 % (ref 36–66)
SODIUM BLD-SCNC: 133 MMOL/L (ref 135–145)
TOTAL IMMATURE NEUTOROPHIL: 0.06 K/CU MM
TOTAL NUCLEATED RBC: 0 K/CU MM
TOTAL PROTEIN: 7.5 GM/DL (ref 6.4–8.2)
WBC # BLD: 14 K/CU MM (ref 4–10.5)

## 2022-03-24 PROCEDURE — 99285 EMERGENCY DEPT VISIT HI MDM: CPT

## 2022-03-24 PROCEDURE — 85025 COMPLETE CBC W/AUTO DIFF WBC: CPT

## 2022-03-24 PROCEDURE — 6370000000 HC RX 637 (ALT 250 FOR IP): Performed by: EMERGENCY MEDICINE

## 2022-03-24 PROCEDURE — 2500000003 HC RX 250 WO HCPCS: Performed by: EMERGENCY MEDICINE

## 2022-03-24 PROCEDURE — 80053 COMPREHEN METABOLIC PANEL: CPT

## 2022-03-24 RX ORDER — FLUCONAZOLE 150 MG/1
150 TABLET ORAL
Qty: 4 TABLET | Refills: 0 | Status: SHIPPED | OUTPATIENT
Start: 2022-03-24 | End: 2022-04-15

## 2022-03-24 RX ORDER — FLUCONAZOLE 100 MG/1
200 TABLET ORAL ONCE
Status: COMPLETED | OUTPATIENT
Start: 2022-03-24 | End: 2022-03-24

## 2022-03-24 RX ORDER — PREDNISONE 10 MG/1
5 TABLET ORAL ONCE
Status: DISCONTINUED | OUTPATIENT
Start: 2022-03-24 | End: 2022-03-24

## 2022-03-24 RX ORDER — PREDNISONE 10 MG/1
40 TABLET ORAL DAILY
Qty: 16 TABLET | Refills: 0 | Status: SHIPPED | OUTPATIENT
Start: 2022-03-25 | End: 2022-03-29

## 2022-03-24 RX ORDER — DIPHENHYDRAMINE HCL 25 MG
25 TABLET ORAL EVERY 6 HOURS PRN
Qty: 100 TABLET | Refills: 0 | Status: SHIPPED | OUTPATIENT
Start: 2022-03-24 | End: 2022-04-23

## 2022-03-24 RX ORDER — NYSTATIN 100000 [USP'U]/G
POWDER TOPICAL
Qty: 60 G | Refills: 0 | Status: SHIPPED | OUTPATIENT
Start: 2022-03-24 | End: 2022-08-30

## 2022-03-24 RX ORDER — CEPHALEXIN 250 MG/1
500 CAPSULE ORAL ONCE
Status: COMPLETED | OUTPATIENT
Start: 2022-03-24 | End: 2022-03-24

## 2022-03-24 RX ORDER — PREDNISONE 20 MG/1
40 TABLET ORAL ONCE
Status: COMPLETED | OUTPATIENT
Start: 2022-03-24 | End: 2022-03-24

## 2022-03-24 RX ORDER — CEPHALEXIN 500 MG/1
500 CAPSULE ORAL 4 TIMES DAILY
Qty: 40 CAPSULE | Refills: 0 | Status: SHIPPED | OUTPATIENT
Start: 2022-03-24 | End: 2022-04-03

## 2022-03-24 RX ADMIN — FLUCONAZOLE 200 MG: 100 TABLET ORAL at 20:29

## 2022-03-24 RX ADMIN — MICONAZOLE NITRATE: 2 POWDER TOPICAL at 20:32

## 2022-03-24 RX ADMIN — CEPHALEXIN 500 MG: 250 CAPSULE ORAL at 20:30

## 2022-03-24 RX ADMIN — PREDNISONE 40 MG: 20 TABLET ORAL at 20:29

## 2022-03-24 NOTE — ED NOTES
Rash all over body.   Patient given 50mg IM benydral at 5:50pm today     Renny Kamara RN  03/24/22 7391

## 2022-03-24 NOTE — ED PROVIDER NOTES
Triage Chief Complaint:   Rash (\"all over body\" per pt. been going on for 2 days. given 50 mg Bemadyl IM, pta at facility.)    [place]:  Rober Lopez is a 82 y.o. female that presents with rash. Patient was in baseline state of health until several days ago when the above started. Patient reports rash started in her skin folds and has slowly spread. Rash is very itchy and at times painful to the sides of her belly. Patient reports rash has been very red it is mostly in her skin folds. Patient is never had a problem with this before. No new foods or exposures. No new medications. Patient denies any skin blistering or sloughing. No fevers. Patient reports she otherwise feels well. Patient did receive 50 mg of intramuscular Benadryl prior to arrival with marginal improvement of her itching. ROS:  General:  No fevers, no chills  Respiratory:  No shortness of breath  Neurologic:  No numbness, no weakness  Extremities:  No edema, no pain  Skin:  + rash  Psych: No axienty    Past Medical History:   Diagnosis Date    Allergic rhinitis     Arthritis     CHF (congestive heart failure) (HCC)     DVT, lower extremity, proximal, acute, left (HCC)     Edema leg     Gastroesophageal reflux disease 3/30/2019    H/O 24 hour EKG monitoring 6/10 1/09 11/08    6/7/10- predominant rhythm is a-fib, freq PVCs    H/O cardiac catheterization 7/20/00    H/O cardiac catheterization 7/20/00    right dominant system, left main is short but angiographically normal, LAD and CX reveal sign  tortuosities distally  but no sign angiographic atherosclerotic disease noted except luminal  irregularities here and there. LVSF normal, EF  55%    H/O cardiovascular stress test 5/2/2016    lexiscan-normal,EF70%    H/O Doppler echocardiogram 7/12/10    intimal thickening but no sign. atherosclerotic plaque noted in [facility]  LICA.     H/O echocardiogram 5/2/2016 9/10     5/16 EF60% mild MR, TR, AR 9/20/10 Complete 2 dim transthoracic echo, techn. difficult. The study was  tech limited, LVSF is normal    History of atrial fibrillation     History of cardiovascular stress test 7/26/13, 7/10 8/08    7/13-WNL Observed defect consistent with breast attenuation. EF 70%. 7/10-post stress myocardial images show a normal pattern of perfusion in all regions. LV is normal  in size. EF is 62%, global  LVSF is normal    History of falling     per old chart    History of nuclear stress test 10/08/2020    EF 60%, Normal    Hx of blood clots     per old chart  hx of DVT left leg    Hx: UTI (urinary tract infection)     Hyperglycemia 3/30/2019    Hyperlipidemia     Hypertension     Hypothyroidism     Incontinence of bowel     Incontinence of urine     Sepsis ([facility] [address] [facility] 2015    Tremor 3/30/2019    Wears glasses     to read    Wears partial dentures     upper     Past Surgical History:   Procedure Laterality Date    BREAST SURGERY      Left breast biopsy    CARDIOVERSION  10/17/2008    CARDIOVERSION  12/15/2008    HYSTERECTOMY  age 21's    IR GUIDED DRAIN W CATHETER PLACEMENT  8/21/2020    IR GUIDED DRAIN W CATHETER PLACEMENT 8/21/2020 [facility] PROCED[facility]    IR GUIDED DRAIN W CATHETER PLACEMENT  9/8/2020    IR GUIDED DRAIN W CATHETER PLACEMENT 9/8/2020 [facility] SPECIAL PROCEDURES    JOINT REPLACEMENT  4/2005 2/2006    bilat    OTHER SURGICAL HISTORY  10/2009     bladder stimulator - interstim II 3058 - MRI OF HEAD ONLY -STIMULATOR MUST BE OFF AND TRANSMIT RECEIVE HEAD COIL ONLY    NC TOTAL HIP ARTHROPLASTY Left 02/23/2015    Hip Replacement, Total    TOTAL KNEE ARTHROPLASTY  2005, 2006    bilateral     Family History   Problem Relation Age of Onset    Diabetes Mother     Diabetes Father     Heart Disease Father      Social History     Socioeconomic History    Marital status:       Spouse name: Not on file    Number of children: Not on file    Years of education: Not on file    Highest education level: Not on file Occupational History    Not on file   Tobacco Use    Smoking status: Former Smoker     Years: 1.00    Smokeless tobacco: Never Used    Tobacco comment: \"quit yrs ago only smoked for 2-3 yrs\"   Vaping Use    Vaping Use: Never used   Substance and Sexual Activity    Alcohol use: No     Alcohol/week: 0.0 standard drinks    Drug use: No    Sexual activity: Not Currently   Other Topics Concern    Not on file   Social History Narrative    Not on file     Social Determinants of Health     Financial Resource Strain:     Difficulty of Paying Living Expenses: Not on file   Food Insecurity:     Worried About Running Out of Food in the Last Year: Not on file    Jerica of Food in the Last Year: Not on file   Transportation Needs:     Lack of Transportation (Medical): Not on file    Lack of Transportation (Non-Medical):  Not on file   Physical Activity:     Days of Exercise per Week: Not on file    Minutes of Exercise per Session: Not on file   Stress:     Feeling of Stress : Not on file   Social Connections:     Frequency of Communication with Friends and Family: Not on file    Frequency of Social Gatherings with Friends and Family: Not on file    Attends [employer] Services: Not on file    Active Member of [address] [place] [address] [employer] or Organizations: Not on file    Attends Club or Organization Meetings: Not on file    Marital Status: Not on file   Intimate Partner Violence:     Fear of Current or Ex-Partner: Not on file    Emotionally Abused: Not on file    Physically Abused: Not on file    Sexually Abused: Not on file   Housing Stability:     Unable to Pay for Housing in the Last Year: Not on file    Number of [place] in the Last Year: Not on file    Unstable Housing in the Last Year: Not on file     Current Facility-Administered Medications   Medication Dose Route Frequency Provider Last Rate Last Admin    fluconazole (DIFLUCAN) tablet 200 mg  200 mg Oral Once David Thomas MD        miconazole (MICOTIN) 2 % powder   Topical BID Dionna Hernandez MD        predniSONE (DELTASONE) tablet 40 mg  40 mg Oral Once Dionna Hernandez MD        cephALEXin [facility]) capsule 500 mg  500 mg Oral Once Dionna Hernandez MD         Current Outpatient Medications   Medication Sig Dispense Refill    fluconazole (DIFLUCAN) 150 MG tablet Take 1 tablet by mouth every 7 days for 4 doses 4 tablet 0    nystatin (MYCOSTATIN) 806056 UNIT/GM powder Apply 3 times daily. 60 g 0    cephALEXin (KEFLEX) 500 MG capsule Take 1 capsule by mouth 4 times daily for 10 days 40 capsule 0    diphenhydrAMINE (BENADRYL ALLERGY) 25 MG tablet Take 1 tablet by mouth every 6 hours as needed for Itching 100 tablet 0    [START ON 3/25/2022] predniSONE (DELTASONE) 10 MG tablet Take 4 tablets by mouth daily for 4 days 16 tablet 0    losartan (COZAAR) 100 MG tablet Take 1 tablet by mouth daily 30 tablet 3    vitamin D 25 MCG (1000 UT) CAPS Take 5 capsules by mouth daily 30 capsule 0    aspirin 81 MG chewable tablet Take 1 tablet by mouth daily 30 tablet 0    furosemide (LASIX) 20 MG tablet Take 1 pill on Mon, Weds, Fri 90 tablet 1    fenofibrate micronized (LOFIBRA) 67 MG capsule Take 1 capsule by mouth every morning (before breakfast) 90 capsule 1    pravastatin (PRAVACHOL) 10 MG tablet TAKE 1 TABLET AT BEDTIME 90 tablet 1    levothyroxine (SYNTHROID) 175 MCG tablet Take 1 tablet by mouth daily 90 tablet 1    potassium chloride (MICRO-K) 10 MEQ extended release capsule Take 1 capsule by mouth daily 90 capsule 1    dronedarone hcl (MULTAQ) 400 MG TABS TAKE 1 TABLET TWICE A DAY WITH MEALS 180 tablet 1    loperamide (IMODIUM) 2 MG capsule Take 2 mg by mouth daily as needed for Diarrhea      loratadine (CLARITIN) 10 MG tablet Take 10 mg by mouth daily       acetaminophen (TYLENOL) 325 MG tablet Take 325 mg by mouth daily.          Allergies   Allergen Reactions    Tape [employer] Tape] Itching    Codeine Rash    Sulfa Antibiotics Nausea And Vomiting       Nursing Notes Reviewed    Physical Exam:  ED Triage Vitals [03/24/22 1802]   Enc Vitals Group      BP (!) 123/46      Pulse 82      Resp 18      Temp 98.2 °F (36.8 °C)      Temp Source Oral      SpO2 98 %      Weight 215 lb (97.5 kg)      Height 5' 4\" (1.626 m)      Head Circumference       Peak Flow       Pain Score       Pain Loc       Pain Edu? Excl. in [address]? My pulse ox interpretation is - normal    General appearance:  No acute distress. Sitting comfortably in bed. Skin:  Warm. Dry. There is a moist erythematous rash to skin folds to bilateral antecubital fossa's, axillas, groin, pannus folds of the abdomen. Rash is slightly raised and is red. There is no blistering or skin sloughing. No open wounds. No significant induration. No crepitance throughout. Eye:  Extraocular movements intact. Ears, nose, mouth and throat:  Oral mucosa moist   Extremity:  No swelling. Normal ROM. Rash as above to the creases. Heart:  Strong and symmetric peripheral pulses. Extremities are well perfused. Abdomen:  Non-distended. Rash as above to the pannus folds. Respiratory:  Respirations nonlabored. Neurological:  Alert and oriented times 3. No focal neuro deficits. Sensation intact to light touch to distal upper/lower extremities; 5/5 and symmetric  and dorsi/plantar flexion.                 I have reviewed and interpreted all of the currently available lab results from this visit (if applicable):  Results for orders placed or performed during the hospital encounter of 03/24/22   CBC with Auto Differential   Result Value Ref Range    WBC 14.0 (H) 4.0 - 10.5 K/CU MM    RBC 3.25 (L) 4.2 - 5.4 M/CU MM    Hemoglobin 9.9 (L) 12.5 - 16.0 GM/DL    Hematocrit 32.6 (L) 37 - 47 %    .3 (H) 78 - 100 FL    MCH 30.5 27 - 31 PG    MCHC 30.4 (L) 32.0 - 36.0 %    RDW 14.5 11.7 - 14.9 %    Platelets 488 817 - 303 K/CU MM    MPV 11.3 (H) 7.5 - 11.1 FL    Differential Type AUTOMATED DIFFERENTIAL     Segs Relative 78.1 (H) 36 - 66 %    Lymphocytes % 11.7 (L) 24 - 44 %    Monocytes % 6.4 (H) 0 - 4 %    Eosinophils % 3.0 0 - 3 %    Basophils % 0.4 0 - 1 %    Segs Absolute 10.9 K/CU MM    Lymphocytes Absolute 1.6 K/CU MM    Monocytes Absolute 0.9 K/CU MM    Eosinophils Absolute 0.4 K/CU MM    Basophils Absolute 0.1 K/CU MM    Nucleated RBC % 0.0 %    Total Nucleated RBC 0.0 K/CU MM    Total Immature Neutrophil 0.06 K/CU MM    Immature Neutrophil % 0.4 0 - 0.43 %   Comprehensive Metabolic Panel   Result Value Ref Range    Sodium 133 (L) 135 - 145 MMOL/L    Potassium 4.7 3.5 - 5.1 MMOL/L    Chloride 99 99 - 110 mMol/L    CO2 23 21 - 32 MMOL/L    BUN 26 (H) 6 - 23 MG/DL    CREATININE 1.9 (H) 0.6 - 1.1 MG/DL    Glucose 85 70 - 99 MG/DL    Calcium 8.6 8.3 - 10.6 MG/DL    Albumin 2.9 (L) 3.4 - 5.0 GM/DL    Total Protein 7.5 6.4 - 8.2 GM/DL    Total Bilirubin 0.3 0.0 - 1.0 MG/DL    ALT 9 (L) 10 - 40 U/L    AST 13 (L) 15 - 37 IU/L    Alkaline Phosphatase 47 40 - 128 IU/L    GFR Non- 25 (L) >60 mL/min/1.73m2    GFR  30 (L) >60 mL/min/1.73m2    Anion Gap 11 4 - 16      Radiographs (if obtained):  [] The following radiograph was interpreted by myself in the absence of a radiologist:   [] Radiologist's Report Reviewed:  No orders to display         EKG (if obtained): (All EKG's are interpreted by myself in the absence of a cardiologist)    Chart review shows recent radiographs:  No results found. MDM:  Pt presents as above. Emergent conditions considered. Presentation prompted initial history and physical.  Labs are obtained. Patient is treated symptomatically with oral and topical antifungals as well as prednisone to control her itching. Patient is already received Benadryl for the rash. CBC does demonstrate a leukocytosis of 14 and I will cover for possible bacterial superinfection with Keflex. CMP does demonstrate mild acute on chronic renal insufficiency.   Patient is tolerating oral fluids here in the emergency department is with stable vital signs otherwise. I do believe patient is appropriate for the outpatient treatment trial with the above medications. Prescriptions are printed as patient gets medications through the [employer]. Patient will need close monitoring of symptoms and return to the emergency department for any worsening changes, fever or other symptoms. I discussed specific signs and symptoms and when to return to the emergency department as well as need for close outpatient follow-up. Questions sought and answered with the patient. They voice understanding and agree with plan. Care of this patient did occur during the COVID-19 pandemic. Clinical Impression:  1. Tinea corporis      Disposition referral (if applicable):  Jessica Juárez MD  [facility]  [phone]    Schedule an appointment as soon as possible for a visit       [facility] Emergency Department  [facility] [phone]  Today  If symptoms worsen    Disposition medications (if applicable):  New Prescriptions    CEPHALEXIN (KEFLEX) 500 MG CAPSULE    Take 1 capsule by mouth 4 times daily for 10 days    DIPHENHYDRAMINE (BENADRYL ALLERGY) 25 MG TABLET    Take 1 tablet by mouth every 6 hours as needed for Itching    FLUCONAZOLE (DIFLUCAN) 150 MG TABLET    Take 1 tablet by mouth every 7 days for 4 doses    NYSTATIN (MYCOSTATIN) 907635 UNIT/GM POWDER    Apply 3 times daily. PREDNISONE (DELTASONE) 10 MG TABLET    Take 4 tablets by mouth daily for 4 days       Comment: Please note this report has been produced using speech recognition software and may contain errors related to that system including errors in grammar, punctuation, and spelling, as well as words and phrases that may be inappropriate.  If there are any questions or concerns please feel free to contact the dictating provider for clarification.          Anais Caputo MD  03/24/22 2013

## 2022-03-25 VITALS
WEIGHT: 215 LBS | OXYGEN SATURATION: 92 % | BODY MASS INDEX: 36.7 KG/M2 | SYSTOLIC BLOOD PRESSURE: 132 MMHG | DIASTOLIC BLOOD PRESSURE: 70 MMHG | HEART RATE: 70 BPM | HEIGHT: 64 IN | RESPIRATION RATE: 16 BRPM | TEMPERATURE: 98 F

## 2022-03-25 NOTE — ED NOTES
Report given to Hazel at [facility]. Made aware of ETA.       Raimundo Garcia RN  03/25/22 1195

## 2022-04-09 NOTE — ED NOTES
Yocasta Jacinto ANSWERED     Katie Schwartz.  [facility]  01/12/22 9264 MEDICATIONS  (STANDING):  amLODIPine   Tablet 7.5 milliGRAM(s) Oral every 24 hours  aspirin enteric coated 81 milliGRAM(s) Oral daily  atorvastatin 40 milliGRAM(s) Oral at bedtime  calcium acetate 667 milliGRAM(s) Oral three times a day with meals  cloNIDine 0.1 milliGRAM(s) Oral every 12 hours  clopidogrel Tablet 75 milliGRAM(s) Oral daily  dextrose 5%. 1000 milliLiter(s) (100 mL/Hr) IV Continuous <Continuous>  dextrose 5%. 1000 milliLiter(s) (50 mL/Hr) IV Continuous <Continuous>  dextrose 50% Injectable 25 Gram(s) IV Push once  dextrose 50% Injectable 12.5 Gram(s) IV Push once  dextrose 50% Injectable 25 Gram(s) IV Push once  epoetin fawad-epbx (RETACRIT) Injectable 40658 Unit(s) IV Push <User Schedule>  glucagon  Injectable 1 milliGRAM(s) IntraMuscular once  heparin   Injectable 5000 Unit(s) SubCutaneous every 12 hours  imipramine 50 milliGRAM(s) Oral at bedtime  insulin glargine Injectable (LANTUS) 7 Unit(s) SubCutaneous every morning  insulin lispro (ADMELOG) corrective regimen sliding scale   SubCutaneous at bedtime  insulin lispro (ADMELOG) corrective regimen sliding scale   SubCutaneous three times a day before meals  lactobacillus acidophilus 1 Tablet(s) Oral two times a day  Nephro-elvie 1 Tablet(s) Oral daily  pantoprazole    Tablet 40 milliGRAM(s) Oral before breakfast  piperacillin/tazobactam IVPB.. 3.375 Gram(s) IV Intermittent every 12 hours

## 2022-04-14 ENCOUNTER — CARE COORDINATION (OUTPATIENT)
Dept: CARE COORDINATION | Age: 87
End: 2022-04-14

## 2022-04-25 ENCOUNTER — CARE COORDINATION (OUTPATIENT)
Dept: CARE COORDINATION | Age: 87
End: 2022-04-25

## 2022-04-28 ENCOUNTER — CARE COORDINATION (OUTPATIENT)
Dept: CARE COORDINATION | Age: 87
End: 2022-04-28

## 2022-04-28 NOTE — CARE COORDINATION
Patient transferred from [address] [place] [address] on 4/27 to [facility]. I spoke with Trevor Horta and patient is in assisted living at facility. Updated med list requested to be faxed to [phone]. Will notify CTN at this time.

## 2022-04-29 ENCOUNTER — CARE COORDINATION (OUTPATIENT)
Dept: CASE MANAGEMENT | Age: 87
End: 2022-04-29

## 2022-04-29 NOTE — CARE COORDINATION
[facility] Transitions Follow Up Call    2022    Patient: Cedrick Stuart  Patient : 1935   MRN: <[id number]>  Reason for Admission:   Discharge Date: 3/25/22 RARS: Readmission Risk Score: 15.7 ( )    Stephany Queen entered into media      Care Transitions Subsequent and Final Call    Subsequent and Final Calls  Care Transitions Interventions  Other Interventions:            Follow Up  Future Appointments   Date Time Provider Abner Maravilla   2022  3:30 PM Ramón Rangel MD [facility]       LUMA BalderramaN, RN   [address] [place] [address] Transition Nurse  [phone]

## 2022-05-03 ENCOUNTER — HOSPITAL ENCOUNTER (OUTPATIENT)
Age: 87
Setting detail: SPECIMEN
Discharge: HOME | End: 2022-05-03
Payer: MEDICARE

## 2022-05-03 PROCEDURE — 84443 ASSAY THYROID STIM HORMONE: CPT

## 2022-05-31 ENCOUNTER — HOSPITAL ENCOUNTER (OUTPATIENT)
Age: 87
Setting detail: SPECIMEN
Discharge: HOME | End: 2022-05-31
Payer: MEDICARE

## 2022-05-31 LAB
ALBUMIN SERPL-MCNC: 3.6 GM/DL (ref 3.4–5)
ALP BLD-CCNC: 40 IU/L (ref 40–129)
ALT SERPL-CCNC: 8 U/L (ref 10–40)
ANION GAP SERPL CALCULATED.3IONS-SCNC: 12 MMOL/L (ref 4–16)
AST SERPL-CCNC: 14 IU/L (ref 15–37)
BILIRUB SERPL-MCNC: 0.2 MG/DL (ref 0–1)
BUN BLDV-MCNC: 32 MG/DL (ref 6–23)
CALCIUM SERPL-MCNC: 9.1 MG/DL (ref 8.3–10.6)
CHLORIDE BLD-SCNC: 100 MMOL/L (ref 99–110)
CO2: 25 MMOL/L (ref 21–32)
CREAT SERPL-MCNC: 1.9 MG/DL (ref 0.6–1.1)
GFR AFRICAN AMERICAN: 30 ML/MIN/1.73M2
GFR NON-AFRICAN AMERICAN: 25 ML/MIN/1.73M2
GLUCOSE BLD-MCNC: 79 MG/DL (ref 70–99)
HCT VFR BLD CALC: 28.9 % (ref 37–47)
HEMOGLOBIN: 9 GM/DL (ref 12.5–16)
MCH RBC QN AUTO: 31.9 PG (ref 27–31)
MCHC RBC AUTO-ENTMCNC: 31.1 % (ref 32–36)
MCV RBC AUTO: 102.5 FL (ref 78–100)
PDW BLD-RTO: 14.6 % (ref 11.7–14.9)
PLATELET # BLD: 227 K/CU MM (ref 140–440)
PMV BLD AUTO: 11.7 FL (ref 7.5–11.1)
POTASSIUM SERPL-SCNC: 4.6 MMOL/L (ref 3.5–5.1)
RBC # BLD: 2.82 M/CU MM (ref 4.2–5.4)
SODIUM BLD-SCNC: 137 MMOL/L (ref 135–145)
TOTAL PROTEIN: 6.3 GM/DL (ref 6.4–8.2)
TSH HIGH SENSITIVITY: 1.25 UIU/ML (ref 0.27–4.2)
VITAMIN D 25-HYDROXY: 39.07 NG/ML
WBC # BLD: 7 K/CU MM (ref 4–10.5)

## 2022-05-31 PROCEDURE — 85027 COMPLETE CBC AUTOMATED: CPT

## 2022-05-31 PROCEDURE — 80053 COMPREHEN METABOLIC PANEL: CPT

## 2022-05-31 PROCEDURE — 82306 VITAMIN D 25 HYDROXY: CPT

## 2022-05-31 PROCEDURE — 36415 COLL VENOUS BLD VENIPUNCTURE: CPT

## 2022-05-31 PROCEDURE — 84443 ASSAY THYROID STIM HORMONE: CPT

## 2022-07-01 ENCOUNTER — HOSPITAL ENCOUNTER (OUTPATIENT)
Age: 87
Setting detail: SPECIMEN
Discharge: HOME | End: 2022-07-01
Payer: MEDICARE

## 2022-07-01 LAB
BASOPHILS ABSOLUTE: 0.1 K/CU MM
BASOPHILS RELATIVE PERCENT: 0.7 % (ref 0–1)
C-REACTIVE PROTEIN, HIGH SENSITIVITY: 22.5 MG/L
DIFFERENTIAL TYPE: ABNORMAL
EOSINOPHILS ABSOLUTE: 0.9 K/CU MM
EOSINOPHILS RELATIVE PERCENT: 12.5 % (ref 0–3)
ERYTHROCYTE SEDIMENTATION RATE: 66 MM/HR (ref 0–30)
HCT VFR BLD CALC: 30 % (ref 37–47)
HEMOGLOBIN: 8.9 GM/DL (ref 12.5–16)
IMMATURE NEUTROPHIL %: 0.4 % (ref 0–0.43)
LYMPHOCYTES ABSOLUTE: 1.3 K/CU MM
LYMPHOCYTES RELATIVE PERCENT: 18 % (ref 24–44)
MCH RBC QN AUTO: 31.6 PG (ref 27–31)
MCHC RBC AUTO-ENTMCNC: 29.7 % (ref 32–36)
MCV RBC AUTO: 106.4 FL (ref 78–100)
MONOCYTES ABSOLUTE: 0.8 K/CU MM
MONOCYTES RELATIVE PERCENT: 11.3 % (ref 0–4)
NUCLEATED RBC %: 0 %
PDW BLD-RTO: 14.1 % (ref 11.7–14.9)
PLATELET # BLD: 270 K/CU MM (ref 140–440)
PMV BLD AUTO: 11.1 FL (ref 7.5–11.1)
RBC # BLD: 2.82 M/CU MM (ref 4.2–5.4)
SEGMENTED NEUTROPHILS ABSOLUTE COUNT: 4.2 K/CU MM
SEGMENTED NEUTROPHILS RELATIVE PERCENT: 57.1 % (ref 36–66)
TOTAL IMMATURE NEUTOROPHIL: 0.03 K/CU MM
TOTAL NUCLEATED RBC: 0 K/CU MM
WBC # BLD: 7.3 K/CU MM (ref 4–10.5)

## 2022-07-01 PROCEDURE — 85652 RBC SED RATE AUTOMATED: CPT

## 2022-07-01 PROCEDURE — 36415 COLL VENOUS BLD VENIPUNCTURE: CPT

## 2022-07-01 PROCEDURE — 85025 COMPLETE CBC W/AUTO DIFF WBC: CPT

## 2022-07-01 PROCEDURE — 86140 C-REACTIVE PROTEIN: CPT

## 2022-08-02 ENCOUNTER — HOSPITAL ENCOUNTER (OUTPATIENT)
Age: 87
Setting detail: SPECIMEN
Discharge: HOME | End: 2022-08-02
Payer: MEDICARE

## 2022-08-02 LAB
ALBUMIN SERPL-MCNC: 4.1 GM/DL (ref 3.4–5)
ALP BLD-CCNC: 38 IU/L (ref 40–129)
ALT SERPL-CCNC: 9 U/L (ref 10–40)
ANION GAP SERPL CALCULATED.3IONS-SCNC: 11 MMOL/L (ref 4–16)
AST SERPL-CCNC: 20 IU/L (ref 15–37)
BILIRUB SERPL-MCNC: 0.3 MG/DL (ref 0–1)
BUN BLDV-MCNC: 28 MG/DL (ref 6–23)
CALCIUM SERPL-MCNC: 9.4 MG/DL (ref 8.3–10.6)
CHLORIDE BLD-SCNC: 104 MMOL/L (ref 99–110)
CO2: 29 MMOL/L (ref 21–32)
CREAT SERPL-MCNC: 1.7 MG/DL (ref 0.6–1.1)
GFR AFRICAN AMERICAN: 35 ML/MIN/1.73M2
GFR NON-AFRICAN AMERICAN: 29 ML/MIN/1.73M2
GLUCOSE BLD-MCNC: 118 MG/DL (ref 70–99)
HCT VFR BLD CALC: 32.9 % (ref 37–47)
HEMOGLOBIN: 10 GM/DL (ref 12.5–16)
MCH RBC QN AUTO: 30.8 PG (ref 27–31)
MCHC RBC AUTO-ENTMCNC: 30.4 % (ref 32–36)
MCV RBC AUTO: 101.2 FL (ref 78–100)
PDW BLD-RTO: 14.2 % (ref 11.7–14.9)
PLATELET # BLD: 264 K/CU MM (ref 140–440)
PMV BLD AUTO: 11.3 FL (ref 7.5–11.1)
POTASSIUM SERPL-SCNC: 4.9 MMOL/L (ref 3.5–5.1)
RBC # BLD: 3.25 M/CU MM (ref 4.2–5.4)
SODIUM BLD-SCNC: 144 MMOL/L (ref 135–145)
TOTAL PROTEIN: 7.1 GM/DL (ref 6.4–8.2)
WBC # BLD: 7 K/CU MM (ref 4–10.5)

## 2022-08-02 PROCEDURE — 80053 COMPREHEN METABOLIC PANEL: CPT

## 2022-08-02 PROCEDURE — 36415 COLL VENOUS BLD VENIPUNCTURE: CPT

## 2022-08-02 PROCEDURE — 85027 COMPLETE CBC AUTOMATED: CPT

## 2022-08-09 ENCOUNTER — HOSPITAL ENCOUNTER (OUTPATIENT)
Age: 87
Setting detail: SPECIMEN
Discharge: HOME | End: 2022-08-09
Payer: MEDICARE

## 2022-08-09 LAB
ALBUMIN SERPL-MCNC: 3.5 GM/DL (ref 3.4–5)
ALP BLD-CCNC: 61 IU/L (ref 40–128)
ALT SERPL-CCNC: 10 U/L (ref 10–40)
ANION GAP SERPL CALCULATED.3IONS-SCNC: 12 MMOL/L (ref 4–16)
AST SERPL-CCNC: 18 IU/L (ref 15–37)
BILIRUB SERPL-MCNC: 0.2 MG/DL (ref 0–1)
BUN BLDV-MCNC: 32 MG/DL (ref 6–23)
CALCIUM SERPL-MCNC: 8.9 MG/DL (ref 8.3–10.6)
CHLORIDE BLD-SCNC: 105 MMOL/L (ref 99–110)
CO2: 26 MMOL/L (ref 21–32)
CREAT SERPL-MCNC: 1.7 MG/DL (ref 0.6–1.1)
GFR AFRICAN AMERICAN: 35 ML/MIN/1.73M2
GFR NON-AFRICAN AMERICAN: 29 ML/MIN/1.73M2
GLUCOSE BLD-MCNC: 98 MG/DL (ref 70–99)
HCT VFR BLD CALC: 33.1 % (ref 37–47)
HEMOGLOBIN: 10.1 GM/DL (ref 12.5–16)
MCH RBC QN AUTO: 30.1 PG (ref 27–31)
MCHC RBC AUTO-ENTMCNC: 30.5 % (ref 32–36)
MCV RBC AUTO: 98.5 FL (ref 78–100)
PDW BLD-RTO: 14 % (ref 11.7–14.9)
PLATELET # BLD: 269 K/CU MM (ref 140–440)
PMV BLD AUTO: 11.6 FL (ref 7.5–11.1)
POTASSIUM SERPL-SCNC: 4.7 MMOL/L (ref 3.5–5.1)
RBC # BLD: 3.36 M/CU MM (ref 4.2–5.4)
SODIUM BLD-SCNC: 143 MMOL/L (ref 135–145)
TOTAL PROTEIN: 6.6 GM/DL (ref 6.4–8.2)
WBC # BLD: 9.1 K/CU MM (ref 4–10.5)

## 2022-08-09 PROCEDURE — 85027 COMPLETE CBC AUTOMATED: CPT

## 2022-08-09 PROCEDURE — 80053 COMPREHEN METABOLIC PANEL: CPT

## 2022-08-09 PROCEDURE — 36415 COLL VENOUS BLD VENIPUNCTURE: CPT

## 2022-08-16 ENCOUNTER — HOSPITAL ENCOUNTER (OUTPATIENT)
Age: 87
Setting detail: SPECIMEN
Discharge: HOME | End: 2022-08-16
Payer: MEDICARE

## 2022-08-16 LAB
ALBUMIN SERPL-MCNC: 3.7 GM/DL (ref 3.4–5)
ALP BLD-CCNC: 54 IU/L (ref 40–128)
ALT SERPL-CCNC: 11 U/L (ref 10–40)
ANION GAP SERPL CALCULATED.3IONS-SCNC: 11 MMOL/L (ref 4–16)
AST SERPL-CCNC: 20 IU/L (ref 15–37)
BILIRUB SERPL-MCNC: 0.2 MG/DL (ref 0–1)
BUN BLDV-MCNC: 24 MG/DL (ref 6–23)
CALCIUM SERPL-MCNC: 9.6 MG/DL (ref 8.3–10.6)
CHLORIDE BLD-SCNC: 101 MMOL/L (ref 99–110)
CO2: 28 MMOL/L (ref 21–32)
CREAT SERPL-MCNC: 1.6 MG/DL (ref 0.6–1.1)
GFR AFRICAN AMERICAN: 37 ML/MIN/1.73M2
GFR NON-AFRICAN AMERICAN: 31 ML/MIN/1.73M2
GLUCOSE BLD-MCNC: 80 MG/DL (ref 70–99)
HCT VFR BLD CALC: 33.9 % (ref 37–47)
HEMOGLOBIN: 10.5 GM/DL (ref 12.5–16)
MCH RBC QN AUTO: 30.3 PG (ref 27–31)
MCHC RBC AUTO-ENTMCNC: 31 % (ref 32–36)
MCV RBC AUTO: 98 FL (ref 78–100)
PDW BLD-RTO: 14.1 % (ref 11.7–14.9)
PLATELET # BLD: 268 K/CU MM (ref 140–440)
PMV BLD AUTO: 11.6 FL (ref 7.5–11.1)
POTASSIUM SERPL-SCNC: 4.7 MMOL/L (ref 3.5–5.1)
RBC # BLD: 3.46 M/CU MM (ref 4.2–5.4)
SODIUM BLD-SCNC: 140 MMOL/L (ref 135–145)
TOTAL PROTEIN: 6.8 GM/DL (ref 6.4–8.2)
WBC # BLD: 9.1 K/CU MM (ref 4–10.5)

## 2022-08-16 PROCEDURE — 85027 COMPLETE CBC AUTOMATED: CPT

## 2022-08-16 PROCEDURE — 80053 COMPREHEN METABOLIC PANEL: CPT

## 2022-08-16 PROCEDURE — 36415 COLL VENOUS BLD VENIPUNCTURE: CPT

## 2022-08-23 ENCOUNTER — HOSPITAL ENCOUNTER (OUTPATIENT)
Age: 87
Setting detail: SPECIMEN
Discharge: HOME | End: 2022-08-23
Payer: MEDICARE

## 2022-08-23 LAB
ALBUMIN SERPL-MCNC: 3.6 GM/DL (ref 3.4–5)
ALP BLD-CCNC: 51 IU/L (ref 40–128)
ALT SERPL-CCNC: 10 U/L (ref 10–40)
ANION GAP SERPL CALCULATED.3IONS-SCNC: 10 MMOL/L (ref 4–16)
AST SERPL-CCNC: 17 IU/L (ref 15–37)
BILIRUB SERPL-MCNC: 0.2 MG/DL (ref 0–1)
BUN BLDV-MCNC: 18 MG/DL (ref 6–23)
CALCIUM SERPL-MCNC: 8.7 MG/DL (ref 8.3–10.6)
CHLORIDE BLD-SCNC: 101 MMOL/L (ref 99–110)
CO2: 28 MMOL/L (ref 21–32)
CREAT SERPL-MCNC: 1.3 MG/DL (ref 0.6–1.1)
GFR AFRICAN AMERICAN: 47 ML/MIN/1.73M2
GFR NON-AFRICAN AMERICAN: 39 ML/MIN/1.73M2
GLUCOSE BLD-MCNC: 79 MG/DL (ref 70–99)
HCT VFR BLD CALC: 35.2 % (ref 37–47)
HEMOGLOBIN: 10.6 GM/DL (ref 12.5–16)
MCH RBC QN AUTO: 30.3 PG (ref 27–31)
MCHC RBC AUTO-ENTMCNC: 30.1 % (ref 32–36)
MCV RBC AUTO: 100.6 FL (ref 78–100)
PDW BLD-RTO: 14.6 % (ref 11.7–14.9)
PLATELET # BLD: 245 K/CU MM (ref 140–440)
PMV BLD AUTO: 11.4 FL (ref 7.5–11.1)
POTASSIUM SERPL-SCNC: 4.4 MMOL/L (ref 3.5–5.1)
RBC # BLD: 3.5 M/CU MM (ref 4.2–5.4)
SODIUM BLD-SCNC: 139 MMOL/L (ref 135–145)
TOTAL PROTEIN: 6.7 GM/DL (ref 6.4–8.2)
WBC # BLD: 6.9 K/CU MM (ref 4–10.5)

## 2022-08-23 PROCEDURE — 85027 COMPLETE CBC AUTOMATED: CPT

## 2022-08-23 PROCEDURE — 36415 COLL VENOUS BLD VENIPUNCTURE: CPT

## 2022-08-23 PROCEDURE — 80053 COMPREHEN METABOLIC PANEL: CPT

## 2022-08-30 ENCOUNTER — APPOINTMENT (OUTPATIENT)
Dept: GENERAL RADIOLOGY | Age: 87
DRG: 071 | End: 2022-08-30
Payer: MEDICARE

## 2022-08-30 ENCOUNTER — HOSPITAL ENCOUNTER (OUTPATIENT)
Age: 87
Setting detail: SPECIMEN
Discharge: HOME | DRG: 071 | End: 2022-08-30
Payer: MEDICARE

## 2022-08-30 ENCOUNTER — HOSPITAL ENCOUNTER (INPATIENT)
Age: 87
LOS: 2 days | Discharge: NURSING, REHAB OR LONG-TERM CARE | DRG: 071 | End: 2022-09-01
Attending: STUDENT IN AN ORGANIZED HEALTH CARE EDUCATION/TRAINING PROGRAM | Admitting: STUDENT IN AN ORGANIZED HEALTH CARE EDUCATION/TRAINING PROGRAM
Payer: MEDICARE

## 2022-08-30 ENCOUNTER — APPOINTMENT (OUTPATIENT)
Dept: CT IMAGING | Age: 87
DRG: 071 | End: 2022-08-30
Payer: MEDICARE

## 2022-08-30 ENCOUNTER — TELEPHONE (OUTPATIENT)
Dept: CARDIOLOGY CLINIC | Age: 87
End: 2022-08-30

## 2022-08-30 DIAGNOSIS — I50.9 CONGESTIVE HEART FAILURE, UNSPECIFIED HF CHRONICITY, UNSPECIFIED HEART FAILURE TYPE (HCC): ICD-10-CM

## 2022-08-30 DIAGNOSIS — R47.01 APHASIA: Primary | ICD-10-CM

## 2022-08-30 PROBLEM — R29.90 STROKE-LIKE SYMPTOMS: Status: ACTIVE | Noted: 2022-08-30

## 2022-08-30 LAB
ALBUMIN SERPL-MCNC: 3.8 GM/DL (ref 3.4–5)
ALBUMIN SERPL-MCNC: 3.8 GM/DL (ref 3.4–5)
ALP BLD-CCNC: 43 IU/L (ref 40–129)
ALP BLD-CCNC: 45 IU/L (ref 40–129)
ALT SERPL-CCNC: 9 U/L (ref 10–40)
ALT SERPL-CCNC: 9 U/L (ref 10–40)
ANION GAP SERPL CALCULATED.3IONS-SCNC: 10 MMOL/L (ref 4–16)
ANION GAP SERPL CALCULATED.3IONS-SCNC: 10 MMOL/L (ref 4–16)
AST SERPL-CCNC: 20 IU/L (ref 15–37)
AST SERPL-CCNC: 21 IU/L (ref 15–37)
BASOPHILS ABSOLUTE: 0 K/CU MM
BASOPHILS RELATIVE PERCENT: 0.5 % (ref 0–1)
BILIRUB SERPL-MCNC: 0.3 MG/DL (ref 0–1)
BILIRUB SERPL-MCNC: 0.4 MG/DL (ref 0–1)
BUN BLDV-MCNC: 24 MG/DL (ref 6–23)
BUN BLDV-MCNC: 24 MG/DL (ref 6–23)
CALCIUM SERPL-MCNC: 9.1 MG/DL (ref 8.3–10.6)
CALCIUM SERPL-MCNC: 9.2 MG/DL (ref 8.3–10.6)
CHLORIDE BLD-SCNC: 104 MMOL/L (ref 99–110)
CHLORIDE BLD-SCNC: 106 MMOL/L (ref 99–110)
CO2: 26 MMOL/L (ref 21–32)
CO2: 27 MMOL/L (ref 21–32)
CREAT SERPL-MCNC: 1.2 MG/DL (ref 0.6–1.1)
CREAT SERPL-MCNC: 1.3 MG/DL (ref 0.6–1.1)
DIFFERENTIAL TYPE: ABNORMAL
EKG DIAGNOSIS: NORMAL
EKG Q-T INTERVAL: 424 MS
EKG QRS DURATION: 100 MS
EKG QTC CALCULATION (BAZETT): 549 MS
EKG R AXIS: 92 DEGREES
EKG T AXIS: 13 DEGREES
EKG VENTRICULAR RATE: 101 BPM
EOSINOPHILS ABSOLUTE: 0.4 K/CU MM
EOSINOPHILS RELATIVE PERCENT: 5.7 % (ref 0–3)
GFR AFRICAN AMERICAN: 47 ML/MIN/1.73M2
GFR AFRICAN AMERICAN: 52 ML/MIN/1.73M2
GFR NON-AFRICAN AMERICAN: 39 ML/MIN/1.73M2
GFR NON-AFRICAN AMERICAN: 43 ML/MIN/1.73M2
GLUCOSE BLD-MCNC: 130 MG/DL (ref 70–99)
GLUCOSE BLD-MCNC: 85 MG/DL (ref 70–99)
GLUCOSE BLD-MCNC: 89 MG/DL (ref 70–99)
GLUCOSE BLD-MCNC: 95 MG/DL (ref 70–99)
HCT VFR BLD CALC: 33.8 % (ref 37–47)
HCT VFR BLD CALC: 34.8 % (ref 37–47)
HEMOGLOBIN: 10.3 GM/DL (ref 12.5–16)
HEMOGLOBIN: 10.4 GM/DL (ref 12.5–16)
IMMATURE NEUTROPHIL %: 0.3 % (ref 0–0.43)
INR BLD: 1.09 INDEX
LV EF: 58 %
LVEF MODALITY: NORMAL
LYMPHOCYTES ABSOLUTE: 1.2 K/CU MM
LYMPHOCYTES RELATIVE PERCENT: 15.5 % (ref 24–44)
MCH RBC QN AUTO: 30 PG (ref 27–31)
MCH RBC QN AUTO: 30.2 PG (ref 27–31)
MCHC RBC AUTO-ENTMCNC: 29.6 % (ref 32–36)
MCHC RBC AUTO-ENTMCNC: 30.8 % (ref 32–36)
MCV RBC AUTO: 101.5 FL (ref 78–100)
MCV RBC AUTO: 98.3 FL (ref 78–100)
MONOCYTES ABSOLUTE: 0.7 K/CU MM
MONOCYTES RELATIVE PERCENT: 9.7 % (ref 0–4)
NUCLEATED RBC %: 0 %
PDW BLD-RTO: 15.1 % (ref 11.7–14.9)
PDW BLD-RTO: 15.2 % (ref 11.7–14.9)
PLATELET # BLD: 215 K/CU MM (ref 140–440)
PLATELET # BLD: 221 K/CU MM (ref 140–440)
PMV BLD AUTO: 11.3 FL (ref 7.5–11.1)
PMV BLD AUTO: 11.7 FL (ref 7.5–11.1)
POTASSIUM SERPL-SCNC: 4.1 MMOL/L (ref 3.5–5.1)
POTASSIUM SERPL-SCNC: 4.8 MMOL/L (ref 3.5–5.1)
PROTHROMBIN TIME: 14.1 SECONDS (ref 11.7–14.5)
RBC # BLD: 3.43 M/CU MM (ref 4.2–5.4)
RBC # BLD: 3.44 M/CU MM (ref 4.2–5.4)
SEGMENTED NEUTROPHILS ABSOLUTE COUNT: 5.2 K/CU MM
SEGMENTED NEUTROPHILS RELATIVE PERCENT: 68.3 % (ref 36–66)
SODIUM BLD-SCNC: 140 MMOL/L (ref 135–145)
SODIUM BLD-SCNC: 143 MMOL/L (ref 135–145)
TOTAL IMMATURE NEUTOROPHIL: 0.02 K/CU MM
TOTAL NUCLEATED RBC: 0 K/CU MM
TOTAL PROTEIN: 6.8 GM/DL (ref 6.4–8.2)
TOTAL PROTEIN: 7.2 GM/DL (ref 6.4–8.2)
TROPONIN T: <0.01 NG/ML
WBC # BLD: 7.6 K/CU MM (ref 4–10.5)
WBC # BLD: 8.7 K/CU MM (ref 4–10.5)

## 2022-08-30 PROCEDURE — 85027 COMPLETE CBC AUTOMATED: CPT

## 2022-08-30 PROCEDURE — 80053 COMPREHEN METABOLIC PANEL: CPT

## 2022-08-30 PROCEDURE — 93010 ELECTROCARDIOGRAM REPORT: CPT | Performed by: INTERNAL MEDICINE

## 2022-08-30 PROCEDURE — 6370000000 HC RX 637 (ALT 250 FOR IP): Performed by: STUDENT IN AN ORGANIZED HEALTH CARE EDUCATION/TRAINING PROGRAM

## 2022-08-30 PROCEDURE — 93005 ELECTROCARDIOGRAM TRACING: CPT | Performed by: PHYSICIAN ASSISTANT

## 2022-08-30 PROCEDURE — 82962 GLUCOSE BLOOD TEST: CPT

## 2022-08-30 PROCEDURE — 70450 CT HEAD/BRAIN W/O DYE: CPT

## 2022-08-30 PROCEDURE — 1200000000 HC SEMI PRIVATE

## 2022-08-30 PROCEDURE — 97167 OT EVAL HIGH COMPLEX 60 MIN: CPT

## 2022-08-30 PROCEDURE — 92610 EVALUATE SWALLOWING FUNCTION: CPT

## 2022-08-30 PROCEDURE — 6360000002 HC RX W HCPCS: Performed by: STUDENT IN AN ORGANIZED HEALTH CARE EDUCATION/TRAINING PROGRAM

## 2022-08-30 PROCEDURE — 93306 TTE W/DOPPLER COMPLETE: CPT

## 2022-08-30 PROCEDURE — 36415 COLL VENOUS BLD VENIPUNCTURE: CPT

## 2022-08-30 PROCEDURE — 71045 X-RAY EXAM CHEST 1 VIEW: CPT

## 2022-08-30 PROCEDURE — 70496 CT ANGIOGRAPHY HEAD: CPT

## 2022-08-30 PROCEDURE — 6360000004 HC RX CONTRAST MEDICATION: Performed by: PHYSICIAN ASSISTANT

## 2022-08-30 PROCEDURE — 99285 EMERGENCY DEPT VISIT HI MDM: CPT

## 2022-08-30 PROCEDURE — 94761 N-INVAS EAR/PLS OXIMETRY MLT: CPT

## 2022-08-30 PROCEDURE — 84484 ASSAY OF TROPONIN QUANT: CPT

## 2022-08-30 PROCEDURE — 85025 COMPLETE CBC W/AUTO DIFF WBC: CPT

## 2022-08-30 PROCEDURE — 85610 PROTHROMBIN TIME: CPT

## 2022-08-30 PROCEDURE — 97535 SELF CARE MNGMENT TRAINING: CPT

## 2022-08-30 RX ORDER — HEPARIN SODIUM 5000 [USP'U]/ML
5000 INJECTION, SOLUTION INTRAVENOUS; SUBCUTANEOUS EVERY 8 HOURS SCHEDULED
Status: DISCONTINUED | OUTPATIENT
Start: 2022-08-30 | End: 2022-09-01 | Stop reason: HOSPADM

## 2022-08-30 RX ORDER — LANOLIN ALCOHOL/MO/W.PET/CERES
325 CREAM (GRAM) TOPICAL 2 TIMES DAILY WITH MEALS
Status: DISCONTINUED | OUTPATIENT
Start: 2022-08-30 | End: 2022-08-30

## 2022-08-30 RX ORDER — SODIUM PHOSPHATE, DIBASIC AND SODIUM PHOSPHATE, MONOBASIC 7; 19 G/133ML; G/133ML
1 ENEMA RECTAL
COMMUNITY

## 2022-08-30 RX ORDER — BISACODYL 10 MG
10 SUPPOSITORY, RECTAL RECTAL PRN
Status: ON HOLD | COMMUNITY
End: 2022-09-01 | Stop reason: HOSPADM

## 2022-08-30 RX ORDER — BACLOFEN 10 MG/1
10 TABLET ORAL 2 TIMES DAILY
Status: ON HOLD | COMMUNITY
End: 2022-09-01 | Stop reason: HOSPADM

## 2022-08-30 RX ORDER — BACLOFEN 10 MG/1
10 TABLET ORAL 2 TIMES DAILY
Status: DISCONTINUED | OUTPATIENT
Start: 2022-08-30 | End: 2022-09-01 | Stop reason: HOSPADM

## 2022-08-30 RX ORDER — DIPHENHYDRAMINE HCL 25 MG
25 TABLET ORAL EVERY 6 HOURS PRN
Status: ON HOLD | COMMUNITY
End: 2022-09-01 | Stop reason: HOSPADM

## 2022-08-30 RX ORDER — ONDANSETRON 2 MG/ML
4 INJECTION INTRAMUSCULAR; INTRAVENOUS EVERY 6 HOURS PRN
Status: DISCONTINUED | OUTPATIENT
Start: 2022-08-30 | End: 2022-08-30

## 2022-08-30 RX ORDER — ASPIRIN 300 MG/1
300 SUPPOSITORY RECTAL DAILY
Status: DISCONTINUED | OUTPATIENT
Start: 2022-08-30 | End: 2022-09-01 | Stop reason: HOSPADM

## 2022-08-30 RX ORDER — DIVALPROEX SODIUM 125 MG/1
125 TABLET, DELAYED RELEASE ORAL 2 TIMES DAILY
Status: DISCONTINUED | OUTPATIENT
Start: 2022-08-30 | End: 2022-09-01 | Stop reason: HOSPADM

## 2022-08-30 RX ORDER — POLYETHYLENE GLYCOL 3350 17 G/17G
17 POWDER, FOR SOLUTION ORAL DAILY PRN
Status: DISCONTINUED | OUTPATIENT
Start: 2022-08-30 | End: 2022-09-01 | Stop reason: HOSPADM

## 2022-08-30 RX ORDER — MIRTAZAPINE 15 MG/1
7.5 TABLET, FILM COATED ORAL NIGHTLY
Status: DISCONTINUED | OUTPATIENT
Start: 2022-08-30 | End: 2022-09-01 | Stop reason: HOSPADM

## 2022-08-30 RX ORDER — FERROUS SULFATE 325(65) MG
325 TABLET ORAL 2 TIMES DAILY WITH MEALS
Status: DISCONTINUED | OUTPATIENT
Start: 2022-08-30 | End: 2022-09-01 | Stop reason: HOSPADM

## 2022-08-30 RX ORDER — LABETALOL HYDROCHLORIDE 5 MG/ML
10 INJECTION, SOLUTION INTRAVENOUS EVERY 10 MIN PRN
Status: DISCONTINUED | OUTPATIENT
Start: 2022-08-30 | End: 2022-09-01 | Stop reason: HOSPADM

## 2022-08-30 RX ORDER — ESCITALOPRAM OXALATE 10 MG/1
10 TABLET ORAL DAILY
COMMUNITY

## 2022-08-30 RX ORDER — CETIRIZINE HYDROCHLORIDE 10 MG/1
10 TABLET ORAL 2 TIMES DAILY
Status: ON HOLD | COMMUNITY
End: 2022-09-01 | Stop reason: HOSPADM

## 2022-08-30 RX ORDER — ONDANSETRON 4 MG/1
4 TABLET, FILM COATED ORAL EVERY 6 HOURS PRN
Status: ON HOLD | COMMUNITY
End: 2022-09-01 | Stop reason: HOSPADM

## 2022-08-30 RX ORDER — ATORVASTATIN CALCIUM 40 MG/1
80 TABLET, FILM COATED ORAL NIGHTLY
Status: DISCONTINUED | OUTPATIENT
Start: 2022-08-30 | End: 2022-09-01 | Stop reason: HOSPADM

## 2022-08-30 RX ORDER — DIVALPROEX SODIUM 125 MG/1
125 TABLET, DELAYED RELEASE ORAL 2 TIMES DAILY
COMMUNITY

## 2022-08-30 RX ORDER — ESCITALOPRAM OXALATE 10 MG/1
10 TABLET ORAL DAILY
Status: DISCONTINUED | OUTPATIENT
Start: 2022-08-30 | End: 2022-09-01 | Stop reason: HOSPADM

## 2022-08-30 RX ORDER — TERBINAFINE HYDROCHLORIDE 250 MG/1
250 TABLET ORAL NIGHTLY
Status: DISCONTINUED | OUTPATIENT
Start: 2022-08-30 | End: 2022-09-01 | Stop reason: HOSPADM

## 2022-08-30 RX ORDER — OXYBUTYNIN CHLORIDE 10 MG/1
20 TABLET, EXTENDED RELEASE ORAL 2 TIMES DAILY
Status: ON HOLD | COMMUNITY
End: 2022-09-01 | Stop reason: HOSPADM

## 2022-08-30 RX ORDER — TERBINAFINE HYDROCHLORIDE 250 MG/1
250 TABLET ORAL NIGHTLY
COMMUNITY

## 2022-08-30 RX ORDER — ASPIRIN 81 MG/1
81 TABLET ORAL DAILY
Status: DISCONTINUED | OUTPATIENT
Start: 2022-08-30 | End: 2022-09-01 | Stop reason: HOSPADM

## 2022-08-30 RX ORDER — MIRTAZAPINE 7.5 MG/1
7.5 TABLET, FILM COATED ORAL NIGHTLY
COMMUNITY

## 2022-08-30 RX ORDER — ACETAMINOPHEN 325 MG/1
650 TABLET ORAL EVERY 6 HOURS PRN
Status: ON HOLD | COMMUNITY
End: 2022-09-01 | Stop reason: HOSPADM

## 2022-08-30 RX ORDER — ONDANSETRON 4 MG/1
4 TABLET, ORALLY DISINTEGRATING ORAL EVERY 8 HOURS PRN
Status: DISCONTINUED | OUTPATIENT
Start: 2022-08-30 | End: 2022-08-30

## 2022-08-30 RX ORDER — LANOLIN ALCOHOL/MO/W.PET/CERES
325 CREAM (GRAM) TOPICAL 2 TIMES DAILY
COMMUNITY

## 2022-08-30 RX ADMIN — BACLOFEN 10 MG: 10 TABLET ORAL at 15:16

## 2022-08-30 RX ADMIN — ATORVASTATIN CALCIUM 80 MG: 40 TABLET, FILM COATED ORAL at 21:11

## 2022-08-30 RX ADMIN — ASPIRIN 81 MG: 81 TABLET, COATED ORAL at 15:16

## 2022-08-30 RX ADMIN — BACLOFEN 10 MG: 10 TABLET ORAL at 21:11

## 2022-08-30 RX ADMIN — ESCITALOPRAM OXALATE 10 MG: 10 TABLET ORAL at 15:16

## 2022-08-30 RX ADMIN — HEPARIN SODIUM 5000 UNITS: 5000 INJECTION INTRAVENOUS; SUBCUTANEOUS at 15:16

## 2022-08-30 RX ADMIN — HEPARIN SODIUM 5000 UNITS: 5000 INJECTION INTRAVENOUS; SUBCUTANEOUS at 21:12

## 2022-08-30 RX ADMIN — FERROUS SULFATE TAB 325 MG (65 MG ELEMENTAL FE) 325 MG: 325 (65 FE) TAB at 17:33

## 2022-08-30 RX ADMIN — DIVALPROEX SODIUM 125 MG: 125 TABLET, DELAYED RELEASE ORAL at 21:12

## 2022-08-30 RX ADMIN — TERBINAFINE TABLETS 250 MG 250 MG: 250 TABLET ORAL at 21:11

## 2022-08-30 RX ADMIN — MIRTAZAPINE 7.5 MG: 15 TABLET, FILM COATED ORAL at 21:12

## 2022-08-30 RX ADMIN — DRONEDARONE 400 MG: 400 TABLET, FILM COATED ORAL at 17:33

## 2022-08-30 RX ADMIN — LEVOTHYROXINE SODIUM 175 MCG: 25 TABLET ORAL at 21:11

## 2022-08-30 RX ADMIN — IOPAMIDOL 75 ML: 755 INJECTION, SOLUTION INTRAVENOUS at 09:00

## 2022-08-30 RX ADMIN — DIVALPROEX SODIUM 125 MG: 125 TABLET, DELAYED RELEASE ORAL at 15:15

## 2022-08-30 ASSESSMENT — LIFESTYLE VARIABLES
HOW OFTEN DO YOU HAVE A DRINK CONTAINING ALCOHOL: NEVER
HOW MANY STANDARD DRINKS CONTAINING ALCOHOL DO YOU HAVE ON A TYPICAL DAY: PATIENT DOES NOT DRINK

## 2022-08-30 ASSESSMENT — PAIN SCALES - GENERAL: PAINLEVEL_OUTOF10: 0

## 2022-08-30 NOTE — ED TRIAGE NOTES
81 Y/O presented to ED due to slurred speech  and \"not acting Right \" as stated by Nursing home staff. Unknown baseline .      LKW @ 0503

## 2022-08-30 NOTE — Clinical Note
Discharge Plan[de-identified] Other/[facility])   Telemetry/Cardiac Monitoring Required?: No

## 2022-08-30 NOTE — PROGRESS NOTES
Speech Language Pathology  Facility/Department: [address] [place] [address]   CLINICAL BEDSIDE SWALLOW EVALUATION    NAME: Luigi Soliman  : 1935  MRN: [id number]    IMPRESSIONS AND RECOMMENDATIONS: Luigi Soliman was referred for a bedside swallow evaluation following admission to [facility] with changes in communication, left leg weakness and confusion concerning for possible CVA. Per radiologist, head CT negative for acute abnormality. MRI pending. Medical hx includes CHF, GERD, afib, HTN, HLD, hypothyroidism. No known history of dysphagia prior to admission. Pt does have a history of cognitive impairment and was seen by SLP for cognitive tx in . Pt seen for evaluation seated upright in bed, alert, cooperative. She was distractible and benefited from intermittent redirections throughout. Children present. Oral mechanism examination limited d/t difficulty with direction following; no overt asymmetry was observed. Pt presented with PO trials of ice chips, thin liquids via tsp/cup/straw, puree, soft solids and regular solids. Oral stage mildly impaired with intact labial seal, slow/disorganized mastication, and mild diffuse oral residue with regular solids. Residue did not fully clear with liquid wash. Pharyngeal stage appears intact/age-appropriate without s/s aspiration. Speech/language screened; speech was intelligible, direction-following was limited, and expression characterized by short and inconsistently relevant utterances. Will monitor imaging reports to determine appropriateness of speech/language evaluation. Recommend initiation of soft/bite-sized diet and thin liquids. Pt will benefit from 1:1 assistance with meals including cuing for attention to task. SLP will follow. D/w pt, family, RN.      ADMISSION DATE: 2022  ADMITTING DIAGNOSIS: has Chronic anticoagulation; Hypertension; Hypothyroidism; Encounter for long-term (current) use of other medications; Recurrent UTI; Multiple falls;  Anemia; Sepsis ([address] Leg weakness; Abnormality of gait; CHF (congestive heart failure) ([address] Rhabdomyolysis; Sepsis associated hypotension ([address] Acute cystitis without hematuria; DVT (deep vein thrombosis) in pregnancy; Progression of deep vein thrombosis (DVT) ([address] EKG abnormalities; Noncompliance with medication regimen; Chronic atrial fibrillation ([address] Acute metabolic encephalopathy; Urinary tract infection associated with catheterization of urinary tract ([address] Fall at home, initial encounter; Frequent falls; Muscle weakness of lower extremity; Tremor; Gastroesophageal reflux disease; Hyperglycemia; Morbid obesity with BMI of 40.0-44.9, adult ([address] CKD (chronic kidney disease) stage 4, GFR 15-29 ml/min ([facility] [address] COVID-19; Hyponatremia; Acute kidney injury superimposed on CKD ([facility] [address] and Stroke-like symptoms on their problem list.  ONSET DATE: this admission    Recent Chest Xray/CT of Chest: see chart    Date of Eval: 8/30/2022  Evaluating Therapist: ENEDELIA Curry    Current Diet level:  Current Diet : NPO      Primary Complaint  Patient Complaint: does not state, communication deficits    Pain:  Pain Assessment  Pain Assessment: None - Denies Pain  Pain Level: 0  Patient's Stated Pain Goal: 0 - No pain    Reason for Referral  Eduardo Carcamo was referred for a bedside swallow evaluation to assess the efficiency of her swallow function, identify signs and symptoms of aspiration and make recommendations regarding safe dietary consistencies, effective compensatory strategies, and safe eating environment. Impression  Dysphagia Diagnosis: Mild oral stage dysphagia  Dysphagia Outcome Severity Scale: Level 5: Mild dysphagia- Distant supervision. May need one diet consistency restricted     Treatment Plan  Requires SLP Intervention: Yes  Duration of Treatment: LOS  D/C Recommendations: Ongoing speech therapy is recommended during this hospitalization; To be determined       Recommended Diet and Intervention Recommended Form of Meds: PO (one at a time)     Therapeutic Interventions: Diet tolerance monitoring; Therapeutic PO trials with SLP;Patient/Family education    Compensatory Swallowing Strategies  Compensatory Swallowing Strategies : Upright as possible for all oral intake; Total feed    Treatment/Goals  Short-term Goals  Timeframe for Short-term Goals: length of admission  Goal 1: Pt will tolerate soft/bite-sized diet/thin liquids with adequate oral manipulation/clearance and no s/s aspiration. Goal 2: Pt will tolerate regular solids with adequate oral manipulation/clearance 10/10 for diet advancement. Goal 3: Pt/caregivers will indicate understanding of all education/recommendations. General  Chart Reviewed: Yes  Behavior/Cognition: Alert; Cooperative; Requires cueing;Distractible  Respiratory Status: Room air  O2 Device: None (Room air)  Communication Observation:  (cognitive communication deficits?)  Follows Directions: Simple  Dentition: Adequate; Some missing teeth  Patient Positioning: Upright in bed  Baseline Vocal Quality: Normal  Prior Dysphagia History: none known prior to admission  Consistencies Administered: Regular;Pureed; Thin - teaspoon; Thin - cup; Thin - straw; Ice Chips           Vision/Hearing  Hearing  Hearing: Within functional limits    Oral Motor Deficits  Oral/Motor  Oral Hygiene: Moist    Oral Phase Dysfunction  Oral Phase  Oral Phase: Exceptions     Indicators of Pharyngeal Phase Dysfunction        Prognosis  Individuals consulted  Consulted and agree with results and recommendations: Patient; Family member;RN  Family member consulted: children    Education  Patient Education: results/recommendations  Patient Education Response: No evidence of learning  Safety Devices in place: Yes  Type of devices: All fall risk precautions in place; Left in bed       Therapy Time  SLP Individual Minutes  Time In: 6851  Time Out: 65  Minutes: [facility]  8/30/2022 4:28 PM

## 2022-08-30 NOTE — ED NOTES
Per discussion with nursing home, patient's last known well was 'sometime last night'. Slurred speech noted, aphasia noted, left sided weakness. Per patent's son, patient was checked for clot in leg, negative as of Friday. Son also reports patient being taken off of blood thinners as of Friday for a dental procedure and son says that the nursing home said that she isn't on any blood thinners. Pt does have hx of Atrial fibrillation. Son reports that patient has \"been on Eloquis for years\".              Tano Irwin RN  08/30/22 6769

## 2022-08-30 NOTE — PLAN OF CARE
Problem: Discharge Planning  Goal: Discharge to home or other facility with appropriate resources  Outcome: Progressing  Flowsheets (Taken 8/30/2022 1700)  Discharge to home or other facility with appropriate resources: Identify barriers to discharge with patient and caregiver     Problem: Safety - Adult  Goal: Free from fall injury  Outcome: Progressing     Problem: ABCDS Injury Assessment  Goal: Absence of physical injury  Outcome: Progressing     Problem: Skin/Tissue Integrity  Goal: Absence of new skin breakdown  Description: 1. Monitor for areas of redness and/or skin breakdown  2. Assess vascular access sites hourly  3. Every 4-6 hours minimum:  Change oxygen saturation probe site  4. Every 4-6 hours:  If on nasal continuous positive airway pressure, respiratory therapy assess nares and determine need for appliance change or resting period. Outcome: Progressing     Problem: Confusion  Goal: Confusion, delirium, dementia, or psychosis is improved or at baseline  Description: INTERVENTIONS:  1. Assess for possible contributors to thought disturbance, including medications, impaired vision or hearing, underlying metabolic abnormalities, dehydration, psychiatric diagnoses, and notify attending LIP  2. [place] high risk fall precautions, as indicated  3. Provide frequent short contacts to provide reality reorientation, refocusing and direction  4. Decrease environmental stimuli, including noise as appropriate  5. Monitor and intervene to maintain adequate nutrition, hydration, elimination, sleep and activity  6. If unable to ensure safety without constant attention obtain sitter and review sitter guidelines with assigned personnel  7.  Initiate Psychosocial CNS and Spiritual Care consult, as indicated  Outcome: Progressing

## 2022-08-30 NOTE — H&P
V2.0  History and Physical      Name:  Cristhian Licona /Age/Sex: 1935  (82 y.o. female)   MRN & CSN:  [id number] & [id number] Encounter Date/Time: 2022 10:51 AM EDT   Location:  [id number] PCP: Katina Carbajal MD       Hospital Day: 1    Assessment and Plan:   Cristhian Licona is a 82 y.o. female with PMH of Afib on Elqiuis, CHF, CKD  who presents with Stroke-like symptoms    #CVA. Presented from SNF with global aphagia, left leg weakness, and confusion. Unknown last well known. [address]. Not tpa candidate. Hx of afib on  Eliquis but per nursing home hasn't been taking for months unknown why. Has been off ASA since  for dental procedure. - MRI WO Contrast  - neurology consult  - ECHO   - lipid panel, HA1C  - tele  - permissive HTN for 24h  - continue ASA    #Chronic atrial fibrillation. Rate controlled. On home Eliquis and dronedarone. - continue dronedarone   - hold Eliquis; await MRI and neuro recs  - tele    #HFpEF. Not in excarbation. ECHO 10/15/2020 EF 50-55%, LA mild-moderate dilated, mild LVH  - will hold home lasix, losartan  - input/output, daily weights    #CKD Cr 1.2 baseline 1.2-1.4  - avoid nephrotoxins  - monitor    #chronic anemia  Hgb 10.3. No sign of bleed. - continue iron      Disposition:   Current Living situation: SNF  Expected Disposition: rehab vs SNF  Estimated D/C: 2 days     Diet Diet NPO   DVT Prophylaxis [] Lovenox, [x]  Heparin, [] SCDs, [] Ambulation,  [] Eliquis, [] Xarelto   Code Status Full Code   Surrogate Decision Maker/ Julio Long     History from:     patient    History of Present Illness:     Chief Complaint: Stroke-like symptoms  Cristhian Licona is a 82 y.o. female with PMH of Afib, CHF, CKD   who presents with aphasia and left LE weakness. Pt woke up today 4Am noted to have grossly abnormal speech. Pt is normally alert and oriented x3 . Unable to get full story from pt given her expressive aphasia.  Spoke to son POA, who stated pt has been more dependent on others recently. However, she is normally alert and oriented x3, on wheel chair . She was doing rehab at the nursing facility but it stopped by insurance due to lack of participation. Pt was supposed to be on Eliquis per cardiology but for some reason she was not receiving it at the nursing facility. Pt was also complaining of left side pain and possibly weakness last Friday where she went to urgent care; she had doppler studies which was negative. At the ED, pt was hemodynamically stable. Stroke code called;  per [facility], tPA not indicated. CT, CTA negative for acute hemorrhage or occlusion. Review of Systems: Need 10 Elements   Denies fever, night sweats, chills, chest pain, cough, dyspnea, palpitations, nausea, vomiting, diarrhea, dysuria. Objective:   No intake or output data in the 24 hours ending 08/30/22 1305   Vitals:   Vitals:    08/30/22 1003 08/30/22 1102 08/30/22 1156 08/30/22 1249   BP: 115/80 (!) 122/90     Pulse: 97  100 98   Resp:   23 22   Temp:   98.3 °F (36.8 °C)    TempSrc:   Oral    SpO2: 96% 95% 96% 90%   Weight:       Height:           Medications Prior to Admission     Prior to Admission medications    Medication Sig Start Date End Date Taking? Authorizing Provider   acetaminophen (TYLENOL) 325 MG tablet Take 650 mg by mouth every 6 hours as needed for Pain   Yes Historical Provider, MD   baclofen (LIORESAL) 10 MG tablet Take 10 mg by mouth 2 times daily   Yes Historical Provider, MD   diphenhydrAMINE (BENADRYL) 25 MG tablet Take 25 mg by mouth every 6 hours as needed for Itching   Yes Historical Provider, MD   bisacodyl (DULCOLAX) 10 MG suppository Place 10 mg rectally as needed for Constipation   Yes Historical Provider, MD   divalproex (DEPAKOTE) 125 MG DR tablet Take 125 mg by mouth 2 times daily   Yes Historical Provider, MD   ferrous sulfate (FE TABS 325) 325 (65 Fe) MG EC tablet Take 325 mg by mouth in the morning and 325 mg in the evening.    Yes Historical Provider, MD   Sodium Phosphates (FLEET) 7-19 GM/118ML Place 1 enema rectally once as needed   Yes Historical Provider, MD   hydrocortisone 2.5 % cream Apply topically 2 times daily Apply topically 2 times daily to groin area.    Yes Historical Provider, MD   escitalopram (LEXAPRO) 10 MG tablet Take 10 mg by mouth daily   Yes Historical Provider, MD   magnesium hydroxide (MILK OF MAGNESIA) 400 MG/5ML suspension Take 30 mLs by mouth every 12 hours as needed for Constipation Also has additional for nightly prn dosing   Yes Historical Provider, MD   oxybutynin (DITROPAN-XL) 10 MG extended release tablet Take 20 mg by mouth 2 times daily   Yes Historical Provider, MD   mirtazapine (REMERON) 7.5 MG tablet Take 7.5 mg by mouth nightly   Yes Historical Provider, MD   terbinafine (LAMISIL) 250 MG tablet Take 250 mg by mouth nightly   Yes Historical Provider, MD   ondansetron (ZOFRAN) 4 MG tablet Take 4 mg by mouth every 6 hours as needed for Nausea or Vomiting   Yes Historical Provider, MD   cetirizine (ZYRTEC) 10 MG tablet Take 10 mg by mouth 2 times daily   Yes Historical Provider, MD   vitamin D 25 MCG (1000 UT) CAPS Take 5 capsules by mouth daily  Patient taking differently: Take 1,000 Units by mouth daily 1/7/22 8/30/22  Elaine Trinidad DO   furosemide (LASIX) 20 MG tablet Take 1 pill on Mon, Weds, Fri  Patient taking differently: Take 20 mg by mouth See Admin Instructions Receives on Mon/Wed/Fri//Sat/Sun 11/29/21   Christine Ball MD   levothyroxine (SYNTHROID) 175 MCG tablet Take 1 tablet by mouth daily  Patient taking differently: Take 175 mcg by mouth nightly 9/8/21   Katina Carbajal MD   potassium chloride (MICRO-K) 10 MEQ extended release capsule Take 1 capsule by mouth daily  Patient taking differently: Take 10 mEq by mouth nightly 9/8/21   Katina Carbajal MD   dronedarone hcl (MULTAQ) 400 MG TABS TAKE 1 TABLET TWICE A DAY WITH MEALS  Patient taking differently: Take 400 mg by mouth 2 times daily (with meals) 3/8/21 8/30/22  Silvio Preciado MD   loperamide (IMODIUM) 2 MG capsule Take 2 mg by mouth daily as needed for Diarrhea    Historical Provider, MD   loratadine (CLARITIN) 10 MG tablet Take 10 mg by mouth daily     Historical Provider, MD   acetaminophen (TYLENOL) 325 MG tablet Take 325 mg by mouth nightly    Historical Provider, MD       Physical Exam: Need 8 Elements   Physical Exam  HENT:      Head: Normocephalic and atraumatic. Eyes:      Pupils: Pupils are equal, round, and reactive to light. Comments: Unable to check EOM   Cardiovascular:      Rate and Rhythm: Normal rate. Rhythm irregular. Pulses: Normal pulses. Heart sounds: Normal heart sounds. Pulmonary:      Effort: Pulmonary effort is normal.      Breath sounds: Normal breath sounds. No rales. Abdominal:      General: Abdomen is flat. Bowel sounds are normal.      Palpations: Abdomen is soft. Tenderness: There is no abdominal tenderness. Musculoskeletal:         General: Normal range of motion. Cervical back: Normal range of motion and neck supple. Right lower leg: No edema. Left lower leg: No edema. Skin:     General: Skin is warm and dry. Capillary Refill: Capillary refill takes less than 2 seconds. Neurological:      Mental Status: She is alert. Sensory: No sensory deficit. Motor: Weakness (LLE) present. Comments: Oriented to person only.  Unable to perform full neuro exam as pt was unable to follow all commands          Past Medical History:   PMHx   Past Medical History:   Diagnosis Date    Allergic rhinitis     Arthritis     CHF (congestive heart failure) ([address]     DVT, lower extremity, proximal, acute, left ([address]     Edema leg     Gastroesophageal reflux disease 3/30/2019    H/O 24 hour EKG monitoring 6/10 1/09 11/08    6/7/10- predominant rhythm is a-fib, freq PVCs    H/O cardiac catheterization 7/20/00    H/O cardiac catheterization 7/20/00    right dominant system, left main is short but angiographically normal, LAD and CX reveal sign  tortuosities distally  but no sign angiographic atherosclerotic disease noted except luminal  irregularities here and there. LVSF normal, EF  55%    H/O cardiovascular stress test 5/2/2016    lexiscan-normal,EF70%    H/O Doppler echocardiogram 7/12/10    intimal thickening but no sign. atherosclerotic plaque noted in [facility]  [facility]. H/O echocardiogram 5/2/2016 9/10     5/16 EF60% mild MR, TR, AR 9/20/10 Complete 2 dim transthoracic echo, techn. difficult. The study was  tech limited, LVSF is normal    History of atrial fibrillation     History of cardiovascular stress test 7/26/13, 7/10 8/08    7/13-WNL Observed defect consistent with breast attenuation. EF 70%. 7/10-post stress myocardial images show a normal pattern of perfusion in all regions. LV is normal  in size. EF is 62%, global  LVSF is normal    History of falling     per old chart    History of nuclear stress test 10/08/2020    EF 60%, Normal    Hx of blood clots     per old chart  hx of DVT left leg    Hx: UTI (urinary tract infection)     Hyperglycemia 3/30/2019    Hyperlipidemia     Hypertension     Hypothyroidism     Incontinence of bowel     Incontinence of urine     Sepsis ([facility] [address] 2015    Tremor 3/30/2019    Wears glasses     to read    Wears partial dentures     upper     PSHX:  has a past surgical history that includes Total knee arthroplasty (2005, 2006); Breast surgery; joint replacement (4/2005 2/2006); Cardioversion (10/17/2008); Cardioversion (12/15/2008); other surgical history (10/2009); pr total hip arthroplasty (Left, 02/23/2015); Hysterectomy (age 21's); IR GUIDED FLUID DRAINAGE BY CATH SOFT TISSUE PERC (8/21/2020); and IR GUIDED FLUID DRAINAGE BY CATH SOFT TISSUE PERC (9/8/2020). Allergies:    Allergies   Allergen Reactions    Tape [employer] Tape] Itching    Codeine Rash    Sulfa Antibiotics Nausea And Vomiting     Fam HX:  family history includes Diabetes in her father and mother; Heart Disease in her father. Soc HX:   Social History     Socioeconomic History    Marital status:      Spouse name: None    Number of children: None    Years of education: None    Highest education level: None   Tobacco Use    Smoking status: Former     Years: 1.00     Types: Cigarettes    Smokeless tobacco: Never    Tobacco comments:     \"quit yrs ago only smoked for 2-3 yrs\"   Vaping Use    Vaping Use: Never used   Substance and Sexual Activity    Alcohol use: No     Alcohol/week: 0.0 standard drinks    Drug use: No    Sexual activity: Not Currently       Medications:   Medications:    Infusions:   PRN Meds:     Labs      CBC:   Recent Labs     08/30/22  0558 08/30/22  0845   WBC 8.7 7.6   HGB 10.3* 10.4*    215     BMP:    Recent Labs     08/30/22  0558 08/30/22  0845    140   K 4.8 4.1    104   CO2 27 26   BUN 24* 24*   CREATININE 1.3* 1.2*   GLUCOSE 85 95     Hepatic:   Recent Labs     08/30/22  0558 08/30/22  0845   AST 21 20   ALT 9* 9*   BILITOT 0.3 0.4   ALKPHOS 45 43     Lipids:   Lab Results   Component Value Date/Time    CHOL 144 02/05/2020 01:30 PM    HDL 47 02/05/2020 01:30 PM    TRIG 88 02/05/2020 01:30 PM     Hemoglobin A1C:   Lab Results   Component Value Date/Time    LABA1C 5.9 07/07/2014 04:32 PM     TSH:   Lab Results   Component Value Date/Time    TSH 0.39 09/08/2021 04:10 PM     Troponin:   Lab Results   Component Value Date/Time    TROPONINT <0.010 08/30/2022 08:45 AM    TROPONINT <0.010 01/14/2022 09:05 AM    TROPONINT 0.018 01/13/2022 04:45 AM     Lactic Acid: No results for input(s): LACTA in the last 72 hours. BNP: No results for input(s): PROBNP in the last 72 hours.   UA:  Lab Results   Component Value Date/Time    NITRU NEGATIVE 01/12/2022 05:26 PM    NITRU Negative 07/01/2014 05:46 PM    COLORU YELLOW 01/12/2022 05:26 PM    PHUR 6.0 07/01/2014 05:46 PM    WBCUA 4 01/12/2022 05:26 PM    RBCUA 1 01/12/2022 05:26 PM MUCUS RARE 01/12/2022 05:26 PM    TRICHOMONAS NONE SEEN 01/12/2022 05:26 PM    YEAST FEW 07/06/2018 10:17 AM    BACTERIA RARE 01/12/2022 05:26 PM    CLARITYU CLOUDY 01/12/2022 05:26 PM    SPECGRAV 1.019 01/12/2022 05:26 PM    LEUKOCYTESUR LARGE 01/12/2022 05:26 PM    UROBILINOGEN NEGATIVE 01/12/2022 05:26 PM    BILIRUBINUR NEGATIVE 01/12/2022 05:26 PM    BILIRUBINUR neg 01/31/2014 04:02 PM    BLOODU NEGATIVE 01/12/2022 05:26 PM    GLUCOSEU Negative 07/01/2014 05:46 PM    KETUA NEGATIVE 01/12/2022 05:26 PM    AMORPHOUS RARE 02/23/2018 08:20 PM     Urine Cultures: No results found for: Estela Hero  Blood Cultures: No results found for: BC  No results found for: BLOODCULT2  Organism:   Lab Results   Component Value Date/Time    ORG PROT 11/22/2018 01:00 PM       Imaging/Diagnostics Last 24 Hours   CT HEAD WO CONTRAST    Result Date: 8/30/2022  EXAMINATION: CT OF THE HEAD WITHOUT CONTRAST  8/30/2022 8:50 am TECHNIQUE: CT of the head was performed without the administration of intravenous contrast. Automated exposure control, iterative reconstruction, and/or weight based adjustment of the mA/kV was utilized to reduce the radiation dose to as low as reasonably achievable. COMPARISON: 01/12/2022. HISTORY: ORDERING SYSTEM PROVIDED HISTORY: Stroke Symptoms TECHNOLOGIST PROVIDED HISTORY: Has a \"code stroke\" or \"stroke alert\" been called? ->Yes Reason for exam:->Stroke Symptoms Decision Support Exception - unselect if not a suspected or confirmed emergency medical condition->Emergency Medical Condition (MA) Reason for Exam: Aphasia FINDINGS: BRAIN/VENTRICLES: This study is degraded by motion artifact. There is cerebral parenchymal volume loss with severe chronic microvascular white matter ischemic disease, stable. There are no areas of hemorrhage, mass, or midline shift. No abnormal extra-axial fluid collections. The ventricles are enlarged, likely related to involutional change. Gray-white differentiation is maintained. Vascular calcifications are noted in the carotid siphons and distal vertebral arteries. ORBITS: Lens implants from prior cataract surgery are noted. The orbits are otherwise unremarkable. SINUSES: The paranasal sinuses and mastoid air cells are clear. SOFT TISSUES/SKULL:  The calvarium is intact. No appreciable scalp soft tissue swelling. 1. No acute intracranial abnormality. 2. Cerebral and cerebellar parenchymal volume loss with chronic microvascular white matter ischemic disease. XR CHEST PORTABLE    Result Date: 8/30/2022  EXAMINATION: ONE XRAY VIEW OF THE CHEST 8/30/2022 9:08 am COMPARISON: 01/12/2022 HISTORY: ORDERING SYSTEM PROVIDED HISTORY: stroke symptoms TECHNOLOGIST PROVIDED HISTORY: Reason for exam:->stroke symptoms Reason for Exam: stroke symptoms FINDINGS: The cardiac silhouette is at the upper limits of normal in size. Mild pulmonary vascular congestion has increased. Vascular calcifications are noted along the aorta. Lung volumes are low. No pleural effusion or pneumothorax. The visualized osseous structures are unremarkable. 1. Slight increase in mild pulmonary vascular congestion. CTA HEAD NECK W CONTRAST    Result Date: 8/30/2022  EXAMINATION: CTA OF THE HEAD AND NECK WITH CONTRAST 8/30/2022 9:00 am TECHNIQUE: CTA of the head and neck was performed with the administration of intravenous contrast. Multiplanar reformatted images are provided for review. MIP images are provided for review. Stenosis of the internal carotid arteries measured using NASCET criteria. Automated exposure control, iterative reconstruction, and/or weight based adjustment of the mA/kV was utilized to reduce the radiation dose to as low as reasonably achievable. COMPARISON: None HISTORY: ORDERING SYSTEM PROVIDED HISTORY: Stroke Symptoms TECHNOLOGIST PROVIDED HISTORY: Has a \"code stroke\" or \"stroke alert\" been called? ->Yes Reason for exam:->Stroke Symptoms Decision Support Exception - unselect if not a suspected or confirmed emergency medical condition->Emergency Medical Condition (MA) Reason for Exam: Aphasia; Cerebrovascular Accident Relevant Medical/Surgical History: pt confused, unable to hold still FINDINGS: CTA NECK: AORTIC ARCH/ARCH VESSELS: No dissection or arterial injury. No significant stenosis of the brachiocephalic or subclavian arteries. CAROTID ARTERIES: No dissection, arterial injury, or hemodynamically significant stenosis by NASCET criteria. VERTEBRAL ARTERIES: No dissection, arterial injury, or significant stenosis. SOFT TISSUES: The lung apices are clear. No cervical or superior mediastinal lymphadenopathy. The larynx and pharynx are unremarkable. No acute abnormality of the salivary and thyroid glands. BONES: No acute osseous abnormality. CTA HEAD: ANTERIOR CIRCULATION: No significant stenosis of the intracranial internal carotid, anterior cerebral, or middle cerebral arteries. No aneurysm. POSTERIOR CIRCULATION: No significant stenosis of the vertebral, basilar, or posterior cerebral arteries. No aneurysm. OTHER: No dural venous sinus thrombosis on this non-dedicated study. BRAIN: Severe chronic microvascular disease is identified within the periventricular white matter. Moderate ventriculomegaly is likely related to central atrophy. Unremarkable CTA of the head and neck. Severe chronic microvascular disease within the periventricular white matter. Moderate ventriculomegaly likely secondary to central atrophy.  RECOMMENDATIONS: Unavailable       Personally reviewed Lab Studies, Imaging, and discussed case with pt    Electronically signed by Genesis Beckett MD on 8/30/2022 at 1:05 PM

## 2022-08-30 NOTE — TELEPHONE ENCOUNTER
Pt son called and states pt is at [facility] poss stroke'. Son has question about pt med.  Please call Adi Bristow [phone]

## 2022-08-30 NOTE — ED PROVIDER NOTES
Supervisory Note:    This patient was initially evaluated by the NP/PA. Please see their documentation for their full evaluation, impression and plan. I evaluated this patient directly. This is an 62-year-old woman who was sent to the emergency department from her nursing home due to sudden onset of slurred speech and altered mental status. Patient has not had any focal or lateralizing symptoms. Physical exam:  Neuro: Awake and responsive, dysarthria, confusion noted. No motor weakness identified  Chest: No respiratory distress  Skin: Warm and dry    ECG: Atrial fibrillation pattern with slight RVR. Ventricular rate of 101. QRS is 100. QTc is 549. There are no ST elevations or depressions. There is no ventricular ectopy.     Clinical impression:  Acute stroke      Plan:   Admit to the hospital for further evaluation by medicine and neurology     Jessenia De Dios MD  08/30/22 2197

## 2022-08-30 NOTE — PROGRESS NOTES
WFL  Hearing:  [facility]  Cardiopulmonary:  No 02 needs. HR increased ~150 sitting EOB, returned to supine. HR decreased ~95      Body Systems and functions:  ROM R/L:  WFL. Strength R/L:  4-/5,   Sensation: Difficult to assess due to cognition  Tone: Normal  Coordination: Decreased speed BUE  Perception: mod decreased initiation/sequencing w/ hand over hand to overcome    Activities of Daily Living (ADLs):  Feeding: Setup  Grooming: Setup/SBA (washing hands/face w/ warm washcloth)  UB bathing: SBA  LB bathing: maxA  UB dressing: SBA  LB dressing: maxA (Donning socks supine in bed)  Toileting: maxA    Cognitive and Psychosocial Functioning:  Overall cognitive status: AxO to self only, decreased problem solving/short term memory, decrease insight into deficits, follows one step commands w/ repetition and increased time  Affect: Pleasantly confused       Mobility:  Supine to sit:  maxA  Transfers: DNT  Sitting balance:  Supa moving to SBA ~8 minutes before fatiguing due to increased HR. Standing balance:  DNT. Functional Mobility DNT  Toilet/Shower Transfers: DNT  Sit to supine: maxA  Scooting to HOB: maxA    Pt in chair position ~5 minutes. HR WFL             AM-PAC Daily Activity Inpatient   How much help for putting on and taking off regular lower body clothing?: Total  How much help for Bathing?: A Lot  How much help for Toileting?: Total  How much help for putting on and taking off regular upper body clothing?: A Little  How much help for taking care of personal grooming?: A Little  How much help for eating meals?: A Little  AM-[facility] Inpatient Daily Activity Raw Score: 13  AM-PAC Inpatient ADL T-Scale Score : 32.03  ADL Inpatient CMS 0-100% Score: 63.03  ADL Inpatient CMS G-Code Modifier : CL    Treatment:  Self Care Training:   Cues were given for safety, sequence, UE/LE placement, visual cues, and balance.     Activities performed today included grooming, LB dressing    Therapeutic Activity Training:   Therapeutic activity training was instructed today. Cues were given for safety, sequence, UE/LE placement, awareness, and balance. Activities performed today included bed mobility training, sup-sit, sitting balance, sit to supine, scooting to [facility]     Safety: patient left in bed  with bed alarm, call light within reach, RN notified, gait belt used. Assessment:  Pt is a 81 yo female admitted from home for stroke like symptoms. Pt at baseline needs assistance for ADLs doesn't perform for high level IADLs and needs assistance for functional transfers to wheelchair. Pt currently presents w/ deficits in ADL and high level IADL independence, functional activity tolerance, dynamic sitting and standing balance and tolerance and functional transfers, BUE strength, initiation/sequencing and cognition. Pt would benefit from continued acute care OT services w/ discharge to [facility] [place] w/ OT  Complexity: High  Prognosis: Good, no significant barriers to participation at this time.    Plan  Times per Week: 1x+  Times per Day: Daily  Current Treatment Recommendations: Strengthening, ROM, Balance training, Functional mobility training, Endurance training, Patient/Caregiver education & training, Self-Care / ADL, Home management training, Safety education & training, Pain management, Cognitive reorientation, Equipment evaluation, education, & procurement, Neuromuscular re-education, Positioning, Cognitive/Perceptual training, Coordination training     Equipment: defer    Goals:  Pt goal: go home  Time Frame for STGs: discharge  Goal 1: Pt will perform UE ADLs SBA  Goal 2: Pt will perform LE ADLs modA w/ AD  Goal 3: Pt will perform toileting modA  Goal 4: Pt will perform functional transfer w/ AD Supa  Goal 5: Pt will perform all aspects of bed mobility SBA  Goal 6: Pt will perform therex/theract in order to increase functional activity tolerance and dynamic standing balance    Treatment plan:  Pt will perform functional task in sit reaching in all 3 planes in order to increase dynamic sitting balance and functional activity tolerance    Recommendations for NURSING activity: Up to chair for all 3 meals and up to [facility] for all toileting needs    Time:   Time in: 1442  Time out: 1503  Timed treatment minutes: 11 minutes  Total time: 21 minutes    Electronically signed by:    Roxana JORDAN/L [id number]  3:25 PM,8/30/2022

## 2022-08-30 NOTE — ED NOTES
Medication History  [facility]    Patient Name: Doris Jovel 1935     Medication history has been completed by: Rikki Gabriel CPhT    Source(s) of information: Medication list provided by [id number] [facility]     Primary Care Physician: Ronny Bourne MD     Pharmacy:     Allergies as of 08/30/2022 - Fully Reviewed 08/30/2022   Allergen Reaction Noted    Tape [adhesive tape] Itching 08/20/2012    Codeine Rash 08/20/2012    Sulfa antibiotics Nausea And Vomiting 05/19/2015        Prior to Admission medications    Medication Sig Start Date End Date Taking? Authorizing Provider   acetaminophen (TYLENOL) 325 MG tablet Take 650 mg by mouth every 6 hours as needed for Pain   Yes Historical Provider, MD   baclofen (LIORESAL) 10 MG tablet Take 10 mg by mouth 2 times daily   Yes Historical Provider, MD   diphenhydrAMINE (BENADRYL) 25 MG tablet Take 25 mg by mouth every 6 hours as needed for Itching   Yes Historical Provider, MD   bisacodyl (DULCOLAX) 10 MG suppository Place 10 mg rectally as needed for Constipation   Yes Historical Provider, MD   divalproex (DEPAKOTE) 125 MG DR tablet Take 125 mg by mouth 2 times daily   Yes Historical Provider, MD   ferrous sulfate (FE TABS 325) 325 (65 Fe) MG EC tablet Take 325 mg by mouth in the morning and 325 mg in the evening. Yes Historical Provider, MD   Sodium Phosphates (FLEET) 7-19 GM/118ML Place 1 enema rectally once as needed   Yes Historical Provider, MD   hydrocortisone 2.5 % cream Apply topically 2 times daily Apply topically 2 times daily to groin area.    Yes Historical Provider, MD   escitalopram (LEXAPRO) 10 MG tablet Take 10 mg by mouth daily   Yes Historical Provider, MD   magnesium hydroxide (MILK OF MAGNESIA) 400 MG/5ML suspension Take 30 mLs by mouth every 12 hours as needed for Constipation Also has additional for nightly prn dosing   Yes Historical Provider, MD   oxybutynin (DITROPAN-XL) 10 MG extended release tablet Take 20 mg by mouth 2 times daily   Yes Historical Provider, MD   mirtazapine (REMERON) 7.5 MG tablet Take 7.5 mg by mouth nightly   Yes Historical Provider, MD   terbinafine (LAMISIL) 250 MG tablet Take 250 mg by mouth nightly   Yes Historical Provider, MD   ondansetron (ZOFRAN) 4 MG tablet Take 4 mg by mouth every 6 hours as needed for Nausea or Vomiting   Yes Historical Provider, MD   cetirizine (ZYRTEC) 10 MG tablet Take 10 mg by mouth 2 times daily   Yes Historical Provider, MD   vitamin D 25 MCG (1000 UT) CAPS Take 1,000 Units by mouth daily 1/7/22   Staci Moore DO   furosemide (LASIX) 20 MG tablet Take 20 mg by mouth See Admin Instructions Receives on Mon/Wed/Fri//Sat/Sun 11/29/21   Zac Leon MD   levothyroxine (SYNTHROID) 175 MCG tablet Take 175 mcg by mouth nightly 9/8/21   Carlotta Lozoya MD   potassium chloride (MICRO-K) 10 MEQ extended release capsule Take 10 mEq by mouth nightly 9/8/21   Carlotta Lozoya MD   dronedarone hcl (MULTAQ) 400 MG TABS Take 400 mg by mouth 2 times daily (with meals) 3/8/21   Carlotta Lozoya MD   loperamide (IMODIUM) 2 MG capsule Take 2 mg by mouth daily as needed for Diarrhea    Historical Provider, MD   loratadine (CLARITIN) 10 MG tablet Take 10 mg by mouth daily     Historical Provider, MD   acetaminophen (TYLENOL) 325 MG tablet Take 325 mg by mouth nightly    Historical Provider, MD     Medications added or changed (ex. new medication, dosage change, interval change, formulation change):   Baclofen (added)  Benadryl (added) prn  Bisacodyl suppository prn  (added)  Depakote (added)  Ferrous sulfate (added)  Fleets prn (added)  Hydrocortisone cream (added)  Escitalopram (added)  Milk of mag prn (added)  Oxybutynin ER (added)  Mirtazapine (added)  Terbinafine (added)  Cetirizine (added)  Ondansetron prn (added)  Vitamin D dosage change from 5000 units to 1000 units    Medications removed from list (include reason, ex. noncompliance, medication cost, therapy complete etc.):   Fenofibrate Not on med list per nursing facility  Losartan Not on med list per nursing facility  Pravastatin Not on med list per nursing facility  Nystatin powder Not on med list per nursing facility  Aspirin Not on med list per nursing facility    Comments:  Medication list provided by [facility]    To my knowledge the above medication history is accurate as of 8/30/2022 10:11 AM.   Leopoldo Rhody, CPhT   8/30/2022 10:11 AM

## 2022-08-30 NOTE — ED PROVIDER NOTES
Triage Chief Complaint:   Aphasia and Cerebrovascular Accident    [place]:  Stevie Lopez is a 82 y.o. female that presents via EMS from [facility] for slurred speech and altered mental status. Per EMS, patient awoke this morning at 4 AM with grossly abnormal speech from her baseline per charge nurse at the facility. Unknown exact last known well, last night before going to bed. Patient is also having difficulty following commands and is alert to self only. Per EMS, patient is typically alert and oriented x3 at baseline. I did attempt to call and speak with [facility] staff, to discuss patient's presentation and when she was last known well was not able to reach staff members, was sent to multiple voicemails without response. Accu-Chek per EMS is 98. Patient is currently denying any pain. Noted to have aphasic speech on initial assessment but is acutely awake and responsive to pain. Very limited HPI. On chart review from nursing home paperwork, patient is full code, is on a baby aspirin only. ROS:  At least 10 systems reviewed and otherwise acutely negative except as in the [address] Past Medical History:   Diagnosis Date    Allergic rhinitis     Arthritis     CHF (congestive heart failure) ([facility] [address]     DVT, lower extremity, proximal, acute, left (HCC)     Edema leg     Gastroesophageal reflux disease 3/30/2019    H/O 24 hour EKG monitoring 6/10 1/09 11/08    6/7/10- predominant rhythm is a-fib, freq PVCs    H/O cardiac catheterization 7/20/00    H/O cardiac catheterization 7/20/00    right dominant system, left main is short but angiographically normal, LAD and CX reveal sign  tortuosities distally  but no sign angiographic atherosclerotic disease noted except luminal  irregularities here and there. LVSF normal, EF  55%    H/O cardiovascular stress test 5/2/2016    lexiscan-normal,EF70%    H/O Doppler echocardiogram 7/12/10    intimal thickening but no sign.  atherosclerotic plaque noted in [facility]  LICA. H/O echocardiogram 5/2/2016 9/10     5/16 EF60% mild MR, TR, AR 9/20/10 Complete 2 dim transthoracic echo, techn. difficult. The study was  tech limited, LVSF is normal    History of atrial fibrillation     History of cardiovascular stress test 7/26/13, 7/10 8/08    7/13-WNL Observed defect consistent with breast attenuation. EF 70%. 7/10-post stress myocardial images show a normal pattern of perfusion in all regions. LV is normal  in size. EF is 62%, global  LVSF is normal    History of falling     per old chart    History of nuclear stress test 10/08/2020    EF 60%, Normal    Hx of blood clots     per old chart  hx of DVT left leg    Hx: UTI (urinary tract infection)     Hyperglycemia 3/30/2019    Hyperlipidemia     Hypertension     Hypothyroidism     Incontinence of bowel     Incontinence of urine     Sepsis ([facility] [address] 2015    Tremor 3/30/2019    Wears glasses     to read    Wears partial dentures     upper     Past Surgical History:   Procedure Laterality Date    BREAST SURGERY      Left breast biopsy    CARDIOVERSION  10/17/2008    CARDIOVERSION  12/15/2008    HYSTERECTOMY (CERVIX STATUS UNKNOWN)  age 21's    IR GUIDED DRAIN W CATHETER PLACEMENT  8/21/2020    IR GUIDED DRAIN W CATHETER PLACEMENT 8/21/2020 [facility] PROCEDURES    IR GUIDED DRAIN W CATHETER PLACEMENT  9/8/2020    IR GUIDED DRAIN W CATHETER PLACEMENT 9/8/2020 [facility] SPECIAL PROCEDURES    JOINT REPLACEMENT  4/2005 2/2006    bilat    OTHER SURGICAL HISTORY  10/2009     bladder stimulator - interstim II 3058 - MRI OF HEAD ONLY -STIMULATOR MUST BE OFF AND TRANSMIT RECEIVE HEAD COIL ONLY    RI TOTAL HIP ARTHROPLASTY Left 02/23/2015    Hip Replacement, Total    TOTAL KNEE ARTHROPLASTY  2005, 2006    bilateral     Family History   Problem Relation Age of Onset    Diabetes Mother     Diabetes Father     Heart Disease Father      Social History     Socioeconomic History    Marital status:       Spouse name: Not on file    Number of children: Not on file    Years of education: Not on file    Highest education level: Not on file   Occupational History    Not on file   Tobacco Use    Smoking status: Former     Years: 1.00     Types: Cigarettes    Smokeless tobacco: Never    Tobacco comments:     \"quit yrs ago only smoked for 2-3 yrs\"   Vaping Use    Vaping Use: Never used   Substance and Sexual Activity    Alcohol use: No     Alcohol/week: 0.0 standard drinks    Drug use: No    Sexual activity: Not Currently   Other Topics Concern    Not on file   Social History Narrative    Not on file     Social Determinants of Health     Financial Resource Strain: Not on file   Food Insecurity: Not on file   Transportation Needs: Not on file   Physical Activity: Not on file   Stress: Not on file   Social Connections: Not on file   Intimate Partner Violence: Not on file   Housing Stability: Not on file     No current facility-administered medications for this encounter. Current Outpatient Medications   Medication Sig Dispense Refill    acetaminophen (TYLENOL) 325 MG tablet Take 650 mg by mouth every 6 hours as needed for Pain      baclofen (LIORESAL) 10 MG tablet Take 10 mg by mouth 2 times daily      diphenhydrAMINE (BENADRYL) 25 MG tablet Take 25 mg by mouth every 6 hours as needed for Itching      bisacodyl (DULCOLAX) 10 MG suppository Place 10 mg rectally as needed for Constipation      divalproex (DEPAKOTE) 125 MG DR tablet Take 125 mg by mouth 2 times daily      ferrous sulfate (FE TABS 325) 325 (65 Fe) MG EC tablet Take 325 mg by mouth in the morning and 325 mg in the evening. Sodium Phosphates (FLEET) 7-19 GM/118ML Place 1 enema rectally once as needed      hydrocortisone 2.5 % cream Apply topically 2 times daily Apply topically 2 times daily to groin area.       escitalopram (LEXAPRO) 10 MG tablet Take 10 mg by mouth daily      magnesium hydroxide (MILK OF MAGNESIA) 400 MG/5ML suspension Take 30 mLs by mouth every 12 hours as needed for Constipation Also has additional for nightly prn dosing      oxybutynin (DITROPAN-XL) 10 MG extended release tablet Take 20 mg by mouth 2 times daily      mirtazapine (REMERON) 7.5 MG tablet Take 7.5 mg by mouth nightly      terbinafine (LAMISIL) 250 MG tablet Take 250 mg by mouth nightly      ondansetron (ZOFRAN) 4 MG tablet Take 4 mg by mouth every 6 hours as needed for Nausea or Vomiting      cetirizine (ZYRTEC) 10 MG tablet Take 10 mg by mouth 2 times daily      vitamin D 25 MCG (1000 UT) CAPS Take 5 capsules by mouth daily (Patient taking differently: Take 1,000 Units by mouth daily) 30 capsule 0    furosemide (LASIX) 20 MG tablet Take 1 pill on Mon, Weds, Fri (Patient taking differently: Take 20 mg by mouth See Admin Instructions Receives on Mon/Wed/Fri//Sat/Sun) 90 tablet 1    levothyroxine (SYNTHROID) 175 MCG tablet Take 1 tablet by mouth daily (Patient taking differently: Take 175 mcg by mouth nightly) 90 tablet 1    potassium chloride (MICRO-K) 10 MEQ extended release capsule Take 1 capsule by mouth daily (Patient taking differently: Take 10 mEq by mouth nightly) 90 capsule 1    dronedarone hcl (MULTAQ) 400 MG TABS TAKE 1 TABLET TWICE A DAY WITH MEALS (Patient taking differently: Take 400 mg by mouth 2 times daily (with meals)) 180 tablet 1    loperamide (IMODIUM) 2 MG capsule Take 2 mg by mouth daily as needed for Diarrhea      loratadine (CLARITIN) 10 MG tablet Take 10 mg by mouth daily       acetaminophen (TYLENOL) 325 MG tablet Take 325 mg by mouth nightly       Allergies   Allergen Reactions    Tape [Adhesive Tape] Itching    Codeine Rash    Sulfa Antibiotics Nausea And Vomiting       Nursing Notes Reviewed    Physical Exam:  ED Triage Vitals [08/30/22 0839]   Enc Vitals Group      BP       Pulse       Resp       Temp       Temp src       SpO2       Weight 215 lb (97.5 kg)      Height 5' (1.524 m)      Head Circumference       Peak Flow       Pain Score       Pain Loc       Pain Edu? Excl.  in GC?      GENERAL APPEARANCE: Awake and alert. Cooperative. HEAD: Normocephalic. Atraumatic. EYES: EOM's grossly intact. Sclera anicteric. ENT: Mucous membranes are moist. Tolerates saliva. No trismus. NECK: Supple. No meningismus. Trachea midline. HEART: RRR. Radial pulses 2+. LUNGS: Respirations unlabored. CTAB  ABDOMEN: Soft. Non-tender. No guarding or rebound. EXTREMITIES: No acute deformities. SKIN: Warm and dry. NEUROLOGICAL:   [facility] NIH Stroke Scale at 10:53 AM is:  Level of Consciousness:  0 - alert; keenly responsive    LOC Questions:  1 - answers one question correctly    LOC Commands:  1 - performs one task correctly    Best Gaze:  0 - normal    Visual Fields:  0 - no visual loss    Facial Palsy:  1 - minor paralysis (flattened nasolabial fold, asymmetric on smiling)    Motor-Arm-Left:  0 - no drift, limb holds 90 (or 45) degrees for full 10 seconds but noted decreased  strength compared to right    Motor-Leg-Left:  3 - no effort against gravity; leg falls to bed immediately    Motor-Arm-Right:  0 - no drift, limb holds 90 (or 45) degrees for full 10 seconds    Motor-Leg-Right:  0 - no drift; leg holds 30 degree position for full 5 seconds    Limb Ataxia:  0 - absent    Sensory:  0 - normal; no sensory loss    Best Language:  2 - severe aphasia; all communication is through fragmentary expression; great need for inference, questioning, and guessing by the listener. Range of information that can be exchanged is limited; listener carries burden of communication. Examiner cannot identify materials provided from patient response. Dysarthria:  1 - mild to moderate, patient slurs at least some words and at worst, can be understood with some difficulty    Extinction and Inattention:  0 - no abnormality  PSYCHIATRIC: Normal mood.     I have reviewed and interpreted all of the currently available lab results from this visit (if applicable):  Results for orders placed or performed during the hospital encounter of 08/30/22   CBC with Auto Differential   Result Value Ref Range    WBC 7.6 4.0 - 10.5 K/CU MM    RBC 3.44 (L) 4.2 - 5.4 M/CU MM    Hemoglobin 10.4 (L) 12.5 - 16.0 GM/DL    Hematocrit 33.8 (L) 37 - 47 %    MCV 98.3 78 - 100 FL    MCH 30.2 27 - 31 PG    MCHC 30.8 (L) 32.0 - 36.0 %    RDW 15.1 (H) 11.7 - 14.9 %    Platelets 172 670 - 433 K/CU MM    MPV 11.3 (H) 7.5 - 11.1 FL    Differential Type AUTOMATED DIFFERENTIAL     Segs Relative 68.3 (H) 36 - 66 %    Lymphocytes % 15.5 (L) 24 - 44 %    Monocytes % 9.7 (H) 0 - 4 %    Eosinophils % 5.7 (H) 0 - 3 %    Basophils % 0.5 0 - 1 %    Segs Absolute 5.2 K/CU MM    Lymphocytes Absolute 1.2 K/CU MM    Monocytes Absolute 0.7 K/CU MM    Eosinophils Absolute 0.4 K/CU MM    Basophils Absolute 0.0 K/CU MM    Nucleated RBC % 0.0 %    Total Nucleated RBC 0.0 K/CU MM    Total Immature Neutrophil 0.02 K/CU MM    Immature Neutrophil % 0.3 0 - 0.43 %   Comprehensive Metabolic Panel w/ Reflex to MG   Result Value Ref Range    Sodium 140 135 - 145 MMOL/L    Potassium 4.1 3.5 - 5.1 MMOL/L    Chloride 104 99 - 110 mMol/L    CO2 26 21 - 32 MMOL/L    BUN 24 (H) 6 - 23 MG/DL    Creatinine 1.2 (H) 0.6 - 1.1 MG/DL    Glucose 95 70 - 99 MG/DL    Calcium 9.1 8.3 - 10.6 MG/DL    Albumin 3.8 3.4 - 5.0 GM/DL    Total Protein 7.2 6.4 - 8.2 GM/DL    Total Bilirubin 0.4 0.0 - 1.0 MG/DL    ALT 9 (L) 10 - 40 U/L    AST 20 15 - 37 IU/L    Alkaline Phosphatase 43 40 - 129 IU/L    GFR Non- 43 (L) >60 mL/min/1.73m2    GFR  52 (L) >60 mL/min/1.73m2    Anion Gap 10 4 - 16   Troponin   Result Value Ref Range    Troponin T <0.010 <0.01 NG/ML   Protime-INR   Result Value Ref Range    Protime 14.1 11.7 - 14.5 SECONDS    INR 1.09 INDEX   POCT Glucose   Result Value Ref Range    POC Glucose 89 70 - 99 MG/DL   EKG 12 Lead   Result Value Ref Range    Ventricular Rate 101 BPM    Atrial Rate 159 BPM    QRS Duration 100 ms    Q-T Interval 424 ms    QTc Calculation (Bazett) 549 ms    R Axis 92 degrees    T Axis 13 degrees    Diagnosis       Atrial fibrillation with rapid ventricular response  Rightward axis  Low voltage QRS  Incomplete right bundle branch block  Nonspecific ST and T wave abnormality  Abnormal ECG  When compared with ECG of 12-JAN-2022 17:38,  Previous ECG has undetermined rhythm, needs review  QT has lengthened          Radiographs (if obtained):  [] The following radiograph was interpreted by myself in the absence of a radiologist:      XR CHEST PORTABLE (Preliminary result)  Result time 08/30/22 10:48:04  Preliminary result by Sosa Berumen MD (08/30/22 10:48:04)                Impression:    1. Slight increase in mild pulmonary vascular congestion. Narrative:    EXAMINATION:   ONE XRAY VIEW OF THE CHEST     8/30/2022 9:08 am     COMPARISON:   01/12/2022     HISTORY:   ORDERING SYSTEM PROVIDED HISTORY: stroke symptoms   TECHNOLOGIST PROVIDED HISTORY:   Reason for exam:->stroke symptoms   Reason for Exam: stroke symptoms     FINDINGS:   The cardiac silhouette is at the upper limits of normal in size. Mild   pulmonary vascular congestion has increased. Vascular calcifications are   noted along the aorta. Lung volumes are low. No pleural effusion or   pneumothorax. The visualized osseous structures are unremarkable. Preliminary result by Jacob Flower (08/30/22 10:29:47)                Impression:    1. Slight increase in mild pulmonary vascular congestion. CTA HEAD NECK W CONTRAST (Preliminary result)  Result time 08/30/22 09:29:06  Preliminary result by Mariel Ruiz MD (08/30/22 09:29:06)                Impression:    Unremarkable CTA of the head and neck. Severe chronic microvascular disease within the periventricular white matter. Moderate ventriculomegaly likely secondary to central atrophy.      RECOMMENDATIONS:   Unavailable             Narrative:    EXAMINATION:   CTA OF THE HEAD AND NECK WITH CONTRAST 8/30/2022 9:00 am     TECHNIQUE:   CTA of the head and neck was performed with the administration of intravenous   contrast. Multiplanar reformatted images are provided for review. MIP images   are provided for review. Stenosis of the internal carotid arteries measured   using NASCET criteria. Automated exposure control, iterative reconstruction,   and/or weight based adjustment of the mA/kV was utilized to reduce the   radiation dose to as low as reasonably achievable. COMPARISON:   None     HISTORY:   ORDERING SYSTEM PROVIDED HISTORY: Stroke Symptoms   TECHNOLOGIST PROVIDED HISTORY:   Has a \"code stroke\" or \"stroke alert\" been called? ->Yes   Reason for exam:->Stroke Symptoms   Decision Support Exception - unselect if not a suspected or confirmed   emergency medical condition->Emergency Medical Condition (MA)   Reason for Exam: Aphasia; Cerebrovascular Accident   Relevant Medical/Surgical History: pt confused, unable to hold still     FINDINGS:     CTA NECK:     AORTIC ARCH/ARCH VESSELS: No dissection or arterial injury. No significant   stenosis of the brachiocephalic or subclavian arteries. CAROTID ARTERIES: No dissection, arterial injury, or hemodynamically   significant stenosis by NASCET criteria. VERTEBRAL ARTERIES: No dissection, arterial injury, or significant stenosis. SOFT TISSUES: The lung apices are clear. No cervical or superior mediastinal   lymphadenopathy. The larynx and pharynx are unremarkable. No acute   abnormality of the salivary and thyroid glands. BONES: No acute osseous abnormality. CTA HEAD:     ANTERIOR CIRCULATION: No significant stenosis of the intracranial internal   carotid, anterior cerebral, or middle cerebral arteries. No aneurysm. POSTERIOR CIRCULATION: No significant stenosis of the vertebral, basilar, or   posterior cerebral arteries. No aneurysm.      OTHER: No dural venous sinus thrombosis on this non-dedicated study.     BRAIN: Severe chronic microvascular disease is identified within the   periventricular white matter. Moderate ventriculomegaly is likely related to central atrophy. Preliminary result by Fuentes Albarran (08/30/22 09:20:49)                Impression:    Unremarkable CTA of the head and neck. Severe chronic microvascular disease within the periventricular white matter     Moderate ventriculomegaly likely secondary to central atrophy     RECOMMENDATIONS:   Unavailable                       CT HEAD WO CONTRAST (Preliminary result)  Result time 08/30/22 09:17:24  Preliminary result by Caridad Armstrong MD (08/30/22 09:17:24)                Impression:    1. No acute intracranial abnormality. 2. Cerebral and cerebellar parenchymal volume loss with chronic microvascular   white matter ischemic disease. Narrative:    EXAMINATION:   CT OF THE HEAD WITHOUT CONTRAST  8/30/2022 8:50 am     TECHNIQUE:   CT of the head was performed without the administration of intravenous   contrast. Automated exposure control, iterative reconstruction, and/or weight   based adjustment of the mA/kV was utilized to reduce the radiation dose to as   low as reasonably achievable. COMPARISON:   01/12/2022. HISTORY:   ORDERING SYSTEM PROVIDED HISTORY: Stroke Symptoms   TECHNOLOGIST PROVIDED HISTORY:   Has a \"code stroke\" or \"stroke alert\" been called? ->Yes   Reason for exam:->Stroke Symptoms   Decision Support Exception - unselect if not a suspected or confirmed   emergency medical condition->Emergency Medical Condition (MA)   Reason for Exam: Aphasia     FINDINGS:   BRAIN/VENTRICLES: This study is degraded by motion artifact. There is   cerebral parenchymal volume loss with severe chronic microvascular white   matter ischemic disease, stable. There are no areas of hemorrhage, mass, or   midline shift. No abnormal extra-axial fluid collections.   The ventricles   are enlarged, likely related to involutional change. Gray-white   differentiation is maintained. Vascular calcifications are noted in the carotid siphons and distal vertebral   arteries. ORBITS: Lens implants from prior cataract surgery are noted. The orbits are   otherwise unremarkable. SINUSES: The paranasal sinuses and mastoid air cells are clear. SOFT TISSUES/SKULL:  The calvarium is intact. No appreciable scalp soft   tissue swelling. Preliminary result by Hayley Magallon (08/30/22 08:59:06)                Impression:    1. No acute intracranial abnormality. 2. Cerebral and cerebellar parenchymal volume loss with chronic microvascular   white matter ischemic disease. EKG Interpretation  Please see ED physician's note - Dr. Reina Felder - for EKG interpretation      Clinical Impression:  1. Aphasia        Patient presents via EMS for altered mental status, slurred speech. Upon ED arrival, initially assessed patient, is noted to have abnormal speech, is alert to self only. She is following some commands, there is some noted left-sided weakness with  strength patient is having difficulty keeping left leg off the bed against gravity. Also noted aphasic speech with difficulty word finding. Stroke alert was initiated and patient was brought emergently back for CT/CTA head and neck imaging. Accu-Chek per EMS is 98. I did attempt to contact [facility] staff as patient appears to have woken up at 4 AM with the symptoms and unknown exact last known well or when she went to bed last night however he was not able to reach any staff and so was sent to different voicemails. Initial NIH score on my assessment is 8. CT head noncontrast negative for evidence of acute intracranial process. Patient was evaluated by stroke neurologist, Dr. Joshua Montes, however given unknown last known well, patient was deemed not a tPA candidate.   Stroke neurology recommending for CTA head and neck imaging and if no evidence of large vessel occlusion, can be admitted to our facility for MRI stroke rule out. CTA head and neck with contrast shows no evidence of large vessel occlusion. There is moderate ventriculomegaly likely secondary to central atrophy. Labs are otherwise reassuring. Accu-Chek here is 89. Normal white  blood cell count. Hemoglobin 10.4. CMP with BUN and creatinine 24/1.2 with normal potassium, renal function does appear baseline. Troponin within normal normal range. Chest x-ray shows slight increase in mild pulmonary vascular congestion the patient does not appear significantly fluid overloaded on my exam.  At this time, we will plan to consult with hospitalist for admission for MRI imaging versus CVA/stroke rule out. I did consult with Dr. Jaimee Stewart - hospitalist - and discussed patient's history, ED presentation/course including any pertinent laboratory findings and imaging study findings. He/she agrees to hospital admission. Patient is admitted to the hospital in stable condition. I did discuss this patient's history, ED presentation/course with my attending physician - Dr. Carrillo Field - who has also seen and evaluated this patient. He/she does agree that admission is reasonable at this time. Please see his/her note for additional details of their evaluation. MDM:  1. Physician evaluation performed at:  0835am  2. Stroke alert called at:  0838 am  3. CT results obtained at:  0917 am, CT head without contrast shows no acute intracranial process but there is chronic microvascular white ischemic disease  4.  0912 Decision was made that TPA is NOT indicated in consultation with [place] Stroke Neurologist, Dr. Dorinda Eli given last known well was sometime last night, recommends awaiting CTA imaging and if no large vessel occlusion, can be admitted to our facility for MRI for stroke rule out.   5. Patient will be dispositioned to hospitalist    (Please note that portions of this note may have been completed with a voice recognition program. Efforts were made to edit the dictations but occasionally words are mis-transcribed.)    Brian Mcfarland PA-C         CRITICAL CARE NOTE:  There was a high probability of clinically significant life-threatening deterioration of the patient's condition requiring my urgent intervention due to Stroke alerte. Telemetry, continuous pulse ox monitoring, serial neurologic reassessments, chart review, consultation with specialty services was performed to address this. Total critical care time is at least  22 minutes. This includes vital sign monitoring, pulse oximetry monitoring, telemetry monitoring, clinical response to the IV medications, reviewing the nursing notes, consultation time, dictation/documentation time, and interpretation of the lab work. This time excludes time spent performing procedures and separately billable procedures and family discussion time.            Mary Washington PA-C  08/30/22 842 Davis Alvarenga MD  08/30/22 1053

## 2022-08-31 ENCOUNTER — APPOINTMENT (OUTPATIENT)
Dept: MRI IMAGING | Age: 87
DRG: 071 | End: 2022-08-31
Payer: MEDICARE

## 2022-08-31 LAB
ANION GAP SERPL CALCULATED.3IONS-SCNC: 10 MMOL/L (ref 4–16)
BUN BLDV-MCNC: 30 MG/DL (ref 6–23)
CALCIUM SERPL-MCNC: 8.6 MG/DL (ref 8.3–10.6)
CHLORIDE BLD-SCNC: 106 MMOL/L (ref 99–110)
CHOLESTEROL: 183 MG/DL
CO2: 27 MMOL/L (ref 21–32)
CREAT SERPL-MCNC: 1.1 MG/DL (ref 0.6–1.1)
ESTIMATED AVERAGE GLUCOSE: 123 MG/DL
FOLATE: 7.2 NG/ML (ref 3.1–17.5)
GFR AFRICAN AMERICAN: 57 ML/MIN/1.73M2
GFR NON-AFRICAN AMERICAN: 47 ML/MIN/1.73M2
GLUCOSE BLD-MCNC: 104 MG/DL (ref 70–99)
HBA1C MFR BLD: 5.9 % (ref 4.2–6.3)
HCT VFR BLD CALC: 32.2 % (ref 37–47)
HDLC SERPL-MCNC: 34 MG/DL
HEMOGLOBIN: 9.9 GM/DL (ref 12.5–16)
LDL CHOLESTEROL CALCULATED: 114 MG/DL
MCH RBC QN AUTO: 30.2 PG (ref 27–31)
MCHC RBC AUTO-ENTMCNC: 30.7 % (ref 32–36)
MCV RBC AUTO: 98.2 FL (ref 78–100)
PDW BLD-RTO: 15 % (ref 11.7–14.9)
PLATELET # BLD: 193 K/CU MM (ref 140–440)
PMV BLD AUTO: 11.2 FL (ref 7.5–11.1)
POTASSIUM SERPL-SCNC: 4.1 MMOL/L (ref 3.5–5.1)
RBC # BLD: 3.28 M/CU MM (ref 4.2–5.4)
SODIUM BLD-SCNC: 143 MMOL/L (ref 135–145)
T4 FREE: 0.86 NG/DL (ref 0.9–1.8)
TRIGL SERPL-MCNC: 175 MG/DL
TSH HIGH SENSITIVITY: 8.52 UIU/ML (ref 0.27–4.2)
VITAMIN B-12: 417.1 PG/ML (ref 211–911)
WBC # BLD: 7.4 K/CU MM (ref 4–10.5)

## 2022-08-31 PROCEDURE — 6370000000 HC RX 637 (ALT 250 FOR IP): Performed by: STUDENT IN AN ORGANIZED HEALTH CARE EDUCATION/TRAINING PROGRAM

## 2022-08-31 PROCEDURE — 82607 VITAMIN B-12: CPT

## 2022-08-31 PROCEDURE — 94761 N-INVAS EAR/PLS OXIMETRY MLT: CPT

## 2022-08-31 PROCEDURE — 70551 MRI BRAIN STEM W/O DYE: CPT

## 2022-08-31 PROCEDURE — 1200000000 HC SEMI PRIVATE

## 2022-08-31 PROCEDURE — 83036 HEMOGLOBIN GLYCOSYLATED A1C: CPT

## 2022-08-31 PROCEDURE — 93306 TTE W/DOPPLER COMPLETE: CPT

## 2022-08-31 PROCEDURE — 6360000002 HC RX W HCPCS: Performed by: STUDENT IN AN ORGANIZED HEALTH CARE EDUCATION/TRAINING PROGRAM

## 2022-08-31 PROCEDURE — 85027 COMPLETE CBC AUTOMATED: CPT

## 2022-08-31 PROCEDURE — 80048 BASIC METABOLIC PNL TOTAL CA: CPT

## 2022-08-31 PROCEDURE — 84439 ASSAY OF FREE THYROXINE: CPT

## 2022-08-31 PROCEDURE — 84443 ASSAY THYROID STIM HORMONE: CPT

## 2022-08-31 PROCEDURE — 36415 COLL VENOUS BLD VENIPUNCTURE: CPT

## 2022-08-31 PROCEDURE — 99223 1ST HOSP IP/OBS HIGH 75: CPT | Performed by: PSYCHIATRY & NEUROLOGY

## 2022-08-31 PROCEDURE — 99232 SBSQ HOSP IP/OBS MODERATE 35: CPT | Performed by: NURSE PRACTITIONER

## 2022-08-31 PROCEDURE — 80061 LIPID PANEL: CPT

## 2022-08-31 PROCEDURE — 82746 ASSAY OF FOLIC ACID SERUM: CPT

## 2022-08-31 RX ADMIN — MIRTAZAPINE 7.5 MG: 15 TABLET, FILM COATED ORAL at 21:09

## 2022-08-31 RX ADMIN — ESCITALOPRAM OXALATE 10 MG: 10 TABLET ORAL at 09:44

## 2022-08-31 RX ADMIN — HEPARIN SODIUM 5000 UNITS: 5000 INJECTION INTRAVENOUS; SUBCUTANEOUS at 06:02

## 2022-08-31 RX ADMIN — TERBINAFINE TABLETS 250 MG 250 MG: 250 TABLET ORAL at 21:09

## 2022-08-31 RX ADMIN — FERROUS SULFATE TAB 325 MG (65 MG ELEMENTAL FE) 325 MG: 325 (65 FE) TAB at 09:44

## 2022-08-31 RX ADMIN — LEVOTHYROXINE SODIUM 175 MCG: 25 TABLET ORAL at 21:10

## 2022-08-31 RX ADMIN — ASPIRIN 81 MG: 81 TABLET, COATED ORAL at 09:44

## 2022-08-31 RX ADMIN — ATORVASTATIN CALCIUM 80 MG: 40 TABLET, FILM COATED ORAL at 21:10

## 2022-08-31 RX ADMIN — FERROUS SULFATE TAB 325 MG (65 MG ELEMENTAL FE) 325 MG: 325 (65 FE) TAB at 17:02

## 2022-08-31 RX ADMIN — DRONEDARONE 400 MG: 400 TABLET, FILM COATED ORAL at 09:44

## 2022-08-31 RX ADMIN — DIVALPROEX SODIUM 125 MG: 125 TABLET, DELAYED RELEASE ORAL at 21:10

## 2022-08-31 RX ADMIN — HEPARIN SODIUM 5000 UNITS: 5000 INJECTION INTRAVENOUS; SUBCUTANEOUS at 15:38

## 2022-08-31 RX ADMIN — DRONEDARONE 400 MG: 400 TABLET, FILM COATED ORAL at 17:02

## 2022-08-31 RX ADMIN — DIVALPROEX SODIUM 125 MG: 125 TABLET, DELAYED RELEASE ORAL at 09:41

## 2022-08-31 NOTE — PLAN OF CARE
Problem: Discharge Planning  Goal: Discharge to home or other facility with appropriate resources  Outcome: Progressing     Problem: Safety - Adult  Goal: Free from fall injury  Outcome: Progressing     Problem: ABCDS Injury Assessment  Goal: Absence of physical injury  Outcome: Progressing     Problem: Skin/Tissue Integrity  Goal: Absence of new skin breakdown  Description: 1. Monitor for areas of redness and/or skin breakdown  2. Assess vascular access sites hourly  3. Every 4-6 hours minimum:  Change oxygen saturation probe site  4. Every 4-6 hours:  If on nasal continuous positive airway pressure, respiratory therapy assess nares and determine need for appliance change or resting period. Outcome: Progressing     Problem: Confusion  Goal: Confusion, delirium, dementia, or psychosis is improved or at baseline  Description: INTERVENTIONS:  1. Assess for possible contributors to thought disturbance, including medications, impaired vision or hearing, underlying metabolic abnormalities, dehydration, psychiatric diagnoses, and notify attending LIP  2. [place] high risk fall precautions, as indicated  3. Provide frequent short contacts to provide reality reorientation, refocusing and direction  4. Decrease environmental stimuli, including noise as appropriate  5. Monitor and intervene to maintain adequate nutrition, hydration, elimination, sleep and activity  6. If unable to ensure safety without constant attention obtain sitter and review sitter guidelines with assigned personnel  7.  Initiate Psychosocial CNS and Spiritual Care consult, as indicated  Outcome: Progressing

## 2022-08-31 NOTE — PROGRESS NOTES
Physician Progress Note      Bella Guzmán  [facility] #:                  [id number]  :                       1935  ADMIT DATE:       2022 8:40 AM  DISCH DATE:  RESPONDING  PROVIDER #:        Willa Damon          QUERY TEXT:    Patient admitted with CVA, noted to have chronic afib atrial fibrillation and   was maintained on Eliquis but has not been taking for a dental procedure. If   possible, please document in progress notes and discharge summary if you are   evaluating and/or treating any of the following: The medical record reflects the following:  Risk Factors: chronic afib, Probable CVA  Clinical Indicators: \"It appears that patient was on Eliquis for history of a   fib but has not been taking for perhaps months. She was also on aspirin but   that has been held as well since  for dental procedure. \"Altered mental   status with expressive and receptive changes likely secondary to acute stroke   superimposed on a fib off anticoagulation. \"  Treatment: CARDS consult, CHIQUI, Neuro consult, CTA head and neck, CT head, hold   anticoagulation for now until mri reviewed. Thank  you Amanda rAmas RN, CDS ([phone])  Options provided:  -- Secondary hypercoagulable state in a patient with atrial fibrillation  -- Other - I will add my own diagnosis  -- Disagree - Not applicable / Not valid  -- Disagree - Clinically unable to determine / Unknown  -- Refer to Clinical Documentation Reviewer    PROVIDER RESPONSE TEXT:    Provider is clinically unable to determine a response to this query.     Query created by: Josue Carlisle on 2022 2:27 PM      Electronically signed by:  Willa Damon 2022 2:41 PM

## 2022-08-31 NOTE — CONSULTS
Neurology Service Consult Note  [facility]  Patient Name: Eladio Viera  : 1935        Subjective:   Reason for consult: Stroke like symptoms  82 y.o.  female with history of a fib  CKD, CHF, DVT, HTN, HCL, Tremor, presenting to [facility] from [facility] with speech changes, left leg weakness and confusion. Stroke alert was called and patient was not a tPA/TNK candidate. It appears that patient was on Eliquis for history of a fib but has not been taking for perhaps months. She was also on aspirin but that has been held as well since  for dental procedure. Chart was reviewed in detail. Today patient was seen and assessed. She is sitting in bed. No family is at bedside. Patient is restless and moaning. When asked if she is in pain or uncomfortable she responds \"no\" but continues to grimace and sigh/moan. She is able to state her first name but can not say last name. She is unable to speak in a full sentence. When asked to repeat words, speech is slurred and she struggles to repeat the actual word. She becomes distracted easily and and needs frequent reminders about what we are doing in exam. She has difficulty following commands to lift arms. When asked to wiggle toes she says \"no\". She is unable to identify a pen and does not know what a pen is used for. When asked to identify my hand she states \"my hand\". She keeps her right eye closed.      Past Medical History:   Diagnosis Date    Allergic rhinitis     Arthritis     CHF (congestive heart failure) ([facility] [address]     DVT, lower extremity, proximal, acute, left (HCC)     Edema leg     Gastroesophageal reflux disease 3/30/2019    H/O 24 hour EKG monitoring 6/10 1/09 11/08    6/7/10- predominant rhythm is a-fib, freq PVCs    H/O cardiac catheterization 00    H/O cardiac catheterization 00    right dominant system, left main is short but angiographically normal, LAD and CX reveal sign  tortuosities distally  but no sign angiographic atherosclerotic disease noted except luminal  irregularities here and there. LVSF normal, EF  55%    H/O cardiovascular stress test 5/2/2016    lexiscan-normal,EF70%    H/O Doppler echocardiogram 7/12/10    intimal thickening but no sign. atherosclerotic plaque noted in [facility] OR  LICA. H/O echocardiogram 5/2/2016 9/10     5/16 EF60% mild MR, TR, AR 9/20/10 Complete 2 dim transthoracic echo, techn. difficult. The study was  tech limited, LVSF is normal    History of atrial fibrillation     History of cardiovascular stress test 7/26/13, 7/10 8/08    7/13-WNL Observed defect consistent with breast attenuation. EF 70%. 7/10-post stress myocardial images show a normal pattern of perfusion in all regions. LV is normal  in size.   EF is 62%, global  LVSF is normal    History of falling     per old chart    History of nuclear stress test 10/08/2020    EF 60%, Normal    Hx of blood clots     per old chart  hx of DVT left leg    Hx: UTI (urinary tract infection)     Hyperglycemia 3/30/2019    Hyperlipidemia     Hypertension     Hypothyroidism     Incontinence of bowel     Incontinence of urine     Sepsis ([facility] [address] 2015    Tremor 3/30/2019    Wears glasses     to read    Wears partial dentures     upper    :   Past Surgical History:   Procedure Laterality Date    BREAST SURGERY      Left breast biopsy    CARDIOVERSION  10/17/2008    CARDIOVERSION  12/15/2008    HYSTERECTOMY (CERVIX STATUS UNKNOWN)  age 21's    IR GUIDED DRAIN W CATHETER PLACEMENT  8/21/2020    IR GUIDED DRAIN W CATHETER PLACEMENT 8/21/2020 [facility]    IR GUIDED DRAIN W CATHETER PLACEMENT  9/8/2020    IR GUIDED DRAIN W CATHETER PLACEMENT 9/8/2020 [facility] PROC[facility]    JOINT REPLACEMENT  4/2005 2/2006    bilat    OTHER SURGICAL HISTORY  10/2009     bladder stimulator - interstim II 3058 - MRI OF HEAD ONLY -STIMULATOR MUST BE OFF AND TRANSMIT RECEIVE HEAD COIL ONLY    KS TOTAL HIP ARTHROPLASTY Left 02/23/2015    Hip Replacement, Total    TOTAL KNEE ARTHROPLASTY  2005, 2006    bilateral     Medications:  Scheduled Meds:   baclofen  10 mg Oral BID    divalproex  125 mg Oral BID    dronedarone hcl  400 mg Oral BID WC    escitalopram  10 mg Oral Daily    levothyroxine  175 mcg Oral Nightly    mirtazapine  7.5 mg Oral Nightly    terbinafine  250 mg Oral Nightly    aspirin  81 mg Oral Daily    Or    aspirin  300 mg Rectal Daily    atorvastatin  80 mg Oral Nightly    heparin (porcine)  5,000 Units SubCUTAneous 3 times per day    ferrous sulfate  325 mg Oral BID WC     Continuous Infusions:  PRN Meds:.polyethylene glycol, labetalol    Allergies   Allergen Reactions    Tape Raguel Gulling Tape] Itching    Codeine Rash    Sulfa Antibiotics Nausea And Vomiting     Social History     Socioeconomic History    Marital status:       Spouse name: Not on file    Number of children: Not on file    Years of education: Not on file    Highest education level: Not on file   Occupational History    Not on file   Tobacco Use    Smoking status: Former     Years: 1.00     Types: Cigarettes    Smokeless tobacco: Never    Tobacco comments:     \"quit yrs ago only smoked for 2-3 yrs\"   Vaping Use    Vaping Use: Never used   Substance and Sexual Activity    Alcohol use: No     Alcohol/week: 0.0 standard drinks    Drug use: No    Sexual activity: Not Currently   Other Topics Concern    Not on file   Social History Narrative    Not on file     Social Determinants of Health     Financial Resource Strain: Not on file   Food Insecurity: Not on file   Transportation Needs: Not on file   Physical Activity: Not on file   Stress: Not on file   Social Connections: Not on file   Intimate Partner Violence: Not on file   Housing Stability: Not on file      Family History   Problem Relation Age of Onset    Diabetes Mother     Diabetes Father     Heart Disease Father          ROS (10 systems)  Impaired speech, AMS unable to answer questions    Physical Exam:       Wt Readings from Last 3 Encounters:   08/30/22 215 lb (97.5 kg)   03/24/22 215 lb (97.5 kg)   01/18/22 215 lb 11.2 oz (97.8 kg)     Temp Readings from Last 3 Encounters:   08/31/22 98.3 °F (36.8 °C) (Axillary)   03/25/22 98 °F (36.7 °C)   01/18/22 97.5 °F (36.4 °C)     BP Readings from Last 3 Encounters:   08/31/22 122/76   03/25/22 132/70   01/18/22 139/74     Pulse Readings from Last 3 Encounters:   08/31/22 86   03/25/22 70   01/18/22 62        Gen: Alert to self, NAD, cooperative but easily distracted  HEENT: NC/AT, EOMI, PERRL, keeps right eye closed, mmm, no carotid bruits, neck supple, no meningeal signs  Heart: A fib  Lungs: Respirations even and unlabored  Ext: no edema, no calf tenderness b/l  Psych: Confused, easily distracted, restless  Skin: no rashes or lesions    NEUROLOGIC EXAM:    Mental Status: A&O to self only. Exam limited by receptive and expressive impairment. Restless, speech slurred, speaks in one word phrases only, unable to name objects, difficulty following commands.    Cranial Nerve Exam:   CN II-XII: PERRL, VFF, no nystagmus, no gaze paresis, sensation V1-V3 intact b/l, Left facial weakness; hearing intact to conversational tone, unable to follow command to protrude tongue, lift shoulders,     Motor Exam:       Strength 5/5 UE's/LE's b/l  Tone and bulk normal   Unable to assess for pronator drift    Deep Tendon Reflexes: 2/4 biceps, triceps, brachioradialis, patellar, and achilles b/l; flexor plantar responses b/l    Sensation: Intact light touch/pinprick/vibration UE's/LE's b/l    Coordination/Cerebellum:       Tremors--none      Rapidly alternating movements: Patient unable to complete              Heel-to-Shin: Patient unable to complete      Finger-to-Nose: Patient unable to complete     Gait and stance:      Gait: deferred      LABS:     Recent Labs     08/30/22  0558 08/30/22  0845 08/31/22  0045   WBC 8.7 7.6 7.4    140 143   K 4.8 4.1 4.1    104 106   CO2 27 26 27   BUN 24* 24* 30* CREATININE 1.3* 1.2* 1.1   GLUCOSE 85 95 104*   INR  --  1.09  --      IMAGING:    CT head w/o contrast:  Impression   1. No acute intracranial abnormality. 2. Cerebral and cerebellar parenchymal volume loss with chronic microvascular   white matter ischemic disease. CTA head and neck:  Impression   Unremarkable CTA of the head and neck. Severe chronic microvascular disease within the periventricular white matter. Moderate ventriculomegaly likely secondary to central atrophy. MRI brain w/o contrast:  Pending    Echocardiogram:  Summary   TDS Due to patient cooperation. Saline Study appears negative. Left ventricular systolic function is normal.   Ejection fraction is visually estimated at 55-60%. Proximal septal thickening. No regional wall motion abnormalites. Indeterminate diastolic function due to underlying rhythm. Left ventricle size is normal.   Mildly dilated left atrium. Sclerotic, but non-stenotic aortic valve. Mild to moderate aortic regurgitation is noted. Mitral annular calcification is present PMVL. Mild to moderate mitral regurgitation. Mild to moderate tricuspid regurgitation; RVSP: 34 mmHg. Trace pulmonic regurgitation present. Pulmonic valve is structurally normal.   No evidence of any pericardial effusion. Aneurysmal interatrial septum with bowing into right atria; Suggestive of   increase LA pressures. IVC and abdominal aorta are within normal limits. All imaging was personally reviewed    ASSESSMENT/PLAN:   82year old female with history of a fib reportedly off of anticoagulation for several months and off of aspirin for the last month for dental procedure. Patient resides at [facility] and was noted to have speech changes and transported for further evaluation. Today patient is alert to self. She states first name only. Speech is slurred. She is unable to express herself or her needs. She is unable to name objects or repeat words clearly.  She

## 2022-08-31 NOTE — CARE COORDINATION
CM in to see Pt to initiate discharge planning. Pt not able to participate at this time. CM call to Pt son/ELIER Kim Pt from LTC at [facility]. Plan at this time is to return skilled if recommended. CM call to Manny, pt will not need a precert and can return when medically ready.      CM following

## 2022-08-31 NOTE — PROGRESS NOTES
V2.0  [facility] Hospitalist Progress Note      Name:  Taisha Luna /Age/Sex: 1935  (82 y.o. female)   MRN & CSN:  [id number] & [id number] Encounter Date/Time: 2022 12:22 PM EDT    Location:  7677/3532-B PCP: Elias Conley MD       Hospital Day: 2    Assessment and Plan:   Taisha Luna is a 82 y.o. female  who presents with Stroke-like symptoms      1. CVA/TIA  -Presented from SNF with global aphagia, left leg weakness, and confusion. Unknown last well known. [address]. Not tpa candidate. -Hx of afib on  Eliquis but per nursing home hasn't been taking for months unknown why. Has been off ASA since  for dental procedure. - MRI WO Contrast pending  - neurology consulted  - ECHO   - lipid panel, HA1C  - tele  - permissive HTN for 24h  - continue ASA     2. Chronic atrial fibrillation.   -Rate controlled. On home Eliquis and dronedarone. - continue dronedarone   - hold Eliquis; await MRI and neuro recs  - tele     3. HFpEF. -Not in excarbation. ECHO 10/15/2020 EF 50-55%, LA mild-moderate dilated, mild LVH  - will hold home lasix, losartan  - input/output, daily weights     4. CKD stage IIIb  - Cr 1.2 baseline 1.2-1.4  - avoid nephrotoxins  - monitor     5. chronic anemia  -Hgb 10.3. No sign of bleed. - continue iron    Diet ADULT DIET; Dysphagia - Soft and Bite Sized   DVT Prophylaxis [] Lovenox, []  Heparin, [] SCDs, [] Ambulation,  [] Eliquis, [] Xarelto  [] Coumadin   Code Status Full Code   Disposition From: SNF  Expected Disposition: SNF  Estimated Date of Discharge: TBD  Patient requires continued admission due to TIA/CVA   Surrogate Decision Maker/ POA      Subjective:     Chief Complaint: Aphasia and Cerebrovascular Accident     Patient seen and examined at bedside this morning. Appears lethargic but easily arousable and follows commands. Reports that she is feeling good. Denies any chest pain, shortness of breath, nausea vomiting, fever or chills.   Patient is able to communicate in full sentences and follows commands  Objective: Intake/Output Summary (Last 24 hours) at 8/31/2022 1222  Last data filed at 8/30/2022 1857  Gross per 24 hour   Intake --   Output 300 ml   Net -300 ml        Vitals:   Vitals:    08/31/22 1051   BP:    Pulse: 81   Resp: 13   Temp:    SpO2: 90%       Physical Exam:     General: NAD, generalized weakness, lethargy  Eyes: EOMI  ENT: neck supple  Cardiovascular: Regular rate. Respiratory: Clear to auscultation  Gastrointestinal: Soft, non tender  Genitourinary: no suprapubic tenderness  Musculoskeletal: No edema  Skin: warm, dry  Neuro: Awake and arousable. Communicates in full sentences unable to follow commands. Weakness noted bilateral upper and lower extremities strength 3/5.    Psych: Anxious    Medications:   Medications:    [Held by provider] baclofen  10 mg Oral BID    divalproex  125 mg Oral BID    dronedarone hcl  400 mg Oral BID WC    escitalopram  10 mg Oral Daily    levothyroxine  175 mcg Oral Nightly    mirtazapine  7.5 mg Oral Nightly    terbinafine  250 mg Oral Nightly    aspirin  81 mg Oral Daily    Or    aspirin  300 mg Rectal Daily    atorvastatin  80 mg Oral Nightly    heparin (porcine)  5,000 Units SubCUTAneous 3 times per day    ferrous sulfate  325 mg Oral BID WC      Infusions:   PRN Meds: polyethylene glycol, 17 g, Daily PRN  labetalol, 10 mg, Q10 Min PRN        Labs      Recent Results (from the past 24 hour(s))   POCT Glucose    Collection Time: 08/30/22  8:27 PM   Result Value Ref Range    POC Glucose 130 (H) 70 - 99 MG/DL   CBC    Collection Time: 08/31/22 12:45 AM   Result Value Ref Range    WBC 7.4 4.0 - 10.5 K/CU MM    RBC 3.28 (L) 4.2 - 5.4 M/CU MM    Hemoglobin 9.9 (L) 12.5 - 16.0 GM/DL    Hematocrit 32.2 (L) 37 - 47 %    MCV 98.2 78 - 100 FL    MCH 30.2 27 - 31 PG    MCHC 30.7 (L) 32.0 - 36.0 %    RDW 15.0 (H) 11.7 - 14.9 %    Platelets 192 823 - 099 K/CU MM    MPV 11.2 (H) 7.5 - 11.1 FL   Hemoglobin A1c Collection Time: 08/31/22 12:45 AM   Result Value Ref Range    Hemoglobin A1C 5.9 4.2 - 6.3 %    eAG 123 mg/dL   Lipid panel - fasting    Collection Time: 08/31/22 12:45 AM   Result Value Ref Range    Triglycerides 175 (H) <150 MG/DL    Cholesterol 183 <200 MG/DL    HDL 34 (L) >40 MG/DL    LDL Calculated 114 (H) <100 MG/DL   Basic Metabolic Panel w/ Reflex to MG    Collection Time: 08/31/22 12:45 AM   Result Value Ref Range    Sodium 143 135 - 145 MMOL/L    Potassium 4.1 3.5 - 5.1 MMOL/L    Chloride 106 99 - 110 mMol/L    CO2 27 21 - 32 MMOL/L    Anion Gap 10 4 - 16    BUN 30 (H) 6 - 23 MG/DL    Creatinine 1.1 0.6 - 1.1 MG/DL    Glucose 104 (H) 70 - 99 MG/DL    Calcium 8.6 8.3 - 10.6 MG/DL    GFR Non- 47 (L) >60 mL/min/1.73m2    GFR  57 (L) >60 mL/min/1.73m2        Imaging/Diagnostics Last 24 Hours   Echocardiogram complete 2D with doppler with color    Result Date: 8/30/2022  Transthoracic Echocardiography Report (TTE)  Demographics   Patient Name       URBAN GWENDOLYN E Date of Study       08/30/2022   Date of Birth      1935        Gender              Female   Age                82 year(s)        Race                   Patient Number     [id number]        Room Number         2672   Visit Number       [id number]   Corporate ID       [id number]   Accession Number   [id number]        ALESIA Rader   Ordering Physician                   Interpreting        Dmitry Roberts MD  Procedure Type of Study   TTE procedure:ECHOCARDIOGRAM COMPLETE 2D W DOPPLER W COLOR. Procedure Date Date: 08/30/2022 Start: 01:24 PM Study Location: Portable Technical Quality: Technically difficult study Indications:CVA. Height: 60 inches Weight: 215 pounds BSA: 1.92 m2 BMI: 41.99 kg/m2 HR: 96 bpm BP: 122/90 mmHg  Conclusions   Summary  TDS Due to patient cooperation. Saline Study appears negative.   Left ventricular systolic function is normal.  Ejection fraction is visually estimated at 55-60%. Proximal septal thickening. No regional wall motion abnormalites. Indeterminate diastolic function due to underlying rhythm. Left ventricle size is normal.  Mildly dilated left atrium. Sclerotic, but non-stenotic aortic valve. Mild to moderate aortic regurgitation is noted. Mitral annular calcification is present PMVL. Mild to moderate mitral regurgitation. Mild to moderate tricuspid regurgitation; RVSP: 34 mmHg. Trace pulmonic regurgitation present. Pulmonic valve is structurally normal.  No evidence of any pericardial effusion. Aneurysmal interatrial septum with bowing into right atria; Suggestive of  increase LA pressures. IVC and abdominal aorta are within normal limits. Signature   ------------------------------------------------------------------  Electronically signed by Orlena Duverney MD  (Interpreting physician) on 08/30/2022 at 06:09 PM  ------------------------------------------------------------------   Findings   Left Ventricle  Left ventricular systolic function is normal.  Ejection fraction is visually estimated at 55-60%. Proximal septal thickening. No regional wall motion abnormalites. Indeterminate diastolic function due to underlying rhythm. Left ventricle size is normal.   Left Atrium  Mildly dilated left atrium. Right Atrium  Right atrium is not clearly visualized. Right Ventricle  Essentially normal right ventricle. Aortic Valve  Sclerotic, but non-stenotic aortic valve. Mild to moderate aortic regurgitation is noted. Mitral Valve  Mitral annular calcification is present PMVL. Mild to moderate mitral regurgitation. Tricuspid Valve  Mild to moderate tricuspid regurgitation; RVSP: 34 mmHg. Pulmonic Valve  Trace pulmonic regurgitation present. Pulmonic valve is structurally normal.   Pericardial Effusion  No evidence of any pericardial effusion.    Pleural Effusion  No evidence of pleural effusion. Miscellaneous  Aneurysmal interatrial septum with bowing into right atria; Suggestive of  increase LA pressures. IVC and abdominal aorta are within normal limits.   M-Mode/2D Measurements & Calculations   LV Diastolic Dimension:  LV Systolic Dimension:  LA Dimension: 3.9 cmAO Root  4.78 cm                  2.99 cm                 Dimension: 3.7 cmLA Area:  LV FS:37.5 %             LV Volume Diastolic: 77 61.4 cm2  LV PW Diastolic: 7.83 cm ml  LV PW Systolic: 5.92 cm  LV Volume Systolic: 33  Septum Diastolic: 5.06   ml  cm                       LV EDV/LV EDV Index: 77  Septum Systolic: 9.69 cm JS/60 J3JS ESV/LV ESV   LA/Aorta: 1.05  CO: 6.48 l/min           Index: 33 ml/17 m2      Ascending Aorta: 3.5 cm  CI: 3.38 l/m*m2          EF Calculated (A4C):    LA volume/Index: 57 ml                           57.1 %                  /52Z1  LV Area Diastolic: 59.0  EF Calculated (2D):  cm2                      67.3 %  LV Area Systolic: 30.7  cm2                      LV Length: 6.95 cm                            LVOT: 2.1 cm  Doppler Measurements & Calculations    AV Peak Velocity: 134 cm/s    LVOT Peak Velocity: 96.3 cm/s   AV Peak Gradient: 7.18 mmHg   LVOT Mean Velocity: 62.5 cm/s   AV Mean Velocity: 95.7 cm/s   LVOT Peak Gradient: 4 mmHgLVOT Mean Gradient:   AV Mean Gradient: 4 mmHg      2 mmHg   AV VTI: 24.6 cm               Estimated RVSP: 34 mmHg   AV Area (Continuity):2.74 cm2 Estimated RAP:3 mmHg    LVOT VTI: 19.5 cm   AV P1/2t: 360 msec            TR Velocity:235 cm/s   Estimated PASP: 25.09 mmHg    TR Gradient:22.09 mmHg                                 PV Peak Velocity: 89.6 cm/s                                 PV Peak Gradient: 3.21 mmHg      CT HEAD WO CONTRAST    Result Date: 8/30/2022  EXAMINATION: CT OF THE HEAD WITHOUT CONTRAST  8/30/2022 8:50 am TECHNIQUE: CT of the head was performed without the administration of intravenous contrast. Automated exposure control, iterative reconstruction, and/or weight based adjustment of the mA/kV was utilized to reduce the radiation dose to as low as reasonably achievable. COMPARISON: 01/12/2022. HISTORY: ORDERING SYSTEM PROVIDED HISTORY: Stroke Symptoms TECHNOLOGIST PROVIDED HISTORY: Has a \"code stroke\" or \"stroke alert\" been called? ->Yes Reason for exam:->Stroke Symptoms Decision Support Exception - unselect if not a suspected or confirmed emergency medical condition->Emergency Medical Condition (MA) Reason for Exam: Aphasia FINDINGS: BRAIN/VENTRICLES: This study is degraded by motion artifact. There is cerebral parenchymal volume loss with severe chronic microvascular white matter ischemic disease, stable. There are no areas of hemorrhage, mass, or midline shift. No abnormal extra-axial fluid collections. The ventricles are enlarged, likely related to involutional change. Gray-white differentiation is maintained. Vascular calcifications are noted in the carotid siphons and distal vertebral arteries. ORBITS: Lens implants from prior cataract surgery are noted. The orbits are otherwise unremarkable. SINUSES: The paranasal sinuses and mastoid air cells are clear. SOFT TISSUES/SKULL:  The calvarium is intact. No appreciable scalp soft tissue swelling. 1. No acute intracranial abnormality. 2. Cerebral and cerebellar parenchymal volume loss with chronic microvascular white matter ischemic disease. Results were given to Dr. Andrew Arellano at 9:12 a.m. on 08/30/2022. XR CHEST PORTABLE    Result Date: 8/30/2022  EXAMINATION: ONE XRAY VIEW OF THE CHEST 8/30/2022 9:08 am COMPARISON: 01/12/2022 HISTORY: ORDERING SYSTEM PROVIDED HISTORY: stroke symptoms TECHNOLOGIST PROVIDED HISTORY: Reason for exam:->stroke symptoms Reason for Exam: stroke symptoms FINDINGS: The cardiac silhouette is at the upper limits of normal in size. Mild pulmonary vascular congestion has increased. Vascular calcifications are noted along the aorta.   Lung volumes are low. No pleural effusion or pneumothorax. The visualized osseous structures are unremarkable. 1. Slight increase in mild pulmonary vascular congestion. CTA HEAD NECK W CONTRAST    Result Date: 8/30/2022  EXAMINATION: CTA OF THE HEAD AND NECK WITH CONTRAST 8/30/2022 9:00 am TECHNIQUE: CTA of the head and neck was performed with the administration of intravenous contrast. Multiplanar reformatted images are provided for review. MIP images are provided for review. Stenosis of the internal carotid arteries measured using NASCET criteria. Automated exposure control, iterative reconstruction, and/or weight based adjustment of the mA/kV was utilized to reduce the radiation dose to as low as reasonably achievable. COMPARISON: None HISTORY: ORDERING SYSTEM PROVIDED HISTORY: Stroke Symptoms TECHNOLOGIST PROVIDED HISTORY: Has a \"code stroke\" or \"stroke alert\" been called? ->Yes Reason for exam:->Stroke Symptoms Decision Support Exception - unselect if not a suspected or confirmed emergency medical condition->Emergency Medical Condition (MA) Reason for Exam: Aphasia; Cerebrovascular Accident Relevant Medical/Surgical History: pt confused, unable to hold still FINDINGS: CTA NECK: AORTIC ARCH/ARCH VESSELS: No dissection or arterial injury. No significant stenosis of the brachiocephalic or subclavian arteries. CAROTID ARTERIES: No dissection, arterial injury, or hemodynamically significant stenosis by NASCET criteria. VERTEBRAL ARTERIES: No dissection, arterial injury, or significant stenosis. SOFT TISSUES: The lung apices are clear. No cervical or superior mediastinal lymphadenopathy. The larynx and pharynx are unremarkable. No acute abnormality of the salivary and thyroid glands. BONES: No acute osseous abnormality. CTA HEAD: ANTERIOR CIRCULATION: No significant stenosis of the intracranial internal carotid, anterior cerebral, or middle cerebral arteries. No aneurysm.  POSTERIOR CIRCULATION: No significant stenosis of the vertebral, basilar, or posterior cerebral arteries. No aneurysm. OTHER: No dural venous sinus thrombosis on this non-dedicated study. BRAIN: Severe chronic microvascular disease is identified within the periventricular white matter. Moderate ventriculomegaly is likely related to central atrophy. Unremarkable CTA of the head and neck. Severe chronic microvascular disease within the periventricular white matter. Moderate ventriculomegaly likely secondary to central atrophy.  RECOMMENDATIONS: Unavailable       Electronically signed by Halle Esteban MD on 8/31/2022 at 12:22 PM

## 2022-09-01 VITALS
TEMPERATURE: 98.1 F | HEIGHT: 60 IN | OXYGEN SATURATION: 96 % | SYSTOLIC BLOOD PRESSURE: 129 MMHG | DIASTOLIC BLOOD PRESSURE: 53 MMHG | WEIGHT: 215 LBS | RESPIRATION RATE: 23 BRPM | HEART RATE: 84 BPM | BODY MASS INDEX: 42.21 KG/M2

## 2022-09-01 LAB
SARS-COV-2, NAAT: NOT DETECTED
SOURCE: NORMAL

## 2022-09-01 PROCEDURE — 97530 THERAPEUTIC ACTIVITIES: CPT

## 2022-09-01 PROCEDURE — 97162 PT EVAL MOD COMPLEX 30 MIN: CPT

## 2022-09-01 PROCEDURE — 87635 SARS-COV-2 COVID-19 AMP PRB: CPT

## 2022-09-01 PROCEDURE — 6370000000 HC RX 637 (ALT 250 FOR IP): Performed by: STUDENT IN AN ORGANIZED HEALTH CARE EDUCATION/TRAINING PROGRAM

## 2022-09-01 PROCEDURE — 92526 ORAL FUNCTION THERAPY: CPT

## 2022-09-01 PROCEDURE — 94761 N-INVAS EAR/PLS OXIMETRY MLT: CPT

## 2022-09-01 RX ORDER — ATORVASTATIN CALCIUM 80 MG/1
40 TABLET, FILM COATED ORAL NIGHTLY
Qty: 30 TABLET | Refills: 3 | Status: SHIPPED | OUTPATIENT
Start: 2022-09-01

## 2022-09-01 RX ORDER — FUROSEMIDE 20 MG/1
20 TABLET ORAL SEE ADMIN INSTRUCTIONS
Qty: 30 TABLET | Refills: 0 | Status: SHIPPED | OUTPATIENT
Start: 2022-09-01

## 2022-09-01 RX ORDER — POTASSIUM CHLORIDE 750 MG/1
10 CAPSULE, EXTENDED RELEASE ORAL NIGHTLY
Qty: 30 CAPSULE | Refills: 0 | Status: SHIPPED | OUTPATIENT
Start: 2022-09-01

## 2022-09-01 RX ORDER — ASPIRIN 81 MG/1
81 TABLET ORAL DAILY
Qty: 30 TABLET | Refills: 3 | Status: SHIPPED | OUTPATIENT
Start: 2022-09-02 | End: 2022-11-03

## 2022-09-01 RX ADMIN — ASPIRIN 81 MG: 81 TABLET, COATED ORAL at 09:20

## 2022-09-01 RX ADMIN — DRONEDARONE 400 MG: 400 TABLET, FILM COATED ORAL at 09:20

## 2022-09-01 RX ADMIN — DIVALPROEX SODIUM 125 MG: 125 TABLET, DELAYED RELEASE ORAL at 09:20

## 2022-09-01 RX ADMIN — ESCITALOPRAM OXALATE 10 MG: 10 TABLET ORAL at 09:20

## 2022-09-01 RX ADMIN — FERROUS SULFATE TAB 325 MG (65 MG ELEMENTAL FE) 325 MG: 325 (65 FE) TAB at 09:20

## 2022-09-01 NOTE — PROGRESS NOTES
[facility]  DEPARTMENT OF SPEECH/LANGUAGE PATHOLOGY  DAILY PROGRESS NOTE  Sruthi Juarez  9/1/2022  [id number]  Aphasia [R47.01]  Stroke-like symptoms [R29.90]  Allergies   Allergen Reactions    Tape [place] Tape] Itching    Codeine Rash    Sulfa Antibiotics Nausea And Vomiting         Pt was seen this date for dysphagia treatment. IMPRESSION AND RECOMMENDATIONS: Sruthi Juarez was seen for dysphagia follow-up. She was seated upright in chair, alert, cooperative. She was seen with her meal tray including soft solids, puree, and thin liquids via straw. She was presented with additional PO trials of regular solids. Oral stage improved with intact labial seal, mastication, and oral clearance. Pharyngeal stage WFL with adequate swallow initiation/laryngeal elevation, no s/s aspiration. Pt denies any symptoms of dysphagia at this time. Recommend advancement to regular diet/thin liquids. No further acute SLP needs identified. GOALS (current status in bold):  Short-term Goals  Timeframe for Short-term Goals: length of admission  Goal 1: Pt will tolerate soft/bite-sized diet/thin liquids with adequate oral manipulation/clearance and no s/s aspiration. Goal met, advance diet  Goal 2: Pt will tolerate regular solids with adequate oral manipulation/clearance 10/10 for diet advancement. Goal met, diet advanced  Goal 3: Pt/caregivers will indicate understanding of all education/recommendations.  Goal met          EDUCATION: recommendations/plan    PAIN RATING (0-10 Scale): 0  Time in/Time out: SLP Individual Minutes  Time In: 5877  Time Out: 9521  Minutes: 20    Visit number: [id number] [facility]  9/1/2022  1:30 PM

## 2022-09-01 NOTE — PROGRESS NOTES
I am working as a [facility] clinical instructor today. I have a student assigned to this patient.

## 2022-09-01 NOTE — CONSULTS
[place], 1935, 3003/3003-A, 9/1/2022  History  Citizen [place]:  The primary encounter diagnosis was Aphasia. A diagnosis of Congestive heart failure, unspecified HF chronicity, unspecified heart failure type [facility]) was also pertinent to this visit. Patient  has a past medical history of Allergic rhinitis, Arthritis, CHF (congestive heart failure) ([address] DVT, lower extremity, proximal, acute, left (HCC), Edema leg, Gastroesophageal reflux disease, H/O 24 hour EKG monitoring, H/O cardiac catheterization, H/O cardiac catheterization, H/O cardiovascular stress test, H/O Doppler echocardiogram, H/O echocardiogram, History of atrial fibrillation, History of cardiovascular stress test, History of falling, History of nuclear stress test, Hx of blood clots, Hx: UTI (urinary tract infection), Hyperglycemia, Hyperlipidemia, Hypertension, Hypothyroidism, Incontinence of bowel, Incontinence of urine, Sepsis ([address] Tremor, Wears glasses, and Wears partial dentures. Patient  has a past surgical history that includes Total knee arthroplasty (2005, 2006); Breast surgery; joint replacement (4/2005 2/2006); Cardioversion (10/17/2008); Cardioversion (12/15/2008); other surgical history (10/2009); pr total hip arthroplasty (Left, 02/23/2015); Hysterectomy (age 21's); IR GUIDED FLUID DRAINAGE BY CATH SOFT TISSUE PERC (8/21/2020); and IR GUIDED FLUID DRAINAGE BY CATH SOFT TISSUE PERC (9/8/2020). Therapy Hx and additional comorbidities:  see above. Restrictions:  none           Communication with other providers:  cleared for tx  Subjective:  Patient states:  agreeable to eval.  Moves okay. \"Not OOB today. Not OOB since I been in here. \"  Lives at facility, feels it is short term plan, previously lived at home [no dates provided]. Pain:  this bed is pinching me!   Patient goal:  none  Occupational profile (relevant social history and personal factors):  lives in long-term care facility       Examination of body systems (includes body structures/functions, activity/participation limitations):  Vision and Hearing:  visual impairments and WFL hearing  Cognition and Participation:  alert, oriented, pleasant and participatory, and follows commands appropriately. Cardiac and Pulmonary Tolerance:  tolerant of activity, tachycardia, short of breath upon exertion, elevated respiratory rate, and recovers quickly with rest period  Mobility skills and general balance:    Bed Mobility: contact guard assist  .    Sup-sit:  minimal assist.    Sit-static balance: stand-by assist.    Sit-stand:  moderate assist  .    Stand-static balance:  moderate assist  .    Stand-pivot:  maximal assist.    Bedside stepping/ambulation (0-10 feet):  unable. Mobility/Transfer Skills:  Pattern/Sequence:  impaired with reaching inappropriately, impaired ability to scoot. Efficiency:  impaired, also impeded by .     Neuro screen:  [facility]  Musculoskeletal screen:  WFL BUE AROM, fair BLE AROM, some impaired at hips  [facility] 6 Clicks Inpatient Mobility:   (Amb in room is defined as 10-30 feet.)  AM-[facility] Mobility Inpatient   How much difficulty turning over in bed?: A Little  How much difficulty sitting down on / standing up from a chair with arms?: A Lot  How much difficulty moving from lying on back to sitting on side of bed?: A Little  How much help from another person moving to and from a bed to a chair?: A Lot  How much help from another person needed to walk in hospital room?: Total  How much help from another person for climbing 3-5 steps with a railing?: Total  AM-PAC Inpatient Mobility Raw Score : 12  AM-PAC Inpatient T-Scale Score : 35.33  Mobility Inpatient CMS 0-100% Score: 68.66  Mobility Inpatient CMS G-Code Modifier : CL  [facility] 6 Clicks Goal:  16  [place] [facility] Mobility Assessment Tool Score:  1  patient requires assist from staff to sit from supine -- patient is able to transfer with Afebrile, hemodynamically stable, saturating well  NAD, nontoxic appearing  Head leaning to the right  Head NCAT  PERRL, EOMI, anicteric  MMM, uvula midline  RRR, nml S1/S2, no m/r/g  Lungs CTAB, no w/r/r  Abd soft, NT, ND, nml BS, no rebound or guarding  AAO, CN's 3-12 grossly intact, motor 5/5 and sens symm in UE's  ROBBINS spontaneously, no leg cyanosis or edema, 2+ symm radial pulses  Skin warm, well perfused, no rashes or hives assist  BMAT Goal:  3  Treatment today:    Therapeutic Activity Training:   Therapeutic activity training was instructed today. Cues were given for safety, sequence, UE/LE placement, awareness, and balance. Activities performed today included bed mobility training, sup-sit, sit-stand, SPT. Assessment:  Patient admitted for stroke-like symptoms with evolving medical features and evolving rehabilitative features. Presented today with grossly WFL neuromotor status and good cognition. Neuro NP and I discussed case, feel that patient is doing well. Suspect symptoms may have been more related to infection/UTI than neuro injury. Body Structures, Functions, Activity Limitations Requiring Skilled Therapeutic Intervention: Decreased functional mobility , Decreased endurance, Decreased tolerance to work activity, Decreased balance, Decreased safe awareness  Skilled physical therapy services are appropriate in the acute care setting. There are no significant educational impairments or barriers to learning which prevent participation with service. Prognosis: good. Clinical decision making:  medium complexity:  hx 1-2 personal factors/comorbidities, exam tests and measures for 2-3 elements of body/structures/functions/activity limitation/participation restriction, evolving presentation  Discharge recommendations:  minimal/mild post-acute physical therapy service needs residential facility for post-acute rehabilitation, anticipate 1-2 hours per day and 5 days per week. .      Equipment recommendations:  front-wheeled rolling walker, bedside commode, lift chair, and hospital bed  Goals:  Achieve goals within two weeks.   bed mobility and sup-sit independently  sit-stand and SPT with CGA  ambulate at bedside with CGA and least restrictive device  independent with HEP  independent recall of signs and symptoms of cardiac intolerance  Plan:  Plan  Plan: 3-5 times per week  Acute care physical therapy treatment to include patient/caregiver education, therapeutic activity training, gait training, neuromuscular re-education, therapeutic exercise and self care training for home management. First intend to develop bed mobility skills and sitting functional activity tolerance, as needed. Next, intend to develop transfer skills and standing functional activity tolerance, where necessary. Then, after standing is safe, intend to develop standing functional mobility skills, including ambulation. Patient mobility with nursing staff is an important component of care during this admission. Mobility homework for patient and nurse:  change position frequently sit at edge of bed 4-6 times daily out of bed for meals  Patient requires moderate assist   for mobility with nursing. Time in:  1025  Time out:  1055  Timed treatment minutes:  15  Total treatment time:  30    Electronically signed by:   Francisco Javier Mejia PT  9/1/2022, 1:21 PM

## 2022-09-01 NOTE — PLAN OF CARE
Problem: Discharge Planning  Goal: Discharge to home or other facility with appropriate resources  Outcome: Completed     Problem: Safety - Adult  Goal: Free from fall injury  9/1/2022 1628 by Priya Foreman  Outcome: Completed  9/1/2022 0949 by Jessica Herrera  Outcome: Progressing     Problem: ABCDS Injury Assessment  Goal: Absence of physical injury  9/1/2022 1628 by Priya Foreman  Outcome: Completed  9/1/2022 0949 by Jessica Herrera  Outcome: Progressing     Problem: Skin/Tissue Integrity  Goal: Absence of new skin breakdown  Description: 1. Monitor for areas of redness and/or skin breakdown  2. Assess vascular access sites hourly  3. Every 4-6 hours minimum:  Change oxygen saturation probe site  4. Every 4-6 hours:  If on nasal continuous positive airway pressure, respiratory therapy assess nares and determine need for appliance change or resting period. Outcome: Completed     Problem: Confusion  Goal: Confusion, delirium, dementia, or psychosis is improved or at baseline  Description: INTERVENTIONS:  1. Assess for possible contributors to thought disturbance, including medications, impaired vision or hearing, underlying metabolic abnormalities, dehydration, psychiatric diagnoses, and notify attending LIP  2. [place] high risk fall precautions, as indicated  3. Provide frequent short contacts to provide reality reorientation, refocusing and direction  4. Decrease environmental stimuli, including noise as appropriate  5. Monitor and intervene to maintain adequate nutrition, hydration, elimination, sleep and activity  6. If unable to ensure safety without constant attention obtain sitter and review sitter guidelines with assigned personnel  7.  Initiate Psychosocial CNS and Spiritual Care consult, as indicated  Outcome: Completed

## 2022-09-01 NOTE — DISCHARGE INSTR - COC
Continuity of Care Form    Patient Name: Jessica Carter   :  1935  MRN:  [id number]    Admit date:  2022  Discharge date:  2022    Code Status Order: Full Code   Advance Directives:     Admitting Physician:  Toya Hilton MD  PCP: Luca Álvarez MD    Discharging Nurse: Kina 24 Unit/Room#: 6745/6547-L  Discharging Unit Phone Number: [phone]    Emergency Contact:   Extended Emergency Contact Information  Primary Emergency Contact: Sheryl Ram W [address] Phone: [phone]  Work Phone: [phone]  Mobile Phone: [phone]  Relation: Brother/Sister  Secondary Emergency Contact: Ciarra Hutchison [address]  Home Phone: [phone]  Work Phone: [phone]  Mobile Phone: [phone]  Relation: Child    Past Surgical History:  Past Surgical History:   Procedure Laterality Date    BREAST SURGERY      Left breast biopsy    CARDIOVERSION  10/17/2008    CARDIOVERSION  12/15/2008    HYSTERECTOMY (CERVIX STATUS UNKNOWN)  age 21's    IR [address], [facility]  2020    IR [address], [facility] 2020 [facility] PROCEDURES    IR GUIDED DRAIN W CATHETER PLACEMENT  2020    IR GUIDED DRAIN W CATHETER PLACEMENT 2020 [facility] SPECIAL PROCEDURES    JOINT REPLACEMENT  2005    bilat    OTHER SURGICAL HISTORY  10/2009     bladder stimulator - interstim II 3057 - MRI OF HEAD ONLY -STIMULATOR MUST BE OFF AND TRANSMIT RECEIVE HEAD COIL ONLY    ID TOTAL HIP ARTHROPLASTY Left 2015    Hip Replacement, Total    TOTAL KNEE ARTHROPLASTY  ,     bilateral       Immunization History:   Immunization History   Administered Date(s) Administered    COVID-19, PFIZER GRAY top, DO NOT Dilute, (age 15 y+), IM, 30 mcg/0.3 mL 2022    COVID-19, PFIZER PURPLE top, DILUTE for use, (age 15 y+), 30mcg/0.3mL 2021, 2021    Influenza Virus Vaccine 11/01/2012, 10/08/2013    Influenza Whole 09/24/2003    Influenza, FLUAD, (age 72 y+), Adjuvanted, 0.5mL 03/08/2021, 09/08/2021    Influenza, FLUARIX, FLULAVAL, FLUZONE (age 10 mo+) AND AFLURIA, (age 1 y+), PF, 0.5mL 01/15/2020    Influenza, High Dose (Fluzone 65 yrs and older) 10/08/2013, 10/01/2014, 11/30/2016    Pneumococcal Conjugate 13-valent ([id number]) 09/22/2015    Pneumococcal Polysaccharide ([id number]) 07/21/1999, 10/08/2013, 02/24/2015    Td vaccine (adult) 03/20/2009       Active Problems:  Patient Active Problem List   Diagnosis Code    Chronic anticoagulation Z79.01    Hypertension I10    Hypothyroidism E03.9    Encounter for long-term (current) use of other medications Z79.899    Recurrent UTI N39.0    Multiple falls R29.6    Anemia D64.9    Sepsis ([facility] [address] A41.9    Leg weakness R29.898    Abnormality of gait R26.9    CHF (congestive heart failure) ([facility]) I50.9    Rhabdomyolysis M62.82    Sepsis associated hypotension ([facility]) A41.9, I95.9    Acute cystitis without hematuria N30.00    DVT (deep vein thrombosis) in pregnancy O22.30    Progression of deep vein thrombosis (DVT) ([facility]) I82.409    EKG abnormalities R94.31    Noncompliance with medication regimen Z91.14    Chronic atrial fibrillation ([facility]) H27.15    Acute metabolic encephalopathy D46.22    Urinary tract infection associated with catheterization of urinary tract ([facility]) T83.511A, N39.0    Fall at home, initial encounter W19. XXXA, Y92.009    Frequent falls R29.6    Muscle weakness of lower extremity M62.81    Tremor R25.1    Gastroesophageal reflux disease K21.9    Hyperglycemia R73.9    Morbid obesity with BMI of 40.0-44.9, adult ([facility]) E66.01, Z68.41    CKD (chronic kidney disease) stage 4, GFR 15-29 ml/min ([facility]) N18.4    COVID-19 U07.1    Hyponatremia E87.1    Acute kidney injury superimposed on CKD ([facility]) N17.9, N18.9    Stroke-like symptoms R29.90       Isolation/Infection:   Isolation            No Isolation          Patient Infection Status       Infection Onset Added Last Indicated Last Indicated By Review Planned Expiration Resolved Resolved By    None active    Resolved    COVID-19 12/30/21 12/30/21 12/30/21 COVID-19, Rapid   01/13/22 Francisco Javier De Dios RN    10 days of isolation completed. Never required O2 since diagnosis    COVID-19 (Rule Out) 12/30/21 12/30/21 12/30/21 COVID-19, Rapid (Ordered)   12/30/21 Rule-Out Test Resulted    COVID-19 (Rule Out) 08/28/20 08/28/20 08/28/20 Covid-19 Ambulatory (Ordered)   08/30/20 Rule-Out Test Resulted    COVID-19 (Rule Out) 08/17/20 08/17/20 08/17/20 Covid-19 Ambulatory (Ordered)   08/18/20 Rule-Out Test Resulted    VRE  06/01/15 06/01/15 Kelly Plaster   08/29/17 Margaret Townsend LPN    8/37/0858, urine            Nurse Assessment:  Last Vital Signs: /74   Pulse 93   Temp 97.8 °F (36.6 °C) (Oral)   Resp 18   Ht 5' (1.524 m)   Wt 215 lb (97.5 kg)   SpO2 92%   BMI 41.99 kg/m²     Last documented pain score (0-10 scale): Pain Level: 0  Last Weight:   Wt Readings from Last 1 Encounters:   08/30/22 215 lb (97.5 kg)     Mental Status:  oriented and alert    IV Access:  - None    Nursing Mobility/ADLs:  Walking   Dependent  Transfer  Dependent  Bathing  Dependent  Dressing  Assisted  Toileting  Dependent  Feeding  Assisted  Med Admin  Independent  Med Delivery   whole    Wound Care Documentation and Therapy:        Elimination:  Continence: Bowel: Yes  Bladder: Yes  Urinary Catheter: Insertion Date: unknown    Colostomy/Ileostomy/Ileal Conduit: No       Date of Last BM: 9/1    Intake/Output Summary (Last 24 hours) at 9/1/2022 1252  Last data filed at 8/31/2022 1836  Gross per 24 hour   Intake --   Output 200 ml   Net -200 ml     I/O last 3 completed shifts:  In: -   Out: 200 [Urine:200]    Safety Concerns:      At Risk for Falls and recent confusion    Impairments/Disabilities:      None      Patient's personal belongings (please select all that are sent with patient):  clothes    RN SIGNATURE:  Electronically signed by Isabel Glasgow on 9/1/22 at 2:38 PM EDT      PHYSICIAN SECTION    Prognosis: Fair    Condition at Discharge: Stable    Rehab Potential (if transferring to Rehab): Fair    Recommended Labs or Other Treatments After Discharge: CBC/BMP WEEKLY    Nutrition Therapy:  Current Nutrition Therapy:   - Oral Diet:  General and soft/bite-sized diet and thin liquids. Pt will benefit from 1:1 assistance with meals including cuing for attention to tas    Routes of Feeding: Oral  Liquids: soft/bite-sized diet and thin liquids. Pt will benefit from 1:1 assistance with meals including cuing for attention to tas  Daily Fluid Restriction: no  Last Modified Barium Swallow with Video (Video Swallowing Test): not done    Treatments at the Time of Hospital Discharge:   Respiratory Treatments:   Oxygen Therapy:  is not on home oxygen therapy. Ventilator:    - No ventilator support    Rehab Therapies: Physical Therapy and Occupational Therapy  Weight Bearing Status/Restrictions: No weight bearing restrictions  Other Medical Equipment (for information only, NOT a DME order): Other Treatments:     Physician Certification: I certify the above information and transfer of Francesca Howard  is necessary for the continuing treatment of the diagnosis listed and that she requires Richardson Aime for greater 30 days.      Update Admission H&P: No change in H&P    PHYSICIAN SIGNATURE:  Electronically signed by Ricarda Dumont MD on 9/1/22 at 12:52 PM EDT

## 2022-09-01 NOTE — DISCHARGE SUMMARY
V2.0  Discharge Summary    Name:  Kaitlyn Hicks /Age/Sex: 1935 (82 y.o. female)   Admit Date: 2022  Discharge Date: 22    MRN & CSN:  [id number] & [id number] Encounter Date and Time 22 12:53 PM EDT    Attending:  Britney Russo MD Discharging Provider: Britney Russo MD       Hospital Course:     Brief HPI: Kaitlyn Hicks is a 82 y.o. female with PMH of Afib, CHF, CKD   who presents with aphasia and left LE weakness. Pt woke up today 4Am noted to have grossly abnormal speech. Pt is normally alert and oriented x3 . Unable to get full story from pt given her expressive aphasia. Spoke to son POA, who stated pt has been more dependent on others recently. However, she is normally alert and oriented x3, on wheel chair . She was doing rehab at the nursing facility but it stopped by insurance due to lack of participation. Pt was supposed to be on Eliquis per cardiology but for some reason she was not receiving it at the nursing facility. Pt was also complaining of left side pain and possibly weakness last Friday where she went to urgent care; she had doppler studies which was negative. At the ED, pt was hemodynamically stable. Stroke code called;  per [facility], tPA not indicated. CT, CTA negative for acute hemorrhage or occlusion. Brief Problem Based Course:      1. Acute encephalopathy, resolved CVA/TIA ruled out  -Presented from SNF with aphasia, left leg weakness, and confusion. Unknown last well known. [address]. Not tpa candidate. -Hx of afib on  Eliquis but per nursing home hasn't been taking for months unknown why. Has been off ASA since  for dental procedure. Symptoms currently resolved and patient talking in full sentences   - MRI WO Contrast showed no acute stroke  - neurology was consulted  -Patient back to baseline mentation    2. Chronic atrial fibrillation.   -Rate controlled. On home Eliquis and dronedarone. - continue dronedarone   -Resume anticoagulation    3. HFpEF. -Not in excarbation. ECHO 10/15/2020 EF 50-55%, LA mild-moderate dilated, mild LVH  -Resume home medications     4. CKD stage IIIb  - Cr 1.2 baseline 1.2-1.4  -Outpatient follow-up    5. chronic anemia  -Hgb 10.3. No sign of bleed. - continue iron      The patient expressed appropriate understanding of, and agreement with the discharge recommendations, medications, and plan. Consults this admission:  IP CONSULT TO PHARMACY  PHARMACY TO CHANGE BASE FLUIDS  IP CONSULT TO HOSPITALIST  IP CONSULT TO PHARMACY  IP CONSULT TO NEUROLOGY    Discharge Diagnosis:   Stroke-like symptoms        Discharge Instruction:   Follow up appointments:   Primary care physician: Jared Walker MD within 2 weeks  Diet: regular diet   Activity: activity as tolerated  Disposition: Discharged to:   []Home, [][facility], [x]SNF, []Acute Rehab, []Hospice   Condition on discharge: Stable  Labs and Tests to be Followed up as an outpatient by PCP or Specialist:     Discharge Medications:        Medication List        START taking these medications      aspirin 81 MG EC tablet  Take 1 tablet by mouth daily  Start taking on: September 2, 2022     atorvastatin 80 MG tablet  Commonly known as: LIPITOR  Take 0.5 tablets by mouth nightly            CHANGE how you take these medications      acetaminophen 325 MG tablet  Commonly known as: TYLENOL  What changed: Another medication with the same name was removed. Continue taking this medication, and follow the directions you see here.      dronedarone hcl 400 MG Tabs  Commonly known as: Multaq  TAKE 1 TABLET TWICE A DAY WITH MEALS  What changed:   how much to take  how to take this  when to take this  additional instructions     levothyroxine 175 MCG tablet  Commonly known as: SYNTHROID  Take 1 tablet by mouth daily  What changed: when to take this     vitamin D 25 MCG (1000 UT) Caps  Take 5 capsules by mouth daily  What changed: how much to take            CONTINUE taking these medications Depakote 125 MG DR tablet  Generic drug: divalproex     escitalopram 10 MG tablet  Commonly known as: LEXAPRO     ferrous sulfate 325 (65 Fe) MG EC tablet  Commonly known as: FE TABS 325     fleet 7-19 GM/118ML     furosemide 20 MG tablet  Commonly known as: LASIX  Take 1 tablet by mouth See Admin Instructions Receives on Mon/Wed/Fri//Sat/Sun     loratadine 10 MG tablet  Commonly known as: CLARITIN     mirtazapine 7.5 MG tablet  Commonly known as: REMERON     potassium chloride 10 MEQ extended release capsule  Commonly known as: MICRO-K  Take 1 capsule by mouth nightly     terbinafine 250 MG tablet  Commonly known as: LAMISIL            STOP taking these medications      baclofen 10 MG tablet  Commonly known as: LIORESAL     bisacodyl 10 MG suppository  Commonly known as: DULCOLAX     cetirizine 10 MG tablet  Commonly known as: ZYRTEC     diphenhydrAMINE 25 MG tablet  Commonly known as: BENADRYL     hydrocortisone 2.5 % cream     loperamide 2 MG capsule  Commonly known as: IMODIUM     magnesium hydroxide 400 MG/5ML suspension  Commonly known as: MILK OF MAGNESIA     ondansetron 4 MG tablet  Commonly known as: ZOFRAN     oxybutynin 10 MG extended release tablet  Commonly known as: DITROPAN-XL               Where to Get Your Medications        These medications were sent to [facility] [id number] [facility], [address] [place], [place] [address]age  [id number] [place], [place] [id number]  3 [address] [place] [address], [address] [place] [address]      Phone: [phone]   aspirin 81 MG EC tablet  atorvastatin 80 MG tablet  furosemide 20 MG tablet  potassium chloride 10 MEQ extended release capsule        Objective Findings at Discharge:   /74   Pulse 93   Temp 97.8 °F (36.6 °C) (Oral)   Resp 18   Ht 5' (1.524 m)   Wt 215 lb (97.5 kg)   SpO2 92%   BMI 41.99 kg/m²       Physical Exam:   General: NAD  Eyes: EOMI  ENT: neck supple  Cardiovascular: Regular rate.   Respiratory: Clear to auscultation  Gastrointestinal: Soft, non tender  Genitourinary: no suprapubic tenderness  Musculoskeletal: No edema  Skin: warm, dry  Neuro: Alert. Psych: Mood appropriate. Labs and Imaging   Echocardiogram complete 2D with doppler with color    Result Date: 8/30/2022  Transthoracic Echocardiography Report (TTE)  Demographics   Patient Name       URBAN GWENDOLYN E Date of Study       08/30/2022   Date of Birth      1935        Gender              Female   Age                82 year(s)        Race                   Patient Number     [id number]        Room Number         3003   Visit Number       [id number]   Corporate ID       [id number]   Accession Number   [id number]        [facility]   Ordering Physician                   Interpreting        Dmitry Roberts MD  Procedure Type of Study   TTE procedure:ECHOCARDIOGRAM COMPLETE 2D W DOPPLER W COLOR. Procedure Date Date: 08/30/2022 Start: 01:24 PM Study Location: Portable Technical Quality: Technically difficult study Indications:CVA. Height: 60 inches Weight: 215 pounds BSA: 1.92 m2 BMI: 41.99 kg/m2 HR: 96 bpm BP: 122/90 mmHg  Conclusions   Summary  TDS Due to patient cooperation. Saline Study appears negative. Left ventricular systolic function is normal.  Ejection fraction is visually estimated at 55-60%. Proximal septal thickening. No regional wall motion abnormalites. Indeterminate diastolic function due to underlying rhythm. Left ventricle size is normal.  Mildly dilated left atrium. Sclerotic, but non-stenotic aortic valve. Mild to moderate aortic regurgitation is noted. Mitral annular calcification is present PMVL. Mild to moderate mitral regurgitation. Mild to moderate tricuspid regurgitation; RVSP: 34 mmHg. Trace pulmonic regurgitation present. Pulmonic valve is structurally normal.  No evidence of any pericardial effusion.   Aneurysmal interatrial septum with bowing into right atria; Suggestive of  increase LA pressures. IVC and abdominal aorta are within normal limits. Signature   ------------------------------------------------------------------  Electronically signed by Anatoly Coon MD  (Interpreting physician) on 08/30/2022 at 06:09 PM  ------------------------------------------------------------------   Findings   Left Ventricle  Left ventricular systolic function is normal.  Ejection fraction is visually estimated at 55-60%. Proximal septal thickening. No regional wall motion abnormalites. Indeterminate diastolic function due to underlying rhythm. Left ventricle size is normal.   Left Atrium  Mildly dilated left atrium. Right Atrium  Right atrium is not clearly visualized. Right Ventricle  Essentially normal right ventricle. Aortic Valve  Sclerotic, but non-stenotic aortic valve. Mild to moderate aortic regurgitation is noted. Mitral Valve  Mitral annular calcification is present PMVL. Mild to moderate mitral regurgitation. Tricuspid Valve  Mild to moderate tricuspid regurgitation; RVSP: 34 mmHg. Pulmonic Valve  Trace pulmonic regurgitation present. Pulmonic valve is structurally normal.   Pericardial Effusion  No evidence of any pericardial effusion. Pleural Effusion  No evidence of pleural effusion. Miscellaneous  Aneurysmal interatrial septum with bowing into right atria; Suggestive of  increase LA pressures. IVC and abdominal aorta are within normal limits.   M-Mode/2D Measurements & Calculations   LV Diastolic Dimension:  LV Systolic Dimension:  LA Dimension: 3.9 cmAO Root  4.78 cm                  2.99 cm                 Dimension: 3.7 cmLA Area:  LV FS:37.5 %             LV Volume Diastolic: 77 37.5 cm2  LV PW Diastolic: 5.88 cm ml  LV PW Systolic: 8.26 cm  LV Volume Systolic: 33  Septum Diastolic: 7.88   ml  cm                       LV EDV/LV EDV Index: 77  Septum Systolic: 9.42 cm WP/25 T3EW ESV/LV ESV   LA/Aorta: 1.05  CO: 6.48 l/min           Index: 33 ml/17 m2      Ascending Aorta: 3.5 cm  CI: 3.38 l/m*m2          EF Calculated (A4C):    LA volume/Index: 57 ml                           57.1 %                  /53H6  LV Area Diastolic: 51.9  EF Calculated (2D):  cm2                      67.3 %  LV Area Systolic: 16.9  cm2                      LV Length: 6.95 cm                            LVOT: 2.1 cm  Doppler Measurements & Calculations    AV Peak Velocity: 134 cm/s    LVOT Peak Velocity: 96.3 cm/s   AV Peak Gradient: 7.18 mmHg   LVOT Mean Velocity: 62.5 cm/s   AV Mean Velocity: 95.7 cm/s   LVOT Peak Gradient: 4 mmHgLVOT Mean Gradient:   AV Mean Gradient: 4 mmHg      2 mmHg   AV VTI: 24.6 cm               Estimated RVSP: 34 mmHg   AV Area (Continuity):2.74 cm2 Estimated RAP:3 mmHg    LVOT VTI: 19.5 cm   AV P1/2t: 360 msec            TR Velocity:235 cm/s   Estimated PASP: 25.09 mmHg    TR Gradient:22.09 mmHg                                 PV Peak Velocity: 89.6 cm/s                                 PV Peak Gradient: 3.21 mmHg      CT HEAD WO CONTRAST    Result Date: 8/30/2022  EXAMINATION: CT OF THE HEAD WITHOUT CONTRAST  8/30/2022 8:50 am TECHNIQUE: CT of the head was performed without the administration of intravenous contrast. Automated exposure control, iterative reconstruction, and/or weight based adjustment of the mA/kV was utilized to reduce the radiation dose to as low as reasonably achievable. COMPARISON: 01/12/2022. HISTORY: ORDERING SYSTEM PROVIDED HISTORY: Stroke Symptoms TECHNOLOGIST PROVIDED HISTORY: Has a \"code stroke\" or \"stroke alert\" been called? ->Yes Reason for exam:->Stroke Symptoms Decision Support Exception - unselect if not a suspected or confirmed emergency medical condition->Emergency Medical Condition (MA) Reason for Exam: Aphasia FINDINGS: BRAIN/VENTRICLES: This study is degraded by motion artifact. There is cerebral parenchymal volume loss with severe chronic microvascular white matter ischemic disease, stable. There are no areas of hemorrhage, mass, or midline shift. No abnormal extra-axial fluid collections. The ventricles are enlarged, likely related to involutional change. Gray-white differentiation is maintained. Vascular calcifications are noted in the carotid siphons and distal vertebral arteries. ORBITS: Lens implants from prior cataract surgery are noted. The orbits are otherwise unremarkable. SINUSES: The paranasal sinuses and mastoid air cells are clear. SOFT TISSUES/SKULL:  The calvarium is intact. No appreciable scalp soft tissue swelling. 1. No acute intracranial abnormality. 2. Cerebral and cerebellar parenchymal volume loss with chronic microvascular white matter ischemic disease. Results were given to Dr. Alfa Flower at 9:12 a.m. on 08/30/2022. XR CHEST PORTABLE    Result Date: 8/30/2022  EXAMINATION: ONE XRAY VIEW OF THE CHEST 8/30/2022 9:08 am COMPARISON: 01/12/2022 HISTORY: ORDERING SYSTEM PROVIDED HISTORY: stroke symptoms TECHNOLOGIST PROVIDED HISTORY: Reason for exam:->stroke symptoms Reason for Exam: stroke symptoms FINDINGS: The cardiac silhouette is at the upper limits of normal in size. Mild pulmonary vascular congestion has increased. Vascular calcifications are noted along the aorta. Lung volumes are low. No pleural effusion or pneumothorax. The visualized osseous structures are unremarkable. 1. Slight increase in mild pulmonary vascular congestion. CTA HEAD NECK W CONTRAST    Result Date: 8/30/2022  EXAMINATION: CTA OF THE HEAD AND NECK WITH CONTRAST 8/30/2022 9:00 am TECHNIQUE: CTA of the head and neck was performed with the administration of intravenous contrast. Multiplanar reformatted images are provided for review. MIP images are provided for review. Stenosis of the internal carotid arteries measured using NASCET criteria.  Automated exposure control, iterative reconstruction, and/or weight based adjustment of the mA/kV was utilized to reduce the radiation dose to as low as reasonably achievable. COMPARISON: None HISTORY: ORDERING SYSTEM PROVIDED HISTORY: Stroke Symptoms TECHNOLOGIST PROVIDED HISTORY: Has a \"code stroke\" or \"stroke alert\" been called? ->Yes Reason for exam:->Stroke Symptoms Decision Support Exception - unselect if not a suspected or confirmed emergency medical condition->Emergency Medical Condition (MA) Reason for Exam: Aphasia; Cerebrovascular Accident Relevant Medical/Surgical History: pt confused, unable to hold still FINDINGS: CTA NECK: AORTIC ARCH/ARCH VESSELS: No dissection or arterial injury. No significant stenosis of the brachiocephalic or subclavian arteries. CAROTID ARTERIES: No dissection, arterial injury, or hemodynamically significant stenosis by NASCET criteria. VERTEBRAL ARTERIES: No dissection, arterial injury, or significant stenosis. SOFT TISSUES: The lung apices are clear. No cervical or superior mediastinal lymphadenopathy. The larynx and pharynx are unremarkable. No acute abnormality of the salivary and thyroid glands. BONES: No acute osseous abnormality. CTA HEAD: ANTERIOR CIRCULATION: No significant stenosis of the intracranial internal carotid, anterior cerebral, or middle cerebral arteries. No aneurysm. POSTERIOR CIRCULATION: No significant stenosis of the vertebral, basilar, or posterior cerebral arteries. No aneurysm. OTHER: No dural venous sinus thrombosis on this non-dedicated study. BRAIN: Severe chronic microvascular disease is identified within the periventricular white matter. Moderate ventriculomegaly is likely related to central atrophy. Unremarkable CTA of the head and neck. Severe chronic microvascular disease within the periventricular white matter. Moderate ventriculomegaly likely secondary to central atrophy.  RECOMMENDATIONS: Unavailable     MRI brain without contrast    Result Date: 8/31/2022  EXAMINATION: MRI OF THE BRAIN WITHOUT CONTRAST  8/31/2022 1:59 pm TECHNIQUE: Multiplanar multisequence MRI of the brain was performed without the administration of intravenous contrast. COMPARISON: None HISTORY: ORDERING SYSTEM PROVIDED HISTORY: came w CVA, left leg weakness, aphasia and confusion TECHNOLOGIST PROVIDED HISTORY: Reason for exam:->came w CVA, left leg weakness, aphasia and confusion Reason for Exam: Aphasia; Cerebrovascular Accident Initial evaluation FINDINGS: INTRACRANIAL STRUCTURES/VENTRICLES: There is no acute infarct. The ventricles and cisternal spaces are prominent consistent with cerebral atrophy. There are numerous punctate and confluent areas of high signal in the periventricular white matter and centrum semiovale that are likely related to chronic small vessel ischemic disease. There are remote lacunar infarcts in the basal ganglia and thalami. There are dilated perivascular spaces in the basal ganglia and thalami. There is no midline shift or mass effect. There are no areas of restricted diffusion. ORBITS: The visualized portion of the orbits demonstrate no acute abnormality. SINUSES:  The visualized paranasal sinuses and mastoid air cells are for the most part clear. BONES/SOFT TISSUES: The bone marrow signal intensity appears normal. The soft tissues demonstrate no acute abnormality. Cerebral atrophy. Severe chronic small vessel ischemic changes. No acute brain parenchymal abnormality.        CBC:   Recent Labs     08/30/22  0558 08/30/22  0845 08/31/22  0045   WBC 8.7 7.6 7.4   HGB 10.3* 10.4* 9.9*    215 193     BMP:    Recent Labs     08/30/22  0558 08/30/22  0845 08/31/22  0045    140 143   K 4.8 4.1 4.1    104 106   CO2 27 26 27   BUN 24* 24* 30*   CREATININE 1.3* 1.2* 1.1   GLUCOSE 85 95 104*     Hepatic:   Recent Labs     08/30/22  0558 08/30/22  0845   AST 21 20   ALT 9* 9*   BILITOT 0.3 0.4   ALKPHOS 45 43     Lipids:   Lab Results   Component Value Date/Time    CHOL 183 08/31/2022 12:45 AM    CHOL 144 02/05/2020 01:30 PM    HDL 34 08/31/2022 12:45 AM    TRIG 175 08/31/2022 12:45 AM     Hemoglobin A1C:   Lab Results   Component Value Date/Time    LABA1C 5.9 08/31/2022 12:45 AM     TSH:   Lab Results   Component Value Date/Time    TSH 0.39 09/08/2021 04:10 PM     Troponin:   Lab Results   Component Value Date/Time    TROPONINT <0.010 08/30/2022 08:45 AM    TROPONINT <0.010 01/14/2022 09:05 AM    TROPONINT 0.018 01/13/2022 04:45 AM     Lactic Acid: No results for input(s): LACTA in the last 72 hours. BNP: No results for input(s): PROBNP in the last 72 hours.   UA:  Lab Results   Component Value Date/Time    NITRU NEGATIVE 01/12/2022 05:26 PM    NITRU Negative 07/01/2014 05:46 PM    COLORU YELLOW 01/12/2022 05:26 PM    PHUR 6.0 07/01/2014 05:46 PM    WBCUA 4 01/12/2022 05:26 PM    RBCUA 1 01/12/2022 05:26 PM    MUCUS RARE 01/12/2022 05:26 PM    TRICHOMONAS NONE SEEN 01/12/2022 05:26 PM    YEAST FEW 07/06/2018 10:17 AM    BACTERIA RARE 01/12/2022 05:26 PM    CLARITYU CLOUDY 01/12/2022 05:26 PM    SPECGRAV 1.019 01/12/2022 05:26 PM    LEUKOCYTESUR LARGE 01/12/2022 05:26 PM    UROBILINOGEN NEGATIVE 01/12/2022 05:26 PM    BILIRUBINUR NEGATIVE 01/12/2022 05:26 PM    BILIRUBINUR neg 01/31/2014 04:02 PM    BLOODU NEGATIVE 01/12/2022 05:26 PM    GLUCOSEU Negative 07/01/2014 05:46 PM    KETUA NEGATIVE 01/12/2022 05:26 PM    AMORPHOUS RARE 02/23/2018 08:20 PM     Urine Cultures: No results found for: LABURIN  Blood Cultures: No results found for: BC  No results found for: BLOODCULT2  Organism:   Lab Results   Component Value Date/Time    ORG PROT 11/22/2018 01:00 PM       Time Spent Discharging patient 35 minutes    Electronically signed by Enzo Rogers MD on 9/1/2022 at 12:53 PM

## 2022-09-01 NOTE — PROGRESS NOTES
10/08/2020    EF 60%, Normal    Hx of blood clots     per old chart  hx of DVT left leg    Hx: UTI (urinary tract infection)     Hyperglycemia 3/30/2019    Hyperlipidemia     Hypertension     Hypothyroidism     Incontinence of bowel     Incontinence of urine     Sepsis ([address] 2015    Tremor 3/30/2019    Wears glasses     to read    Wears partial dentures     upper    :   Past Surgical History:   Procedure Laterality Date    BREAST SURGERY      Left breast biopsy    CARDIOVERSION  10/17/2008    CARDIOVERSION  12/15/2008    HYSTERECTOMY (CERVIX STATUS UNKNOWN)  age 21's    IR GUIDED DRAIN W CATHETER PLACEMENT  8/21/2020    IR GUIDED DRAIN W CATHETER PLACEMENT 8/21/2020 [facility] PROCEDURES    IR GUIDED DRAIN W CATHETER PLACEMENT  9/8/2020    IR GUIDED DRAIN W CATHETER PLACEMENT 9/8/2020 [facility] SPECIAL PROCEDURES    JOINT REPLACEMENT  4/2005 2/2006    bilat    OTHER SURGICAL HISTORY  10/2009     bladder stimulator - interstim II 3058 - MRI OF HEAD ONLY -STIMULATOR MUST BE OFF AND TRANSMIT RECEIVE HEAD COIL ONLY    NE TOTAL HIP ARTHROPLASTY Left 02/23/2015    Hip Replacement, Total    TOTAL KNEE ARTHROPLASTY  2005, 2006    bilateral     Medications:  Scheduled Meds:   [Held by provider] baclofen  10 mg Oral BID    divalproex  125 mg Oral BID    dronedarone hcl  400 mg Oral BID WC    escitalopram  10 mg Oral Daily    levothyroxine  175 mcg Oral Nightly    mirtazapine  7.5 mg Oral Nightly    terbinafine  250 mg Oral Nightly    aspirin  81 mg Oral Daily    Or    aspirin  300 mg Rectal Daily    atorvastatin  80 mg Oral Nightly    heparin (porcine)  5,000 Units SubCUTAneous 3 times per day    ferrous sulfate  325 mg Oral BID WC     Continuous Infusions:  PRN Meds:.polyethylene glycol, labetalol    Allergies   Allergen Reactions    Tape Edson Sena Tape] Itching    Codeine Rash    Sulfa Antibiotics Nausea And Vomiting     Social History     Socioeconomic History    Marital status:       Spouse name: Not on file    Number of children: Not on file    Years of education: Not on file    Highest education level: Not on file   Occupational History    Not on file   Tobacco Use    Smoking status: Former     Years: 1.00     Types: Cigarettes    Smokeless tobacco: Never    Tobacco comments:     \"quit yrs ago only smoked for 2-3 yrs\"   Vaping Use    Vaping Use: Never used   Substance and Sexual Activity    Alcohol use: No     Alcohol/week: 0.0 standard drinks    Drug use: No    Sexual activity: Not Currently   Other Topics Concern    Not on file   Social History Narrative    Not on file     Social Determinants of Health     Financial Resource Strain: Not on file   Food Insecurity: Not on file   Transportation Needs: Not on file   Physical Activity: Not on file   Stress: Not on file   Social Connections: Not on file   Intimate Partner Violence: Not on file   Housing Stability: Not on file      Family History   Problem Relation Age of Onset    Diabetes Mother     Diabetes Father     Heart Disease Father          ROS (10 systems)  Impaired speech, AMS unable to answer questions    Physical Exam:       Wt Readings from Last 3 Encounters:   08/30/22 215 lb (97.5 kg)   03/24/22 215 lb (97.5 kg)   01/18/22 215 lb 11.2 oz (97.8 kg)     Temp Readings from Last 3 Encounters:   09/01/22 98.1 °F (36.7 °C) (Oral)   03/25/22 98 °F (36.7 °C)   01/18/22 97.5 °F (36.4 °C)     BP Readings from Last 3 Encounters:   09/01/22 (!) 129/53   03/25/22 132/70   01/18/22 139/74     Pulse Readings from Last 3 Encounters:   09/01/22 84   03/25/22 70   01/18/22 62        Gen: Alert to self, NAD, cooperative but easily distracted  HEENT: NC/AT, EOMI, PERRL, keeps right eye closed, mmm, no carotid bruits, neck supple, no meningeal signs  Heart: A fib  Lungs: Respirations even and unlabored  Ext: no edema, no calf tenderness b/l  Psych: Confused, easily distracted, restless  Skin: no rashes or lesions    NEUROLOGIC EXAM:    Mental Status: A&O to self only.  Exam limited by receptive and expressive impairment. Restless, speech slurred, speaks in one word phrases only, unable to name objects, difficulty following commands. Cranial Nerve Exam:   CN II-XII: PERRL, VFF, no nystagmus, no gaze paresis, sensation V1-V3 intact b/l, Left facial weakness; hearing intact to conversational tone, unable to follow command to protrude tongue, lift shoulders,     Motor Exam:       Strength 5/5 UE's/LE's b/l  Tone and bulk normal   Unable to assess for pronator drift    Deep Tendon Reflexes: 2/4 biceps, triceps, brachioradialis, patellar, and achilles b/l; flexor plantar responses b/l    Sensation: Intact light touch/pinprick/vibration UE's/LE's b/l    Coordination/Cerebellum:       Tremors--none      Rapidly alternating movements: Patient unable to complete              Heel-to-Shin: Patient unable to complete      Finger-to-Nose: Patient unable to complete     Gait and stance:      Gait: deferred      LABS:     Recent Labs     08/30/22  0558 08/30/22  0845 08/31/22  0045   WBC 8.7 7.6 7.4    140 143   K 4.8 4.1 4.1    104 106   CO2 27 26 27   BUN 24* 24* 30*   CREATININE 1.3* 1.2* 1.1   GLUCOSE 85 95 104*   INR  --  1.09  --        IMAGING:    CT head w/o contrast:  Impression   1. No acute intracranial abnormality. 2. Cerebral and cerebellar parenchymal volume loss with chronic microvascular   white matter ischemic disease. CTA head and neck:  Impression   Unremarkable CTA of the head and neck. Severe chronic microvascular disease within the periventricular white matter. Moderate ventriculomegaly likely secondary to central atrophy. MRI brain w/o contrast:  Impression   Cerebral atrophy. Severe chronic small vessel ischemic changes. No acute   brain parenchymal abnormality. Echocardiogram:  Summary   TDS Due to patient cooperation. Saline Study appears negative.    Left ventricular systolic function is normal.   Ejection fraction is visually estimated at 55-60%. Proximal septal thickening. No regional wall motion abnormalites. Indeterminate diastolic function due to underlying rhythm. Left ventricle size is normal.   Mildly dilated left atrium. Sclerotic, but non-stenotic aortic valve. Mild to moderate aortic regurgitation is noted. Mitral annular calcification is present PMVL. Mild to moderate mitral regurgitation. Mild to moderate tricuspid regurgitation; RVSP: 34 mmHg. Trace pulmonic regurgitation present. Pulmonic valve is structurally normal.   No evidence of any pericardial effusion. Aneurysmal interatrial septum with bowing into right atria; Suggestive of   increase LA pressures. IVC and abdominal aorta are within normal limits. All imaging was personally reviewed    ASSESSMENT/PLAN:   82year old female with history of a fib reportedly off of anticoagulation for several months and off of aspirin for the last month for dental procedure. Patient resides at [facility] and was noted to have speech changes and transported for further evaluation. Today patient is alert to self. She states first name only. Speech is slurred. She is unable to express herself or her needs. She is unable to name objects or repeat words clearly. She needs frequent redirection to complete tasks and still has difficulty understanding what is being asked of her. Sensation is intact to extremities and she moves all four extremities purposefully. She closes right eye for most exam concerning for visual field changes  Altered mental status with expressive and receptive changes likely secondary to acute stroke superimposed on a fib off anticoagulation.    Neuro imaging as above - current studies non acute  MRI brain completed - no stroke identified  Echocardiogram as above  Continue aspirin and atorvastatin for secondary stroke prevention    A1C 5.9, INR 1.09  History of a fib, previously on eliquis recommend restarting anticoagulation given no stroke  PT/OT ST as recommended  Patient has returned to her baseline. Stable for discharge from neurology standpoint. > 30 minutes of time spent included chart review, obtaining history, patient examination, developing plan of care, and documentation. Patient was discussed with attending neurologist Dr. Meena Justice. Thank you for allowing us to participate in the care of your patient. If there are any questions regarding evaluation please feel free to contact us.      Yaakov Councilman, RAHAT - ILIANA, 9/1/2022

## 2022-09-06 ENCOUNTER — HOSPITAL ENCOUNTER (OUTPATIENT)
Age: 87
Setting detail: SPECIMEN
Discharge: HOME | End: 2022-09-06
Payer: MEDICARE

## 2022-09-06 LAB
ALBUMIN SERPL-MCNC: 3.3 GM/DL (ref 3.4–5)
ALP BLD-CCNC: 48 IU/L (ref 40–128)
ALT SERPL-CCNC: 12 U/L (ref 10–40)
ANION GAP SERPL CALCULATED.3IONS-SCNC: 9 MMOL/L (ref 4–16)
AST SERPL-CCNC: 26 IU/L (ref 15–37)
BILIRUB SERPL-MCNC: 0.2 MG/DL (ref 0–1)
BUN BLDV-MCNC: 27 MG/DL (ref 6–23)
CALCIUM SERPL-MCNC: 8.7 MG/DL (ref 8.3–10.6)
CHLORIDE BLD-SCNC: 104 MMOL/L (ref 99–110)
CO2: 27 MMOL/L (ref 21–32)
CREAT SERPL-MCNC: 1.2 MG/DL (ref 0.6–1.1)
GFR AFRICAN AMERICAN: 52 ML/MIN/1.73M2
GFR NON-AFRICAN AMERICAN: 43 ML/MIN/1.73M2
GLUCOSE BLD-MCNC: 61 MG/DL (ref 70–99)
HCT VFR BLD CALC: 31.3 % (ref 37–47)
HEMOGLOBIN: 9.5 GM/DL (ref 12.5–16)
MCH RBC QN AUTO: 30.1 PG (ref 27–31)
MCHC RBC AUTO-ENTMCNC: 30.4 % (ref 32–36)
MCV RBC AUTO: 99.1 FL (ref 78–100)
PDW BLD-RTO: 15.1 % (ref 11.7–14.9)
PLATELET # BLD: 204 K/CU MM (ref 140–440)
PMV BLD AUTO: 12.1 FL (ref 7.5–11.1)
POTASSIUM SERPL-SCNC: 4 MMOL/L (ref 3.5–5.1)
RBC # BLD: 3.16 M/CU MM (ref 4.2–5.4)
SODIUM BLD-SCNC: 140 MMOL/L (ref 135–145)
TOTAL PROTEIN: 6.7 GM/DL (ref 6.4–8.2)
WBC # BLD: 7.3 K/CU MM (ref 4–10.5)

## 2022-09-06 PROCEDURE — 80053 COMPREHEN METABOLIC PANEL: CPT

## 2022-09-06 PROCEDURE — 36415 COLL VENOUS BLD VENIPUNCTURE: CPT

## 2022-09-06 PROCEDURE — 85027 COMPLETE CBC AUTOMATED: CPT

## 2022-09-13 ENCOUNTER — HOSPITAL ENCOUNTER (OUTPATIENT)
Age: 87
Setting detail: SPECIMEN
Discharge: HOME | End: 2022-09-13
Payer: MEDICARE

## 2022-09-13 ENCOUNTER — TELEPHONE (OUTPATIENT)
Dept: CARDIOLOGY CLINIC | Age: 87
End: 2022-09-13

## 2022-09-13 LAB
ALBUMIN SERPL-MCNC: 3.2 GM/DL (ref 3.4–5)
ALP BLD-CCNC: 47 IU/L (ref 40–128)
ALT SERPL-CCNC: 10 U/L (ref 10–40)
ANION GAP SERPL CALCULATED.3IONS-SCNC: 9 MMOL/L (ref 4–16)
AST SERPL-CCNC: 16 IU/L (ref 15–37)
BILIRUB SERPL-MCNC: 0.3 MG/DL (ref 0–1)
BUN BLDV-MCNC: 15 MG/DL (ref 6–23)
CALCIUM SERPL-MCNC: 8.7 MG/DL (ref 8.3–10.6)
CHLORIDE BLD-SCNC: 103 MMOL/L (ref 99–110)
CO2: 30 MMOL/L (ref 21–32)
CREAT SERPL-MCNC: 1.2 MG/DL (ref 0.6–1.1)
GFR AFRICAN AMERICAN: 52 ML/MIN/1.73M2
GFR NON-AFRICAN AMERICAN: 43 ML/MIN/1.73M2
GLUCOSE BLD-MCNC: 72 MG/DL (ref 70–99)
HCT VFR BLD CALC: 28.9 % (ref 37–47)
HEMOGLOBIN: 8.7 GM/DL (ref 12.5–16)
MCH RBC QN AUTO: 30.1 PG (ref 27–31)
MCHC RBC AUTO-ENTMCNC: 30.1 % (ref 32–36)
MCV RBC AUTO: 100 FL (ref 78–100)
PDW BLD-RTO: 15.3 % (ref 11.7–14.9)
PLATELET # BLD: 231 K/CU MM (ref 140–440)
PMV BLD AUTO: 11.6 FL (ref 7.5–11.1)
POTASSIUM SERPL-SCNC: 4.3 MMOL/L (ref 3.5–5.1)
RBC # BLD: 2.89 M/CU MM (ref 4.2–5.4)
SODIUM BLD-SCNC: 142 MMOL/L (ref 135–145)
TOTAL PROTEIN: 5.8 GM/DL (ref 6.4–8.2)
WBC # BLD: 7.7 K/CU MM (ref 4–10.5)

## 2022-09-13 PROCEDURE — 85027 COMPLETE CBC AUTOMATED: CPT

## 2022-09-13 PROCEDURE — 80053 COMPREHEN METABOLIC PANEL: CPT

## 2022-09-13 PROCEDURE — 36415 COLL VENOUS BLD VENIPUNCTURE: CPT

## 2022-09-13 NOTE — TELEPHONE ENCOUNTER
Spoke with patient son, tried to schedule appt. Soon for pt. That way we can go over all pt. Medications she is taking and what she needs to be taking, He advised me to call [facility] to schedule with them since they will be transporting her. Called and spoke with facility, they will see if  is able to bring her teresita, or Thursday and will have transportation call us back.

## 2022-09-13 NOTE — TELEPHONE ENCOUNTER
Patients Son Maikel Damon called with questions about her Eliquis   She has not been taking for a while unsure when it was discontinued  Mom was sen in [facility] for possible TIA , Son stated shei s in [place]   And they have no record of her being on it as well .  She is to have a tooth   Extracted as well and is asking if we are going to put her back on it

## 2022-09-14 ENCOUNTER — TELEPHONE (OUTPATIENT)
Dept: CARDIOLOGY CLINIC | Age: 87
End: 2022-09-14

## 2022-09-14 NOTE — TELEPHONE ENCOUNTER
Called spoke with [facility] set up  appt for cardiac clearance for dental extractions   Discuss Blood thinners

## 2022-09-20 ENCOUNTER — HOSPITAL ENCOUNTER (OUTPATIENT)
Age: 87
Setting detail: SPECIMEN
Discharge: HOME | End: 2022-09-20
Payer: MEDICARE

## 2022-09-20 LAB
ALBUMIN SERPL-MCNC: 3.5 GM/DL (ref 3.4–5)
ALP BLD-CCNC: 48 IU/L (ref 40–129)
ALT SERPL-CCNC: 8 U/L (ref 10–40)
ANION GAP SERPL CALCULATED.3IONS-SCNC: 10 MMOL/L (ref 4–16)
AST SERPL-CCNC: 15 IU/L (ref 15–37)
BILIRUB SERPL-MCNC: 0.3 MG/DL (ref 0–1)
BUN BLDV-MCNC: 16 MG/DL (ref 6–23)
CALCIUM SERPL-MCNC: 8.6 MG/DL (ref 8.3–10.6)
CHLORIDE BLD-SCNC: 99 MMOL/L (ref 99–110)
CO2: 28 MMOL/L (ref 21–32)
CREAT SERPL-MCNC: 1.3 MG/DL (ref 0.6–1.1)
GFR AFRICAN AMERICAN: 47 ML/MIN/1.73M2
GFR NON-AFRICAN AMERICAN: 39 ML/MIN/1.73M2
GLUCOSE BLD-MCNC: 85 MG/DL (ref 70–99)
HCT VFR BLD CALC: 28.6 % (ref 37–47)
HEMOGLOBIN: 8.8 GM/DL (ref 12.5–16)
MCH RBC QN AUTO: 30 PG (ref 27–31)
MCHC RBC AUTO-ENTMCNC: 30.8 % (ref 32–36)
MCV RBC AUTO: 97.6 FL (ref 78–100)
PDW BLD-RTO: 15.4 % (ref 11.7–14.9)
PLATELET # BLD: 223 K/CU MM (ref 140–440)
PMV BLD AUTO: 11.4 FL (ref 7.5–11.1)
POTASSIUM SERPL-SCNC: 4.6 MMOL/L (ref 3.5–5.1)
RBC # BLD: 2.93 M/CU MM (ref 4.2–5.4)
SODIUM BLD-SCNC: 137 MMOL/L (ref 135–145)
TOTAL PROTEIN: 6.3 GM/DL (ref 6.4–8.2)
WBC # BLD: 8.4 K/CU MM (ref 4–10.5)

## 2022-09-20 PROCEDURE — 85027 COMPLETE CBC AUTOMATED: CPT

## 2022-09-20 PROCEDURE — 36415 COLL VENOUS BLD VENIPUNCTURE: CPT

## 2022-09-20 PROCEDURE — 80053 COMPREHEN METABOLIC PANEL: CPT

## 2022-09-23 ENCOUNTER — OFFICE VISIT (OUTPATIENT)
Dept: CARDIOLOGY CLINIC | Age: 87
End: 2022-09-23
Payer: MEDICARE

## 2022-09-23 VITALS
OXYGEN SATURATION: 95 % | SYSTOLIC BLOOD PRESSURE: 108 MMHG | DIASTOLIC BLOOD PRESSURE: 64 MMHG | BODY MASS INDEX: 36.9 KG/M2 | HEART RATE: 84 BPM | HEIGHT: 64 IN

## 2022-09-23 DIAGNOSIS — E66.01 SEVERE OBESITY (BMI 35.0-39.9) WITH COMORBIDITY (HCC): ICD-10-CM

## 2022-09-23 DIAGNOSIS — I48.20 CHRONIC ATRIAL FIBRILLATION (HCC): Primary | ICD-10-CM

## 2022-09-23 PROCEDURE — G8400 PT W/DXA NO RESULTS DOC: HCPCS | Performed by: NURSE PRACTITIONER

## 2022-09-23 PROCEDURE — 99214 OFFICE O/P EST MOD 30 MIN: CPT | Performed by: NURSE PRACTITIONER

## 2022-09-23 PROCEDURE — G8427 DOCREV CUR MEDS BY ELIG CLIN: HCPCS | Performed by: NURSE PRACTITIONER

## 2022-09-23 PROCEDURE — 1111F DSCHRG MED/CURRENT MED MERGE: CPT | Performed by: NURSE PRACTITIONER

## 2022-09-23 PROCEDURE — G8417 CALC BMI ABV UP PARAM F/U: HCPCS | Performed by: NURSE PRACTITIONER

## 2022-09-23 PROCEDURE — 1036F TOBACCO NON-USER: CPT | Performed by: NURSE PRACTITIONER

## 2022-09-23 PROCEDURE — 1090F PRES/ABSN URINE INCON ASSESS: CPT | Performed by: NURSE PRACTITIONER

## 2022-09-23 PROCEDURE — 1123F ACP DISCUSS/DSCN MKR DOCD: CPT | Performed by: NURSE PRACTITIONER

## 2022-09-23 RX ORDER — AMOXICILLIN 500 MG/1
500 CAPSULE ORAL 3 TIMES DAILY
COMMUNITY

## 2022-09-23 RX ORDER — OXYBUTYNIN CHLORIDE 10 MG/1
TABLET, EXTENDED RELEASE ORAL
COMMUNITY
Start: 2022-07-12

## 2022-09-23 ASSESSMENT — ENCOUNTER SYMPTOMS
SHORTNESS OF BREATH: 1
ORTHOPNEA: 0

## 2022-09-23 NOTE — PROGRESS NOTES
9/23/2022  Primary cardiologist: Dr. Francisca Villegas:   Stephanie Li  is an established 82 y.o.  female here for a follow up on atrial fibrillation/HFpEF      SUBJECTIVE/OBJECTIVE:  Stephanie Li is a 82 y.o. female with a history of chronic atrial fibrillation, hypertension, hyperlipidemia, CKD HFpEF, and chronic anemia      HPI : Stephanie Li was recently in the hospital with acute encephalopathy. She now resides at a local nursing home. Her son is with her today and is concerned about her medications. Notes she has been taken off of Eliquis and he is uncertain as to why. He does not report that she has had a couple falls over the last 6 to 8 months. Today she notes pain to the left upper leg with movement. Review of Systems   Constitutional: Positive for malaise/fatigue. Negative for diaphoresis. Cardiovascular:  Negative for chest pain, claudication, dyspnea on exertion, irregular heartbeat, leg swelling, near-syncope, orthopnea, palpitations and paroxysmal nocturnal dyspnea. Respiratory:  Positive for shortness of breath. Musculoskeletal:  Positive for falls and muscle weakness. Genitourinary:         Indwelling catheter   Neurological:  Negative for dizziness and light-headedness. Vitals:    09/23/22 1058   BP: 108/64   Site: Left Wrist   Position: Sitting   Cuff Size: Medium Adult   Pulse: 84   SpO2: 95%   Height: 5' 4\" (1.626 m)     No flowsheet data found. Wt Readings from Last 3 Encounters:   08/30/22 215 lb (97.5 kg)   03/24/22 215 lb (97.5 kg)   01/18/22 215 lb 11.2 oz (97.8 kg)     Body mass index is 36.9 kg/m². Physical Exam  Vitals reviewed. Constitutional:       Appearance: Normal appearance. She is obese. HENT:      Head: Normocephalic. Eyes:      Extraocular Movements: Extraocular movements intact. Pupils: Pupils are equal, round, and reactive to light. Neck:      Vascular: No carotid bruit. Cardiovascular:      Rate and Rhythm: Normal rate. Rhythm irregular. Pulses: Normal pulses. Pulmonary:      Effort: Pulmonary effort is normal.      Breath sounds: Normal breath sounds. No rales. Chest:      Chest wall: No tenderness. Abdominal:      General: There is no distension. Palpations: Abdomen is soft. Tenderness: There is no abdominal tenderness. Musculoskeletal:         General: Tenderness (left leg) present. Cervical back: No tenderness. Right lower leg: No edema. Left lower leg: No edema. Skin:     General: Skin is warm and dry. Capillary Refill: Capillary refill takes less than 2 seconds. Neurological:      Mental Status: She is alert and oriented to person, place, and time.    Psychiatric:         Mood and Affect: Mood normal.         Behavior: Behavior normal.              Current Outpatient Medications   Medication Sig Dispense Refill    oxybutynin (DITROPAN-XL) 10 MG extended release tablet       amoxicillin (AMOXIL) 500 MG capsule Take 500 mg by mouth 3 times daily      furosemide (LASIX) 20 MG tablet Take 1 tablet by mouth See Admin Instructions Receives on Mon/Wed/Fri//Sat/Sun 30 tablet 0    potassium chloride (MICRO-K) 10 MEQ extended release capsule Take 1 capsule by mouth nightly 30 capsule 0    atorvastatin (LIPITOR) 80 MG tablet Take 0.5 tablets by mouth nightly 30 tablet 3    divalproex (DEPAKOTE) 125 MG DR tablet Take 125 mg by mouth 2 times daily      ferrous sulfate (FE TABS 325) 325 (65 Fe) MG EC tablet Take 325 mg by mouth in the morning and 325 mg in the evening.      escitalopram (LEXAPRO) 10 MG tablet Take 10 mg by mouth daily      mirtazapine (REMERON) 7.5 MG tablet Take 7.5 mg by mouth nightly      terbinafine (LAMISIL) 250 MG tablet Take 250 mg by mouth nightly      vitamin D 25 MCG (1000 UT) CAPS Take 5 capsules by mouth daily 30 capsule 0    levothyroxine (SYNTHROID) 175 MCG tablet Take 1 tablet by mouth daily 90 tablet 1    dronedarone hcl (MULTAQ) 400 MG TABS TAKE 1 TABLET TWICE A DAY WITH MEALS

## 2022-09-27 ENCOUNTER — HOSPITAL ENCOUNTER (OUTPATIENT)
Age: 87
Setting detail: SPECIMEN
Discharge: HOME | End: 2022-09-27
Payer: MEDICARE

## 2022-09-27 LAB
ALBUMIN SERPL-MCNC: 3.5 GM/DL (ref 3.4–5)
ALP BLD-CCNC: 52 IU/L (ref 40–128)
ALT SERPL-CCNC: 10 U/L (ref 10–40)
ANION GAP SERPL CALCULATED.3IONS-SCNC: 11 MMOL/L (ref 4–16)
AST SERPL-CCNC: 18 IU/L (ref 15–37)
BILIRUB SERPL-MCNC: 0.3 MG/DL (ref 0–1)
BUN BLDV-MCNC: 14 MG/DL (ref 6–23)
CALCIUM SERPL-MCNC: 8.6 MG/DL (ref 8.3–10.6)
CHLORIDE BLD-SCNC: 102 MMOL/L (ref 99–110)
CO2: 28 MMOL/L (ref 21–32)
CREAT SERPL-MCNC: 1.2 MG/DL (ref 0.6–1.1)
GFR AFRICAN AMERICAN: 52 ML/MIN/1.73M2
GFR NON-AFRICAN AMERICAN: 43 ML/MIN/1.73M2
GLUCOSE BLD-MCNC: 75 MG/DL (ref 70–99)
HCT VFR BLD CALC: 30.8 % (ref 37–47)
HEMOGLOBIN: 9.3 GM/DL (ref 12.5–16)
MCH RBC QN AUTO: 30.7 PG (ref 27–31)
MCHC RBC AUTO-ENTMCNC: 30.2 % (ref 32–36)
MCV RBC AUTO: 101.7 FL (ref 78–100)
PDW BLD-RTO: 15.7 % (ref 11.7–14.9)
PLATELET # BLD: 220 K/CU MM (ref 140–440)
PMV BLD AUTO: 11.7 FL (ref 7.5–11.1)
POTASSIUM SERPL-SCNC: 4.4 MMOL/L (ref 3.5–5.1)
RBC # BLD: 3.03 M/CU MM (ref 4.2–5.4)
SODIUM BLD-SCNC: 141 MMOL/L (ref 135–145)
TOTAL PROTEIN: 6.2 GM/DL (ref 6.4–8.2)
WBC # BLD: 6.7 K/CU MM (ref 4–10.5)

## 2022-09-27 PROCEDURE — 80053 COMPREHEN METABOLIC PANEL: CPT

## 2022-09-27 PROCEDURE — 36415 COLL VENOUS BLD VENIPUNCTURE: CPT

## 2022-09-27 PROCEDURE — 85027 COMPLETE CBC AUTOMATED: CPT

## 2022-10-04 ENCOUNTER — HOSPITAL ENCOUNTER (OUTPATIENT)
Age: 87
Setting detail: SPECIMEN
Discharge: HOME | End: 2022-10-04
Payer: MEDICARE

## 2022-10-04 LAB
ALBUMIN SERPL-MCNC: 3.8 GM/DL (ref 3.4–5)
ALP BLD-CCNC: 54 IU/L (ref 40–128)
ALT SERPL-CCNC: 9 U/L (ref 10–40)
ANION GAP SERPL CALCULATED.3IONS-SCNC: 12 MMOL/L (ref 4–16)
AST SERPL-CCNC: 18 IU/L (ref 15–37)
BILIRUB SERPL-MCNC: 0.4 MG/DL (ref 0–1)
BUN BLDV-MCNC: 26 MG/DL (ref 6–23)
CALCIUM SERPL-MCNC: 9.2 MG/DL (ref 8.3–10.6)
CHLORIDE BLD-SCNC: 103 MMOL/L (ref 99–110)
CO2: 27 MMOL/L (ref 21–32)
CREAT SERPL-MCNC: 1.2 MG/DL (ref 0.6–1.1)
GFR AFRICAN AMERICAN: 52 ML/MIN/1.73M2
GFR NON-AFRICAN AMERICAN: 43 ML/MIN/1.73M2
GLUCOSE BLD-MCNC: 86 MG/DL (ref 70–99)
HCT VFR BLD CALC: 33.1 % (ref 37–47)
HEMOGLOBIN: 10 GM/DL (ref 12.5–16)
MCH RBC QN AUTO: 30.9 PG (ref 27–31)
MCHC RBC AUTO-ENTMCNC: 30.2 % (ref 32–36)
MCV RBC AUTO: 102.2 FL (ref 78–100)
PDW BLD-RTO: 15.8 % (ref 11.7–14.9)
PLATELET # BLD: 234 K/CU MM (ref 140–440)
PMV BLD AUTO: 11.8 FL (ref 7.5–11.1)
POTASSIUM SERPL-SCNC: 4.2 MMOL/L (ref 3.5–5.1)
RBC # BLD: 3.24 M/CU MM (ref 4.2–5.4)
SODIUM BLD-SCNC: 142 MMOL/L (ref 135–145)
TOTAL PROTEIN: 6.7 GM/DL (ref 6.4–8.2)
WBC # BLD: 7.7 K/CU MM (ref 4–10.5)

## 2022-10-04 PROCEDURE — 36415 COLL VENOUS BLD VENIPUNCTURE: CPT

## 2022-10-04 PROCEDURE — 85027 COMPLETE CBC AUTOMATED: CPT

## 2022-10-04 PROCEDURE — 80053 COMPREHEN METABOLIC PANEL: CPT

## 2022-10-18 ENCOUNTER — HOSPITAL ENCOUNTER (OUTPATIENT)
Age: 87
Setting detail: SPECIMEN
Discharge: HOME | End: 2022-10-18
Payer: MEDICARE

## 2022-10-18 LAB
ALBUMIN SERPL-MCNC: 3.5 GM/DL (ref 3.4–5)
ALP BLD-CCNC: 53 IU/L (ref 40–129)
ALT SERPL-CCNC: 8 U/L (ref 10–40)
ANION GAP SERPL CALCULATED.3IONS-SCNC: 10 MMOL/L (ref 4–16)
AST SERPL-CCNC: 14 IU/L (ref 15–37)
BILIRUB SERPL-MCNC: 0.2 MG/DL (ref 0–1)
BUN BLDV-MCNC: 25 MG/DL (ref 6–23)
CALCIUM SERPL-MCNC: 8.9 MG/DL (ref 8.3–10.6)
CHLORIDE BLD-SCNC: 105 MMOL/L (ref 99–110)
CO2: 27 MMOL/L (ref 21–32)
CREAT SERPL-MCNC: 1.1 MG/DL (ref 0.6–1.1)
GFR SERPL CREATININE-BSD FRML MDRD: 49 ML/MIN/1.73M2
GLUCOSE BLD-MCNC: 94 MG/DL (ref 70–99)
HCT VFR BLD CALC: 29.9 % (ref 37–47)
HEMOGLOBIN: 9.1 GM/DL (ref 12.5–16)
MCH RBC QN AUTO: 30.1 PG (ref 27–31)
MCHC RBC AUTO-ENTMCNC: 30.4 % (ref 32–36)
MCV RBC AUTO: 99 FL (ref 78–100)
PDW BLD-RTO: 15.7 % (ref 11.7–14.9)
PLATELET # BLD: 206 K/CU MM (ref 140–440)
PMV BLD AUTO: 11.8 FL (ref 7.5–11.1)
POTASSIUM SERPL-SCNC: 4.4 MMOL/L (ref 3.5–5.1)
RBC # BLD: 3.02 M/CU MM (ref 4.2–5.4)
SODIUM BLD-SCNC: 142 MMOL/L (ref 135–145)
TOTAL PROTEIN: 6.3 GM/DL (ref 6.4–8.2)
WBC # BLD: 6.9 K/CU MM (ref 4–10.5)

## 2022-10-18 PROCEDURE — 36415 COLL VENOUS BLD VENIPUNCTURE: CPT

## 2022-10-18 PROCEDURE — 80053 COMPREHEN METABOLIC PANEL: CPT

## 2022-10-18 PROCEDURE — 85027 COMPLETE CBC AUTOMATED: CPT

## 2022-10-25 ENCOUNTER — HOSPITAL ENCOUNTER (OUTPATIENT)
Age: 87
Setting detail: SPECIMEN
Discharge: HOME | End: 2022-10-25
Payer: MEDICARE

## 2022-10-25 LAB
ALBUMIN SERPL-MCNC: 3 GM/DL (ref 3.4–5)
ALP BLD-CCNC: 60 IU/L (ref 40–128)
ALT SERPL-CCNC: 9 U/L (ref 10–40)
ANION GAP SERPL CALCULATED.3IONS-SCNC: 13 MMOL/L (ref 4–16)
AST SERPL-CCNC: 25 IU/L (ref 15–37)
BILIRUB SERPL-MCNC: 0.2 MG/DL (ref 0–1)
BUN BLDV-MCNC: 28 MG/DL (ref 6–23)
CALCIUM SERPL-MCNC: 7.9 MG/DL (ref 8.3–10.6)
CHLORIDE BLD-SCNC: 104 MMOL/L (ref 99–110)
CO2: 26 MMOL/L (ref 21–32)
CREAT SERPL-MCNC: 1 MG/DL (ref 0.6–1.1)
GFR SERPL CREATININE-BSD FRML MDRD: 55 ML/MIN/1.73M2
GLUCOSE BLD-MCNC: 60 MG/DL (ref 70–99)
HCT VFR BLD CALC: 26.7 % (ref 37–47)
HEMOGLOBIN: 8 GM/DL (ref 12.5–16)
MCH RBC QN AUTO: 30.1 PG (ref 27–31)
MCHC RBC AUTO-ENTMCNC: 30 % (ref 32–36)
MCV RBC AUTO: 100.4 FL (ref 78–100)
PDW BLD-RTO: 15.7 % (ref 11.7–14.9)
PLATELET # BLD: 184 K/CU MM (ref 140–440)
PMV BLD AUTO: 12.2 FL (ref 7.5–11.1)
POTASSIUM SERPL-SCNC: 3.9 MMOL/L (ref 3.5–5.1)
RBC # BLD: 2.66 M/CU MM (ref 4.2–5.4)
SODIUM BLD-SCNC: 143 MMOL/L (ref 135–145)
TOTAL PROTEIN: 6 GM/DL (ref 6.4–8.2)
WBC # BLD: 8.3 K/CU MM (ref 4–10.5)

## 2022-10-25 PROCEDURE — 36415 COLL VENOUS BLD VENIPUNCTURE: CPT

## 2022-10-25 PROCEDURE — 85027 COMPLETE CBC AUTOMATED: CPT

## 2022-10-25 PROCEDURE — 80053 COMPREHEN METABOLIC PANEL: CPT

## 2022-10-27 ENCOUNTER — TELEPHONE (OUTPATIENT)
Dept: CARDIOLOGY CLINIC | Age: 87
End: 2022-10-27

## 2022-10-31 ENCOUNTER — HOSPITAL ENCOUNTER (OUTPATIENT)
Age: 87
Setting detail: SPECIMEN
Discharge: HOME | End: 2022-10-31
Payer: MEDICARE

## 2022-10-31 LAB
ANION GAP SERPL CALCULATED.3IONS-SCNC: 4 MMOL/L (ref 4–16)
BUN BLDV-MCNC: 16 MG/DL (ref 6–23)
CALCIUM SERPL-MCNC: 8.5 MG/DL (ref 8.3–10.6)
CHLORIDE BLD-SCNC: 106 MMOL/L (ref 99–110)
CO2: 31 MMOL/L (ref 21–32)
CREAT SERPL-MCNC: 1 MG/DL (ref 0.6–1.1)
GFR SERPL CREATININE-BSD FRML MDRD: 55 ML/MIN/1.73M2
GLUCOSE BLD-MCNC: 84 MG/DL (ref 70–99)
HCT VFR BLD CALC: 27.2 % (ref 37–47)
HEMOGLOBIN: 8.1 GM/DL (ref 12.5–16)
MCH RBC QN AUTO: 30 PG (ref 27–31)
MCHC RBC AUTO-ENTMCNC: 29.8 % (ref 32–36)
MCV RBC AUTO: 100.7 FL (ref 78–100)
PDW BLD-RTO: 14.8 % (ref 11.7–14.9)
PLATELET # BLD: 232 K/CU MM (ref 140–440)
PMV BLD AUTO: 11.4 FL (ref 7.5–11.1)
POTASSIUM SERPL-SCNC: 4.3 MMOL/L (ref 3.5–5.1)
RBC # BLD: 2.7 M/CU MM (ref 4.2–5.4)
SODIUM BLD-SCNC: 141 MMOL/L (ref 135–145)
WBC # BLD: 6.4 K/CU MM (ref 4–10.5)

## 2022-10-31 PROCEDURE — 80048 BASIC METABOLIC PNL TOTAL CA: CPT

## 2022-10-31 PROCEDURE — 36415 COLL VENOUS BLD VENIPUNCTURE: CPT

## 2022-10-31 PROCEDURE — 85027 COMPLETE CBC AUTOMATED: CPT

## 2022-11-01 ENCOUNTER — HOSPITAL ENCOUNTER (OUTPATIENT)
Age: 87
Setting detail: SPECIMEN
Discharge: HOME | End: 2022-11-01
Payer: MEDICARE

## 2022-11-01 LAB
ANION GAP SERPL CALCULATED.3IONS-SCNC: 11 MMOL/L (ref 4–16)
BUN BLDV-MCNC: 17 MG/DL (ref 6–23)
CALCIUM SERPL-MCNC: 8.8 MG/DL (ref 8.3–10.6)
CHLORIDE BLD-SCNC: 102 MMOL/L (ref 99–110)
CO2: 28 MMOL/L (ref 21–32)
CREAT SERPL-MCNC: 1.2 MG/DL (ref 0.6–1.1)
GFR SERPL CREATININE-BSD FRML MDRD: 44 ML/MIN/1.73M2
GLUCOSE BLD-MCNC: 77 MG/DL (ref 70–99)
HCT VFR BLD CALC: 29.9 % (ref 37–47)
HEMOGLOBIN: 9 GM/DL (ref 12.5–16)
MCH RBC QN AUTO: 30.2 PG (ref 27–31)
MCHC RBC AUTO-ENTMCNC: 30.1 % (ref 32–36)
MCV RBC AUTO: 100.3 FL (ref 78–100)
PDW BLD-RTO: 14.8 % (ref 11.7–14.9)
PLATELET # BLD: 252 K/CU MM (ref 140–440)
PMV BLD AUTO: 11.7 FL (ref 7.5–11.1)
POTASSIUM SERPL-SCNC: 4.8 MMOL/L (ref 3.5–5.1)
RBC # BLD: 2.98 M/CU MM (ref 4.2–5.4)
SODIUM BLD-SCNC: 141 MMOL/L (ref 135–145)
WBC # BLD: 7.1 K/CU MM (ref 4–10.5)

## 2022-11-01 PROCEDURE — 80048 BASIC METABOLIC PNL TOTAL CA: CPT

## 2022-11-01 PROCEDURE — 36415 COLL VENOUS BLD VENIPUNCTURE: CPT

## 2022-11-01 PROCEDURE — 85027 COMPLETE CBC AUTOMATED: CPT

## 2022-11-03 ENCOUNTER — TELEPHONE (OUTPATIENT)
Dept: CARDIOLOGY CLINIC | Age: 87
End: 2022-11-03

## 2022-11-03 RX ORDER — CLOPIDOGREL BISULFATE 75 MG/1
75 TABLET ORAL DAILY
COMMUNITY

## 2022-11-03 NOTE — TELEPHONE ENCOUNTER
at [facility] stopped asa and put patient on plavix due to GI bleed.  OK per Ma Maker NP

## 2022-11-08 ENCOUNTER — HOSPITAL ENCOUNTER (OUTPATIENT)
Age: 87
Setting detail: SPECIMEN
Discharge: HOME | End: 2022-11-08
Payer: MEDICARE

## 2022-11-08 LAB
ANION GAP SERPL CALCULATED.3IONS-SCNC: 9 MMOL/L (ref 4–16)
BUN BLDV-MCNC: 27 MG/DL (ref 6–23)
CALCIUM SERPL-MCNC: 8.6 MG/DL (ref 8.3–10.6)
CHLORIDE BLD-SCNC: 102 MMOL/L (ref 99–110)
CO2: 30 MMOL/L (ref 21–32)
CREAT SERPL-MCNC: 1.3 MG/DL (ref 0.6–1.1)
GFR SERPL CREATININE-BSD FRML MDRD: 40 ML/MIN/1.73M2
GLUCOSE BLD-MCNC: 84 MG/DL (ref 70–99)
HCT VFR BLD CALC: 30.7 % (ref 37–47)
HEMOGLOBIN: 8.9 GM/DL (ref 12.5–16)
MCH RBC QN AUTO: 30.3 PG (ref 27–31)
MCHC RBC AUTO-ENTMCNC: 29 % (ref 32–36)
MCV RBC AUTO: 104.4 FL (ref 78–100)
PDW BLD-RTO: 15.1 % (ref 11.7–14.9)
PLATELET # BLD: 225 K/CU MM (ref 140–440)
PMV BLD AUTO: 11.9 FL (ref 7.5–11.1)
POTASSIUM SERPL-SCNC: 4.5 MMOL/L (ref 3.5–5.1)
RBC # BLD: 2.94 M/CU MM (ref 4.2–5.4)
SODIUM BLD-SCNC: 141 MMOL/L (ref 135–145)
WBC # BLD: 7.1 K/CU MM (ref 4–10.5)

## 2022-11-08 PROCEDURE — 85027 COMPLETE CBC AUTOMATED: CPT

## 2022-11-08 PROCEDURE — 80048 BASIC METABOLIC PNL TOTAL CA: CPT

## 2022-11-08 PROCEDURE — 36415 COLL VENOUS BLD VENIPUNCTURE: CPT

## 2022-11-15 ENCOUNTER — HOSPITAL ENCOUNTER (OUTPATIENT)
Age: 87
Setting detail: SPECIMEN
Discharge: HOME | End: 2022-11-15
Payer: MEDICARE

## 2022-11-15 LAB
ANION GAP SERPL CALCULATED.3IONS-SCNC: 14 MMOL/L (ref 4–16)
BUN BLDV-MCNC: 26 MG/DL (ref 6–23)
CALCIUM SERPL-MCNC: 8.8 MG/DL (ref 8.3–10.6)
CHLORIDE BLD-SCNC: 100 MMOL/L (ref 99–110)
CO2: 28 MMOL/L (ref 21–32)
CREAT SERPL-MCNC: 1.4 MG/DL (ref 0.6–1.1)
GFR SERPL CREATININE-BSD FRML MDRD: 37 ML/MIN/1.73M2
GLUCOSE BLD-MCNC: 85 MG/DL (ref 70–99)
HCT VFR BLD CALC: 32.3 % (ref 37–47)
HEMOGLOBIN: 10 GM/DL (ref 12.5–16)
MCH RBC QN AUTO: 30.6 PG (ref 27–31)
MCHC RBC AUTO-ENTMCNC: 31 % (ref 32–36)
MCV RBC AUTO: 98.8 FL (ref 78–100)
PDW BLD-RTO: 14.8 % (ref 11.7–14.9)
PLATELET # BLD: 200 K/CU MM (ref 140–440)
PMV BLD AUTO: 12.2 FL (ref 7.5–11.1)
POTASSIUM SERPL-SCNC: 4.4 MMOL/L (ref 3.5–5.1)
RBC # BLD: 3.27 M/CU MM (ref 4.2–5.4)
SODIUM BLD-SCNC: 142 MMOL/L (ref 135–145)
WBC # BLD: 8.8 K/CU MM (ref 4–10.5)

## 2022-11-15 PROCEDURE — 85027 COMPLETE CBC AUTOMATED: CPT

## 2022-11-15 PROCEDURE — 36415 COLL VENOUS BLD VENIPUNCTURE: CPT

## 2022-11-15 PROCEDURE — 80048 BASIC METABOLIC PNL TOTAL CA: CPT

## 2022-11-22 ENCOUNTER — HOSPITAL ENCOUNTER (OUTPATIENT)
Age: 87
Setting detail: SPECIMEN
Discharge: HOME | End: 2022-11-22
Payer: MEDICARE

## 2022-11-22 LAB
ANION GAP SERPL CALCULATED.3IONS-SCNC: 11 MMOL/L (ref 4–16)
BUN BLDV-MCNC: 30 MG/DL (ref 6–23)
CALCIUM SERPL-MCNC: 8.3 MG/DL (ref 8.3–10.6)
CHLORIDE BLD-SCNC: 98 MMOL/L (ref 99–110)
CO2: 28 MMOL/L (ref 21–32)
CREAT SERPL-MCNC: 1.4 MG/DL (ref 0.6–1.1)
GFR SERPL CREATININE-BSD FRML MDRD: 37 ML/MIN/1.73M2
GLUCOSE BLD-MCNC: 72 MG/DL (ref 70–99)
HCT VFR BLD CALC: 27.5 % (ref 37–47)
HEMOGLOBIN: 8.3 GM/DL (ref 12.5–16)
MCH RBC QN AUTO: 30.3 PG (ref 27–31)
MCHC RBC AUTO-ENTMCNC: 30.2 % (ref 32–36)
MCV RBC AUTO: 100.4 FL (ref 78–100)
PDW BLD-RTO: 14.4 % (ref 11.7–14.9)
PLATELET # BLD: 169 K/CU MM (ref 140–440)
PMV BLD AUTO: 12.2 FL (ref 7.5–11.1)
POTASSIUM SERPL-SCNC: 4.2 MMOL/L (ref 3.5–5.1)
RBC # BLD: 2.74 M/CU MM (ref 4.2–5.4)
SODIUM BLD-SCNC: 137 MMOL/L (ref 135–145)
WBC # BLD: 8.2 K/CU MM (ref 4–10.5)

## 2022-11-22 PROCEDURE — 36415 COLL VENOUS BLD VENIPUNCTURE: CPT

## 2022-11-22 PROCEDURE — 85027 COMPLETE CBC AUTOMATED: CPT

## 2022-11-22 PROCEDURE — 80048 BASIC METABOLIC PNL TOTAL CA: CPT

## 2022-11-29 ENCOUNTER — HOSPITAL ENCOUNTER (OUTPATIENT)
Age: 87
Setting detail: SPECIMEN
Discharge: HOME | End: 2022-11-29
Payer: MEDICARE

## 2022-11-29 LAB
ANION GAP SERPL CALCULATED.3IONS-SCNC: 11 MMOL/L (ref 4–16)
BUN BLDV-MCNC: 18 MG/DL (ref 6–23)
CALCIUM SERPL-MCNC: 8.1 MG/DL (ref 8.3–10.6)
CHLORIDE BLD-SCNC: 99 MMOL/L (ref 99–110)
CO2: 27 MMOL/L (ref 21–32)
CREAT SERPL-MCNC: 1 MG/DL (ref 0.6–1.1)
GFR SERPL CREATININE-BSD FRML MDRD: 55 ML/MIN/1.73M2
GLUCOSE BLD-MCNC: 72 MG/DL (ref 70–99)
HCT VFR BLD CALC: 27.5 % (ref 37–47)
HEMOGLOBIN: 8.5 GM/DL (ref 12.5–16)
MCH RBC QN AUTO: 30.6 PG (ref 27–31)
MCHC RBC AUTO-ENTMCNC: 30.9 % (ref 32–36)
MCV RBC AUTO: 98.9 FL (ref 78–100)
PDW BLD-RTO: 14.2 % (ref 11.7–14.9)
PLATELET # BLD: 206 K/CU MM (ref 140–440)
PMV BLD AUTO: 12 FL (ref 7.5–11.1)
POTASSIUM SERPL-SCNC: 4.2 MMOL/L (ref 3.5–5.1)
RBC # BLD: 2.78 M/CU MM (ref 4.2–5.4)
SODIUM BLD-SCNC: 137 MMOL/L (ref 135–145)
WBC # BLD: 7.7 K/CU MM (ref 4–10.5)

## 2022-11-29 PROCEDURE — 36415 COLL VENOUS BLD VENIPUNCTURE: CPT

## 2022-11-29 PROCEDURE — 85027 COMPLETE CBC AUTOMATED: CPT

## 2022-11-29 PROCEDURE — 80048 BASIC METABOLIC PNL TOTAL CA: CPT

## 2022-12-06 ENCOUNTER — HOSPITAL ENCOUNTER (OUTPATIENT)
Age: 87
Setting detail: SPECIMEN
Discharge: HOME | End: 2022-12-06
Payer: MEDICARE

## 2022-12-06 LAB
ANION GAP SERPL CALCULATED.3IONS-SCNC: 12 MMOL/L (ref 4–16)
BUN BLDV-MCNC: 20 MG/DL (ref 6–23)
CALCIUM SERPL-MCNC: 8.1 MG/DL (ref 8.3–10.6)
CHLORIDE BLD-SCNC: 100 MMOL/L (ref 99–110)
CO2: 30 MMOL/L (ref 21–32)
CREAT SERPL-MCNC: 1.2 MG/DL (ref 0.6–1.1)
GFR SERPL CREATININE-BSD FRML MDRD: 44 ML/MIN/1.73M2
GLUCOSE BLD-MCNC: 75 MG/DL (ref 70–99)
HCT VFR BLD CALC: 28.2 % (ref 37–47)
HEMOGLOBIN: 8.8 GM/DL (ref 12.5–16)
MCH RBC QN AUTO: 30.6 PG (ref 27–31)
MCHC RBC AUTO-ENTMCNC: 31.2 % (ref 32–36)
MCV RBC AUTO: 97.9 FL (ref 78–100)
PDW BLD-RTO: 14.6 % (ref 11.7–14.9)
PLATELET # BLD: 201 K/CU MM (ref 140–440)
PMV BLD AUTO: 12 FL (ref 7.5–11.1)
POTASSIUM SERPL-SCNC: 3.8 MMOL/L (ref 3.5–5.1)
RBC # BLD: 2.88 M/CU MM (ref 4.2–5.4)
SODIUM BLD-SCNC: 142 MMOL/L (ref 135–145)
WBC # BLD: 5.2 K/CU MM (ref 4–10.5)

## 2022-12-06 PROCEDURE — 36415 COLL VENOUS BLD VENIPUNCTURE: CPT

## 2022-12-06 PROCEDURE — 85027 COMPLETE CBC AUTOMATED: CPT

## 2022-12-06 PROCEDURE — 80048 BASIC METABOLIC PNL TOTAL CA: CPT

## 2022-12-13 ENCOUNTER — OFFICE VISIT (OUTPATIENT)
Dept: CARDIOLOGY CLINIC | Age: 87
End: 2022-12-13
Payer: MEDICARE

## 2022-12-13 ENCOUNTER — HOSPITAL ENCOUNTER (OUTPATIENT)
Age: 87
Setting detail: SPECIMEN
Discharge: HOME | End: 2022-12-13
Payer: MEDICARE

## 2022-12-13 VITALS
HEART RATE: 84 BPM | BODY MASS INDEX: 36.9 KG/M2 | DIASTOLIC BLOOD PRESSURE: 74 MMHG | HEIGHT: 64 IN | SYSTOLIC BLOOD PRESSURE: 132 MMHG

## 2022-12-13 DIAGNOSIS — I48.20 CHRONIC ATRIAL FIBRILLATION (HCC): Primary | ICD-10-CM

## 2022-12-13 LAB
ANION GAP SERPL CALCULATED.3IONS-SCNC: 11 MMOL/L (ref 4–16)
BUN BLDV-MCNC: 12 MG/DL (ref 6–23)
CALCIUM SERPL-MCNC: 8 MG/DL (ref 8.3–10.6)
CHLORIDE BLD-SCNC: 101 MMOL/L (ref 99–110)
CO2: 28 MMOL/L (ref 21–32)
CREAT SERPL-MCNC: 1 MG/DL (ref 0.6–1.1)
GFR SERPL CREATININE-BSD FRML MDRD: 55 ML/MIN/1.73M2
GLUCOSE BLD-MCNC: 72 MG/DL (ref 70–99)
HCT VFR BLD CALC: 28.5 % (ref 37–47)
HEMOGLOBIN: 8.8 GM/DL (ref 12.5–16)
MCH RBC QN AUTO: 30 PG (ref 27–31)
MCHC RBC AUTO-ENTMCNC: 30.9 % (ref 32–36)
MCV RBC AUTO: 97.3 FL (ref 78–100)
PDW BLD-RTO: 14.4 % (ref 11.7–14.9)
PLATELET # BLD: 209 K/CU MM (ref 140–440)
PMV BLD AUTO: 12.3 FL (ref 7.5–11.1)
POTASSIUM SERPL-SCNC: 4.2 MMOL/L (ref 3.5–5.1)
RBC # BLD: 2.93 M/CU MM (ref 4.2–5.4)
SODIUM BLD-SCNC: 140 MMOL/L (ref 135–145)
WBC # BLD: 7.3 K/CU MM (ref 4–10.5)

## 2022-12-13 PROCEDURE — 80048 BASIC METABOLIC PNL TOTAL CA: CPT

## 2022-12-13 PROCEDURE — 3078F DIAST BP <80 MM HG: CPT | Performed by: INTERNAL MEDICINE

## 2022-12-13 PROCEDURE — 99214 OFFICE O/P EST MOD 30 MIN: CPT | Performed by: INTERNAL MEDICINE

## 2022-12-13 PROCEDURE — 36415 COLL VENOUS BLD VENIPUNCTURE: CPT

## 2022-12-13 PROCEDURE — 1090F PRES/ABSN URINE INCON ASSESS: CPT | Performed by: INTERNAL MEDICINE

## 2022-12-13 PROCEDURE — 85027 COMPLETE CBC AUTOMATED: CPT

## 2022-12-13 PROCEDURE — 1036F TOBACCO NON-USER: CPT | Performed by: INTERNAL MEDICINE

## 2022-12-13 PROCEDURE — G8427 DOCREV CUR MEDS BY ELIG CLIN: HCPCS | Performed by: INTERNAL MEDICINE

## 2022-12-13 PROCEDURE — 1123F ACP DISCUSS/DSCN MKR DOCD: CPT | Performed by: INTERNAL MEDICINE

## 2022-12-13 PROCEDURE — G8400 PT W/DXA NO RESULTS DOC: HCPCS | Performed by: INTERNAL MEDICINE

## 2022-12-13 PROCEDURE — 3074F SYST BP LT 130 MM HG: CPT | Performed by: INTERNAL MEDICINE

## 2022-12-13 PROCEDURE — G8417 CALC BMI ABV UP PARAM F/U: HCPCS | Performed by: INTERNAL MEDICINE

## 2022-12-13 PROCEDURE — G8484 FLU IMMUNIZE NO ADMIN: HCPCS | Performed by: INTERNAL MEDICINE

## 2022-12-13 NOTE — PROGRESS NOTES
CARDIOLOGY NOTE      12/13/2022    RE: Christopher Denis  (1935)                               TO:  Dr. Stephenie Zavala MD            [address] is a 82 y.o. female who was seen today for management of  A fib                                    HPI:                   Pt has h/o a fib, htn, jhyperlipidimea, seen today for  6 mo fu.  Pt has  No cardiac complains , feels weak was in hospital    Christopher Denis has the following history recorded in care path:  Patient Active Problem List    Diagnosis Date Noted    EKG abnormalities     Noncompliance with medication regimen     Chronic atrial fibrillation ([address]     Acute cystitis without hematuria     Sepsis ([address] 05/18/2015    Stroke-like symptoms 08/30/2022    Recurrent UTI 05/14/2015    Rhabdomyolysis 08/30/2017    Sepsis associated hypotension ([address] 08/30/2017    Leg weakness 05/20/2015    Abnormality of gait 05/20/2015    CHF (congestive heart failure) ([address] 05/20/2015    Anemia 05/15/2015    Multiple falls 05/14/2015    Chronic anticoagulation 08/20/2012    Hypertension 08/20/2012    Hypothyroidism 08/20/2012    Encounter for long-term (current) use of other medications 08/20/2012    Acute kidney injury superimposed on CKD ([address] 01/12/2022    COVID-19 12/30/2021    Hyponatremia 12/30/2021    CKD (chronic kidney disease) stage 4, GFR 15-29 ml/min ([address] 09/09/2021    Morbid obesity with BMI of 40.0-44.9, adult ([address] 10/22/2020    Tremor 03/30/2019    Gastroesophageal reflux disease 03/30/2019    Hyperglycemia 03/30/2019    Frequent falls 11/22/2018    Muscle weakness of lower extremity 59/04/6316    Acute metabolic encephalopathy 35/62/2255    Urinary tract infection associated with catheterization of urinary tract ([address] 07/06/2018    Fall at home, initial encounter 07/06/2018    DVT (deep vein thrombosis) in pregnancy 02/23/2018    Progression of deep vein thrombosis (DVT) ([address] 02/23/2018     Current Outpatient Medications Medication Sig Dispense Refill    clopidogrel (PLAVIX) 75 MG tablet Take 75 mg by mouth daily      furosemide (LASIX) 20 MG tablet Take 1 tablet by mouth See Admin Instructions Receives on Mon/Wed/Fri//Sat/Sun 30 tablet 0    potassium chloride (MICRO-K) 10 MEQ extended release capsule Take 1 capsule by mouth nightly 30 capsule 0    atorvastatin (LIPITOR) 80 MG tablet Take 0.5 tablets by mouth nightly 30 tablet 3    divalproex (DEPAKOTE) 125 MG DR tablet Take 125 mg by mouth 2 times daily      ferrous sulfate (FE TABS 325) 325 (65 Fe) MG EC tablet Take 325 mg by mouth in the morning and 325 mg in the evening.      escitalopram (LEXAPRO) 10 MG tablet Take 10 mg by mouth daily      vitamin D 25 MCG (1000 UT) CAPS Take 5 capsules by mouth daily 30 capsule 0    levothyroxine (SYNTHROID) 175 MCG tablet Take 1 tablet by mouth daily 90 tablet 1    dronedarone hcl (MULTAQ) 400 MG TABS TAKE 1 TABLET TWICE A DAY WITH MEALS (Patient taking differently: Take 400 mg by mouth 2 times daily (with meals)) 180 tablet 1    loratadine (CLARITIN) 10 MG tablet Take 10 mg by mouth daily       acetaminophen (TYLENOL) 325 MG tablet Take 325 mg by mouth nightly      oxybutynin (DITROPAN-XL) 10 MG extended release tablet  (Patient not taking: Reported on 12/13/2022)      amoxicillin (AMOXIL) 500 MG capsule Take 500 mg by mouth 3 times daily (Patient not taking: Reported on 12/13/2022)      Sodium Phosphates (FLEET) 7-19 GM/118ML Place 1 enema rectally once as needed (Patient not taking: No sig reported)      mirtazapine (REMERON) 7.5 MG tablet Take 7.5 mg by mouth nightly (Patient not taking: Reported on 12/13/2022)      terbinafine (LAMISIL) 250 MG tablet Take 250 mg by mouth nightly (Patient not taking: Reported on 12/13/2022)       No current facility-administered medications for this visit.      Allergies: Tape [adhesive tape], Codeine, and Sulfa antibiotics  Past Medical History:   Diagnosis Date    Allergic rhinitis     Arthritis CHF (congestive heart failure) (HCC)     DVT, lower extremity, proximal, acute, left (HCC)     Edema leg     Gastroesophageal reflux disease 3/30/2019    H/O 24 hour EKG monitoring 6/10 1/09 11/08    6/7/10- predominant rhythm is a-fib, freq PVCs    H/O cardiac catheterization 7/20/00    H/O cardiac catheterization 7/20/00    right dominant system, left main is short but angiographically normal, LAD and CX reveal sign  tortuosities distally  but no sign angiographic atherosclerotic disease noted except luminal  irregularities here and there. LVSF normal, EF  55%    H/O cardiovascular stress test 5/2/2016    lexiscan-normal,EF70%    H/O Doppler echocardiogram 7/12/10    intimal thickening but no sign. atherosclerotic plaque noted in [facility]. H/O echocardiogram 5/2/2016 9/10     5/16 EF60% mild MR, TR, AR 9/20/10 Complete 2 dim transthoracic echo, techn. difficult. The study was  tech limited, LVSF is normal    History of atrial fibrillation     History of cardiovascular stress test 7/26/13, 7/10 8/08    7/13-WNL Observed defect consistent with breast attenuation. EF 70%. 7/10-post stress myocardial images show a normal pattern of perfusion in all regions. LV is normal  in size.   EF is 62%, global  LVSF is normal    History of falling     per old chart    History of nuclear stress test 10/08/2020    EF 60%, Normal    Hx of blood clots     per old chart  hx of DVT left leg    Hx: UTI (urinary tract infection)     Hyperglycemia 3/30/2019    Hyperlipidemia     Hypertension     Hypothyroidism     Incontinence of bowel     Incontinence of urine     Sepsis ([address] 2015    Tremor 3/30/2019    Wears glasses     to read    Wears partial dentures     upper     Past Surgical History:   Procedure Laterality Date    BREAST SURGERY      Left breast biopsy    CARDIOVERSION  10/17/2008    CARDIOVERSION  12/15/2008    HYSTERECTOMY (CERVIX STATUS UNKNOWN)  age 21's    IR GUIDED DRAIN W CATHETER PLACEMENT  8/21/2020    IR GUIDED DRAIN W CATHETER PLACEMENT 8/21/2020 [facility] PROC[facility]    IR GUIDED DRAIN W CATHETER PLACEMENT  9/8/2020    IR GUIDED DRAIN W CATHETER PLACEMENT 9/8/2020 [facility] PROCED[facility]    JOINT REPLACEMENT  4/2005 2/2006    bilat    OTHER SURGICAL HISTORY  10/2009     bladder stimulator - interstim II 3058 - MRI OF HEAD ONLY -STIMULATOR MUST BE OFF AND TRANSMIT RECEIVE HEAD COIL ONLY    [place] TOTAL HIP ARTHROPLASTY Left 02/23/2015    Hip Replacement, Total    TOTAL KNEE ARTHROPLASTY  2005, 2006    bilateral      As reviewed   Family History   Problem Relation Age of Onset    Diabetes Mother     Diabetes Father     Heart Disease Father      Social History     Tobacco Use    Smoking status: Former     Years: 1.00     Types: Cigarettes    Smokeless tobacco: Never    Tobacco comments:     \"quit yrs ago only smoked for 2-3 yrs\"   Substance Use Topics    Alcohol use: No     Alcohol/week: 0.0 standard drinks      Review of Systems:    Constitutional: Negative for diaphoresis and fatigue  Psychological:Negative for anxiety or depression  HENT: Negative for headaches, nasal congestion, sinus pain or vertigo  Eyes: Negative for visual disturbance.    Endocrine: Negative for polydipsia/polyuria  Respiratory: Negative for shortness of breath  Cardiovascular: Negative for chest pain, dyspnea on exertion, claudication, edema, irregular heartbeat, murmur, palpitations or shortness of breath  Gastrointestinal: Negative for abdominal pain or heartburn  Genito-Urinary: Negative for urinary frequency/urgency  Musculoskeletal: Negative for muscle pain, muscular weakness, negative for pain in arm and leg or swelling in foot and leg  Neurological: Negative for dizziness, headaches, memory loss, numbness/tingling, visual changes, syncope  Dermatological: Negative for rash    Objective:    Vitals:    12/13/22 1051   BP: 132/74   Pulse: 84   Height: 5' 4\" (1.626 m)     /74   Pulse 84   Ht 5' 4\" (1.626 m)   BMI 36.90 kg/m²     No flowsheet data found. Wt Readings from Last 3 Encounters:   08/30/22 215 lb (97.5 kg)   03/24/22 215 lb (97.5 kg)   01/18/22 215 lb 11.2 oz (97.8 kg)     Body mass index is 36.9 kg/m². GENERAL - Alert, oriented, pleasant, in no apparent distress. EYES: No jaundice, no conjunctival pallor. SKIN: It is warm & dry. No rashes. No Echhymosis    HEENT - No clinically significant abnormalities seen. Neck - Supple. No jugular venous distention noted. No carotid bruits. Cardiovascular - Normal S1 and S2 without obvious murmur or gallop. Extremities - No cyanosis, clubbing, or significant edema. Pulmonary - No respiratory distress. No wheezes or rales. Abdomen - No masses, tenderness, or organomegaly. Musculoskeletal - No significant edema. No joint deformities. No muscle wasting. Neurologic - Cranial nerves II through XII are grossly intact. There were no gross focal neurologic abnormalities.     Lab Review   Lab Results   Component Value Date/Time    CKTOTAL 117 01/14/2022 09:05 AM    TROPONINT <0.010 08/30/2022 08:45 AM     BNP:  No results found for: BNP  PT/INR:    Lab Results   Component Value Date    INR 1.09 08/30/2022     Lab Results   Component Value Date    LABA1C 5.9 08/31/2022    LABA1C 5.9 07/07/2014     Lab Results   Component Value Date    WBC 7.3 12/13/2022    HCT 28.5 (L) 12/13/2022    MCV 97.3 12/13/2022     12/13/2022     Lab Results   Component Value Date    CHOL 183 08/31/2022    TRIG 175 (H) 08/31/2022    HDL 34 (L) 08/31/2022    LDLCALC 114 (H) 08/31/2022    LDLDIRECT 70 02/27/2018     Lab Results   Component Value Date    ALT 9 (L) 10/25/2022    AST 25 10/25/2022     BMP:    Lab Results   Component Value Date/Time     12/13/2022 06:30 AM    K 4.2 12/13/2022 06:30 AM    K 3.8 02/24/2018 04:05 AM     12/13/2022 06:30 AM    CO2 28 12/13/2022 06:30 AM    BUN 12 12/13/2022 06:30 AM    CREATININE 1.0 12/13/2022 06:30 AM     CMP:   Lab Results   Component Value Date/Time     12/13/2022 06:30 AM    K 4.2 12/13/2022 06:30 AM    K 3.8 02/24/2018 04:05 AM     12/13/2022 06:30 AM    CO2 28 12/13/2022 06:30 AM    BUN 12 12/13/2022 06:30 AM    PROT 6.0 10/25/2022 07:21 AM    PROT 7.4 08/20/2012 04:10 PM     TSH:    Lab Results   Component Value Date/Time    TSH 0.39 09/08/2021 04:10 PM    TSHHS 8.520 08/31/2022 12:45 AM           Assessment & Plan:    -  Hypertension: Patients blood pressure is normal. Patient is advised about low sodium diet. Present medical regimen will not be changed. On lasix blood pressure well controlled with continue with present medical therapy        -  LIPID MANAGEMENT:  Importance of lipid levels discussed with patient   and patient was given dietary advice. NCEP- ATP III guidelines reviewed with patient. -   Changes  in medicines made: No     On atorvastatin compliant we will check the LDL                           -Chronic  Atrial fibrillation, pt is  compliant with meds.  Patient does not have symptoms from atrial fibrillation    On multaq check EKG  Watchman DW pt     - in wheel chair post knee surgery    -Anemia hemoglobin is low unchanged    -CKD creatinine is elevated per PCP    -Hypothyroidism on Synthroid 175 mcg p.o. daily      Faith Paez MD    [facility] [place]

## 2022-12-20 ENCOUNTER — HOSPITAL ENCOUNTER (OUTPATIENT)
Age: 87
Setting detail: SPECIMEN
Discharge: HOME | End: 2022-12-20

## 2022-12-27 ENCOUNTER — HOSPITAL ENCOUNTER (OUTPATIENT)
Age: 87
Setting detail: SPECIMEN
Discharge: HOME | End: 2022-12-27
Payer: MEDICARE

## 2022-12-27 PROCEDURE — 80048 BASIC METABOLIC PNL TOTAL CA: CPT

## 2022-12-30 ENCOUNTER — HOSPITAL ENCOUNTER (OUTPATIENT)
Age: 87
Setting detail: SPECIMEN
Discharge: HOME | End: 2022-12-30
Payer: MEDICARE

## 2022-12-30 LAB
ANION GAP SERPL CALCULATED.3IONS-SCNC: 7 MMOL/L (ref 4–16)
BUN BLDV-MCNC: 25 MG/DL (ref 6–23)
CALCIUM SERPL-MCNC: 8.6 MG/DL (ref 8.3–10.6)
CHLORIDE BLD-SCNC: 104 MMOL/L (ref 99–110)
CO2: 30 MMOL/L (ref 21–32)
CREAT SERPL-MCNC: 1.2 MG/DL (ref 0.6–1.1)
GFR SERPL CREATININE-BSD FRML MDRD: 44 ML/MIN/1.73M2
GLUCOSE BLD-MCNC: 73 MG/DL (ref 70–99)
HCT VFR BLD CALC: 29.8 % (ref 37–47)
HEMOGLOBIN: 9 GM/DL (ref 12.5–16)
MCH RBC QN AUTO: 29.5 PG (ref 27–31)
MCHC RBC AUTO-ENTMCNC: 30.2 % (ref 32–36)
MCV RBC AUTO: 97.7 FL (ref 78–100)
PDW BLD-RTO: 14.6 % (ref 11.7–14.9)
PLATELET # BLD: 219 K/CU MM (ref 140–440)
PMV BLD AUTO: 12.4 FL (ref 7.5–11.1)
POTASSIUM SERPL-SCNC: 4.9 MMOL/L (ref 3.5–5.1)
RBC # BLD: 3.05 M/CU MM (ref 4.2–5.4)
SODIUM BLD-SCNC: 141 MMOL/L (ref 135–145)
WBC # BLD: 6.4 K/CU MM (ref 4–10.5)

## 2022-12-30 PROCEDURE — 80048 BASIC METABOLIC PNL TOTAL CA: CPT

## 2022-12-30 PROCEDURE — 85027 COMPLETE CBC AUTOMATED: CPT

## 2022-12-30 PROCEDURE — 36415 COLL VENOUS BLD VENIPUNCTURE: CPT

## 2023-01-03 ENCOUNTER — HOSPITAL ENCOUNTER (OUTPATIENT)
Age: 88
Setting detail: SPECIMEN
Discharge: HOME | End: 2023-01-03
Payer: MEDICARE

## 2023-01-03 LAB
ANION GAP SERPL CALCULATED.3IONS-SCNC: 11 MMOL/L (ref 4–16)
BUN BLDV-MCNC: 28 MG/DL (ref 6–23)
CALCIUM SERPL-MCNC: 8.3 MG/DL (ref 8.3–10.6)
CHLORIDE BLD-SCNC: 102 MMOL/L (ref 99–110)
CO2: 28 MMOL/L (ref 21–32)
CREAT SERPL-MCNC: 1.2 MG/DL (ref 0.6–1.1)
GFR SERPL CREATININE-BSD FRML MDRD: 44 ML/MIN/1.73M2
GLUCOSE BLD-MCNC: 80 MG/DL (ref 70–99)
HCT VFR BLD CALC: 27.8 % (ref 37–47)
HEMOGLOBIN: 8.5 GM/DL (ref 12.5–16)
MCH RBC QN AUTO: 29.5 PG (ref 27–31)
MCHC RBC AUTO-ENTMCNC: 30.6 % (ref 32–36)
MCV RBC AUTO: 96.5 FL (ref 78–100)
PDW BLD-RTO: 14.7 % (ref 11.7–14.9)
PLATELET # BLD: 198 K/CU MM (ref 140–440)
PMV BLD AUTO: 12.4 FL (ref 7.5–11.1)
POTASSIUM SERPL-SCNC: 4.3 MMOL/L (ref 3.5–5.1)
RBC # BLD: 2.88 M/CU MM (ref 4.2–5.4)
SODIUM BLD-SCNC: 141 MMOL/L (ref 135–145)
WBC # BLD: 7.6 K/CU MM (ref 4–10.5)

## 2023-01-03 PROCEDURE — 80048 BASIC METABOLIC PNL TOTAL CA: CPT

## 2023-01-03 PROCEDURE — 85027 COMPLETE CBC AUTOMATED: CPT

## 2023-01-03 PROCEDURE — 36415 COLL VENOUS BLD VENIPUNCTURE: CPT

## 2023-01-10 ENCOUNTER — HOSPITAL ENCOUNTER (OUTPATIENT)
Age: 88
Setting detail: SPECIMEN
Discharge: HOME | End: 2023-01-10
Payer: MEDICARE

## 2023-01-10 LAB
ANION GAP SERPL CALCULATED.3IONS-SCNC: 16 MMOL/L (ref 4–16)
BUN BLDV-MCNC: 25 MG/DL (ref 6–23)
CALCIUM SERPL-MCNC: 9 MG/DL (ref 8.3–10.6)
CHLORIDE BLD-SCNC: 98 MMOL/L (ref 99–110)
CO2: 26 MMOL/L (ref 21–32)
CREAT SERPL-MCNC: 1.5 MG/DL (ref 0.6–1.1)
GFR SERPL CREATININE-BSD FRML MDRD: 34 ML/MIN/1.73M2
GLUCOSE BLD-MCNC: 83 MG/DL (ref 70–99)
HCT VFR BLD CALC: 30.3 % (ref 37–47)
HEMOGLOBIN: 9.5 GM/DL (ref 12.5–16)
MCH RBC QN AUTO: 30 PG (ref 27–31)
MCHC RBC AUTO-ENTMCNC: 31.4 % (ref 32–36)
MCV RBC AUTO: 95.6 FL (ref 78–100)
PDW BLD-RTO: 14.9 % (ref 11.7–14.9)
PLATELET # BLD: 208 K/CU MM (ref 140–440)
PMV BLD AUTO: 12.8 FL (ref 7.5–11.1)
POTASSIUM SERPL-SCNC: 4.8 MMOL/L (ref 3.5–5.1)
RBC # BLD: 3.17 M/CU MM (ref 4.2–5.4)
SODIUM BLD-SCNC: 140 MMOL/L (ref 135–145)
TSH HIGH SENSITIVITY: 2.15 UIU/ML (ref 0.27–4.2)
WBC # BLD: 13.1 K/CU MM (ref 4–10.5)

## 2023-01-10 PROCEDURE — 85027 COMPLETE CBC AUTOMATED: CPT

## 2023-01-10 PROCEDURE — 80048 BASIC METABOLIC PNL TOTAL CA: CPT

## 2023-01-10 PROCEDURE — 84443 ASSAY THYROID STIM HORMONE: CPT

## 2023-01-10 PROCEDURE — 36415 COLL VENOUS BLD VENIPUNCTURE: CPT

## 2023-01-14 ENCOUNTER — HOSPITAL ENCOUNTER (OUTPATIENT)
Age: 88
Setting detail: SPECIMEN
Discharge: HOME | End: 2023-01-14
Payer: MEDICARE

## 2023-01-14 PROCEDURE — 36415 COLL VENOUS BLD VENIPUNCTURE: CPT

## 2023-01-14 PROCEDURE — 85025 COMPLETE CBC W/AUTO DIFF WBC: CPT

## 2023-01-14 PROCEDURE — 87804 INFLUENZA ASSAY W/OPTIC: CPT

## 2023-01-14 PROCEDURE — 80048 BASIC METABOLIC PNL TOTAL CA: CPT

## 2023-01-15 LAB
RAPID INFLUENZA  B AGN: NEGATIVE
RAPID INFLUENZA A AGN: NEGATIVE

## 2023-01-17 ENCOUNTER — APPOINTMENT (OUTPATIENT)
Dept: CT IMAGING | Age: 88
DRG: 064 | End: 2023-01-17
Payer: MEDICARE

## 2023-01-17 ENCOUNTER — HOSPITAL ENCOUNTER (INPATIENT)
Age: 88
LOS: 7 days | Discharge: NURSING, REHAB OR LONG-TERM CARE | DRG: 064 | End: 2023-01-24
Attending: EMERGENCY MEDICINE | Admitting: STUDENT IN AN ORGANIZED HEALTH CARE EDUCATION/TRAINING PROGRAM
Payer: MEDICARE

## 2023-01-17 ENCOUNTER — HOSPITAL ENCOUNTER (OUTPATIENT)
Age: 88
Setting detail: SPECIMEN
Discharge: HOME | End: 2023-01-17
Payer: MEDICARE

## 2023-01-17 ENCOUNTER — APPOINTMENT (OUTPATIENT)
Dept: GENERAL RADIOLOGY | Age: 88
DRG: 064 | End: 2023-01-17
Payer: MEDICARE

## 2023-01-17 DIAGNOSIS — A41.9 SEPSIS, DUE TO UNSPECIFIED ORGANISM, UNSPECIFIED WHETHER ACUTE ORGAN DYSFUNCTION PRESENT (HCC): ICD-10-CM

## 2023-01-17 DIAGNOSIS — J96.11 CHRONIC RESPIRATORY FAILURE WITH HYPOXIA (HCC): ICD-10-CM

## 2023-01-17 DIAGNOSIS — J18.9 PNEUMONIA OF BOTH LUNGS DUE TO INFECTIOUS ORGANISM, UNSPECIFIED PART OF LUNG: ICD-10-CM

## 2023-01-17 DIAGNOSIS — D64.9 CHRONIC ANEMIA: ICD-10-CM

## 2023-01-17 DIAGNOSIS — R41.82 ALTERED MENTAL STATUS, UNSPECIFIED ALTERED MENTAL STATUS TYPE: Primary | ICD-10-CM

## 2023-01-17 LAB
ADENOVIRUS DETECTION BY PCR: NOT DETECTED
ALBUMIN SERPL-MCNC: 2.7 GM/DL (ref 3.4–5)
ALP BLD-CCNC: 76 IU/L (ref 40–129)
ALT SERPL-CCNC: 18 U/L (ref 10–40)
ANION GAP SERPL CALCULATED.3IONS-SCNC: 11 MMOL/L (ref 4–16)
ANION GAP SERPL CALCULATED.3IONS-SCNC: 13 MMOL/L (ref 4–16)
AST SERPL-CCNC: 24 IU/L (ref 15–37)
BACTERIA: NEGATIVE /HPF
BANDED NEUTROPHILS ABSOLUTE COUNT: 1.13 K/CU MM
BANDED NEUTROPHILS RELATIVE PERCENT: 10 % (ref 5–11)
BASE EXCESS MIXED: 10.1 (ref 0–2.3)
BASOPHILS ABSOLUTE: 0 K/CU MM
BASOPHILS ABSOLUTE: 0.1 K/CU MM
BASOPHILS RELATIVE PERCENT: 0 % (ref 0–1)
BASOPHILS RELATIVE PERCENT: 0.5 % (ref 0–1)
BILIRUB SERPL-MCNC: 0.4 MG/DL (ref 0–1)
BILIRUBIN URINE: NEGATIVE
BLOOD, URINE: NORMAL
BORDETELLA PARAPERTUSSIS BY PCR: NOT DETECTED
BORDETELLA PERTUSSIS PCR: NOT DETECTED
BUN BLDV-MCNC: 34 MG/DL (ref 6–23)
BUN BLDV-MCNC: 36 MG/DL (ref 6–23)
CALCIUM SERPL-MCNC: 8.2 MG/DL (ref 8.3–10.6)
CALCIUM SERPL-MCNC: 8.6 MG/DL (ref 8.3–10.6)
CHLAMYDOPHILA PNEUMONIA PCR: NOT DETECTED
CHLORIDE BLD-SCNC: 100 MMOL/L (ref 99–110)
CHLORIDE BLD-SCNC: 101 MMOL/L (ref 99–110)
CLARITY: NORMAL
CO2: 29 MMOL/L (ref 21–32)
CO2: 29 MMOL/L (ref 21–32)
COLOR: YELLOW
COMMENT: ABNORMAL
CORONAVIRUS 229E PCR: NOT DETECTED
CORONAVIRUS HKU1 PCR: NOT DETECTED
CORONAVIRUS NL63 PCR: NOT DETECTED
CORONAVIRUS OC43 PCR: NOT DETECTED
CREAT SERPL-MCNC: 0.9 MG/DL (ref 0.6–1.1)
CREAT SERPL-MCNC: 1 MG/DL (ref 0.6–1.1)
DIFFERENTIAL TYPE: ABNORMAL
DIFFERENTIAL TYPE: ABNORMAL
DOHLE BODIES: PRESENT
EKG ATRIAL RATE: 197 BPM
EKG ATRIAL RATE: 197 BPM
EKG DIAGNOSIS: NORMAL
EKG DIAGNOSIS: NORMAL
EKG Q-T INTERVAL: 360 MS
EKG Q-T INTERVAL: 360 MS
EKG QRS DURATION: 90 MS
EKG QRS DURATION: 90 MS
EKG QTC CALCULATION (BAZETT): 491 MS
EKG QTC CALCULATION (BAZETT): 491 MS
EKG R AXIS: 81 DEGREES
EKG R AXIS: 81 DEGREES
EKG T AXIS: 54 DEGREES
EKG T AXIS: 54 DEGREES
EKG VENTRICULAR RATE: 112 BPM
EKG VENTRICULAR RATE: 112 BPM
EOSINOPHILS ABSOLUTE: 0 K/CU MM
EOSINOPHILS ABSOLUTE: 0.1 K/CU MM
EOSINOPHILS RELATIVE PERCENT: 0 % (ref 0–3)
EOSINOPHILS RELATIVE PERCENT: 1 % (ref 0–3)
GFR SERPL CREATININE-BSD FRML MDRD: 55 ML/MIN/1.73M2
GFR SERPL CREATININE-BSD FRML MDRD: >60 ML/MIN/1.73M2
GLUCOSE BLD-MCNC: 76 MG/DL (ref 70–99)
GLUCOSE BLD-MCNC: 96 MG/DL (ref 70–99)
GLUCOSE, URINE: NEGATIVE MG/DL
HCO3 VENOUS: 33.2 MMOL/L (ref 19–25)
HCT VFR BLD CALC: 25.6 % (ref 37–47)
HCT VFR BLD CALC: 25.9 % (ref 37–47)
HCT VFR BLD CALC: 26.9 % (ref 37–47)
HEMOGLOBIN: 7.9 GM/DL (ref 12.5–16)
HEMOGLOBIN: 8 GM/DL (ref 12.5–16)
HEMOGLOBIN: 8.4 GM/DL (ref 12.5–16)
HUMAN METAPNEUMOVIRUS PCR: NOT DETECTED
IMMATURE NEUTROPHIL %: 0 % (ref 0–0.43)
IMMATURE NEUTROPHIL %: 0.9 % (ref 0–0.43)
INFLUENZA A BY PCR: NOT DETECTED
INFLUENZA A H1 (2009) PCR: NOT DETECTED
INFLUENZA A H1 PANDEMIC PCR: NOT DETECTED
INFLUENZA A H3 PCR: NOT DETECTED
INFLUENZA B BY PCR: NOT DETECTED
INR BLD: 1.21 INDEX
KETONES, URINE: NEGATIVE MG/DL
LACTIC ACID, SEPSIS: 1.2 MMOL/L (ref 0.5–1.9)
LEUKOCYTE ESTERASE, URINE: NORMAL
LYMPHOCYTES ABSOLUTE: 0.9 K/CU MM
LYMPHOCYTES ABSOLUTE: 1.2 K/CU MM
LYMPHOCYTES RELATIVE PERCENT: 8 % (ref 24–44)
LYMPHOCYTES RELATIVE PERCENT: 9.8 % (ref 24–44)
MCH RBC QN AUTO: 28.9 PG (ref 27–31)
MCH RBC QN AUTO: 29.2 PG (ref 27–31)
MCH RBC QN AUTO: 29.7 PG (ref 27–31)
MCHC RBC AUTO-ENTMCNC: 30.5 % (ref 32–36)
MCHC RBC AUTO-ENTMCNC: 31.2 % (ref 32–36)
MCHC RBC AUTO-ENTMCNC: 31.3 % (ref 32–36)
MCV RBC AUTO: 93.4 FL (ref 78–100)
MCV RBC AUTO: 94.9 FL (ref 78–100)
MCV RBC AUTO: 95.2 FL (ref 78–100)
MONOCYTES ABSOLUTE: 1.1 K/CU MM
MONOCYTES ABSOLUTE: 1.4 K/CU MM
MONOCYTES RELATIVE PERCENT: 12 % (ref 0–4)
MONOCYTES RELATIVE PERCENT: 8.6 % (ref 0–4)
MYCOPLASMA PNEUMONIAE PCR: NOT DETECTED
NITRITE URINE, QUANTITATIVE: POSITIVE
NUCLEATED RBC %: 0 %
O2 SAT, VEN: 95.5 % (ref 50–70)
PARAINFLUENZA 1 PCR: NOT DETECTED
PARAINFLUENZA 2 PCR: NOT DETECTED
PARAINFLUENZA 3 PCR: NOT DETECTED
PARAINFLUENZA 4 PCR: NOT DETECTED
PCO2, VEN: 38 MMHG (ref 38–52)
PDW BLD-RTO: 14.9 % (ref 11.7–14.9)
PDW BLD-RTO: 15 % (ref 11.7–14.9)
PDW BLD-RTO: 15 % (ref 11.7–14.9)
PH VENOUS: 7.55 (ref 7.32–7.42)
PH, URINE: 6
PLATELET # BLD: 220 K/CU MM (ref 140–440)
PLATELET # BLD: 272 K/CU MM (ref 140–440)
PLATELET # BLD: 275 K/CU MM (ref 140–440)
PMV BLD AUTO: 11.7 FL (ref 7.5–11.1)
PMV BLD AUTO: 11.8 FL (ref 7.5–11.1)
PMV BLD AUTO: 12.2 FL (ref 7.5–11.1)
PO2, VEN: 235 MMHG (ref 28–48)
POTASSIUM SERPL-SCNC: 4 MMOL/L (ref 3.5–5.1)
POTASSIUM SERPL-SCNC: 4.2 MMOL/L (ref 3.5–5.1)
PROCALCITONIN: 0.2
PROTEIN UA: NORMAL MG/DL
PROTHROMBIN TIME: 15.7 SECONDS (ref 11.7–14.5)
RAPID INFLUENZA  B AGN: NEGATIVE
RAPID INFLUENZA A AGN: NEGATIVE
RBC # BLD: 2.69 M/CU MM (ref 4.2–5.4)
RBC # BLD: 2.73 M/CU MM (ref 4.2–5.4)
RBC # BLD: 2.88 M/CU MM (ref 4.2–5.4)
RBC URINE: 5 /HPF
RHINOVIRUS ENTEROVIRUS PCR: NOT DETECTED
RSV PCR: NOT DETECTED
SARS-COV-2, NAAT: NOT DETECTED
SARS-COV-2: NOT DETECTED
SEGMENTED NEUTROPHILS ABSOLUTE COUNT: 7.9 K/CU MM
SEGMENTED NEUTROPHILS ABSOLUTE COUNT: 9.9 K/CU MM
SEGMENTED NEUTROPHILS RELATIVE PERCENT: 70 % (ref 36–66)
SEGMENTED NEUTROPHILS RELATIVE PERCENT: 79.2 % (ref 36–66)
SODIUM BLD-SCNC: 141 MMOL/L (ref 135–145)
SODIUM BLD-SCNC: 142 MMOL/L (ref 135–145)
SOURCE: NORMAL
SPECIFIC GRAVITY UA: 1.01
SQUAMOUS EPITHELIAL: 13 /HPF
TOTAL IMMATURE NEUTOROPHIL: 0 K/CU MM
TOTAL IMMATURE NEUTOROPHIL: 0.11 K/CU MM
TOTAL NUCLEATED RBC: 0 K/CU MM
TOTAL PROTEIN: 6.8 GM/DL (ref 6.4–8.2)
TRANSITIONAL EPITHELIAL: <1 /HPF
TRICHOMONAS: NORMAL /HPF
TROPONIN T: <0.01 NG/ML
UROBILINOGEN, URINE: 0.2 MG/DL
WBC # BLD: 11.1 K/CU MM (ref 4–10.5)
WBC # BLD: 11.3 K/CU MM (ref 4–10.5)
WBC # BLD: 12.5 K/CU MM (ref 4–10.5)
WBC UA: 27 /HPF

## 2023-01-17 PROCEDURE — 71045 X-RAY EXAM CHEST 1 VIEW: CPT

## 2023-01-17 PROCEDURE — 96367 TX/PROPH/DG ADDL SEQ IV INF: CPT

## 2023-01-17 PROCEDURE — 80053 COMPREHEN METABOLIC PANEL: CPT

## 2023-01-17 PROCEDURE — 81001 URINALYSIS AUTO W/SCOPE: CPT

## 2023-01-17 PROCEDURE — 87449 NOS EACH ORGANISM AG IA: CPT

## 2023-01-17 PROCEDURE — 94640 AIRWAY INHALATION TREATMENT: CPT

## 2023-01-17 PROCEDURE — 87635 SARS-COV-2 COVID-19 AMP PRB: CPT

## 2023-01-17 PROCEDURE — 80048 BASIC METABOLIC PNL TOTAL CA: CPT

## 2023-01-17 PROCEDURE — 2580000003 HC RX 258: Performed by: STUDENT IN AN ORGANIZED HEALTH CARE EDUCATION/TRAINING PROGRAM

## 2023-01-17 PROCEDURE — 96365 THER/PROPH/DIAG IV INF INIT: CPT

## 2023-01-17 PROCEDURE — 85027 COMPLETE CBC AUTOMATED: CPT

## 2023-01-17 PROCEDURE — 36415 COLL VENOUS BLD VENIPUNCTURE: CPT

## 2023-01-17 PROCEDURE — 83605 ASSAY OF LACTIC ACID: CPT

## 2023-01-17 PROCEDURE — 6360000002 HC RX W HCPCS: Performed by: EMERGENCY MEDICINE

## 2023-01-17 PROCEDURE — 84484 ASSAY OF TROPONIN QUANT: CPT

## 2023-01-17 PROCEDURE — 6370000000 HC RX 637 (ALT 250 FOR IP): Performed by: STUDENT IN AN ORGANIZED HEALTH CARE EDUCATION/TRAINING PROGRAM

## 2023-01-17 PROCEDURE — 82805 BLOOD GASES W/O2 SATURATION: CPT

## 2023-01-17 PROCEDURE — 2580000003 HC RX 258: Performed by: EMERGENCY MEDICINE

## 2023-01-17 PROCEDURE — 99285 EMERGENCY DEPT VISIT HI MDM: CPT

## 2023-01-17 PROCEDURE — 87040 BLOOD CULTURE FOR BACTERIA: CPT

## 2023-01-17 PROCEDURE — 2060000000 HC ICU INTERMEDIATE R&B

## 2023-01-17 PROCEDURE — 93010 ELECTROCARDIOGRAM REPORT: CPT | Performed by: INTERNAL MEDICINE

## 2023-01-17 PROCEDURE — 6360000002 HC RX W HCPCS: Performed by: STUDENT IN AN ORGANIZED HEALTH CARE EDUCATION/TRAINING PROGRAM

## 2023-01-17 PROCEDURE — 85025 COMPLETE CBC W/AUTO DIFF WBC: CPT

## 2023-01-17 PROCEDURE — 96366 THER/PROPH/DIAG IV INF ADDON: CPT

## 2023-01-17 PROCEDURE — 85610 PROTHROMBIN TIME: CPT

## 2023-01-17 PROCEDURE — 0202U NFCT DS 22 TRGT SARS-COV-2: CPT

## 2023-01-17 PROCEDURE — 87804 INFLUENZA ASSAY W/OPTIC: CPT

## 2023-01-17 PROCEDURE — 93005 ELECTROCARDIOGRAM TRACING: CPT | Performed by: EMERGENCY MEDICINE

## 2023-01-17 PROCEDURE — 84145 PROCALCITONIN (PCT): CPT

## 2023-01-17 PROCEDURE — 87899 AGENT NOS ASSAY W/OPTIC: CPT

## 2023-01-17 PROCEDURE — 84443 ASSAY THYROID STIM HORMONE: CPT

## 2023-01-17 PROCEDURE — 70450 CT HEAD/BRAIN W/O DYE: CPT

## 2023-01-17 RX ORDER — FOLIC ACID 1 MG/1
2 TABLET ORAL DAILY
Status: ON HOLD | COMMUNITY
End: 2023-01-24 | Stop reason: HOSPADM

## 2023-01-17 RX ORDER — ONDANSETRON 2 MG/ML
4 INJECTION INTRAMUSCULAR; INTRAVENOUS EVERY 6 HOURS PRN
Status: DISCONTINUED | OUTPATIENT
Start: 2023-01-17 | End: 2023-01-17 | Stop reason: ALTCHOICE

## 2023-01-17 RX ORDER — ACETAMINOPHEN 650 MG/1
650 SUPPOSITORY RECTAL EVERY 6 HOURS PRN
Status: DISCONTINUED | OUTPATIENT
Start: 2023-01-17 | End: 2023-01-24 | Stop reason: HOSPADM

## 2023-01-17 RX ORDER — ACETAMINOPHEN 325 MG/1
650 TABLET ORAL EVERY 6 HOURS PRN
Status: DISCONTINUED | OUTPATIENT
Start: 2023-01-17 | End: 2023-01-24 | Stop reason: HOSPADM

## 2023-01-17 RX ORDER — LANOLIN ALCOHOL/MO/W.PET/CERES
325 CREAM (GRAM) TOPICAL
Status: DISCONTINUED | OUTPATIENT
Start: 2023-01-18 | End: 2023-01-18

## 2023-01-17 RX ORDER — ONDANSETRON 4 MG/1
4 TABLET, ORALLY DISINTEGRATING ORAL EVERY 8 HOURS PRN
Status: DISCONTINUED | OUTPATIENT
Start: 2023-01-17 | End: 2023-01-24 | Stop reason: HOSPADM

## 2023-01-17 RX ORDER — SODIUM CHLORIDE, SODIUM LACTATE, POTASSIUM CHLORIDE, CALCIUM CHLORIDE 600; 310; 30; 20 MG/100ML; MG/100ML; MG/100ML; MG/100ML
1000 INJECTION, SOLUTION INTRAVENOUS CONTINUOUS
Status: DISCONTINUED | OUTPATIENT
Start: 2023-01-17 | End: 2023-01-18

## 2023-01-17 RX ORDER — IPRATROPIUM BROMIDE AND ALBUTEROL SULFATE 2.5; .5 MG/3ML; MG/3ML
1 SOLUTION RESPIRATORY (INHALATION) EVERY 6 HOURS PRN
Status: ON HOLD | COMMUNITY
End: 2023-01-24 | Stop reason: HOSPADM

## 2023-01-17 RX ORDER — SODIUM CHLORIDE 0.9 % (FLUSH) 0.9 %
5-40 SYRINGE (ML) INJECTION EVERY 12 HOURS SCHEDULED
Status: DISCONTINUED | OUTPATIENT
Start: 2023-01-17 | End: 2023-01-24 | Stop reason: HOSPADM

## 2023-01-17 RX ORDER — SODIUM CHLORIDE 9 MG/ML
INJECTION, SOLUTION INTRAVENOUS PRN
Status: DISCONTINUED | OUTPATIENT
Start: 2023-01-17 | End: 2023-01-24 | Stop reason: HOSPADM

## 2023-01-17 RX ORDER — 0.9 % SODIUM CHLORIDE 0.9 %
30 INTRAVENOUS SOLUTION INTRAVENOUS ONCE
Status: COMPLETED | OUTPATIENT
Start: 2023-01-17 | End: 2023-01-17

## 2023-01-17 RX ORDER — SUCRALFATE 1 G/1
1 TABLET ORAL 4 TIMES DAILY
COMMUNITY

## 2023-01-17 RX ORDER — IPRATROPIUM BROMIDE AND ALBUTEROL SULFATE 2.5; .5 MG/3ML; MG/3ML
1 SOLUTION RESPIRATORY (INHALATION)
Status: DISCONTINUED | OUTPATIENT
Start: 2023-01-17 | End: 2023-01-18

## 2023-01-17 RX ORDER — SODIUM CHLORIDE 0.9 % (FLUSH) 0.9 %
5-40 SYRINGE (ML) INJECTION PRN
Status: DISCONTINUED | OUTPATIENT
Start: 2023-01-17 | End: 2023-01-24 | Stop reason: HOSPADM

## 2023-01-17 RX ORDER — POLYETHYLENE GLYCOL 3350 17 G/17G
17 POWDER, FOR SOLUTION ORAL DAILY PRN
Status: DISCONTINUED | OUTPATIENT
Start: 2023-01-17 | End: 2023-01-24 | Stop reason: HOSPADM

## 2023-01-17 RX ORDER — FOLIC ACID 1 MG/1
2 TABLET ORAL DAILY
Status: DISCONTINUED | OUTPATIENT
Start: 2023-01-17 | End: 2023-01-24 | Stop reason: HOSPADM

## 2023-01-17 RX ORDER — SUCRALFATE 1 G/1
1 TABLET ORAL
Status: DISCONTINUED | OUTPATIENT
Start: 2023-01-17 | End: 2023-01-24 | Stop reason: HOSPADM

## 2023-01-17 RX ORDER — DIVALPROEX SODIUM 250 MG/1
250 TABLET, DELAYED RELEASE ORAL NIGHTLY
Status: ON HOLD | COMMUNITY
End: 2023-01-24 | Stop reason: HOSPADM

## 2023-01-17 RX ORDER — DIVALPROEX SODIUM 250 MG/1
250 TABLET, DELAYED RELEASE ORAL NIGHTLY
Status: DISCONTINUED | OUTPATIENT
Start: 2023-01-17 | End: 2023-01-24 | Stop reason: HOSPADM

## 2023-01-17 RX ORDER — ALBUTEROL SULFATE 90 UG/1
2 AEROSOL, METERED RESPIRATORY (INHALATION) 4 TIMES DAILY
Status: DISCONTINUED | OUTPATIENT
Start: 2023-01-17 | End: 2023-01-17

## 2023-01-17 RX ORDER — ESCITALOPRAM OXALATE 10 MG/1
10 TABLET ORAL DAILY
Status: DISCONTINUED | OUTPATIENT
Start: 2023-01-17 | End: 2023-01-24 | Stop reason: HOSPADM

## 2023-01-17 RX ORDER — ONDANSETRON 4 MG/1
4 TABLET, FILM COATED ORAL EVERY 6 HOURS PRN
Status: ON HOLD | COMMUNITY
End: 2023-01-24 | Stop reason: HOSPADM

## 2023-01-17 RX ORDER — ENOXAPARIN SODIUM 100 MG/ML
40 INJECTION SUBCUTANEOUS DAILY
Status: DISCONTINUED | OUTPATIENT
Start: 2023-01-17 | End: 2023-01-24 | Stop reason: HOSPADM

## 2023-01-17 RX ADMIN — DEXTROSE MONOHYDRATE 500 MG: 50 INJECTION, SOLUTION INTRAVENOUS at 18:09

## 2023-01-17 RX ADMIN — Medication 5 ML: at 10:05

## 2023-01-17 RX ADMIN — ENOXAPARIN SODIUM 40 MG: 100 INJECTION SUBCUTANEOUS at 18:12

## 2023-01-17 RX ADMIN — VANCOMYCIN HYDROCHLORIDE 2500 MG: 1 INJECTION, POWDER, LYOPHILIZED, FOR SOLUTION INTRAVENOUS at 11:34

## 2023-01-17 RX ADMIN — SODIUM CHLORIDE 2925 ML: 9 INJECTION, SOLUTION INTRAVENOUS at 10:07

## 2023-01-17 RX ADMIN — IPRATROPIUM BROMIDE AND ALBUTEROL SULFATE 1 AMPULE: 2.5; .5 SOLUTION RESPIRATORY (INHALATION) at 19:40

## 2023-01-17 RX ADMIN — CEFTRIAXONE SODIUM 1000 MG: 1 INJECTION, POWDER, FOR SOLUTION INTRAMUSCULAR; INTRAVENOUS at 22:06

## 2023-01-17 RX ADMIN — SODIUM CHLORIDE, PRESERVATIVE FREE 10 ML: 5 INJECTION INTRAVENOUS at 22:06

## 2023-01-17 RX ADMIN — CEFEPIME HYDROCHLORIDE 2000 MG: 2 INJECTION, POWDER, FOR SOLUTION INTRAVENOUS at 10:23

## 2023-01-17 RX ADMIN — SODIUM CHLORIDE, POTASSIUM CHLORIDE, SODIUM LACTATE AND CALCIUM CHLORIDE 1000 ML: 600; 310; 30; 20 INJECTION, SOLUTION INTRAVENOUS at 18:08

## 2023-01-17 ASSESSMENT — LIFESTYLE VARIABLES
HOW MANY STANDARD DRINKS CONTAINING ALCOHOL DO YOU HAVE ON A TYPICAL DAY: PATIENT DOES NOT DRINK
HOW OFTEN DO YOU HAVE A DRINK CONTAINING ALCOHOL: NEVER

## 2023-01-17 NOTE — PROGRESS NOTES
Pt arrived to unit per ED cart, transferred to unit bed, on 2L NC, skin assessment complete with Ashok Cat RN, No open areas noted. Bed alarm on, call light within reach.

## 2023-01-17 NOTE — ED NOTES
ED TO INPATIENT SBAR HANDOFF    Patient Name: Stevie Lopez   :  1935  82 y.o. MRN:  [id number]  Preferred Name  [facility]  ED Room #:  TR03/03TR-03  Family/Caregiver Present NO  Restraints: NO  Sitter NO  Sepsis Risk Score Sepsis Risk Score: 3.08    Situation  Code Status: Prior Limited Code details: Intubation/Re-intubation No Comment; Defibrillation/Cardioversion No Comment; Chest Compressions No Comment; Resuscitative Medications No Comment; Other No Comment  . Allergies: Tape [adhesive tape], Codeine, and Sulfa antibiotics  Weight: Patient Vitals for the past 96 hrs (Last 3 readings):   Weight   23 0848 215 lb (97.5 kg)     Arrived from: home  Chief Complaint:   Chief Complaint   Patient presents with    Altered Mental Status     LKW apprx 2 days ago. Hospital Problem/Diagnosis:    Imaging:   CT HEAD WO CONTRAST   Final Result   1. No acute intracranial abnormality. XR CHEST PORTABLE   Final Result   1. Patchy bilateral densities likely representing multifocal pneumonia. I   would recommend follow-up to resolution. 2. Stable cardiomegaly without overt failure.            Abnormal labs:   Abnormal Labs Reviewed   CBC WITH AUTO DIFFERENTIAL - Abnormal; Notable for the following components:       Result Value    WBC 12.5 (*)     RBC 2.88 (*)     Hemoglobin 8.4 (*)     Hematocrit 26.9 (*)     MCHC 31.2 (*)     MPV 11.7 (*)     Segs Relative 79.2 (*)     Lymphocytes % 9.8 (*)     Monocytes % 8.6 (*)     Immature Neutrophil % 0.9 (*)     All other components within normal limits   COMPREHENSIVE METABOLIC PANEL W/ REFLEX TO MG FOR LOW K - Abnormal; Notable for the following components:    BUN 34 (*)     Albumin 2.7 (*)     All other components within normal limits   BLOOD GAS, VENOUS - Abnormal; Notable for the following components:    pH, Oneil 7.55 (*)     pO2, Oneil 235 (*)     Base Exc, Mixed 10.1 (*)     HCO3, Venous 33.2 (*)     O2 Sat, Oneil 95.5 (*)     All other components within normal limits   PROTIME-INR - Abnormal; Notable for the following components:    Protime 15.7 (*)     All other components within normal limits     Critical values:NO    Abnormal Assessment Findings: AMS    Background  History:   Past Medical History:   Diagnosis Date    Allergic rhinitis     Arthritis     CHF (congestive heart failure) (HCC)     DVT, lower extremity, proximal, acute, left (HCC)     Edema leg     Gastroesophageal reflux disease 3/30/2019    H/O 24 hour EKG monitoring 6/10 1/09 11/08    6/7/10- predominant rhythm is a-fib, freq PVCs    H/O cardiac catheterization 7/20/00    H/O cardiac catheterization 7/20/00    right dominant system, left main is short but angiographically normal, LAD and CX reveal sign  tortuosities distally  but no sign angiographic atherosclerotic disease noted except luminal  irregularities here and there. LVSF normal, EF  55%    H/O cardiovascular stress test 5/2/2016    lexiscan-normal,EF70%    H/O Doppler echocardiogram 7/12/10    intimal thickening but no sign. atherosclerotic plaque noted in [facility].  H/O echocardiogram 5/2/2016 9/10     5/16 EF60% mild MR, TR, AR 9/20/10 Complete 2 dim transthoracic echo, techn. difficult. The study was  tech limited, LVSF is normal    History of atrial fibrillation     History of cardiovascular stress test 7/26/13, 7/10 8/08    7/13-WNL Observed defect consistent with breast attenuation. EF 70%. 7/10-post stress myocardial images show a normal pattern of perfusion in all regions. LV is normal  in size.   EF is 62%, global  LVSF is normal    History of falling     per old chart    History of nuclear stress test 10/08/2020    EF 60%, Normal    Hx of blood clots     per old chart  hx of DVT left leg    Hx: UTI (urinary tract infection)     Hyperglycemia 3/30/2019    Hyperlipidemia     Hypertension     Hypothyroidism     Incontinence of bowel     Incontinence of urine     Sepsis ([facility] 2015    Tremor 3/30/2019    Wears glasses     to read    Wears partial dentures     upper       Assessment    Vitals/MEWS: MEWS Score: 2  Level of Consciousness: New confusion or agitation (1)   Vitals:    01/17/23 1032 01/17/23 1107 01/17/23 1500 01/17/23 1510   BP: (!) 145/71 134/68  (!) 124/59   Pulse: (!) 106 (!) 116  86   Resp:    16   Temp:   98.4 °F (36.9 °C) 98.4 °F (36.9 °C)   TempSrc:    Oral   SpO2: 94% 93%  93%   Weight:       Height:         FiO2 (%): Nasal Can. O2 Flow Rate: O2 Device: Nasal cannula O2 Flow Rate (L/min): 3 L/min  Cardiac Rhythm: Sinus Tach  Pain Assessment:  [x] Verbal [] Margart [place] Scale  Pain Scale: Pain Assessment  Pain Assessment: None - Denies Pain  Last documented pain score (0-10 scale)    Last documented pain medication administered: N/A  Mental Status: Altered Mental Status  NIH Score:    C-SSRS:    Bedside swallow:    Alfonso Coma Scale (GCS): Active LDA's:   Peripheral IV 01/17/23 Left Other (Comment) (Active)   Site Assessment Clean, dry & intact 01/17/23 1006   Line Status Normal saline locked 01/17/23 1006       Peripheral IV 01/17/23 Right Forearm (Active)   Site Assessment Clean, dry & intact 01/17/23 1006   Line Status Blood return noted; Flushed;Specimen collected;Normal saline locked 01/17/23 1006   Phlebitis Assessment No symptoms 01/17/23 1006   Infiltration Assessment 0 01/17/23 1006   Dressing Status New dressing applied 01/17/23 1006   Dressing Type Transparent 01/17/23 1006   Dressing Intervention New 01/17/23 1006     PO Status: Regular  Pertinent or High Risk Medications/Drips: NO  Pending Blood Product Administration: NO    You may also review the ED PT Care Timeline found under the Summary Nursing Index tab. Recommendation    Pending orders N/A  Plan for Discharge (if known):    Additional Comments: N/A  If any further questions, please call Sending RN at [phone]    Electronically signed by: Electronically signed by Elle Eubanks RN on 1/17/2023 at 3:27 PM     He Beatrice Reed  01/17/23 1536

## 2023-01-17 NOTE — ED NOTES
Report given to [facility] and care transferred at this time     Pushpa Hathaway, RN  01/17/23 [id number]

## 2023-01-17 NOTE — ED NOTES
Pt noted to have suprapubic cath intact on arrival. Port on catheter cleaned with alcohol and urine withdrawn via syringe, specimen sent to lab as ordered     Patricia Crespo RN  01/17/23 1440

## 2023-01-17 NOTE — ED PROVIDER NOTES
Emergency Department Encounter    Patient: Doris Jovel  MRN: [id number]  : 1935  Date of Evaluation: 2023  ED Provider:  Mariam Bush MD    Triage Chief Complaint:   Altered Mental Status (LKW apprx 2 days ago. /)    [place]:  Doris Jovel is a 82 y.o. female that presents with concern for altered mental status, she is on oxygen at baseline at the nursing home, but we are not clear why, 2 to 3 L at baseline. Per EMS last known well was 2 days ago, was seen by the physician at the facility at that time. Report was that nobody knew if she had been normal yesterday, this morning when they went to round on the patient she was altered. She has not had any infectious symptoms that we are aware of, however the nurse this morning did not know the patient. She does not answer questions, just groans. She does move her extremities but not reliably following commands    ROS - see HPI, below listed is current ROS at time of my eval:  Limited  patient' mental status. Past Medical History:   Diagnosis Date    Allergic rhinitis     Arthritis     CHF (congestive heart failure) ([facility] [address]     DVT, lower extremity, proximal, acute, left (HCC)     Edema leg     Gastroesophageal reflux disease 3/30/2019    H/O 24 hour EKG monitoring 6/10 1/09 11/08    6/7/10- predominant rhythm is a-fib, freq PVCs    H/O cardiac catheterization 00    H/O cardiac catheterization 00    right dominant system, left main is short but angiographically normal, LAD and CX reveal sign  tortuosities distally  but no sign angiographic atherosclerotic disease noted except luminal  irregularities here and there. LVSF normal, EF  55%    H/O cardiovascular stress test 2016    lexiscan-normal,EF70%    H/O Doppler echocardiogram 7/12/10    intimal thickening but no sign. atherosclerotic plaque noted in [facility]  [facility].     H/O echocardiogram 2016 9/10     5/16 EF60% mild MR, TR, AR 9/20/10 Complete 2 dim transthoracic echo, techn. difficult. The study was  tech limited, LVSF is normal    History of atrial fibrillation     History of cardiovascular stress test 7/26/13, 7/10 8/08    7/13-WNL Observed defect consistent with breast attenuation. EF 70%. 7/10-post stress myocardial images show a normal pattern of perfusion in all regions. LV is normal  in size. EF is 62%, global  LVSF is normal    History of falling     per old chart    History of nuclear stress test 10/08/2020    EF 60%, Normal    Hx of blood clots     per old chart  hx of DVT left leg    Hx: UTI (urinary tract infection)     Hyperglycemia 3/30/2019    Hyperlipidemia     Hypertension     Hypothyroidism     Incontinence of bowel     Incontinence of urine     Sepsis ([facility] [address] 2015    Tremor 3/30/2019    Wears glasses     to read    Wears partial dentures     upper     Past Surgical History:   Procedure Laterality Date    BREAST SURGERY      Left breast biopsy    CARDIOVERSION  10/17/2008    CARDIOVERSION  12/15/2008    HYSTERECTOMY (CERVIX STATUS UNKNOWN)  age 21's    IR GUIDED DRAIN W CATHETER PLACEMENT  8/21/2020    IR GUIDED DRAIN W CATHETER PLACEMENT 8/21/2020 [facility] PROCED[facility]    IR GUIDED DRAIN W CATHETER PLACEMENT  9/8/2020    IR GUIDED DRAIN W CATHETER PLACEMENT 9/8/2020 [facility] PROCEDURES    JOINT REPLACEMENT  4/2005 2/2006    bilat    OTHER SURGICAL HISTORY  10/2009     bladder stimulator - interstim II 3058 - MRI OF HEAD ONLY -STIMULATOR MUST BE OFF AND TRANSMIT RECEIVE HEAD COIL ONLY    CO ARTHRP ACETBLR/PROX FEM PROSTC AGRFT/ALGRFT Left 02/23/2015    Hip Replacement, Total    TOTAL KNEE ARTHROPLASTY  2005, 2006    bilateral     Family History   Problem Relation Age of Onset    Diabetes Mother     Diabetes Father     Heart Disease Father      Social History     Socioeconomic History    Marital status:       Spouse name: Not on file    Number of children: Not on file    Years of education: Not on file    Highest education level: Not on file Occupational History    Not on file   Tobacco Use    Smoking status: Former     Years: 1.00     Types: Cigarettes    Smokeless tobacco: Never    Tobacco comments:     \"quit yrs ago only smoked for 2-3 yrs\"   Vaping Use    Vaping Use: Never used   Substance and Sexual Activity    Alcohol use: No     Alcohol/week: 0.0 standard drinks    Drug use: No    Sexual activity: Not Currently   Other Topics Concern    Not on file   Social History Narrative    Not on file     Social Determinants of Health     Financial Resource Strain: Not on file   Food Insecurity: Not on file   Transportation Needs: Not on file   Physical Activity: Not on file   Stress: Not on file   Social Connections: Not on file   Intimate Partner Violence: Not on file   Housing Stability: Not on file     Current Facility-Administered Medications   Medication Dose Route Frequency Provider Last Rate Last Admin    sodium chloride flush 0.9 % injection 5-40 mL  5-40 mL IntraVENous 2 times per day Jessenia Moreira MD   5 mL at 01/17/23 1005    sodium chloride flush 0.9 % injection 5-40 mL  5-40 mL IntraVENous PRN Jessenia Moreira MD        0.9 % sodium chloride infusion   IntraVENous PRN Jessenia Moreira MD        vancomycin (VANCOCIN) 2,500 mg in dextrose 5 % 500 mL IVPB  25 mg/kg (Order-Specific) IntraVENous Once Jessenia Moreira .7 mL/hr at 01/17/23 1134 2,500 mg at 01/17/23 1134     Current Outpatient Medications   Medication Sig Dispense Refill    sucralfate (CARAFATE) 1 GM tablet Take 1 g by mouth 4 times daily      divalproex (DEPAKOTE) 250 MG DR tablet Take 250 mg by mouth nightly      folic acid (FOLVITE) 1 MG tablet Take 2 mg by mouth daily      ipratropium-albuterol (DUONEB) 0.5-2.5 (3) MG/3ML SOLN nebulizer solution Inhale 1 vial into the lungs every 6 hours as needed for Shortness of Breath      lidocaine (XYLOCAINE) 2 % jelly Apply topically every 2 hours as needed for Pain Apply to bottom lower lip as needed      ondansetron (ZOFRAN) 4 MG tablet Take 4 mg by mouth every 6 hours as needed for Nausea or Vomiting      furosemide (LASIX) 20 MG tablet Take 1 tablet by mouth See Admin Instructions Receives on Mon/Wed/Fri//Sat/Sun 30 tablet 0    potassium chloride (MICRO-K) 10 MEQ extended release capsule Take 1 capsule by mouth nightly 30 capsule 0    atorvastatin (LIPITOR) 80 MG tablet Take 0.5 tablets by mouth nightly 30 tablet 3    ferrous sulfate (FE TABS 325) 325 (65 Fe) MG EC tablet Take 325 mg by mouth in the morning and 325 mg in the evening.      escitalopram (LEXAPRO) 10 MG tablet Take 10 mg by mouth daily      vitamin D 25 MCG (1000 UT) CAPS Take 5 capsules by mouth daily 30 capsule 0    levothyroxine (SYNTHROID) 175 MCG tablet Take 1 tablet by mouth daily 90 tablet 1    dronedarone hcl (MULTAQ) 400 MG TABS TAKE 1 TABLET TWICE A DAY WITH MEALS (Patient taking differently: Take 400 mg by mouth 2 times daily (with meals)) 180 tablet 1    loratadine (CLARITIN) 10 MG tablet Take 10 mg by mouth daily       acetaminophen (TYLENOL) 325 MG tablet Take 325 mg by mouth nightly       Allergies   Allergen Reactions    Tape [Adhesive Tape] Itching    Codeine Rash    Sulfa Antibiotics Nausea And Vomiting       Nursing Notes Reviewed    Physical Exam:  ED Triage Vitals [01/17/23 0848]   Enc Vitals Group      BP (!) 154/74      Heart Rate (!) 125      Resp 22      Temp 98.6 °F (37 °C)      Temp Source Axillary      SpO2 93 %      Weight 215 lb (97.5 kg)      Height 5' 4\" (1.626 m)      Head Circumference       Peak Flow       Pain Score       Pain Loc       Pain Edu? Excl. in [address]? My pulse ox interpretation is - normal    General appearance: appears unwell, on nasal cannula, just groaning for me. Skin:  Warm. Dry. Eye:  Extraocular movements intact. Ears, nose, mouth and throat:  Oral mucosa moist   Neck:  Trachea midline. Extremity:  No swelling.   Normal ROM     Heart:  tachycardic with regular rhythm, normal S1 & S2, no extra heart sounds. Perfusion:  intact  Respiratory: bases diminished, no wheezing, no coughing, mild tachynpea, no intercoasl muscle use. Abdominal:   Soft. Nontender. Non distended. Neurological: Not consistently following commands, but she does wiggle her toes on both feet, did not squeeze well on the right, does squeeze some on the left. If you hold her arms up, the right arm drifts down, the left arm she does keep up.   Does not really answer questions, groans, she did try to say her name            I have reviewed and interpreted all of the currently available lab results from this visit (if applicable):  Results for orders placed or performed during the hospital encounter of 01/17/23   Rapid Flu Swab    Specimen: Nasopharyngeal   Result Value Ref Range    Rapid Influenza A Ag NEGATIVE NEGATIVE    Rapid Influenza B Ag NEGATIVE NEGATIVE   COVID-19, Rapid    Specimen: Nasopharyngeal   Result Value Ref Range    Source NARES     SARS-CoV-2, NAAT NOT DETECTED NOT DETECTED   CBC auto differential   Result Value Ref Range    WBC 12.5 (H) 4.0 - 10.5 K/CU MM    RBC 2.88 (L) 4.2 - 5.4 M/CU MM    Hemoglobin 8.4 (L) 12.5 - 16.0 GM/DL    Hematocrit 26.9 (L) 37 - 47 %    MCV 93.4 78 - 100 FL    MCH 29.2 27 - 31 PG    MCHC 31.2 (L) 32.0 - 36.0 %    RDW 14.9 11.7 - 14.9 %    Platelets 360 248 - 911 K/CU MM    MPV 11.7 (H) 7.5 - 11.1 FL    Differential Type AUTOMATED DIFFERENTIAL     Segs Relative 79.2 (H) 36 - 66 %    Lymphocytes % 9.8 (L) 24 - 44 %    Monocytes % 8.6 (H) 0 - 4 %    Eosinophils % 1.0 0 - 3 %    Basophils % 0.5 0 - 1 %    Segs Absolute 9.9 K/CU MM    Lymphocytes Absolute 1.2 K/CU MM    Monocytes Absolute 1.1 K/CU MM    Eosinophils Absolute 0.1 K/CU MM    Basophils Absolute 0.1 K/CU MM    Nucleated RBC % 0.0 %    Total Nucleated RBC 0.0 K/CU MM    Total Immature Neutrophil 0.11 K/CU MM    Immature Neutrophil % 0.9 (H) 0 - 0.43 %   Comprehensive Metabolic Panel w/ Reflex to MG   Result Value Ref Range    Sodium 141 135 - 145 MMOL/L    Potassium 4.0 3.5 - 5.1 MMOL/L    Chloride 101 99 - 110 mMol/L    CO2 29 21 - 32 MMOL/L    BUN 34 (H) 6 - 23 MG/DL    Creatinine 0.9 0.6 - 1.1 MG/DL    Est, Glom Filt Rate >60 >60 mL/min/1.73m2    Glucose 96 70 - 99 MG/DL    Calcium 8.6 8.3 - 10.6 MG/DL    Albumin 2.7 (L) 3.4 - 5.0 GM/DL    Total Protein 6.8 6.4 - 8.2 GM/DL    Total Bilirubin 0.4 0.0 - 1.0 MG/DL    ALT 18 10 - 40 U/L    AST 24 15 - 37 IU/L    Alkaline Phosphatase 76 40 - 129 IU/L    Anion Gap 11 4 - 16   Urinalysis   Result Value Ref Range    Color, UA YELLOW     Clarity, UA CLOUDY     Glucose, Urine NEGATIVE MG/DL    Bilirubin Urine NEGATIVE     Ketones, Urine NEGATIVE MG/DL    Specific Gravity, UA 1.015     Blood, Urine SMALL     pH, Urine 6.0     Protein, UA TRACE MG/DL    Urobilinogen, Urine 0.2 MG/DL    Nitrite Urine, Quantitative POSITIVE     Leukocyte Esterase, Urine SMALL    Lactate, Sepsis   Result Value Ref Range    Lactic Acid, Sepsis 1.2 0.5 - 1.9 mMOL/L   Blood gas, venous   Result Value Ref Range    pH, Oneil 7.55 (H) 7.32 - 7.42    pCO2, Oneil 38 38 - 52 mmHG    pO2, Oneil 235 (H) 28 - 48 mmHG    Base Exc, Mixed 10.1 (H) 0 - 2.3    HCO3, Venous 33.2 (H) 19 - 25 MMOL/L    O2 Sat, Oneil 95.5 (H) 50 - 70 %    Comment VBG    Troponin   Result Value Ref Range    Troponin T <0.010 <0.01 NG/ML   Protime-INR   Result Value Ref Range    Protime 15.7 (H) 11.7 - 14.5 SECONDS    INR 1.21 INDEX   Microscopic Urinalysis   Result Value Ref Range    RBC, UA 5 /HPF    WBC, UA 27 /HPF    Bacteria, UA NEGATIVE /HPF    Squam Epithel, UA 13 /HPF    Trans Epithel, UA <1 /HPF    Trichomonas, UA NONE SEEN /HPF   EKG 12 lead   Result Value Ref Range    Ventricular Rate 112 BPM    Atrial Rate 197 BPM    QRS Duration 90 ms    Q-T Interval 360 ms    QTc Calculation (Bazett) 491 ms    R Axis 81 degrees    T Axis 54 degrees    Diagnosis       Atrial fibrillation with rapid ventricular response with premature ventricular or aberrantly conducted complexes  Low voltage QRS  Nonspecific ST and T wave abnormality  Abnormal ECG  When compared with ECG of 30-AUG-2022 08:53,  Incomplete right bundle branch block is no longer present  Nonspecific T wave abnormality, improved in Inferior leads     EKG 12 Lead   Result Value Ref Range    Ventricular Rate 112 BPM    Atrial Rate 197 BPM    QRS Duration 90 ms    Q-T Interval 360 ms    QTc Calculation (Bazett) 491 ms    R Axis 81 degrees    T Axis 54 degrees    Diagnosis       Atrial fibrillation with rapid ventricular response with premature ventricular or aberrantly conducted complexes  Low voltage QRS  Nonspecific ST and T wave abnormality  Abnormal ECG  When compared with ECG of 30-AUG-2022 08:53,  Incomplete right bundle branch block is no longer present  Nonspecific T wave abnormality, improved in Inferior leads        Radiographs (if obtained):  Radiologist's Report Reviewed:  No results found. EKG (if obtained): (All EKG's are interpreted by myself in the absence of a cardiologist)  Atrial fibrillation with rapid ventricular response, rate of 112 bpm.  No ST elevation. No previous to compare      MDM:  53-year-old female with history as above presents with concern for altered mental status, she does not have any focal neurologic findings to suggest a large stroke, however she is altered compared to her baseline, last known well was 2 days ago and stroke alert is not called given this. However we will obtain labs, imaging and reassess. Given the presentation a sepsis and stroke work-up was both initiated, as she is tachypneic and tachycardic, diminished at the bases. Is this patient to be included in the SEP-1 Core Measure due to severe sepsis or septic shock? Yes   SEP-1 CORE MEASURE DATA      Sepsis Criteria   Severe Sepsis Criteria   Septic Shock Criteria     Must be confirmed or suspected to move forward with diagnosis of sepsis.     Must meet 2:    [] Temperature > 100.9 F (38.3 C)        or < 96.8 F (36 C)  [x] HR > 90  [x] RR > 20  [] WBC > 12 or < 4 or 10% bands      AND:      [] Infection Confirmed or        Suspected. Must meet 1:    [] Lactate > 2       or   [x] Signs of Organ Dysfunction:    - SBP < 90 or MAP < 65  - Altered mental status  - Creatinine > 2 or increased from      baseline  - Urine Output < 0.5 ml/kg/hr  - Bilirubin > 2  - INR > 1.5 (not anticoagulated)  - Platelets < 381,002  - Acute Respiratory Failure as     evidenced by new need for NIPPV     or mechanical ventilation      [] No criteria met for Severe Sepsis. Must meet 1:    [] Lactate > 4        or   [] SBP < 90 or MAP < 65 for at        least two readings in the first        hour after fluid bolus        administration      [] Vasopressors initiated (if hypotension persists after fluid resuscitation)        [] No criteria met for Septic Shock. Patient Vitals for the past 6 hrs:   BP Temp Pulse Resp SpO2 Height Weight Weight Method Percent Weight Change   01/17/23 0848 (!) 154/74 98.6 °F (37 °C) (!) 125 22 93 % 5' 4\" (1.626 m) 215 lb (97.5 kg) Estimated 0   01/17/23 0849 -- 98.6 °F (37 °C) -- 22 93 % -- -- -- --   01/17/23 0912 134/72 -- (!) 102 -- 91 % -- -- -- --   01/17/23 0924 -- -- (!) 104 -- 94 % -- -- -- --   01/17/23 1002 131/72 -- (!) 113 -- 94 % -- -- -- --   01/17/23 1032 (!) 145/71 -- (!) 106 -- 94 % -- -- -- --   01/17/23 1107 134/68 -- (!) 116 -- 93 % -- -- -- --      Recent Labs     01/14/23  1400 01/17/23  0612 01/17/23  0855   WBC 11.3* 11.1* 12.5*   CREATININE  --  1.0 0.9   BILITOT  --   --  0.4   INR  --   --  1.21    272 275         Time  0933  Identified: pneumonia on CXR with tachycardia and tachypnea as well as AMS. Fluids and antibiotics had already been ordered with initial orderset. Awaiting labs.     Fluid Resuscitation Rational: at least 30mL/kg based on entered actual weight at time of triage      Repeat lactate level: not indicated due to initial lactate < 2    Reassessment Exam: Not applicable. Patient does not have septic shock. CC/HPI Summary, DDx, ED Course, and Reassessment: Patient did well with fluids, heart rate is improving and so we do not need to do diltiazem, she was found to have atrial fibrillation, does have a history of chronic anticoagulation, chronic anemia. Does appear that she had a previous history of DVTs. She is stable on her home nasal cannula, does appear that she has bilateral pneumonia, treated for severe sepsis as above given the altered mental status. Does have previous cardiac history as well, at this time does not appear to be fluid overloaded, blood pressure remained stable. Plan for admission. CT head without any evidence of any acute abnormality at this time, or large stroke or bleed. History from : EMS and chart    Limitations to history : Altered Mental Status.     Patient was given the following medications:  Medications   sodium chloride flush 0.9 % injection 5-40 mL (5 mLs IntraVENous Given 1/17/23 1005)   sodium chloride flush 0.9 % injection 5-40 mL (has no administration in time range)   0.9 % sodium chloride infusion (has no administration in time range)   vancomycin (VANCOCIN) 2,500 mg in dextrose 5 % 500 mL IVPB (2,500 mg IntraVENous New Bag 1/17/23 1134)   0.9 % sodium chloride IV bolus 2,925 mL (2,925 mLs IntraVENous New Bag 1/17/23 1007)   cefepime (MAXIPIME) 2000 mg IVPB minibag (0 mg IntraVENous Stopped 1/17/23 1105)       Independent Imaging Interpretation by me: None    EKG (if obtained): (All EKG's are interpreted by myself in the absence of a cardiologist) as above    Chronic conditions affecting care: Chronic atrial fibrillation, hypoxic respiratory failure on nasal cannula,    Discussion with Other Profesionals : Admitting Team      Social Determinants : None    Reviewed cardiology note from December 13, 2022, was seen for a 6-month follow-up with no cardiac complaints, had a recent hospitalization, chronic A. fib that appeared to be controlled at that time. They had discussed doing the watchman procedure, does appear that she also has a history of chronic kidney disease    Disposition Considerations (tests considered but not done, Shared Decision Making, Pt Expectation of Test or Tx.): Patient will be admitted given her level of altered mental status, bilateral pneumonia, covered with broad-spectrum antibiotics as she is in a nursing home, so have to cover for possible healthcare acquired pneumonia at this time      I am the Primary Clinician of Record. Total critical care time today provided was 35minutes. This excludes seperately billable procedure. Critical care time provided for AMS, sepsis that required close evaluation and/or intervention with concern for patient decompensation. Clinical Impression:  1. Altered mental status, unspecified altered mental status type    2. Sepsis, due to unspecified organism, unspecified whether acute organ dysfunction present ([facility] [address]    3. Pneumonia of both lungs due to infectious organism, unspecified part of lung    4. Chronic respiratory failure with hypoxia (HCC)    5. Chronic anemia      Disposition referral (if applicable):  No follow-up provider specified. Disposition medications (if applicable):  New Prescriptions    No medications on file     ED Provider Disposition Time  DISPOSITION Decision To Admit 01/17/2023 11:01:57 AM      Comment: Please note this report has been produced using speech recognition software and may contain errors related to that system including errors in grammar, punctuation, and spelling, as well as words and phrases that may be inappropriate. Efforts were made to edit the dictations.        Mauro Cash MD  01/17/23 [id number]

## 2023-01-17 NOTE — ED NOTES
Dr. Irwin Durham at bedside.      Tabitha Friedman, [facility]  01/17/23 2027

## 2023-01-17 NOTE — ED NOTES
Pt resting in position of comfort on cot presenting in no acute/ apparent distress (NAD). Respirations are noted even and unlabored with good rise and fall of the chest observed. Patient voices no needs at this time. Cot noted in lowest position, locked, with side rails X 2 up for patient safety. Will continue to monitor patient for acute changes.       [x] Side rails up    [x] Cart in lowest position    [] Family at bedside    [x] Call light within reach           [facility], RN  01/17/23 [id number]

## 2023-01-17 NOTE — ED TRIAGE NOTES
Pt to the ED via EMS with c/o AMS. Pt from [place]. Per nursing home staff, pt's LKW was 2 days ago. Pt is normally alert and oriented and can move both arms. Pt's right side is weaker. Pt only moaning at this time.

## 2023-01-17 NOTE — H&P
V2.0  History and Physical      Name:  Jenni Ramos /Age/Sex: 1935  (82 y.o. female)   MRN & CSN:  [id number] & [id number] Encounter Date/Time: 2023 4:18 PM EST   Location:  -A PCP: Silvio Preciado MD       Hospital Day: 1      History of Present Illness:     Chief Complaint: Anca Carnes is a 82 y.o. female with PMH of Afib (not on anticoagulation), CHF, CKD   who presents with AMS. Pt has been having poor oral intake since Friday. History was taken from son given pt's mental status. Pt has been gradually having worsening oral intake, and activity since Friday. Per nursing home, she was found unresponsive today which prompted them to call EMS. Last well known was 2 days ago. Pt otherwise wasn't complaining of any symptoms prior. Her baseline is that she converses, feeds herself and ambulates on wheelchair. At ED, pt was afebrile, 's, 's requiring 3L NC. Labs remarkable for BUN 36, Cr 1.0, WBC 11.1, Hgb 7.9, LA 1.2. CXR revealed multifocal PNA. Pt received sepsis IVF bolus, vanc and cefepime. Assessment and Plan:   Sepsis 2/2 multifocal pneumonia. Acute hypoxic resp failure   Came w leukocytosis, tachycardia. No LA. CXR revealed multifocal PNA. Currently on 3L NC. Will treat for community acquired pneumonia  - rocephin/azithromycin  - get resp culture, strep, legionella  - get resp viral panel  - continue IVF  - blood cxX2  - wean O2 as tolerated  - breathing treatment    Acute metabolic encephalopathy. Gradual since Friday with poor oral intake. Likely 2/2 delirium from sepsis. Pt has signs of worsening cognitive per family past few months but never worked up by neurology outpatient. Had MRI 2022 showing cerebral atrophy, severe chronic small vessel ischemic changes. CT head this admission was negative.    No sign of focal neurological deficits but unable to do neuro exam  - plan as above  - pt may need neurology consult if no improvement  - neurovascular checks q4h    Chronic atrial fibrillation came with 's. Likely 2/2 sepsis. Not on anticoagulation, likely due to recurrent falls. Pt was being considered for watchman.  -continue home dronedarone. - will consult cardiology given  and pt on home dronedarone     HFpEF. Not in excarbation. ECHO 08/30/2022 EF 55-60%, proximal septal thickening, mildly dilated LA, mild to moderate MR, AR, TR.   -holding lasix given pt's sepsis. - daily intake/output, weights     CKD stage IIIb  - Cr 0.9 baseline 1.2-1.4     Chronic anemia  Stable Hgb 8.4. No sign of bleed. - continue home iron         Disposition:   Current Living situation: nursing home  Expected Disposition: nursing home  Estimated D/C: 2-3 days    Diet Diet NPO Exceptions are: Ice Chips, Sips of Water with Meds   DVT Prophylaxis [x] Lovenox, []  Heparin, [] SCDs, [] Ambulation,  [] Eliquis, [] Xarelto   Code Status Full Code   Surrogate Decision Maker/ POA son     History from:     family member - son     Review of Systems: Need 10 Elements   Unable to obtain           Objective: Intake/Output Summary (Last 24 hours) at 1/17/2023 2030  Last data filed at 1/17/2023 1747  Gross per 24 hour   Intake 3425 ml   Output 400 ml   Net 3025 ml      Vitals:   Vitals:    01/17/23 1510 01/17/23 1614 01/17/23 1747 01/17/23 2014   BP: (!) 124/59 (!) 143/62  (!) 140/61   Pulse: 86 93 84 92   Resp: 16 18 22 20   Temp: 98.4 °F (36.9 °C) 98.6 °F (37 °C)  97.6 °F (36.4 °C)   TempSrc: Oral Oral  Oral   SpO2: 93% 96%  97%   Weight:  208 lb 5.4 oz (94.5 kg)     Height:           Medications Prior to Admission     Prior to Admission medications    Medication Sig Start Date End Date Taking?  Authorizing Provider   sucralfate (CARAFATE) 1 GM tablet Take 1 g by mouth 4 times daily   Yes Historical Provider, MD   divalproex (DEPAKOTE) 250 MG DR tablet Take 250 mg by mouth nightly   Yes Historical Provider, MD   folic acid (FOLVITE) 1 MG tablet Take 2 mg by mouth daily   Yes Historical Provider, MD   ipratropium-albuterol (DUONEB) 0.5-2.5 (3) MG/3ML SOLN nebulizer solution Inhale 1 vial into the lungs every 6 hours as needed for Shortness of Breath   Yes Historical Provider, MD   lidocaine (XYLOCAINE) 2 % jelly Apply topically every 2 hours as needed for Pain Apply to bottom lower lip as needed   Yes Historical Provider, MD   ondansetron (ZOFRAN) 4 MG tablet Take 4 mg by mouth every 6 hours as needed for Nausea or Vomiting   Yes Historical Provider, MD   furosemide (LASIX) 20 MG tablet Take 1 tablet by mouth See Admin Instructions Receives on Mon/Wed/Fri//Sat/Sun 9/1/22   Joy Wright MD   potassium chloride (MICRO-K) 10 MEQ extended release capsule Take 1 capsule by mouth nightly 9/1/22   Joy Wright MD   atorvastatin (LIPITOR) 80 MG tablet Take 0.5 tablets by mouth nightly 9/1/22   Joy Wright, MD   ferrous sulfate (FE TABS 325) 325 (65 Fe) MG EC tablet Take 325 mg by mouth in the morning and 325 mg in the evening. Historical Provider, MD   escitalopram (LEXAPRO) 10 MG tablet Take 10 mg by mouth daily    Historical Provider, MD   vitamin D 25 MCG (1000 UT) CAPS Take 5 capsules by mouth daily 1/7/22 1/17/23  Susan Camilo DO   levothyroxine (SYNTHROID) 175 MCG tablet Take 1 tablet by mouth daily 9/8/21   Vero Baldwin MD   dronedarone hcl (MULTAQ) 400 MG TABS TAKE 1 TABLET TWICE A DAY WITH MEALS  Patient taking differently: Take 400 mg by mouth 2 times daily (with meals) 3/8/21 1/17/23  Vero Baldwin MD   loratadine (CLARITIN) 10 MG tablet Take 10 mg by mouth daily     Historical Provider, MD   acetaminophen (TYLENOL) 325 MG tablet Take 325 mg by mouth nightly    Historical Provider, MD       Physical Exam: Need 8 Elements   Physical Exam     General: NAD  Eyes: EOMI  ENT: neck supple  Cardiovascular: irregular rate.   Respiratory: bilateral rales  Gastrointestinal: Soft, non tender  Genitourinary: no suprapubic tenderness  Musculoskeletal: No edema  Skin: warm, dry  Neuro: obtunded, unable to obtain ROS, not following commands. Past Medical History:   PMHx   Past Medical History:   Diagnosis Date    Allergic rhinitis     Arthritis     CHF (congestive heart failure) ([facility] [address]     DVT, lower extremity, proximal, acute, left (HCC)     Edema leg     Gastroesophageal reflux disease 3/30/2019    H/O 24 hour EKG monitoring 6/10 1/09 11/08    6/7/10- predominant rhythm is a-fib, freq PVCs    H/O cardiac catheterization 7/20/00    H/O cardiac catheterization 7/20/00    right dominant system, left main is short but angiographically normal, LAD and CX reveal sign  tortuosities distally  but no sign angiographic atherosclerotic disease noted except luminal  irregularities here and there. LVSF normal, EF  55%    H/O cardiovascular stress test 5/2/2016    lexiscan-normal,EF70%    H/O Doppler echocardiogram 7/12/10    intimal thickening but no sign. atherosclerotic plaque noted in [facility] OR  LICA. H/O echocardiogram 5/2/2016 9/10     5/16 EF60% mild MR, TR, AR 9/20/10 Complete 2 dim transthoracic echo, techn. difficult. The study was  tech limited, LVSF is normal    History of atrial fibrillation     History of cardiovascular stress test 7/26/13, 7/10 8/08    7/13-WNL Observed defect consistent with breast attenuation. EF 70%. 7/10-post stress myocardial images show a normal pattern of perfusion in all regions. LV is normal  in size.   EF is 62%, global  LVSF is normal    History of falling     per old chart    History of nuclear stress test 10/08/2020    EF 60%, Normal    Hx of blood clots     per old chart  hx of DVT left leg    Hx: UTI (urinary tract infection)     Hyperglycemia 3/30/2019    Hyperlipidemia     Hypertension     Hypothyroidism     Incontinence of bowel     Incontinence of urine     Sepsis ([facility] [address] 2015    Tremor 3/30/2019    Wears glasses     to read    Wears partial dentures     upper     PSHX:  has a past surgical history that includes Total knee arthroplasty (2005, 2006); Breast surgery; joint replacement (4/2005 2/2006); Cardioversion (10/17/2008); Cardioversion (12/15/2008); other surgical history (10/2009); pr arthrp acetblr/prox fem prostc agrft/algrft (Left, 02/23/2015); Hysterectomy (age 21's); IR GUIDED FLUID DRAINAGE BY CATH SOFT TISSUE PERC (8/21/2020); and IR GUIDED FLUID DRAINAGE BY CATH SOFT TISSUE PERC (9/8/2020). Allergies: Allergies   Allergen Reactions    Tape Brad Boss Tape] Itching    Codeine Rash    Sulfa Antibiotics Nausea And Vomiting     Fam HX:  family history includes Diabetes in her father and mother; Heart Disease in her father. Soc HX:   Social History     Socioeconomic History    Marital status:       Spouse name: None    Number of children: None    Years of education: None    Highest education level: None   Tobacco Use    Smoking status: Former     Years: 1.00     Types: Cigarettes    Smokeless tobacco: Never    Tobacco comments:     \"quit yrs ago only smoked for 2-3 yrs\"   Vaping Use    Vaping Use: Never used   Substance and Sexual Activity    Alcohol use: No     Alcohol/week: 0.0 standard drinks    Drug use: No    Sexual activity: Not Currently       Medications:   Medications:    sodium chloride flush  5-40 mL IntraVENous 2 times per day    [Held by provider] divalproex  250 mg Oral Nightly    [Held by provider] escitalopram  10 mg Oral Daily    folic acid  2 mg Oral Daily    sodium chloride flush  5-40 mL IntraVENous 2 times per day    enoxaparin  40 mg SubCUTAneous Daily    azithromycin  500 mg IntraVENous Q24H    cefTRIAXone (ROCEPHIN) IV  1,000 mg IntraVENous Q24H    dronedarone hcl  400 mg Oral BID WC    [START ON 1/18/2023] ferrous sulfate  325 mg Oral Daily with breakfast    sucralfate  1 g Oral 4x Daily AC & HS    [START ON 1/18/2023] levothyroxine  175 mcg Oral QAM AC    ipratropium-albuterol  1 ampule Inhalation Q4H WA      Infusions:    sodium chloride      sodium chloride      lactated ringers 1,000 mL (01/17/23 1808)     PRN Meds: sodium chloride flush, 5-40 mL, PRN  sodium chloride, , PRN  sodium chloride flush, 5-40 mL, PRN  sodium chloride, , PRN  ondansetron, 4 mg, Q8H PRN  polyethylene glycol, 17 g, Daily PRN  acetaminophen, 650 mg, Q6H PRN   Or  acetaminophen, 650 mg, Q6H PRN      Labs      CBC:   Recent Labs     01/17/23  0612 01/17/23  0855   WBC 11.1* 12.5*   HGB 7.9* 8.4*    275     BMP:    Recent Labs     01/17/23  0612 01/17/23  0855    141   K 4.2 4.0    101   CO2 29 29   BUN 36* 34*   CREATININE 1.0 0.9   GLUCOSE 76 96     Hepatic:   Recent Labs     01/17/23  0855   AST 24   ALT 18   BILITOT 0.4   ALKPHOS 76     Lipids:   Lab Results   Component Value Date/Time    CHOL 183 08/31/2022 12:45 AM    CHOL 144 02/05/2020 01:30 PM    HDL 34 08/31/2022 12:45 AM    TRIG 175 08/31/2022 12:45 AM     Hemoglobin A1C:   Lab Results   Component Value Date/Time    LABA1C 5.9 08/31/2022 12:45 AM     TSH:   Lab Results   Component Value Date/Time    TSH 0.39 09/08/2021 04:10 PM     Troponin:   Lab Results   Component Value Date/Time    TROPONINT <0.010 01/17/2023 08:55 AM    TROPONINT <0.010 08/30/2022 08:45 AM    TROPONINT <0.010 01/14/2022 09:05 AM     Lactic Acid: No results for input(s): LACTA in the last 72 hours. BNP: No results for input(s): PROBNP in the last 72 hours.   UA:  Lab Results   Component Value Date/Time    NITRU POSITIVE 01/17/2023 10:40 AM    NITRU Negative 07/01/2014 05:46 PM    COLORU YELLOW 01/17/2023 10:40 AM    PHUR 6.0 07/01/2014 05:46 PM    WBCUA 27 01/17/2023 10:40 AM    RBCUA 5 01/17/2023 10:40 AM    MUCUS RARE 01/12/2022 05:26 PM    TRICHOMONAS NONE SEEN 01/17/2023 10:40 AM    YEAST FEW 07/06/2018 10:17 AM    BACTERIA NEGATIVE 01/17/2023 10:40 AM    CLARITYU CLOUDY 01/17/2023 10:40 AM    SPECGRAV 1.015 01/17/2023 10:40 AM    LEUKOCYTESUR SMALL 01/17/2023 10:40 AM    UROBILINOGEN 0.2 01/17/2023 10:40 AM    BILIRUBINUR NEGATIVE 01/17/2023 10:40 AM    BILIRUBINUR neg 01/31/2014 04:02 PM    BLOODU SMALL 01/17/2023 10:40 AM    GLUCOSEU Negative 07/01/2014 05:46 PM    KETUA NEGATIVE 01/17/2023 10:40 AM    AMORPHOUS RARE 02/23/2018 08:20 PM     Urine Cultures: No results found for: Soumya Girard  Blood Cultures: No results found for: BC  No results found for: BLOODCULT2  Organism:   Lab Results   Component Value Date/Time    ORG PROT 11/22/2018 01:00 PM       Imaging/Diagnostics Last 24 Hours   CT HEAD WO CONTRAST    Result Date: 1/17/2023  EXAMINATION: CT OF THE HEAD WITHOUT CONTRAST  1/17/2023 11:10 am TECHNIQUE: CT of the head was performed without the administration of intravenous contrast. Automated exposure control, iterative reconstruction, and/or weight based adjustment of the mA/kV was utilized to reduce the radiation dose to as low as reasonably achievable. COMPARISON: 08/30/2022. HISTORY: ORDERING SYSTEM PROVIDED HISTORY: aMS TECHNOLOGIST PROVIDED HISTORY: Has a \"code stroke\" or \"stroke alert\" been called? ->No Reason for exam:->aMS Decision Support Exception - unselect if not a suspected or confirmed emergency medical condition->Emergency Medical Condition (MA) Reason for Exam: AMS FINDINGS: BRAIN/VENTRICLES: There is no acute intracranial hemorrhage, mass effect or midline shift. No abnormal extra-axial fluid collection. The gray-white differentiation is maintained without evidence of an acute infarct. There is prominence of the ventricles and sulci due to global parenchymal volume loss. There are nonspecific areas of hypoattenuation within the periventricular and subcortical white matter, which likely represent chronic microvascular ischemic change. 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 ORBITS: The visualized portion of the orbits demonstrate no acute abnormality. SINUSES: The visualized paranasal sinuses and mastoid air cells demonstrate no acute abnormality.  SOFT TISSUES/SKULL: No acute abnormality of the visualized skull or soft tissues. 1.No acute intracranial abnormality. XR CHEST PORTABLE    Result Date: 1/17/2023  EXAMINATION: ONE XRAY VIEW OF THE CHEST 1/17/2023 8:49 am COMPARISON: 08/30/2022. HISTORY: ORDERING SYSTEM PROVIDED HISTORY: AMS TECHNOLOGIST PROVIDED HISTORY: Reason for exam:->AMS Reason for Exam: AMS FINDINGS: The heart size is enlarged. The pulmonary vasculature is within normal limits. There are patchy bilateral densities. No pneumothoraces are seen. 1. Patchy bilateral densities likely representing multifocal pneumonia. I would recommend follow-up to resolution. 2. Stable cardiomegaly without overt failure.        Personally reviewed Lab Studies, Imaging, and discussed case with son    Electronically signed by Blossom Aggarwal MD on 1/17/2023 at 8:30 PM

## 2023-01-17 NOTE — ED NOTES
Received report from [facility]. All questions/ concerns addressed.       Joselyn Caceres RN  01/17/23 [id number]

## 2023-01-17 NOTE — ED NOTES
Medication History  [facility]    Patient Name: Callie Nunez 1935     Medication history has been completed by: Patty aWlker CPhT    Source(s) of information: Medication list provided by [facility]     Primary Care Physician: Oj Carballo MD     Pharmacy:     Allergies as of 01/17/2023 - Fully Reviewed 01/17/2023   Allergen Reaction Noted    Tape [adhesive tape] Itching 08/20/2012    Codeine Rash 08/20/2012    Sulfa antibiotics Nausea And Vomiting 05/19/2015        Prior to Admission medications    Medication Sig Start Date End Date Taking? Authorizing Provider   sucralfate (CARAFATE) 1 GM tablet Take 1 g by mouth 4 times daily   Yes Historical Provider, MD   divalproex (DEPAKOTE) 250 MG DR tablet Take 250 mg by mouth nightly   Yes Historical Provider, MD   folic acid (FOLVITE) 1 MG tablet Take 2 mg by mouth daily   Yes Historical Provider, MD   ipratropium-albuterol (DUONEB) 0.5-2.5 (3) MG/3ML SOLN nebulizer solution Inhale 1 vial into the lungs every 6 hours as needed for Shortness of Breath   Yes Historical Provider, MD   lidocaine (XYLOCAINE) 2 % jelly Apply topically every 2 hours as needed for Pain Apply to bottom lower lip as needed   Yes Historical Provider, MD   ondansetron (ZOFRAN) 4 MG tablet Take 4 mg by mouth every 6 hours as needed for Nausea or Vomiting   Yes Historical Provider, MD   furosemide (LASIX) 20 MG tablet Take 1 tablet by mouth See Admin Instructions Receives on Mon/Wed/Fri//Sat/Sun 9/1/22   Maverick Valle MD   potassium chloride (MICRO-K) 10 MEQ extended release capsule Take 1 capsule by mouth nightly 9/1/22   Maverick Valle MD   atorvastatin (LIPITOR) 80 MG tablet Take 0.5 tablets by mouth nightly 9/1/22   Maverick Valle MD   ferrous sulfate (FE TABS 325) 325 (65 Fe) MG EC tablet Take 325 mg by mouth in the morning and 325 mg in the evening.     Historical Provider, MD   escitalopram (LEXAPRO) 10 MG tablet Take 10 mg by mouth daily Historical Provider, MD   vitamin D 25 MCG (1000 UT) CAPS Take 5 capsules by mouth daily 1/7/22   Jessica Batres, DO   levothyroxine (SYNTHROID) 175 MCG tablet Take 1 tablet by mouth daily 9/8/21   Jared Walker MD   dronedarone hcl (MULTAQ) 400 MG TABS  Take 400 mg by mouth 2 times daily (with meals) 3/8/21   Jared Walker MD   loratadine (CLARITIN) 10 MG tablet Take 10 mg by mouth daily     Historical Provider, MD   acetaminophen (TYLENOL) 325 MG tablet Take 325 mg by mouth nightly    Historical Provider, MD     Medications added or changed (ex.  new medication, dosage change, interval change, formulation change):  Carafate (added)  Divalproex dosage change from 125 mg to 045 mg   Folic acid (added)  Duoneb (added)  Lidocaine gel (added)  Ondansetron (added)    Medications removed from list (include reason, ex. noncompliance, medication cost, therapy complete etc.):   Amoxicillin Not on med list per nursing facility  Clopidogrel Not on med list per nursing facility  Mirtazapine Not on med list per nursing facility  Oxybutynin XL Not on med list per nursing facility  Fleets Not on med list per nursing facility  Terbinafine Not on med list per nursing facility    Comments:  Medication list provided by [facility]     To my knowledge the above medication history is accurate as of 1/17/2023 11:29 AM.   Gabbie Marc CPhT   1/17/2023 11:29 AM

## 2023-01-17 NOTE — ED NOTES
Pt resting in position of comfort on cot presenting in no acute/ apparent distress (NAD). Respirations are noted even and unlabored with good rise and fall of the chest observed. Patient voices no needs at this time. Cot noted in lowest position, locked, with side rails X 2 up for patient safety. Will continue to monitor patient for acute changes.       [x] Side rails up    [x] Cart in lowest position    [] Family at bedside    [x] Call light within reach           [facility], RN  01/17/23 [id number]

## 2023-01-17 NOTE — ED NOTES
200 Pt's son called to check on his Mother Ashanay Arnold [phone])     Carin Galicia  01/17/23 1046

## 2023-01-18 ENCOUNTER — APPOINTMENT (OUTPATIENT)
Dept: MRI IMAGING | Age: 88
DRG: 064 | End: 2023-01-18
Payer: MEDICARE

## 2023-01-18 ENCOUNTER — APPOINTMENT (OUTPATIENT)
Dept: CT IMAGING | Age: 88
DRG: 064 | End: 2023-01-18
Payer: MEDICARE

## 2023-01-18 LAB
AMMONIA: 21 UMOL/L (ref 11–51)
ANION GAP SERPL CALCULATED.3IONS-SCNC: 10 MMOL/L (ref 4–16)
ANISOCYTOSIS: ABNORMAL
BANDED NEUTROPHILS ABSOLUTE COUNT: 0.66 K/CU MM
BANDED NEUTROPHILS RELATIVE PERCENT: 6 % (ref 5–11)
BASE EXCESS MIXED: 5.3 (ref 0–2.3)
BUN BLDV-MCNC: 21 MG/DL (ref 6–23)
CALCIUM SERPL-MCNC: 7.9 MG/DL (ref 8.3–10.6)
CHLORIDE BLD-SCNC: 104 MMOL/L (ref 99–110)
CHOLESTEROL: 68 MG/DL
CO2: 27 MMOL/L (ref 21–32)
COMMENT: ABNORMAL
CREAT SERPL-MCNC: 0.7 MG/DL (ref 0.6–1.1)
DIFFERENTIAL TYPE: ABNORMAL
DOHLE BODIES: PRESENT
EOSINOPHILS ABSOLUTE: 0.2 K/CU MM
EOSINOPHILS RELATIVE PERCENT: 2 % (ref 0–3)
ESTIMATED AVERAGE GLUCOSE: 134 MG/DL
GFR SERPL CREATININE-BSD FRML MDRD: >60 ML/MIN/1.73M2
GLUCOSE BLD-MCNC: 83 MG/DL (ref 70–99)
GLUCOSE BLD-MCNC: 87 MG/DL (ref 70–99)
GLUCOSE BLD-MCNC: 91 MG/DL (ref 70–99)
HBA1C MFR BLD: 6.3 % (ref 4.2–6.3)
HCO3 VENOUS: 31.1 MMOL/L (ref 19–25)
HCT VFR BLD CALC: 25.2 % (ref 37–47)
HDLC SERPL-MCNC: 21 MG/DL
HEMOGLOBIN: 7.7 GM/DL (ref 12.5–16)
LDL CHOLESTEROL CALCULATED: 29 MG/DL
LYMPHOCYTES ABSOLUTE: 1.4 K/CU MM
LYMPHOCYTES RELATIVE PERCENT: 13 % (ref 24–44)
MCH RBC QN AUTO: 29.2 PG (ref 27–31)
MCHC RBC AUTO-ENTMCNC: 30.6 % (ref 32–36)
MCV RBC AUTO: 95.5 FL (ref 78–100)
MONOCYTES ABSOLUTE: 0.9 K/CU MM
MONOCYTES RELATIVE PERCENT: 8 % (ref 0–4)
O2 SAT, VEN: 88.1 % (ref 50–70)
PCO2, VEN: 49 MMHG (ref 38–52)
PDW BLD-RTO: 14.9 % (ref 11.7–14.9)
PH VENOUS: 7.41 (ref 7.32–7.42)
PLATELET # BLD: 223 K/CU MM (ref 140–440)
PMV BLD AUTO: 11.5 FL (ref 7.5–11.1)
PO2, VEN: 56 MMHG (ref 28–48)
POTASSIUM SERPL-SCNC: 4 MMOL/L (ref 3.5–5.1)
RBC # BLD: 2.64 M/CU MM (ref 4.2–5.4)
SEGMENTED NEUTROPHILS ABSOLUTE COUNT: 7.8 K/CU MM
SEGMENTED NEUTROPHILS RELATIVE PERCENT: 71 % (ref 36–66)
SODIUM BLD-SCNC: 141 MMOL/L (ref 135–145)
TOXIC GRANULATION: PRESENT
TRIGL SERPL-MCNC: 89 MG/DL
TSH HIGH SENSITIVITY: 0.93 UIU/ML (ref 0.27–4.2)
WBC # BLD: 11 K/CU MM (ref 4–10.5)
WBC # BLD: ABNORMAL 10*3/UL

## 2023-01-18 PROCEDURE — 6370000000 HC RX 637 (ALT 250 FOR IP): Performed by: STUDENT IN AN ORGANIZED HEALTH CARE EDUCATION/TRAINING PROGRAM

## 2023-01-18 PROCEDURE — 94761 N-INVAS EAR/PLS OXIMETRY MLT: CPT

## 2023-01-18 PROCEDURE — 2580000003 HC RX 258: Performed by: STUDENT IN AN ORGANIZED HEALTH CARE EDUCATION/TRAINING PROGRAM

## 2023-01-18 PROCEDURE — 97530 THERAPEUTIC ACTIVITIES: CPT

## 2023-01-18 PROCEDURE — 80048 BASIC METABOLIC PNL TOTAL CA: CPT

## 2023-01-18 PROCEDURE — 2500000003 HC RX 250 WO HCPCS: Performed by: STUDENT IN AN ORGANIZED HEALTH CARE EDUCATION/TRAINING PROGRAM

## 2023-01-18 PROCEDURE — 70498 CT ANGIOGRAPHY NECK: CPT

## 2023-01-18 PROCEDURE — 82962 GLUCOSE BLOOD TEST: CPT

## 2023-01-18 PROCEDURE — 83036 HEMOGLOBIN GLYCOSYLATED A1C: CPT

## 2023-01-18 PROCEDURE — 36415 COLL VENOUS BLD VENIPUNCTURE: CPT

## 2023-01-18 PROCEDURE — 80061 LIPID PANEL: CPT

## 2023-01-18 PROCEDURE — 92610 EVALUATE SWALLOWING FUNCTION: CPT

## 2023-01-18 PROCEDURE — 85027 COMPLETE CBC AUTOMATED: CPT

## 2023-01-18 PROCEDURE — 85007 BL SMEAR W/DIFF WBC COUNT: CPT

## 2023-01-18 PROCEDURE — 6360000004 HC RX CONTRAST MEDICATION: Performed by: INTERNAL MEDICINE

## 2023-01-18 PROCEDURE — 6360000002 HC RX W HCPCS: Performed by: STUDENT IN AN ORGANIZED HEALTH CARE EDUCATION/TRAINING PROGRAM

## 2023-01-18 PROCEDURE — 2060000000 HC ICU INTERMEDIATE R&B

## 2023-01-18 PROCEDURE — 2700000000 HC OXYGEN THERAPY PER DAY

## 2023-01-18 PROCEDURE — 82140 ASSAY OF AMMONIA: CPT

## 2023-01-18 PROCEDURE — 97167 OT EVAL HIGH COMPLEX 60 MIN: CPT

## 2023-01-18 PROCEDURE — 97112 NEUROMUSCULAR REEDUCATION: CPT

## 2023-01-18 PROCEDURE — 6370000000 HC RX 637 (ALT 250 FOR IP)

## 2023-01-18 PROCEDURE — 97162 PT EVAL MOD COMPLEX 30 MIN: CPT

## 2023-01-18 PROCEDURE — 94640 AIRWAY INHALATION TREATMENT: CPT

## 2023-01-18 PROCEDURE — 99222 1ST HOSP IP/OBS MODERATE 55: CPT | Performed by: INTERNAL MEDICINE

## 2023-01-18 PROCEDURE — 70551 MRI BRAIN STEM W/O DYE: CPT

## 2023-01-18 PROCEDURE — 82805 BLOOD GASES W/O2 SATURATION: CPT

## 2023-01-18 RX ORDER — IPRATROPIUM BROMIDE AND ALBUTEROL SULFATE 2.5; .5 MG/3ML; MG/3ML
1 SOLUTION RESPIRATORY (INHALATION) 2 TIMES DAILY
Status: DISCONTINUED | OUTPATIENT
Start: 2023-01-18 | End: 2023-01-24 | Stop reason: HOSPADM

## 2023-01-18 RX ORDER — FERROUS SULFATE 325(65) MG
325 TABLET ORAL
Status: DISCONTINUED | OUTPATIENT
Start: 2023-01-18 | End: 2023-01-24 | Stop reason: HOSPADM

## 2023-01-18 RX ADMIN — IOPAMIDOL 75 ML: 755 INJECTION, SOLUTION INTRAVENOUS at 14:29

## 2023-01-18 RX ADMIN — METOPROLOL TARTRATE 25 MG: 25 TABLET, FILM COATED ORAL at 20:52

## 2023-01-18 RX ADMIN — IPRATROPIUM BROMIDE AND ALBUTEROL SULFATE 1 AMPULE: 2.5; .5 SOLUTION RESPIRATORY (INHALATION) at 20:11

## 2023-01-18 RX ADMIN — IPRATROPIUM BROMIDE AND ALBUTEROL SULFATE 1 AMPULE: 2.5; .5 SOLUTION RESPIRATORY (INHALATION) at 11:21

## 2023-01-18 RX ADMIN — CEFTRIAXONE SODIUM 1000 MG: 1 INJECTION, POWDER, FOR SOLUTION INTRAMUSCULAR; INTRAVENOUS at 20:54

## 2023-01-18 RX ADMIN — SODIUM CHLORIDE, PRESERVATIVE FREE 10 ML: 5 INJECTION INTRAVENOUS at 20:52

## 2023-01-18 RX ADMIN — ENOXAPARIN SODIUM 40 MG: 100 INJECTION SUBCUTANEOUS at 09:39

## 2023-01-18 RX ADMIN — SUCRALFATE 1 G: 1 TABLET ORAL at 20:52

## 2023-01-18 RX ADMIN — Medication 10 ML: at 09:40

## 2023-01-18 RX ADMIN — Medication 10 ML: at 20:54

## 2023-01-18 RX ADMIN — SODIUM CHLORIDE, POTASSIUM CHLORIDE, SODIUM LACTATE AND CALCIUM CHLORIDE 1000 ML: 600; 310; 30; 20 INJECTION, SOLUTION INTRAVENOUS at 03:48

## 2023-01-18 RX ADMIN — IPRATROPIUM BROMIDE AND ALBUTEROL SULFATE 1 AMPULE: 2.5; .5 SOLUTION RESPIRATORY (INHALATION) at 07:58

## 2023-01-18 RX ADMIN — DOXYCYCLINE 100 MG: 100 INJECTION, POWDER, LYOPHILIZED, FOR SOLUTION INTRAVENOUS at 15:57

## 2023-01-18 NOTE — PROGRESS NOTES
V2.0  [facility] [place] Hospitalist Progress Note      Name:  Qian Waters /Age/Sex: 1935  (82 y.o. female)   MRN & CSN:  [id number] & [id number] Encounter Date/Time: 2023 7:26 AM EST    Location:  -A PCP: Rosita Hsu MD       Hospital Day: 2    Assessment and Plan:   Sepsis 2/2 multifocal pneumonia. Acute hypoxic resp failure   Came w leukocytosis, tachycardia. No LA. CXR revealed multifocal PNA. Currently on 3L NC. Will treat for community acquired pneumonia  - rocephin/azithromycin-------- initially, switched to Rocephin and doxycycline given prolonged QTC as below.  - get resp culture, strep, legionella, pending  -Negative resp viral panel  - continue IVF  - blood cxX2 sent and pending  - wean O2 as tolerated  - breathing treatment     Acute metabolic encephalopathy. ? ??Stroke -came in with AUDREY christal with RVR on admission, she is not on anticoagulation as she did have recurrent falls recently, she is high risk for stroke  Gradual since Friday with poor oral intake. Likely 2/2 delirium from sepsis. Versus could be a stroke given upon talking to patient's family, she was last seen well Friday, and her baseline is usually able to move around with a wheelchair, she is able to eat and drink on her own, and she is able to hold a conversation. Currently patient on admission is just moaning in bed, does not follow any commands. Pt has signs of worsening cognitive per family past few months but never worked up by neurology outpatient. Had MRI 2022 showing cerebral atrophy, severe chronic small vessel ischemic changes. CT head this admission was negative. Will initiate a stroke work-up including a CTA, brain MRI, also sent for lipid panel and A1c. Consider repeat TTE with bubble. Completing ultimately states work-up with ammonia and VBG. TSH checked this admission and was unremarkable.   Not sure if patient is focal or not as she has not following commands and we are not able to do an neurological exam  -We will likely consult neurology, will wait until further work-up is back as above.  -Neurochecks every 4 hours     Chronic atrial fibrillation came with 's. Likely 2/2 sepsis. Patient came and with RVR, she is not on anticoagulation, high risk for stroke as above. Not on anticoagulation, likely due to recurrent falls. Pt was being considered for watchman.  -Currently off her home amio. - consult cardiology given  and pt on home dronedarone, cardiology evaluated, they started Toprol tartrate 25 mg twice daily.  -Telemetry     HFpEF. Not in excarbation. ECHO 08/30/2022 EF 55-60%, proximal septal thickening, mildly dilated LA, mild to moderate MR, AR, TR.   -holding lasix given pt's sepsis. - daily intake/output, weights     CKD stage IIIb  - Cr 0.9 baseline 1.2-1.4     Chronic anemia  Stable Hgb 9 on baseline 7-8. No sign of bleed. - continue home iron           Disposition:   Current Living situation: nursing home  Expected Disposition: nursing home  Estimated D/C: 2-3 days     Diet Diet NPO Exceptions are: Ice Chips, Sips of Water with Meds   DVT Prophylaxis [x] Lovenox, []  Heparin, [] SCDs, [] Ambulation,  [] Eliquis, [] Xarelto  [] Coumadin   Code Status Full Code   Disposition Patient requires continued admission due to pneumonia    son     Subjective:     Chief Complaint: Altered Mental Status (LKW apprx 2 days ago. /)       Stevie Lopez is a 82 y.o. female who presents with pneumonia. Morning in bed, was not able to answer any questions or follow any commands. Alert. Review of Systems:    Review of Systems    10 point review of systems negative except as above. Objective:      Intake/Output Summary (Last 24 hours) at 1/18/2023 0726  Last data filed at 1/18/2023 0357  Gross per 24 hour   Intake 3425 ml   Output 700 ml   Net 2725 ml        Vitals:   Vitals:    01/18/23 0403   BP:    Pulse:    Resp:    Temp: 97 °F (36.1 °C)   SpO2: Physical Exam:     General: NAD, obese  Eyes: EOMI  ENT: neck supple  Cardiovascular: Regular rhythm, normal rate  Respiratory: On 3 L nasal cannula  Gastrointestinal: Soft, non tender  Genitourinary: no suprapubic tenderness  Musculoskeletal: No edema  Skin: warm, dry  Neuro: Alert. Moans in bed, is not following any commands, no acute focal or not is not moving extremities currently. Psych: Mood appropriate.      Medications:   Medications:    ferrous sulfate  325 mg Oral Daily with breakfast    sodium chloride flush  5-40 mL IntraVENous 2 times per day    [Held by provider] divalproex  250 mg Oral Nightly    [Held by provider] escitalopram  10 mg Oral Daily    folic acid  2 mg Oral Daily    sodium chloride flush  5-40 mL IntraVENous 2 times per day    enoxaparin  40 mg SubCUTAneous Daily    azithromycin  500 mg IntraVENous Q24H    cefTRIAXone (ROCEPHIN) IV  1,000 mg IntraVENous Q24H    dronedarone hcl  400 mg Oral BID WC    sucralfate  1 g Oral 4x Daily AC & HS    levothyroxine  175 mcg Oral QAM AC    ipratropium-albuterol  1 ampule Inhalation Q4H WA      Infusions:    sodium chloride      sodium chloride      lactated ringers 1,000 mL (01/18/23 0348)     PRN Meds: sodium chloride flush, 5-40 mL, PRN  sodium chloride, , PRN  sodium chloride flush, 5-40 mL, PRN  sodium chloride, , PRN  ondansetron, 4 mg, Q8H PRN  polyethylene glycol, 17 g, Daily PRN  acetaminophen, 650 mg, Q6H PRN   Or  acetaminophen, 650 mg, Q6H PRN        Labs      Recent Results (from the past 24 hour(s))   CBC auto differential    Collection Time: 01/17/23  8:55 AM   Result Value Ref Range    WBC 12.5 (H) 4.0 - 10.5 K/CU MM    RBC 2.88 (L) 4.2 - 5.4 M/CU MM    Hemoglobin 8.4 (L) 12.5 - 16.0 GM/DL    Hematocrit 26.9 (L) 37 - 47 %    MCV 93.4 78 - 100 FL    MCH 29.2 27 - 31 PG    MCHC 31.2 (L) 32.0 - 36.0 %    RDW 14.9 11.7 - 14.9 %    Platelets 608 041 - 278 K/CU MM    MPV 11.7 (H) 7.5 - 11.1 FL    Differential Type AUTOMATED DIFFERENTIAL     Segs Relative 79.2 (H) 36 - 66 %    Lymphocytes % 9.8 (L) 24 - 44 %    Monocytes % 8.6 (H) 0 - 4 %    Eosinophils % 1.0 0 - 3 %    Basophils % 0.5 0 - 1 %    Segs Absolute 9.9 K/CU MM    Lymphocytes Absolute 1.2 K/CU MM    Monocytes Absolute 1.1 K/CU MM    Eosinophils Absolute 0.1 K/CU MM    Basophils Absolute 0.1 K/CU MM    Nucleated RBC % 0.0 %    Total Nucleated RBC 0.0 K/CU MM    Total Immature Neutrophil 0.11 K/CU MM    Immature Neutrophil % 0.9 (H) 0 - 0.43 %   Comprehensive Metabolic Panel w/ Reflex to MG    Collection Time: 01/17/23  8:55 AM   Result Value Ref Range    Sodium 141 135 - 145 MMOL/L    Potassium 4.0 3.5 - 5.1 MMOL/L    Chloride 101 99 - 110 mMol/L    CO2 29 21 - 32 MMOL/L    BUN 34 (H) 6 - 23 MG/DL    Creatinine 0.9 0.6 - 1.1 MG/DL    Est, Glom Filt Rate >60 >60 mL/min/1.73m2    Glucose 96 70 - 99 MG/DL    Calcium 8.6 8.3 - 10.6 MG/DL    Albumin 2.7 (L) 3.4 - 5.0 GM/DL    Total Protein 6.8 6.4 - 8.2 GM/DL    Total Bilirubin 0.4 0.0 - 1.0 MG/DL    ALT 18 10 - 40 U/L    AST 24 15 - 37 IU/L    Alkaline Phosphatase 76 40 - 129 IU/L    Anion Gap 11 4 - 16   Lactate, Sepsis    Collection Time: 01/17/23  8:55 AM   Result Value Ref Range    Lactic Acid, Sepsis 1.2 0.5 - 1.9 mMOL/L   Troponin    Collection Time: 01/17/23  8:55 AM   Result Value Ref Range    Troponin T <0.010 <0.01 NG/ML   Protime-INR    Collection Time: 01/17/23  8:55 AM   Result Value Ref Range    Protime 15.7 (H) 11.7 - 14.5 SECONDS    INR 1.21 INDEX   Procalcitonin    Collection Time: 01/17/23  8:55 AM   Result Value Ref Range    Procalcitonin 0.198    TSH    Collection Time: 01/17/23  8:55 AM   Result Value Ref Range    TSH, High Sensitivity 0.934 0.270 - 4.20 uIu/ml   Blood gas, venous    Collection Time: 01/17/23  9:00 AM   Result Value Ref Range    pH, Oneil 7.55 (H) 7.32 - 7.42    pCO2, Oneil 38 38 - 52 mmHG    pO2, Oneil 235 (H) 28 - 48 mmHG    Base Exc, Mixed 10.1 (H) 0 - 2.3    HCO3, Venous 33.2 (H) 19 - 25 MMOL/L O2 Sat, Oneil 95.5 (H) 50 - 70 %    Comment VBG    EKG 12 lead    Collection Time: 01/17/23  9:05 AM   Result Value Ref Range    Ventricular Rate 112 BPM    Atrial Rate 197 BPM    QRS Duration 90 ms    Q-T Interval 360 ms    QTc Calculation (Bazett) 491 ms    R Axis 81 degrees    T Axis 54 degrees    Diagnosis       Atrial fibrillation with rapid ventricular response with premature ventricular or aberrantly conducted complexes  Low voltage QRS  Nonspecific ST and T wave abnormality  Abnormal ECG  When compared with ECG of 30-AUG-2022 08:53,  Incomplete right bundle branch block is no longer present  Nonspecific T wave abnormality, improved in Inferior leads     EKG 12 Lead    Collection Time: 01/17/23  9:05 AM   Result Value Ref Range    Ventricular Rate 112 BPM    Atrial Rate 197 BPM    QRS Duration 90 ms    Q-T Interval 360 ms    QTc Calculation (Bazett) 491 ms    R Axis 81 degrees    T Axis 54 degrees    Diagnosis       Atrial fibrillation with rapid ventricular response with premature ventricular or aberrantly conducted complexes  Low voltage QRS  Nonspecific ST and T wave abnormality  Abnormal ECG  When compared with ECG of 30-AUG-2022 08:53,  Incomplete right bundle branch block is no longer present  Nonspecific T wave abnormality, improved in Inferior leads  Confirmed by Rosemary Cruz ([id number]) on 1/17/2023 3:17:52 PM     Rapid Flu Swab    Collection Time: 01/17/23 10:16 AM    Specimen: Nasopharyngeal   Result Value Ref Range    Rapid Influenza A Ag NEGATIVE NEGATIVE    Rapid Influenza B Ag NEGATIVE NEGATIVE   COVID-19, Rapid    Collection Time: 01/17/23 10:16 AM    Specimen: Nasopharyngeal   Result Value Ref Range    Source NARES     SARS-CoV-2, NAAT NOT DETECTED NOT DETECTED   Urinalysis    Collection Time: 01/17/23 10:40 AM   Result Value Ref Range    Color, UA YELLOW     Clarity, UA CLOUDY     Glucose, Urine NEGATIVE MG/DL    Bilirubin Urine NEGATIVE     Ketones, Urine NEGATIVE MG/DL    Specific Gravity, UA 1.015     Blood, Urine SMALL     pH, Urine 6.0     Protein, UA TRACE MG/DL    Urobilinogen, Urine 0.2 MG/DL    Nitrite Urine, Quantitative POSITIVE     Leukocyte Esterase, Urine SMALL    Microscopic Urinalysis    Collection Time: 01/17/23 10:40 AM   Result Value Ref Range    RBC, UA 5 /HPF    WBC, UA 27 /HPF    Bacteria, UA NEGATIVE /HPF    Squam Epithel, UA 13 /HPF    Trans Epithel, UA <1 /HPF    Trichomonas, UA NONE SEEN /HPF   Respiratory Panel, Molecular, with COVID-19 (Restricted: peds pts or suitable admitted adults)    Collection Time: 01/17/23  5:16 PM    Specimen: Nasopharyngeal   Result Value Ref Range    Adenovirus Detection by PCR NOT DETECTED NOT DETECTED    Coronavirus 229E PCR NOT DETECTED NOT DETECTED    Coronavirus HKU1 PCR NOT DETECTED NOT DETECTED    Coronavirus NL63 PCR NOT DETECTED NOT DETECTED    Coronavirus OC43 PCR NOT DETECTED NOT DETECTED    SARS-CoV-2 NOT DETECTED NOT DETECTED    Human Metapneumovirus PCR NOT DETECTED NOT DETECTED    Rhinovirus Enterovirus PCR NOT DETECTED NOT DETECTED    Influenza A by PCR NOT DETECTED NOT DETECTED    Influenza A H1 Pandemic PCR NOT DETECTED NOT DETECTED    Influenza A H1 (2009) PCR NOT DETECTED NOT DETECTED    Influenza A H3 PCR NOT DETECTED NOT DETECTED    Influenza B by PCR NOT DETECTED NOT DETECTED    Parainfluenza 1 PCR NOT DETECTED NOT DETECTED    Parainfluenza 2 PCR NOT DETECTED NOT DETECTED    Parainfluenza 3 PCR NOT DETECTED NOT DETECTED    Parainfluenza 4 PCR NOT DETECTED NOT DETECTED    RSV PCR NOT DETECTED NOT DETECTED    Bordetella parapertussis by PCR NOT DETECTED NOT DETECTED    B Pertussis by PCR NOT DETECTED NOT DETECTED    Chlamydophila Pneumonia PCR NOT DETECTED NOT DETECTED    Mycoplasma pneumo by PCR NOT DETECTED NOT DETECTED   CBC with Auto Differential    Collection Time: 01/18/23  4:10 AM   Result Value Ref Range    WBC 11.0 (H) 4.0 - 10.5 K/CU MM    RBC 2.64 (L) 4.2 - 5.4 M/CU MM    Hemoglobin 7.7 (L) 12.5 - 16.0 GM/DL Hematocrit 25.2 (L) 37 - 47 %    MCV 95.5 78 - 100 FL    MCH 29.2 27 - 31 PG    MCHC 30.6 (L) 32.0 - 36.0 %    RDW 14.9 11.7 - 14.9 %    Platelets 394 118 - 494 K/CU MM    MPV 11.5 (H) 7.5 - 11.1 FL    Bands Relative 6 5 - 11 %    Segs Relative 71.0 (H) 36 - 66 %    Eosinophils % 2.0 0 - 3 %    Lymphocytes % 13.0 (L) 24 - 44 %    Monocytes % 8.0 (H) 0 - 4 %    Bands Absolute 0.66 K/CU MM    Segs Absolute 7.8 K/CU MM    Eosinophils Absolute 0.2 K/CU MM    Lymphocytes Absolute 1.4 K/CU MM    Monocytes Absolute 0.9 K/CU MM    Differential Type MANUAL DIFFERENTIAL     Anisocytosis 1+     Toxic Granulation PRESENT     Dohle Bodies PRESENT     WBC Morphology OCCASIONAL    Basic Metabolic Panel w/ Reflex to MG    Collection Time: 01/18/23  4:10 AM   Result Value Ref Range    Sodium 141 135 - 145 MMOL/L    Potassium 4.0 3.5 - 5.1 MMOL/L    Chloride 104 99 - 110 mMol/L    CO2 27 21 - 32 MMOL/L    Anion Gap 10 4 - 16    BUN 21 6 - 23 MG/DL    Creatinine 0.7 0.6 - 1.1 MG/DL    Est, Glom Filt Rate >60 >60 mL/min/1.73m2    Glucose 83 70 - 99 MG/DL    Calcium 7.9 (L) 8.3 - 10.6 MG/DL        Imaging/Diagnostics Last 24 Hours   CT HEAD WO CONTRAST    Result Date: 1/17/2023  EXAMINATION: CT OF THE HEAD WITHOUT CONTRAST  1/17/2023 11:10 am TECHNIQUE: CT of the head was performed without the administration of intravenous contrast. Automated exposure control, iterative reconstruction, and/or weight based adjustment of the mA/kV was utilized to reduce the radiation dose to as low as reasonably achievable. COMPARISON: 08/30/2022. HISTORY: ORDERING SYSTEM PROVIDED HISTORY: aMS TECHNOLOGIST PROVIDED HISTORY: Has a \"code stroke\" or \"stroke alert\" been called? ->No Reason for exam:->aMS Decision Support Exception - unselect if not a suspected or confirmed emergency medical condition->Emergency Medical Condition (MA) Reason for Exam: AMS FINDINGS: BRAIN/VENTRICLES: There is no acute intracranial hemorrhage, mass effect or midline shift.  No abnormal extra-axial fluid collection. The gray-white differentiation is maintained without evidence of an acute infarct. There is prominence of the ventricles and sulci due to global parenchymal volume loss. There are nonspecific areas of hypoattenuation within the periventricular and subcortical white matter, which likely represent chronic microvascular ischemic change. 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 ORBITS: The visualized portion of the orbits demonstrate no acute abnormality. SINUSES: The visualized paranasal sinuses and mastoid air cells demonstrate no acute abnormality. SOFT TISSUES/SKULL: No acute abnormality of the visualized skull or soft tissues. 1.No acute intracranial abnormality. XR CHEST PORTABLE    Result Date: 1/17/2023  EXAMINATION: ONE XRAY VIEW OF THE CHEST 1/17/2023 8:49 am COMPARISON: 08/30/2022. HISTORY: ORDERING SYSTEM PROVIDED HISTORY: AMS TECHNOLOGIST PROVIDED HISTORY: Reason for exam:->AMS Reason for Exam: AMS FINDINGS: The heart size is enlarged. The pulmonary vasculature is within normal limits. There are patchy bilateral densities. No pneumothoraces are seen. 1. Patchy bilateral densities likely representing multifocal pneumonia. I would recommend follow-up to resolution. 2. Stable cardiomegaly without overt failure.        Electronically signed by Maury Brock MD on 1/18/2023 at 7:26 AM

## 2023-01-18 NOTE — PROGRESS NOTES
Spoke with hospitalist, informed per family at baseline pt able to hold conversations, feed self and move around.

## 2023-01-18 NOTE — PROGRESS NOTES
01/18/23 1157   Encounter Summary   Encounter Overview/Reason  Initial Encounter   Service Provided For: Patient   Referral/Consult From: [address] Family members   Last Encounter  01/18/23  (Pasty Sera in room; patient did not verbalize, but blinked her eyes to affirm questions. Time of prayer observed.)   Complexity of Encounter Low   Begin Time 1152   End Time  1200   Total Time Calculated 8 min   Encounter    Type Initial Screen/Assessment   Spiritual/Emotional needs   Type Spiritual Support   Assessment/Intervention/Outcome   Assessment Calm; Hopeful   Intervention Active listening;Nurtured Hope;Prayer (assurance of)/[place];Sustaining Presence/Ministry of presence   Outcome Acceptance;Comfort;Coping;Encouraged;Did not respond  (Patient communicated through facial expression and by blinking eyes; no verbal response.)   Plan and Referrals   Plan/Referrals Continue to visit, (comment)

## 2023-01-18 NOTE — CONSULTS
Electrophysiology consult note                   Reason for consultation: Atrial fibrillation with rapid ventricular rate, prolonged QTC. Referring physician:  Cyn Foster MD     Primary care physician: Fabiola Baker MD      Chief Complaints :  Chief Complaint   Patient presents with    Altered Mental Status     LKW apprx 2 days ago. History of present illness:Lexi is a 82 y. o.year old female who has persistent atrial fibrillation on dronedarone. She is admitted to the hospital with pneumonia, altered mental status. She was noted to have prolonged QTC and atrial fibrillation with rapid ventricular rate. Patient is a nursing home resident. Upon my evaluation patient is still confused and unable to give any meaningful history. Her heart rate has actually improved to the 70s and 80s. Patient has been on dronedarone as an outpatient but I have reviewed her previous EKGs and she continues to remain in atrial fibrillation. She is not on anticoagulation because of high risk of falls. Past medical history:    has a past medical history of Allergic rhinitis, Arthritis, CHF (congestive heart failure) ([address] DVT, lower extremity, proximal, acute, left (HCC), Edema leg, Gastroesophageal reflux disease, H/O 24 hour EKG monitoring, H/O cardiac catheterization, H/O cardiac catheterization, H/O cardiovascular stress test, H/O Doppler echocardiogram, H/O echocardiogram, History of atrial fibrillation, History of cardiovascular stress test, History of falling, History of nuclear stress test, Hx of blood clots, Hx: UTI (urinary tract infection), Hyperglycemia, Hyperlipidemia, Hypertension, Hypothyroidism, Incontinence of bowel, Incontinence of urine, Sepsis ([address] Tremor, Wears glasses, and Wears partial dentures. Past surgical history:   has a past surgical history that includes Total knee arthroplasty (2005, 2006); Breast surgery; joint replacement (4/2005 2/2006); Cardioversion (10/17/2008); Cardioversion (12/15/2008); other surgical history (10/2009); pr arthrp acetblr/prox fem prostc agrft/algrft (Left, 02/23/2015); Hysterectomy (age 21's); IR GUIDED FLUID DRAINAGE BY CATH SOFT TISSUE PERC (8/21/2020); and IR GUIDED FLUID DRAINAGE BY CATH SOFT TISSUE PERC (9/8/2020). Social History:   reports that she has quit smoking. She has never used smokeless tobacco. She reports that she does not drink alcohol and does not use drugs.   Family history:   no family history of CAD, STROKE of DM at early age    Allergies   Allergen Reactions    Tape [place]ell [place] Tape] Itching    Codeine Rash    Sulfa Antibiotics Nausea And Vomiting       ferrous sulfate (IRON 325) tablet 325 mg, Daily with breakfast  sodium chloride flush 0.9 % injection 5-40 mL, 2 times per day  sodium chloride flush 0.9 % injection 5-40 mL, PRN  0.9 % sodium chloride infusion, PRN  [Held by provider] divalproex (DEPAKOTE) DR tablet 250 mg, Nightly  [Held by provider] escitalopram (LEXAPRO) tablet 10 mg, Daily  folic acid (FOLVITE) tablet 2 mg, Daily  sodium chloride flush 0.9 % injection 5-40 mL, 2 times per day  sodium chloride flush 0.9 % injection 5-40 mL, PRN  0.9 % sodium chloride infusion, PRN  enoxaparin (LOVENOX) injection 40 mg, Daily  ondansetron (ZOFRAN-ODT) disintegrating tablet 4 mg, Q8H PRN  polyethylene glycol (GLYCOLAX) packet 17 g, Daily PRN  acetaminophen (TYLENOL) tablet 650 mg, Q6H PRN   Or  acetaminophen (TYLENOL) suppository 650 mg, Q6H PRN  azithromycin (ZITHROMAX) 500 mg in dextrose 5 % 250 mL IVPB ([id number]), Q24H  cefTRIAXone (ROCEPHIN) 1,000 mg in dextrose 5 % 50 mL IVPB mini-bag, Q24H  lactated ringers infusion 1,000 mL, Continuous  dronedarone hcl (MULTAQ) tablet 400 mg, BID WC  sucralfate (CARAFATE) tablet 1 g, 4x Daily AC & HS  levothyroxine (SYNTHROID) tablet 175 mcg, QAM AC  ipratropium-albuterol (DUONEB) nebulizer solution 1 ampule, Q4H WA      Current Facility-Administered Medications   Medication Dose Route Frequency Provider Last Rate Last Admin    ferrous sulfate (IRON 325) tablet 325 mg  325 mg Oral Daily with breakfast Coleen Remy MD        sodium chloride flush 0.9 % injection 5-40 mL  5-40 mL IntraVENous 2 times per day Coleen Remy MD   10 mL at 01/18/23 0940    sodium chloride flush 0.9 % injection 5-40 mL  5-40 mL IntraVENous PRN Coleen Remy MD        0.9 % sodium chloride infusion   IntraVENous PRN Coleen Remy MD        [facility]] divalproex (DEPAKOTE) DR tablet 250 mg  250 mg Oral Nightly Coleen Remy MD        [Held by provider] escitalopram (LEXAPRO) tablet 10 mg  10 mg Oral Daily Coleen Remy MD        folic acid (FOLVITE) tablet 2 mg  2 mg Oral Daily Coleen Remy MD        sodium chloride flush 0.9 % injection 5-40 mL  5-40 mL IntraVENous 2 times per day Coleen Remy MD   10 mL at 01/17/23 2206    sodium chloride flush 0.9 % injection 5-40 mL  5-40 mL IntraVENous PRN Coleen Remy MD        0.9 % sodium chloride infusion   IntraVENous PRN Coleen Remy MD        enoxaparin (LOVENOX) injection 40 mg  40 mg SubCUTAneous Daily Coleen Remy MD   40 mg at 01/18/23 0939    ondansetron (ZOFRAN-ODT) disintegrating tablet 4 mg  4 mg Oral Q8H PRN Coleen Remy MD        polyethylene glycol (GLYCOLAX) packet 17 g  17 g Oral Daily PRN Coleen Remy MD        acetaminophen (TYLENOL) tablet 650 mg  650 mg Oral Q6H PRN Coleen Remy MD        Or    acetaminophen (TYLENOL) suppository 650 mg  650 mg Rectal Q6H PRN Coleen Remy MD        azithromycin (ZITHROMAX) 500 mg in dextrose 5 % 250 mL IVPB ([id number])  500 mg IntraVENous Q24H Coleen Remy MD   Stopped at 01/17/23 1909    cefTRIAXone (ROCEPHIN) 1,000 mg in dextrose 5 % 50 mL IVPB mini-bag  1,000 mg IntraVENous Q24H Coleen Remy MD   Stopped at 01/17/23 2236    lactated ringers infusion 1,000 mL  1,000 mL IntraVENous Continuous Coleen Remy  mL/hr at 01/18/23 0348 1,000 mL at 01/18/23 0348    dronedarone hcl (MULTAQ) tablet 400 mg  400 mg Oral BID  Coleen Remy MD sucralfate (CARAFATE) tablet 1 g  1 g Oral 4x Daily AC & HS Sunita Cisse MD        levothyroxine (SYNTHROID) tablet 175 mcg  175 mcg Oral QAM AC Sunita Cisse MD        ipratropium-albuterol (DUONEB) nebulizer solution 1 ampule  1 ampule Inhalation Q4H WA Sunita Cisse MD   1 ampule at 01/18/23 0758     Review of Systems:   Unable to do review of system because patient is very confused. Physical Examination:    Vitals:    01/18/23 0757   BP: (!) 145/78   Pulse: 77   Resp: 15   Temp: 97.3 °F (36.3 °C)   SpO2: 97%        General Appearance: Elderly  female, in no distress, she is currently confused and unable to participate in a meaningful conversation. Constitutional:  No acute distress, Non-toxic appearance. HENT:  Normocephalic, Atraumatic,   Eyes:  Conjunctiva normal, No discharge. Respiratory:  No respiratory distress,  Cardiovascular: Irregular pulse   abdomen /GI:   Soft, No tenderness   Genitourinary: No costovertebral angle tenderness   Musculoskeletal:  No edema,  no deformities. Integument:   no rash   Neurologic: Confused at baseline    Medical decision making and Data review:    Lab Review   Recent Labs     01/18/23  0410   WBC 11.0*   HGB 7.7*   HCT 25.2*         Recent Labs     01/18/23  0410      K 4.0      CO2 27   BUN 21   CREATININE 0.7     Recent Labs     01/17/23  0855   AST 24   ALT 18   BILITOT 0.4   ALKPHOS 76     Recent Labs     01/17/23  0855   TROPONINT <0.010       No results for input(s): PROBNP in the last 72 hours. Lab Results   Component Value Date    INR 1.21 01/17/2023    PROTIME 15.7 (H) 01/17/2023       EKG: (reviewed by myself: EKG showed atrial fibrillation with a rate of around 110 bpm.  There were no significant ischemic changes. ECHO:(reviewed by myself) her last echocardiogram was done in August 2022 which showed preserved ejection fraction. Chest Xray:(reviewed by myself): Her chest x-ray showed multifocal pneumonia.       All labs, medications and tests reviewed by myself including data  from outside source , patient and available family . Continue all other medications of all above medical condition listed as is. Impression and Recommendations:    Atrial fibrillation with rapid ventricular rate  Severe sepsis due to pneumonia      82 y. o.year old with above medical history. Currently her heart rate is better controlled. I would stop her drip and add her on because it is ineffective in providing rhythm control and in addition it has very high risk interaction with azithromycin. Instead we will just start her on metoprolol 25 mg twice daily. She is not on systemic anticoagulation due to recurrent falls. Whether or not she is a candidate for any invasive procedures like watchman can be determined down the road based on her mental status and overall functional status and goals of care. Thank you  much for consult and giving us the opportunity in contributing in the care of this patient. Please feel free to call me for any questions.        Michelle Lopez MD, 1/18/2023 10:03 AM

## 2023-01-18 NOTE — PROGRESS NOTES
[facility] ACUTE CARE OCCUPATIONAL THERAPY EVALUATION  Douglas De Leon, 1935, 2013/2013-A, 1/18/2023    History  [place]:  The primary encounter diagnosis was Altered mental status, unspecified altered mental status type. Diagnoses of Sepsis, due to unspecified organism, unspecified whether acute organ dysfunction present [facility]), Pneumonia of both lungs due to infectious organism, unspecified part of lung, Chronic respiratory failure with hypoxia ([address] and Chronic anemia were also pertinent to this visit. Patient  has a past medical history of Allergic rhinitis, Arthritis, CHF (congestive heart failure) ([address] DVT, lower extremity, proximal, acute, left (HCC), Edema leg, Gastroesophageal reflux disease, H/O 24 hour EKG monitoring, H/O cardiac catheterization, H/O cardiac catheterization, H/O cardiovascular stress test, H/O Doppler echocardiogram, H/O echocardiogram, History of atrial fibrillation, History of cardiovascular stress test, History of falling, History of nuclear stress test, Hx of blood clots, Hx: UTI (urinary tract infection), Hyperglycemia, Hyperlipidemia, Hypertension, Hypothyroidism, Incontinence of bowel, Incontinence of urine, Sepsis ([address] Tremor, Wears glasses, and Wears partial dentures. Patient  has a past surgical history that includes Total knee arthroplasty (2005, 2006); Breast surgery; joint replacement (4/2005 2/2006); Cardioversion (10/17/2008); Cardioversion (12/15/2008); other surgical history (10/2009); pr arthrp acetblr/prox fem prostc agrft/algrft (Left, 02/23/2015); Hysterectomy (age 21's); IR GUIDED FLUID DRAINAGE BY CATH SOFT TISSUE PERC (8/21/2020); and IR GUIDED FLUID DRAINAGE BY CATH SOFT TISSUE PERC (9/8/2020). Subjective:  Patient states:  Pt perseverating on \"yeah\". Pain:  No obvious pain symptoms. Communication with other providers:  Handoff to RN, co-eval with PT.    Restrictions: General Precautions, [facility] Setup/Prior level of function   Pt lives at [place]. Pt needs assistance for ADLs, does not perform high level IADLs and needs assistance for stand pivot transfers to wheelchair. Examination of body systems (includes body structures/functions, activity/participation limitations):  Observation:  Supine in bed upon arrival  Vision:  Difficulty tracking on R side, difficulty crossing midline when following therapist's finger  Hearing:  Appears WFL  Cardiopulmonary:  On 02; 02 saturation remained WFL. BP at initiation of session supine in bed: 140/68 (87); sitting upright in chair position: 149/87 (105)      Body Systems and functions:  ROM R/L:  LUE: ~15 degrees shoulder flexion (AAROM WFL), distal WFL; RUE: does not attend AAROM: ~90 degrees shoulder flexion distal WFL. Strength R/L:  LUE: 2+/5, RUE: 1/5   Sensation: Unable to assess due to decreased cognition  Tone: BUE hypotonic; more noted on RUE  Coordination: mod Decreased gross/fine motor coordination on LUE; max decreased on RUE; hand over hand to overcome; tremors in LUE w/ intentional movement  Perception: max decreased initiation/sequencing, difficulty sitting upright in midline    Activities of Daily Living (ADLs):  Feeding: maxA  Grooming: maxA (washing hands/face w/ warm washcloth)  UB bathing: maxA  LB bathing: dependent/total  UB dressing: maxA  LB dressing: dependent/total (donning/doffing socks supine in bed)  Toileting: dependent/total    Cognitive and Psychosocial Functioning:  Overall cognitive status: Able to track therapist's fingers w/ multiple Vcs and tactile cues, intermittently following commands  Affect: Flat       Mobility:  Supine to sit:  DNT   Transfers: DNT  Sitting balance:  Pt placed in chair position ~5 minutes. Standing balance:  DNT.   Functional Mobility DNT  Toilet/Shower Transfers: DNT             AM-PAC Daily Activity Inpatient   How much help for putting on and taking off regular lower body clothing?: Total  How much help for Bathing?: Total  How much help for Toileting?: Total  How much help for putting on and taking off regular upper body clothing?: Total  How much help for taking care of personal grooming?: Total  How much help for eating meals?: Total  AM-PAC Inpatient Daily Activity Raw Score: 6  AM-PAC Inpatient ADL T-Scale Score : 17.07  ADL Inpatient CMS 0-100% Score: 100  ADL Inpatient CMS G-Code Modifier : CN    Treatment:  Therapeutic Activity Training:   Therapeutic activity training was instructed today. Cues were given for safety, sequence, UE/LE placement, awareness, and balance. Activities performed today included bed mobility training, chair position, BUE AAROM    Safety: patient left in bed with bed alarm, call light within reach, RN notified, gait belt used. Assessment:  Pt is a 83 yo female admitted from LTC  for multifocal pneumonia. Pt at baseline needs assistance for ADLs needs assistance for high level IADLs and needs assistance for functional transfers/mobility w/ AD. Pt currently presents w/ deficits in ADL and high level IADL independence, functional activity tolerance, dynamic sitting and standing balance and tolerance and functional transfers, BUE strength/ROM, coordination, initiation/sequencing and cognition and OOB activity. Pt would benefit from continued acute care OT services w/ discharge to SNF  Complexity: High  Prognosis: Fair due to decreased cognition  Occupational Therapy Plan  Times Per Week: 3x+  Times Per Day:  Once a day  Current Treatment Recommendations: Strengthening, ROM, Balance training, Functional mobility training, Endurance training, Self-Care / ADL, Patient/Caregiver education & training, Safety education & training, Pain management, Equipment evaluation, education, & procurement, Cognitive reorientation, Neuromuscular re-education, Positioning, Cognitive/Perceptual training, Home management training, Coordination training     Equipment: defer    Goals:  Pt goal: go home  Time Frame for STGs: discharge  Goal 1: Pt will perform UE ADLs Supervision  Goal 2: Pt will perform LE ADLs Supa w/ AD  Goal 3: Pt will perform toileting Supa  Goal 4: Pt will perform functional transfer w/ AD Supa  Goal 5: Pt will perform all aspects of bed mobility SBA  Goal 6: Pt will perform therex/theract in order to increase functional activity tolerance and dynamic sitting balance    Treatment plan:  Pt will perform functional task in sit reaching in all 3 planes in order to increase dynamic sitting balance and functional activity tolerance    Recommendations for NURSING activity: TORREY    Time:   Time in: 1316  Time out: 1334  Timed treatment minutes: 8 minutes  Total time: 18 minutes    Electronically signed by:    Arturo Atkinson OTR/RUSSEL [id number]  2:29 PM,1/18/2023

## 2023-01-18 NOTE — PROGRESS NOTES
Speech Language Pathology  Facility/Department: [facility] ICU STEPDOWN   CLINICAL BEDSIDE SWALLOW EVALUATION    NAME: Sruthi Juarez  : 1935  MRN: [id number]    IMPRESSIONS AND RECOMMENDATIONS: Sruthi Juarez was referred for a bedside swallow evaluation following admission to [facility] with sepsis, respiratory failure, PNA, encephalopathy, and concern for possible CVA. Brain MRI pending. Medical hx includes afib, HFpEF, CKD, anemia. Pt has been seen by SLP during prior admissions; last seen 2022 and recommended regular diet/thin liquids. Pt seen for evaluation seated upright in bed, alert. She did not follow direct verbal commands. She did not produce any verbalizations but did vocalize intermittently throughout. She did not participate in oral mechanism examination. She was presented with PO trials of ice chips, thin liquids via tsp/cup/straw, puree, and regular solids. Oral stage is mildly impaired, characterized by inconsistent labial seal for use of straw, slow/repetitive mastication with piecemeal deglutition of solids, adequate AP transit/oral clearance. Pharyngeal stage appears [facility] with adequate swallow initiation/laryngeal elevation and no s/s aspiration across all trials. Recommend initiation of soft/bite-sized diet and thin liquids, total feed, general aspiration precautions. Give pills in pureed or pudding consistency. SLP will follow briefly. ADMISSION DATE: 2023  ADMITTING DIAGNOSIS: has Chronic anticoagulation; Hypertension; Hypothyroidism; Encounter for long-term (current) use of other medications; Recurrent UTI; Multiple falls; Anemia; Sepsis ([address] Leg weakness; Abnormality of gait; CHF (congestive heart failure) ([address] Rhabdomyolysis; Sepsis associated hypotension ([address] Acute cystitis without hematuria; DVT (deep vein thrombosis) in pregnancy; Progression of deep vein thrombosis (DVT) ([address] EKG abnormalities;  Noncompliance with medication regimen; Chronic atrial fibrillation ([facility] Acute metabolic encephalopathy; Urinary tract infection associated with catheterization of urinary tract ([facility] Fall at home, initial encounter; Frequent falls; Muscle weakness of lower extremity; Tremor; Gastroesophageal reflux disease; Hyperglycemia; Morbid obesity with BMI of 40.0-44.9, adult ([facility] CKD (chronic kidney disease) stage 4, GFR 15-29 ml/min ([facility] COVID-19; Hyponatremia; Acute kidney injury superimposed on CKD ([facility] Stroke-like symptoms; and Multifocal pneumonia on their problem list.  ONSET DATE: this admission    Recent Chest Xray/CT of Chest: see chart    Date of Eval: 1/18/2023  Evaluating Therapist: ENEDELIA Castaneda    Current Diet level:  Current Diet : NPO      Primary Complaint  Patient Complaint: does not state/AMS    Pain:  Pain Assessment  Pain Assessment: Face, Legs, Activity, Cry, and Consolability (FLACC)  Faces, Legs, Activity, Cry, and Consolability (FLACC)  Face (F): no particular expression or smile  Legs (L): normal position or relaxed  Activity (A): lying quietly, normal position, moves easily  Cry (C): no cry (awake or asleep)  Consolability (C): content, relaxed  FLACC Score : 0    Reason for Referral  Doris Jovel was referred for a bedside swallow evaluation to assess the efficiency of her swallow function, identify signs and symptoms of aspiration and make recommendations regarding safe dietary consistencies, effective compensatory strategies, and safe eating environment. Impression  Dysphagia Diagnosis: Mild oral stage dysphagia  Dysphagia Outcome Severity Scale: Level 5: Mild dysphagia- Distant supervision. May need one diet consistency restricted     Treatment Plan  Requires SLP Intervention: Yes  Duration of Treatment: LOS  D/C Recommendations: 24 hour supervision/assistance; To be determined; Ongoing speech therapy is recommended during this hospitalization       Recommended Diet and Intervention        Recommended Form of Meds: Meds in puree  Recommendations: Total feed  Therapeutic Interventions: Diet tolerance monitoring; Therapeutic PO trials with SLP;Patient/Family education    Compensatory Swallowing Strategies  Compensatory Swallowing Strategies : Total feed;Upright as possible for all oral intake    Treatment/Goals  Short-term Goals  Timeframe for Short-term Goals: length of admission  Goal 1: Pt will tolerate soft/bite-sized diet and thin liquids with adequate oral manipulation/clearance and no s/s aspiration. Goal 2: Pt will tolerate PO trials of advanced textures with adequate oral manipulation/clearance and no s/s aspiration. Goal 3: Caregivers will indicate understanding of education/recommendations. General  Chart Reviewed: Yes  Behavior/Cognition: Alert;Confused; Requires cueing  Respiratory Status: O2 via nasual cannula  O2 Device: Nasal cannula  Communication Observation: Non-verbal  Follows Directions: None  Dentition: Adequate  Patient Positioning: Upright in bed  Prior Dysphagia History: last recommended regular diet/thin liquids September 2022  Consistencies Administered: Regular;Pureed; Thin - teaspoon; Thin - cup; Thin - straw; Ice Chips           Vision/Hearing       Oral Motor Deficits       Oral Phase Dysfunction  Oral Phase  Oral Phase: Exceptions  Oral Phase  Oral Phase - Comment: mild     Indicators of Pharyngeal Phase Dysfunction   Pharyngeal Phase   Pharyngeal Phase: WFL    Prognosis       Education  Patient Education: results, recommendations  Patient Education Response: No evidence of learning  Safety Devices in place: Yes  Type of devices: All fall risk precautions in place; Left in bed       Therapy Time  SLP Individual Minutes  Time In: 6738  Time Out: [address]  Minutes: [place], [address] [facility]-SLP  1/18/2023 12:59 PM

## 2023-01-19 PROBLEM — I63.532 ACUTE CEREBROVASCULAR ACCIDENT (CVA) DUE TO OCCLUSION OF LEFT POSTERIOR CEREBRAL ARTERY (HCC): Status: ACTIVE | Noted: 2023-01-19

## 2023-01-19 PROBLEM — G92.8 TOXIC METABOLIC ENCEPHALOPATHY: Status: ACTIVE | Noted: 2018-07-06

## 2023-01-19 PROBLEM — F01.50 VASCULAR DEMENTIA (HCC): Status: ACTIVE | Noted: 2023-01-19

## 2023-01-19 PROBLEM — R41.82 ALTERED MENTAL STATUS: Status: ACTIVE | Noted: 2023-01-19

## 2023-01-19 LAB
ANION GAP SERPL CALCULATED.3IONS-SCNC: 8 MMOL/L (ref 4–16)
BASOPHILS ABSOLUTE: 0 K/CU MM
BASOPHILS RELATIVE PERCENT: 0.4 % (ref 0–1)
BUN BLDV-MCNC: 14 MG/DL (ref 6–23)
CALCIUM SERPL-MCNC: 7.9 MG/DL (ref 8.3–10.6)
CHLORIDE BLD-SCNC: 102 MMOL/L (ref 99–110)
CO2: 28 MMOL/L (ref 21–32)
CREAT SERPL-MCNC: 0.6 MG/DL (ref 0.6–1.1)
DIFFERENTIAL TYPE: ABNORMAL
EOSINOPHILS ABSOLUTE: 0.3 K/CU MM
EOSINOPHILS RELATIVE PERCENT: 3 % (ref 0–3)
GFR SERPL CREATININE-BSD FRML MDRD: >60 ML/MIN/1.73M2
GLUCOSE BLD-MCNC: 101 MG/DL (ref 70–99)
GLUCOSE BLD-MCNC: 132 MG/DL (ref 70–99)
GLUCOSE BLD-MCNC: 92 MG/DL (ref 70–99)
GLUCOSE BLD-MCNC: 93 MG/DL (ref 70–99)
HCT VFR BLD CALC: 25.7 % (ref 37–47)
HEMOGLOBIN: 7.4 GM/DL (ref 12.5–16)
IMMATURE NEUTROPHIL %: 1.5 % (ref 0–0.43)
LEGIONELLA URINARY AG: NEGATIVE
LYMPHOCYTES ABSOLUTE: 1.1 K/CU MM
LYMPHOCYTES RELATIVE PERCENT: 10.5 % (ref 24–44)
MCH RBC QN AUTO: 28.8 PG (ref 27–31)
MCHC RBC AUTO-ENTMCNC: 28.8 % (ref 32–36)
MCV RBC AUTO: 100 FL (ref 78–100)
MONOCYTES ABSOLUTE: 0.8 K/CU MM
MONOCYTES RELATIVE PERCENT: 7.3 % (ref 0–4)
NUCLEATED RBC %: 0.2 %
PDW BLD-RTO: 15.3 % (ref 11.7–14.9)
PLATELET # BLD: 244 K/CU MM (ref 140–440)
PMV BLD AUTO: 11.8 FL (ref 7.5–11.1)
POTASSIUM SERPL-SCNC: 3.8 MMOL/L (ref 3.5–5.1)
RBC # BLD: 2.57 M/CU MM (ref 4.2–5.4)
SEGMENTED NEUTROPHILS ABSOLUTE COUNT: 8.3 K/CU MM
SEGMENTED NEUTROPHILS RELATIVE PERCENT: 77.3 % (ref 36–66)
SODIUM BLD-SCNC: 138 MMOL/L (ref 135–145)
STREP PNEUMONIAE ANTIGEN: NORMAL
TOTAL IMMATURE NEUTOROPHIL: 0.16 K/CU MM
TOTAL NUCLEATED RBC: 0 K/CU MM
WBC # BLD: 10.8 K/CU MM (ref 4–10.5)

## 2023-01-19 PROCEDURE — 94640 AIRWAY INHALATION TREATMENT: CPT

## 2023-01-19 PROCEDURE — 94761 N-INVAS EAR/PLS OXIMETRY MLT: CPT

## 2023-01-19 PROCEDURE — 2580000003 HC RX 258: Performed by: STUDENT IN AN ORGANIZED HEALTH CARE EDUCATION/TRAINING PROGRAM

## 2023-01-19 PROCEDURE — 6370000000 HC RX 637 (ALT 250 FOR IP): Performed by: STUDENT IN AN ORGANIZED HEALTH CARE EDUCATION/TRAINING PROGRAM

## 2023-01-19 PROCEDURE — 82607 VITAMIN B-12: CPT

## 2023-01-19 PROCEDURE — 99232 SBSQ HOSP IP/OBS MODERATE 35: CPT | Performed by: INTERNAL MEDICINE

## 2023-01-19 PROCEDURE — 80048 BASIC METABOLIC PNL TOTAL CA: CPT

## 2023-01-19 PROCEDURE — 99223 1ST HOSP IP/OBS HIGH 75: CPT | Performed by: STUDENT IN AN ORGANIZED HEALTH CARE EDUCATION/TRAINING PROGRAM

## 2023-01-19 PROCEDURE — APPSS30 APP SPLIT SHARED TIME 16-30 MINUTES

## 2023-01-19 PROCEDURE — 6370000000 HC RX 637 (ALT 250 FOR IP)

## 2023-01-19 PROCEDURE — 82746 ASSAY OF FOLIC ACID SERUM: CPT

## 2023-01-19 PROCEDURE — 82962 GLUCOSE BLOOD TEST: CPT

## 2023-01-19 PROCEDURE — 85025 COMPLETE CBC W/AUTO DIFF WBC: CPT

## 2023-01-19 PROCEDURE — 6360000002 HC RX W HCPCS: Performed by: STUDENT IN AN ORGANIZED HEALTH CARE EDUCATION/TRAINING PROGRAM

## 2023-01-19 PROCEDURE — 2500000003 HC RX 250 WO HCPCS: Performed by: STUDENT IN AN ORGANIZED HEALTH CARE EDUCATION/TRAINING PROGRAM

## 2023-01-19 PROCEDURE — 36415 COLL VENOUS BLD VENIPUNCTURE: CPT

## 2023-01-19 PROCEDURE — 2060000000 HC ICU INTERMEDIATE R&B

## 2023-01-19 PROCEDURE — 2700000000 HC OXYGEN THERAPY PER DAY

## 2023-01-19 RX ADMIN — SUCRALFATE 1 G: 1 TABLET ORAL at 12:07

## 2023-01-19 RX ADMIN — DOXYCYCLINE 100 MG: 100 INJECTION, POWDER, LYOPHILIZED, FOR SOLUTION INTRAVENOUS at 15:20

## 2023-01-19 RX ADMIN — ENOXAPARIN SODIUM 40 MG: 100 INJECTION SUBCUTANEOUS at 08:47

## 2023-01-19 RX ADMIN — SODIUM CHLORIDE, PRESERVATIVE FREE 10 ML: 5 INJECTION INTRAVENOUS at 08:47

## 2023-01-19 RX ADMIN — IPRATROPIUM BROMIDE AND ALBUTEROL SULFATE 1 AMPULE: 2.5; .5 SOLUTION RESPIRATORY (INHALATION) at 22:58

## 2023-01-19 RX ADMIN — IPRATROPIUM BROMIDE AND ALBUTEROL SULFATE 1 AMPULE: 2.5; .5 SOLUTION RESPIRATORY (INHALATION) at 07:44

## 2023-01-19 RX ADMIN — SUCRALFATE 1 G: 1 TABLET ORAL at 20:51

## 2023-01-19 RX ADMIN — METOPROLOL TARTRATE 25 MG: 25 TABLET, FILM COATED ORAL at 08:46

## 2023-01-19 RX ADMIN — Medication 10 ML: at 08:47

## 2023-01-19 RX ADMIN — FERROUS SULFATE TAB 325 MG (65 MG ELEMENTAL FE) 325 MG: 325 (65 FE) TAB at 08:46

## 2023-01-19 RX ADMIN — SODIUM CHLORIDE 10 ML: 9 INJECTION, SOLUTION INTRAVENOUS at 15:19

## 2023-01-19 RX ADMIN — LEVOTHYROXINE SODIUM 175 MCG: 25 TABLET ORAL at 05:45

## 2023-01-19 RX ADMIN — Medication 5 ML: at 20:54

## 2023-01-19 RX ADMIN — SUCRALFATE 1 G: 1 TABLET ORAL at 16:34

## 2023-01-19 RX ADMIN — CEFTRIAXONE SODIUM 1000 MG: 1 INJECTION, POWDER, FOR SOLUTION INTRAMUSCULAR; INTRAVENOUS at 20:50

## 2023-01-19 RX ADMIN — DOXYCYCLINE 100 MG: 100 INJECTION, POWDER, LYOPHILIZED, FOR SOLUTION INTRAVENOUS at 02:03

## 2023-01-19 RX ADMIN — SUCRALFATE 1 G: 1 TABLET ORAL at 05:45

## 2023-01-19 RX ADMIN — METOPROLOL TARTRATE 25 MG: 25 TABLET, FILM COATED ORAL at 20:51

## 2023-01-19 RX ADMIN — FOLIC ACID 2 MG: 1 TABLET ORAL at 08:43

## 2023-01-19 NOTE — CONSULTS
Neurology Service Consult Note  [address]   Patient Name: Callie Nunez  : 1935        Subjective:   Reason for consult: Stroke  82 y.o. female with history of a fib not on anticoagulation, CKD, CHF, DVT, HTN, HCL, tremor presenting to [place] [id number] from facility  with concern for altered mental status. Patient was last known well 1/15/23 when seen by the facility physician. In the ED patient was not responding verbally and was unable to follow commands but was moving extremities spontaneously. Patients son reported that patient had been having poor oral intake for several days prior to admission. She is currently being treated for pneumonia, blood cultures with no growth at 5 days. MRI brain was completed and notes acute left PCA territory infarct involving the medial aspect of the left temporal lobe including the left hippocampus and left thalamus. Patient is known to our service and was evaluated 22 for stroke like symptoms. At that time it was noted that patients anticoagulant (Eliquis) and aspirin had been held for as long as months for a dental procedure. Imaging during that hospitalization did not identify any acute stroke or LVO. Her mental status improved. AC was not restarted due to history of frequent falls. She has been evaluated by EP and could potentially be considered for watchman pending clinical course. Chart was reviewed in detail. Patient was seen and assessed. She is lying in bed awake, moaning. She is unable to tell me her name or answer questions. She does intermittently follow commands and was able to repeat \"mama\" but could not repeat any other words. She is unable to identify common objects.  No family is at bedside during exam.     Past Medical History:   Diagnosis Date    Allergic rhinitis     Arthritis     CHF (congestive heart failure) ([facility])     DVT, lower extremity, proximal, acute, left ([facility])     Edema leg Gastroesophageal reflux disease 3/30/2019    H/O 24 hour EKG monitoring 6/10 1/09 11/08    6/7/10- predominant rhythm is a-fib, freq PVCs    H/O cardiac catheterization 7/20/00    H/O cardiac catheterization 7/20/00    right dominant system, left main is short but angiographically normal, LAD and CX reveal sign  tortuosities distally  but no sign angiographic atherosclerotic disease noted except luminal  irregularities here and there. LVSF normal, EF  55%    H/O cardiovascular stress test 5/2/2016    lexiscan-normal,EF70%    H/O Doppler echocardiogram 7/12/10    intimal thickening but no sign. atherosclerotic plaque noted in [facility] OR  [facility]. H/O echocardiogram 5/2/2016 9/10     5/16 EF60% mild MR, TR, AR 9/20/10 Complete 2 dim transthoracic echo, techn. difficult. The study was  tech limited, LVSF is normal    History of atrial fibrillation     History of cardiovascular stress test 7/26/13, 7/10 8/08    7/13-WNL Observed defect consistent with breast attenuation. EF 70%. 7/10-post stress myocardial images show a normal pattern of perfusion in all regions. LV is normal  in size.   EF is 62%, global  LVSF is normal    History of falling     per old chart    History of nuclear stress test 10/08/2020    EF 60%, Normal    Hx of blood clots     per old chart  hx of DVT left leg    Hx: UTI (urinary tract infection)     Hyperglycemia 3/30/2019    Hyperlipidemia     Hypertension     Hypothyroidism     Incontinence of bowel     Incontinence of urine     Sepsis ([facility] [address] 2015    Tremor 3/30/2019    Wears glasses     to read    Wears partial dentures     upper    :   Past Surgical History:   Procedure Laterality Date    BREAST SURGERY      Left breast biopsy    CARDIOVERSION  10/17/2008    CARDIOVERSION  12/15/2008    HYSTERECTOMY (CERVIX STATUS UNKNOWN)  age 21's    IR GUIDED DRAIN W CATHETER PLACEMENT  08/21/2020    IR GUIDED DRAIN W CATHETER PLACEMENT 8/21/2020 [facility] SPECIAL PROCEDURES    IR GUIDED DRAIN W CATHETER PLACEMENT 09/08/2020    IR GUIDED DRAIN W CATHETER PLACEMENT 9/8/2020 [facility] SPECIAL PROCEDURES    JOINT REPLACEMENT  4/2005 2/2006    bilat    OTHER SURGICAL HISTORY  10/2009    bladder stimulator - interstim II 3058 --REMOVED PER SON - XRAY CHECKED BY RADIOLOGIST    NM ARTHRP ACETBLR/PROX FEM PROSTC AGRFT/ALGRFT Left 02/23/2015    Hip Replacement, Total    TOTAL KNEE ARTHROPLASTY  2005, 2006    bilateral     Medications:  Scheduled Meds:   ferrous sulfate  325 mg Oral Daily with breakfast    ipratropium-albuterol  1 ampule Inhalation BID    doxycycline (VIBRAMYCIN) IV  100 mg IntraVENous Q12H    metoprolol tartrate  25 mg Oral BID    sodium chloride flush  5-40 mL IntraVENous 2 times per day    [Held by provider] divalproex  250 mg Oral Nightly    [Held by provider] escitalopram  10 mg Oral Daily    folic acid  2 mg Oral Daily    sodium chloride flush  5-40 mL IntraVENous 2 times per day    enoxaparin  40 mg SubCUTAneous Daily    cefTRIAXone (ROCEPHIN) IV  1,000 mg IntraVENous Q24H    sucralfate  1 g Oral 4x Daily AC & HS    levothyroxine  175 mcg Oral QAM AC     Continuous Infusions:   sodium chloride      sodium chloride       PRN Meds:.sodium chloride flush, sodium chloride, sodium chloride flush, sodium chloride, ondansetron **OR** [DISCONTINUED] ondansetron, polyethylene glycol, acetaminophen **OR** acetaminophen    Allergies   Allergen Reactions    Tape Margrett Derik Tape] Itching    Codeine Rash    Sulfa Antibiotics Nausea And Vomiting     Social History     Socioeconomic History    Marital status:       Spouse name: Not on file    Number of children: Not on file    Years of education: Not on file    Highest education level: Not on file   Occupational History    Not on file   Tobacco Use    Smoking status: Former     Years: 1.00     Types: Cigarettes    Smokeless tobacco: Never    Tobacco comments:     \"quit yrs ago only smoked for 2-3 yrs\"   Vaping Use    Vaping Use: Never used   Substance and Sexual Activity Alcohol use: No     Alcohol/week: 0.0 standard drinks    Drug use: No    Sexual activity: Not Currently   Other Topics Concern    Not on file   Social History Narrative    Not on file     Social Determinants of Health     Financial Resource Strain: Not on file   Food Insecurity: Not on file   Transportation Needs: Not on file   Physical Activity: Not on file   Stress: Not on file   Social Connections: Not on file   Intimate Partner Violence: Not on file   Housing Stability: Not on file      Family History   Problem Relation Age of Onset    Diabetes Mother     Diabetes Father     Heart Disease Father          ROS (10 systems)  Unable to answer commands    Physical Exam:      Wt Readings from Last 3 Encounters:   01/19/23 214 lb 8.1 oz (97.3 kg)   01/18/23 210 lb (95.3 kg)   08/30/22 215 lb (97.5 kg)     Temp Readings from Last 3 Encounters:   01/19/23 97.5 °F (36.4 °C) (Oral)   09/01/22 98.1 °F (36.7 °C) (Oral)   03/25/22 98 °F (36.7 °C)     BP Readings from Last 3 Encounters:   01/19/23 139/73   12/13/22 132/74   09/23/22 108/64     Pulse Readings from Last 3 Encounters:   01/19/23 84   12/13/22 84   09/23/22 84        Gen: Alert, not verbalizing, unable to assess orientation, follows some commands  HEENT: NC/AT, tracks examiner around the room, PERRL, mmm, neck supple, no meningeal signs  Heart: a fib  Lungs: Respirations even an unlabored  Ext: no edema, no calf tenderness b/l  Psych: Non verbal, moaning, alert  Skin: no rashes or lesions    NEUROLOGIC EXAM:    Mental Status: Alert, moaning and otherwise non verbal, NAD, follows commands intermittently    Cranial Nerve Exam:   CN II-XII:  PERRL, VFF, no nystagmus, no gaze paresis, sensation V1-V3 intact b/l, muscles of facial expression symmetric; hearing intact to conversational tone, palate elevates symmetrically, shoulder elevation symmetric and tongue protrudes midline with movement side to side.     Motor Exam:       Strength 5/5 UE's/LE's left, 4/5 UE/LE right  Tone and bulk normal   Right upper extremity pronator drift    Deep Tendon Reflexes: 1/4 biceps, triceps, brachioradialis, patellar, and achilles b/l; flexor plantar responses b/l    Sensation: Intact light touch/pinprick/vibration UE's/LE's b/l    Coordination/Cerebellum:       Tremors--none      Rapidly alternating movements: Unable to follow commands, AYUSH      Heel-to-Shin: Unable to follow commands, AYUSH      Finger-to-Nose: Unable to follow commands, AYUSH    Gait and stance:      Gait: deferred      LABS:     Recent Labs     01/17/23  0855 01/18/23  0410 01/19/23  0603   WBC 12.5* 11.0* 10.8*    141 138   K 4.0 4.0 3.8    104 102   CO2 29 27 28   BUN 34* 21 14   CREATININE 0.9 0.7 0.6   GLUCOSE 96 83 92   INR 1.21  --   --        IMAGING:    CT head w/o contrast:  Impression   1. No acute intracranial abnormality. CTA head and neck:  Impression   Unchanged moderate stenosis at the origin of bilateral vertebral arteries. No other hemodynamically significant stenosis within the head and neck   circulation. Extensive consolidative changes of the right upper lobe and mild   consolidative changes of left upper lobe with small bilateral pleural   effusion are noted. Findings are likely suggestive of multifocal pneumonia. The C2 vertebral body extensive degenerative changes is associated with the   large cystic change and posterior cortical destruction there is also   associated fracture line at the base of the C2. .  The findings appears   unchanged since prior examination. If there is need for further evaluation   contrast enhanced MR examination can be obtained. Cervical       RECOMMENDATIONS:   Unavailable     MRI brain w/o contrast:  Impression   1. Acute left posterior cerebral artery territory infarct involving the   medial aspect of the left temporal lobe including the left hippocampus and   the left thalamus. 2. No acute intracranial hemorrhage.    3. Severe diffuse cerebral volume loss and chronic small vessel ischemic   changes. TTE 8/30/22:  Summary   TDS Due to patient cooperation. Saline Study appears negative. Left ventricular systolic function is normal.   Ejection fraction is visually estimated at 55-60%. Proximal septal thickening. No regional wall motion abnormalites. Indeterminate diastolic function due to underlying rhythm. Left ventricle size is normal.   Mildly dilated left atrium. Sclerotic, but non-stenotic aortic valve. Mild to moderate aortic regurgitation is noted. Mitral annular calcification is present PMVL. Mild to moderate mitral regurgitation. Mild to moderate tricuspid regurgitation; RVSP: 34 mmHg. Trace pulmonic regurgitation present. Pulmonic valve is structurally normal.   No evidence of any pericardial effusion. Aneurysmal interatrial septum with bowing into right atria; Suggestive of   increase LA pressures. IVC and abdominal aorta are within normal limits. All imaging was personally reviewed   ASSESSMENT/PLAN:   82year old female presenting with altered mental status. Patient is alert, moaning in bed. No meaningful language. Face is symmetric, PERRLA, appears to be managing own secretions. No meaningful speech, able to say \"mama\" when asked. Right upper and lower extremity weakness on exam with right upper extremity drift. Sensation intact to painful stimulus in the upper and lower extremities. Altered mental status likely secondary to acute stroke superimposed on pneumonia and underlying diminished cognitive reserve.    Neuro imaging as above  MRI notes acute left PCA infarct involving left temporal lobe including left hippocampus and left thalamus  CTA with moderate stenosis of the vertebral arteries, appears to be at baseline  Recent TTE as above  Recommend aspirin and atorvastatin for secondary stroke prevention  Recommend SLP swallow eval  A fib with RVR management per Cardiology/EP  Pneumonia, given area of stroke possibly secondary to aspiration, management per IM  LDL 29, A1C 6.3, TSH 0.934  B12, folate, pending  PT/OT as recommended  Will continue to follow pending clinical course. > 75 minutes of time spent included chart review, obtaining history, patient examination, developing plan of care, and documentation. Patient was discussed with attending neurologist Dr. Francisco J Chowdary. Thank you for allowing us to participate in the care of your patient. If there are any questions regarding evaluation please feel free to contact us. RAHAT Chan CNP, 1/19/2023     ------------------------------------    Attending Note:  I have rounded on this patient with MEGAN Hall. I have reviewed the chart and we have discussed this case in detail. The patient was seen and examined by myself. Pertinent labs and imaging have been personally reviewed. Our findings and impressions were discussed with the patient. I concur with the Nurse Specialist's assessment and plan. Patient seen and examined at bedside this afternoon. She is alert when I enter the room. She is repetitively saying \"yeah\". She tracks examiner around the room. Blinks to threat. Moving the left upper and lower extremity spontaneously and robustly. Minimal movement noted to the right lower extremity, does attempts to localize with the right upper extremity, however very apparent that the right upper extremity is weak. Station is intact to noxious stimuli x4 extremities. MRI brain imaging reviewed personally and does demonstrate an acute left PCA territory stroke. Patient with multiple risk factors including history of atrial fibrillation off anticoagulation, HTN, age, prior stroke. She has been taken off of anticoagulation and antiplatelet due to fall risk. From a neurology standpoint, I would recommend that patient at least be on antiplatelet for stroke prevention and low-dose statin given low LDL.   Suspect altered mentation is likely secondary from toxic encephalopathy superimposed on diminished cognitive reserve. Certainly left PCA territory stroke is not helping the situation but I do not feel is the entire cause of altered mentation.     Abraham Grimes DO 8/13/2022 2:08 PM

## 2023-01-19 NOTE — CARE COORDINATION
CM in to see Pt to initiate discharge planning. Pt not able to participate at this time. CM spoke with pt's son/poa, Julio. Who stated that pt will return to [facility] if the physician feels that pt is able. CM call to [address] [facility] at [facility]. Pt is from LTC at [facility]. Plan at this time is to return to [facility] either skilled or LTC. Will not need a pre-cert either way. CM team will follow.

## 2023-01-19 NOTE — PLAN OF CARE
Problem: Discharge Planning  Goal: Discharge to home or other facility with appropriate resources  Outcome: Progressing  Flowsheets (Taken 1/19/2023 1932)  Discharge to home or other facility with appropriate resources:   Identify barriers to discharge with patient and caregiver   Arrange for needed discharge resources and transportation as appropriate   Identify discharge learning needs (meds, wound care, etc)   Refer to discharge planning if patient needs post-hospital services based on physician order or complex needs related to functional status, cognitive ability or social support system     Problem: Pain  Goal: Verbalizes/displays adequate comfort level or baseline comfort level  Outcome: Progressing     Problem: Skin/Tissue Integrity  Goal: Absence of new skin breakdown  Description: 1. Monitor for areas of redness and/or skin breakdown  2. Assess vascular access sites hourly  3. Every 4-6 hours minimum:  Change oxygen saturation probe site  4. Every 4-6 hours:  If on nasal continuous positive airway pressure, respiratory therapy assess nares and determine need for appliance change or resting period.   Outcome: Progressing     Problem: ABCDS Injury Assessment  Goal: Absence of physical injury  Outcome: Progressing     Problem: Safety - Adult  Goal: Free from fall injury  Outcome: Progressing

## 2023-01-19 NOTE — PROGRESS NOTES
[facility] PHYSICAL REHABILITATION [place]  [address]  Phone: [phone]    Fax [phone]                  Sarina Peguero MD, Claudio Ohara MD, Sebastian Zapien MD, Cassell Brake, MD Abelino Cobb, MD Herschell Leventhal, MD Dr. Brock Rossi MD, RAHAT Tai, RAHAT Song, RAHAT Yoder, RAHAT Acosta PAFARA    CARDIOLOGY  NOTE      Name:  Eduardo Carcamo /Age/Sex: 1935  (82 y.o. female)   MRN & CSN:  [id number] & [id number] Admission Date/Time: 2023  8:42 AM   Location:  -A PCP: Rodríguez Banegas, [address] [place] [address] Day: 3    - Cardiology consult is for: A. fib RVR and prolonged QTC      ASSESSMENT/ PLAN:  Chronic atrial fibrillation: Rate controlled  Prolonged QTC  Valvular heart disease  Chronic HFpEF  -Dronedarone held  -Continue Lopressor 25 mg twice daily  -Appears euvolemic  -Not currently on oral anticoagulant due to multiple falls and anemia  Chronic anemia  -Hemoglobin stable  -Possible consideration of watchman device upon improvement of mental status. Will follow outpatient  Sepsis secondary to multifocal pneumonia  -Antibiotics per primary care  History of venous thromboembolism  -  Altered mental status  -Per primary care        Subjective:  Eboni Hunt is a 82 y. o.year old     Patient still with altered mental status    Objective: Temperature:  Current - Temp: 98.1 °F (36.7 °C);  Max - Temp  Av.8 °F (36.6 °C)  Min: 97.4 °F (36.3 °C)  Max: 98.2 °F (36.8 °C)    Respiratory Rate : Resp  Av.1  Min: 15  Max: 28    Pulse Range: Pulse  Av.3  Min: 79  Max: 99    Blood Presuure Range:  Systolic (79TCE), NOT:706 , Min:120 , ZHD:451   ; Diastolic (38JXH), MDM:95, Min:57, Max:73      Pulse ox Range: SpO2  Av.1 %  Min: 94 %  Max: 99 %    24hr I & O:    Intake/Output Summary (Last 24 hours) at 2023 1020  Last data filed at 1/19/2023 0204  Gross per 24 hour   Intake 120 ml   Output 600 ml   Net -480 ml         BP (!) 141/58   Pulse 89   Temp 98.1 °F (36.7 °C) (Axillary)   Resp 23   Ht 5' 4\" (1.626 m)   Wt 214 lb 8.1 oz (97.3 kg)   SpO2 94%   BMI 36.82 kg/m²         TELEMETRY: Atrial fibrillation rate controlled     has a past medical history of Allergic rhinitis, Arthritis, CHF (congestive heart failure) ([address] DVT, lower extremity, proximal, acute, left (HCC), Edema leg, Gastroesophageal reflux disease, H/O 24 hour EKG monitoring, H/O cardiac catheterization, H/O cardiac catheterization, H/O cardiovascular stress test, H/O Doppler echocardiogram, H/O echocardiogram, History of atrial fibrillation, History of cardiovascular stress test, History of falling, History of nuclear stress test, Hx of blood clots, Hx: UTI (urinary tract infection), Hyperglycemia, Hyperlipidemia, Hypertension, Hypothyroidism, Incontinence of bowel, Incontinence of urine, Sepsis ([facility] [address] Tremor, Wears glasses, and Wears partial dentures. has a past surgical history that includes Total knee arthroplasty (2005, 2006); Breast surgery; joint replacement (4/2005 2/2006); Cardioversion (10/17/2008); Cardioversion (12/15/2008); other surgical history (10/2009); pr arthrp acetblr/prox fem prostc agrft/algrft (Left, 02/23/2015); Hysterectomy (age 21's); IR GUIDED FLUID DRAINAGE BY CATH SOFT TISSUE PERC (08/21/2020); and IR GUIDED FLUID DRAINAGE BY CATH SOFT TISSUE PERC (09/08/2020). Physical Exam  Constitutional:       General: She is not in acute distress. Appearance: She is obese. She is not diaphoretic. HENT:      Head: Normocephalic and atraumatic. Right Ear: External ear normal.      Left Ear: External ear normal.      Nose: Nose normal.      Mouth/Throat:      Mouth: Mucous membranes are moist.   Eyes:      Extraocular Movements: Extraocular movements intact.       Comments: Pupils equal and round   Neck:      Vascular: No carotid bruit. Cardiovascular:      Rate and Rhythm: Normal rate and regular rhythm. Pulses: Normal pulses. Heart sounds: S1 normal and S2 normal. No murmur heard. No friction rub. No gallop. Pulmonary:      Effort: Pulmonary effort is normal. No respiratory distress. Breath sounds: Normal breath sounds. No rales. Chest:      Chest wall: No tenderness. Abdominal:      Palpations: Abdomen is soft. Tenderness: There is no abdominal tenderness. Musculoskeletal:      Right lower leg: No edema. Left lower leg: No edema. Skin:     General: Skin is warm and dry. Capillary Refill: Capillary refill takes less than 2 seconds. Findings: No rash. Comments: Skin turgor brisk   Neurological:      Mental Status: She is alert. Comments: Altered mental status   Psychiatric:         Behavior: Behavior is cooperative.         Medications:    ferrous sulfate  325 mg Oral Daily with breakfast    ipratropium-albuterol  1 ampule Inhalation BID    doxycycline (VIBRAMYCIN) IV  100 mg IntraVENous Q12H    metoprolol tartrate  25 mg Oral BID    sodium chloride flush  5-40 mL IntraVENous 2 times per day    [Held by provider] divalproex  250 mg Oral Nightly    [Held by provider] escitalopram  10 mg Oral Daily    folic acid  2 mg Oral Daily    sodium chloride flush  5-40 mL IntraVENous 2 times per day    enoxaparin  40 mg SubCUTAneous Daily    cefTRIAXone (ROCEPHIN) IV  1,000 mg IntraVENous Q24H    sucralfate  1 g Oral 4x Daily AC & HS    levothyroxine  175 mcg Oral QAM AC      sodium chloride      sodium chloride       sodium chloride flush, sodium chloride, sodium chloride flush, sodium chloride, ondansetron **OR** [DISCONTINUED] ondansetron, polyethylene glycol, acetaminophen **OR** acetaminophen    Lab Data:  CBC:   Recent Labs     01/17/23  0855 01/18/23  0410 01/19/23  0603   WBC 12.5* 11.0* 10.8*   HGB 8.4* 7.7* 7.4*   HCT 26.9* 25.2* 25.7*   MCV 93.4 95.5 100.0    223 244     BMP:   Recent Labs     01/17/23  0855 01/18/23  0410 01/19/23  0603    141 138   K 4.0 4.0 3.8    104 102   CO2 29 27 28   BUN 34* 21 14   CREATININE 0.9 0.7 0.6     LIVER PROFILE:   Recent Labs     01/17/23  0855   AST 24   ALT 18   BILITOT 0.4   ALKPHOS 76     PT/INR:   Recent Labs     01/17/23  0855   PROTIME 15.7*   INR 1.21     APTT: No results for input(s): APTT in the last 72 hours. BNP:  No results for input(s): BNP in the last 72 hours. TROPONIN:   Recent Labs     01/17/23  0855   TROPONINT <0.010     Labs, consult, tests reviewed          [place] Thomas PA-C, 1/19/2023 10:20 AM         CARDIOLOGY ATTENDING ADDENDUM    I have seen, spoken to and examined this patient personally, independent of the NP/PAC. I have reviewed the hospital care given to date and reviewed all pertinent labs and imaging. I have spoken with patient, nursing staff and provided written and verbal instructions . The above note has been reviewed. I have spent substantive amount of time in formulating patient care.              Physical Exam:    General:   Awake, confused  Head:normal  Eye:normal  Chest:  Clear   Cardiovascular:  S1S2 IRIR         MEDICAL DECISION MAKING :     Chronic atrial fibrillation: Rate controlled  Continue with Lopressor  Discontinue Multaq  Not on oral anticoagulation due to frequent falls and anemia    Patient being treated for sepsis with antibiotics  Change in mental status: Management per primary team    Cardiology will sign off please call with questions    Dr. Sarah Pastrana MD

## 2023-01-19 NOTE — PROGRESS NOTES
V2.0  [facility] Hospitalist Progress Note      Name:  Lashanda Cummings /Age/Sex: 1935  (82 y.o. female)   MRN & CSN:  [id number] & [id number] Encounter Date/Time: 2023 7:26 AM EST    Location:  -A PCP: Jared Walker MD       Hospital Day: 3    Assessment and Plan:   Sepsis 2/2 multifocal pneumonia. Acute hypoxic resp failure   Came w leukocytosis, tachycardia. No LA. CXR revealed multifocal PNA. Currently on 3L NC. Will treat for community acquired pneumonia  - rocephin/azithromycin-------- initially, switched to Rocephin and doxycycline given prolonged QTC as below.  - get resp culture, strep, legionella, pending  -Negative resp viral panel  - continue IVF  - blood cxX2 sent and pending  - wean O2 as tolerated  - breathing treatment     Acute encephalopathy -likely mixed picture in the setting of stroke as below in addition to infection as above. Acute left PCA territory infarct -came in with AUDREY siegel with RVR on admission, she is not on anticoagulation as she did have recurrent falls recently, she is high risk for stroke  Gradual since Friday with poor oral intake. Likely 2/2 delirium from sepsis. Versus could be a stroke given upon talking to patient's family, she was last seen well Friday, and her baseline is usually able to move around with a wheelchair, she is able to eat and drink on her own, and she is able to hold a conversation. Currently patient on admission is just moaning in bed, does not follow any commands. Pt has signs of worsening cognitive per family past few months but never worked up by neurology outpatient. Had MRI 2022 showing cerebral atrophy, severe chronic small vessel ischemic changes. CT head this admission was negative. 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   Noted though extensive conservative changes of the right upper lobe and mild concentric changes of the left upper lobe with small bilateral pleural effusions. - brain MRI with acute left posterior cerebral artery territory infarct involving the medial aspect of the left temporal lobe including the left hippocampus and the left thalamus. No hemorrhage. Severe diffuse cerebral volume loss and chronic small vessel ischemic changes. also sent for lipid panel and A1c. Consider repeat TTE with bubble.  -Completing altered mental state work-up with ammonia back at 21 and VBG with pH of 7.21.    -TSH checked this admission and was unremarkable. Not sure if patient is focal or not as she has not following commands and we are not able to do an neurological exam, likely has right-sided deficits, noted by staff that she is not moving her right side much.  -Neurology consulted, appreciate help  NIH SS? Unable to determine  -Neurochecks every 4 hours     Chronic atrial fibrillation came with 's. Likely 2/2 sepsis. Patient came and with RVR, she is not on anticoagulation, high risk for stroke as above. Not on anticoagulation, likely due to recurrent falls. Pt was being considered for watchman.  -Currently off her home amio. - consult cardiology given  and pt on home dronedarone, cardiology evaluated, they started Toprol tartrate 25 mg twice daily.  -Telemetry     HFpEF. Not in excarbation. ECHO 08/30/2022 EF 55-60%, proximal septal thickening, mildly dilated LA, mild to moderate MR, AR, TR.   -holding lasix given pt's sepsis. - daily intake/output, weights     CKD stage IIIb  - Cr 0.9 baseline 1.2-1.4     Chronic anemia  Stable Hgb 9 on baseline 7-8. No sign of bleed. - continue home iron           Disposition:   Current Living situation: nursing home  Expected Disposition: nursing home  Estimated D/C: 2-3 days     Diet ADULT DIET;  Dysphagia - Soft and Bite Sized   DVT Prophylaxis [x] Lovenox, []  Heparin, [] SCDs, [] Ambulation,  [] Eliquis, [] Xarelto  [] Coumadin   Code Status Full Code   Disposition Patient requires continued admission due to pneumonia    son     Subjective:     Chief Complaint: Altered Mental Status (LKW apprx 2 days ago. /)       Meka Green is a 82 y.o. female who presents with pneumonia. Morning in bed, was not able to answer any questions or follow any commands. Alert. Review of Systems:    Review of Systems    10 point review of systems negative except as above. Objective: Intake/Output Summary (Last 24 hours) at 1/19/2023 0810  Last data filed at 1/19/2023 0204  Gross per 24 hour   Intake 120 ml   Output 600 ml   Net -480 ml          Vitals:   Vitals:    01/19/23 0746   BP:    Pulse:    Resp:    Temp:    SpO2: 99%       Physical Exam:     General: NAD, obese  Eyes: EOMI  ENT: neck supple  Cardiovascular: Regular rhythm, normal rate  Respiratory: On 3 L nasal cannula  Gastrointestinal: Soft, non tender  Genitourinary: no suprapubic tenderness  Musculoskeletal: No edema  Skin: warm, dry  Neuro: Alert. Moans in bed, is not following any commands, not moving extremities currently. Psych: Mood appropriate.      Medications:   Medications:    ferrous sulfate  325 mg Oral Daily with breakfast    ipratropium-albuterol  1 ampule Inhalation BID    doxycycline (VIBRAMYCIN) IV  100 mg IntraVENous Q12H    metoprolol tartrate  25 mg Oral BID    sodium chloride flush  5-40 mL IntraVENous 2 times per day    [Held by provider] divalproex  250 mg Oral Nightly    [Held by provider] escitalopram  10 mg Oral Daily    folic acid  2 mg Oral Daily    sodium chloride flush  5-40 mL IntraVENous 2 times per day    enoxaparin  40 mg SubCUTAneous Daily    cefTRIAXone (ROCEPHIN) IV  1,000 mg IntraVENous Q24H    sucralfate  1 g Oral 4x Daily AC & HS    levothyroxine  175 mcg Oral QAM AC      Infusions:    sodium chloride      sodium chloride       PRN Meds: sodium chloride flush, 5-40 mL, PRN  sodium chloride, , PRN  sodium chloride flush, 5-40 mL, PRN  sodium chloride, , PRN  ondansetron, 4 mg, Q8H PRN  polyethylene glycol, 17 g, Daily PRN  acetaminophen, 650 mg, Q6H PRN   Or  acetaminophen, 650 mg, Q6H PRN      Labs      Recent Results (from the past 24 hour(s))   POCT Glucose    Collection Time: 01/18/23  4:29 PM   Result Value Ref Range    POC Glucose 91 70 - 99 MG/DL   Ammonia    Collection Time: 01/18/23  5:18 PM   Result Value Ref Range    Ammonia 21 11 - 51 UMOL/L   Blood Gas, Venous    Collection Time: 01/18/23  5:18 PM   Result Value Ref Range    pH, Oneil 7.41 7.32 - 7.42    pCO2, Oneil 49 38 - 52 mmHG    pO2, Oneil 56 (H) 28 - 48 mmHG    Base Exc, Mixed 5.3 (H) 0 - 2.3    HCO3, Venous 31.1 (H) 19 - 25 MMOL/L    O2 Sat, Oneil 88.1 (H) 50 - 70 %    Comment VBG    Hemoglobin A1C    Collection Time: 01/18/23  5:18 PM   Result Value Ref Range    Hemoglobin A1C 6.3 4.2 - 6.3 %    eAG 134 mg/dL   Lipid Panel    Collection Time: 01/18/23  5:18 PM   Result Value Ref Range    Triglycerides 89 <150 MG/DL    Cholesterol 68 <200 MG/DL    HDL 21 (L) >40 MG/DL    LDL Calculated 29 <100 MG/DL   POCT Glucose    Collection Time: 01/18/23  8:21 PM   Result Value Ref Range    POC Glucose 87 70 - 99 MG/DL   CBC with Auto Differential    Collection Time: 01/19/23  6:03 AM   Result Value Ref Range    WBC 10.8 (H) 4.0 - 10.5 K/CU MM    RBC 2.57 (L) 4.2 - 5.4 M/CU MM    Hemoglobin 7.4 (L) 12.5 - 16.0 GM/DL    Hematocrit 25.7 (L) 37 - 47 %    .0 78 - 100 FL    MCH 28.8 27 - 31 PG    MCHC 28.8 (L) 32.0 - 36.0 %    RDW 15.3 (H) 11.7 - 14.9 %    Platelets 191 290 - 535 K/CU MM    MPV 11.8 (H) 7.5 - 11.1 FL    Differential Type AUTOMATED DIFFERENTIAL     Segs Relative 77.3 (H) 36 - 66 %    Lymphocytes % 10.5 (L) 24 - 44 %    Monocytes % 7.3 (H) 0 - 4 %    Eosinophils % 3.0 0 - 3 %    Basophils % 0.4 0 - 1 %    Segs Absolute 8.3 K/CU MM    Lymphocytes Absolute 1.1 K/CU MM    Monocytes Absolute 0.8 K/CU MM    Eosinophils Absolute 0.3 K/CU MM    Basophils Absolute 0.0 K/CU MM    Nucleated RBC % 0.2 %    Total Nucleated RBC 0.0 K/CU MM    Total Immature Neutrophil 0.16 K/CU MM    Immature Neutrophil % 1.5 (H) 0 - 0.43 %   Basic Metabolic Panel w/ Reflex to MG    Collection Time: 01/19/23  6:03 AM   Result Value Ref Range    Sodium 138 135 - 145 MMOL/L    Potassium 3.8 3.5 - 5.1 MMOL/L    Chloride 102 99 - 110 mMol/L    CO2 28 21 - 32 MMOL/L    Anion Gap 8 4 - 16    BUN 14 6 - 23 MG/DL    Creatinine 0.6 0.6 - 1.1 MG/DL    Est, Glom Filt Rate >60 >60 mL/min/1.73m2    Glucose 92 70 - 99 MG/DL    Calcium 7.9 (L) 8.3 - 10.6 MG/DL        Imaging/Diagnostics Last 24 Hours   CT HEAD WO CONTRAST    Result Date: 1/17/2023  EXAMINATION: CT OF THE HEAD WITHOUT CONTRAST  1/17/2023 11:10 am TECHNIQUE: CT of the head was performed without the administration of intravenous contrast. Automated exposure control, iterative reconstruction, and/or weight based adjustment of the mA/kV was utilized to reduce the radiation dose to as low as reasonably achievable. COMPARISON: 08/30/2022. HISTORY: ORDERING SYSTEM PROVIDED HISTORY: aMS TECHNOLOGIST PROVIDED HISTORY: Has a \"code stroke\" or \"stroke alert\" been called? ->No Reason for exam:->aMS Decision Support Exception - unselect if not a suspected or confirmed emergency medical condition->Emergency Medical Condition (MA) Reason for Exam: AMS FINDINGS: BRAIN/VENTRICLES: There is no acute intracranial hemorrhage, mass effect or midline shift. No abnormal extra-axial fluid collection. The gray-white differentiation is maintained without evidence of an acute infarct. There is prominence of the ventricles and sulci due to global parenchymal volume loss. There are nonspecific areas of hypoattenuation within the periventricular and subcortical white matter, which likely represent chronic microvascular ischemic change. 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  ORBITS: The visualized portion of the orbits demonstrate no acute abnormality. SINUSES: The visualized paranasal sinuses and mastoid air cells demonstrate no acute abnormality. SOFT TISSUES/SKULL: No acute abnormality of the visualized skull or soft tissues. 1.No acute intracranial abnormality. XR CHEST PORTABLE    Result Date: 1/17/2023  EXAMINATION: ONE XRAY VIEW OF THE CHEST 1/17/2023 8:49 am COMPARISON: 08/30/2022. HISTORY: ORDERING SYSTEM PROVIDED HISTORY: AMS TECHNOLOGIST PROVIDED HISTORY: Reason for exam:->AMS Reason for Exam: AMS FINDINGS: The heart size is enlarged. The pulmonary vasculature is within normal limits. There are patchy bilateral densities. No pneumothoraces are seen. 1. Patchy bilateral densities likely representing multifocal pneumonia. I would recommend follow-up to resolution. 2. Stable cardiomegaly without overt failure.        Electronically signed by Paige Shah MD on 1/19/2023 at 8:10 AM

## 2023-01-19 NOTE — PROGRESS NOTES
[id number] [facility] SPEECH/LANGUAGE PATHOLOGY    Lupis Marion  1/19/2023  [id number]    Attempted to see Lupis Marion for dysphagia follow-up. Pt receiving personal care. Will reattempt as able.     ENEDELIA Thompson  1/19/2023  10:32 AM

## 2023-01-20 LAB
ANION GAP SERPL CALCULATED.3IONS-SCNC: 7 MMOL/L (ref 4–16)
BASOPHILS ABSOLUTE: 0 K/CU MM
BASOPHILS RELATIVE PERCENT: 0.5 % (ref 0–1)
BUN BLDV-MCNC: 11 MG/DL (ref 6–23)
CALCIUM SERPL-MCNC: 7.8 MG/DL (ref 8.3–10.6)
CHLORIDE BLD-SCNC: 105 MMOL/L (ref 99–110)
CO2: 29 MMOL/L (ref 21–32)
CREAT SERPL-MCNC: 0.7 MG/DL (ref 0.6–1.1)
DIFFERENTIAL TYPE: ABNORMAL
EOSINOPHILS ABSOLUTE: 0.4 K/CU MM
EOSINOPHILS RELATIVE PERCENT: 4.3 % (ref 0–3)
GFR SERPL CREATININE-BSD FRML MDRD: >60 ML/MIN/1.73M2
GLUCOSE BLD-MCNC: 133 MG/DL (ref 70–99)
GLUCOSE BLD-MCNC: 136 MG/DL (ref 70–99)
GLUCOSE BLD-MCNC: 91 MG/DL (ref 70–99)
GLUCOSE BLD-MCNC: 92 MG/DL (ref 70–99)
HCT VFR BLD CALC: 25.4 % (ref 37–47)
HEMOGLOBIN: 7.6 GM/DL (ref 12.5–16)
IMMATURE NEUTROPHIL %: 1.6 % (ref 0–0.43)
LYMPHOCYTES ABSOLUTE: 1.3 K/CU MM
LYMPHOCYTES RELATIVE PERCENT: 14.6 % (ref 24–44)
MCH RBC QN AUTO: 29.1 PG (ref 27–31)
MCHC RBC AUTO-ENTMCNC: 29.9 % (ref 32–36)
MCV RBC AUTO: 97.3 FL (ref 78–100)
MONOCYTES ABSOLUTE: 0.7 K/CU MM
MONOCYTES RELATIVE PERCENT: 8.1 % (ref 0–4)
NUCLEATED RBC %: 0 %
PDW BLD-RTO: 15.5 % (ref 11.7–14.9)
PLATELET # BLD: 243 K/CU MM (ref 140–440)
PMV BLD AUTO: 11.7 FL (ref 7.5–11.1)
POTASSIUM SERPL-SCNC: 3.6 MMOL/L (ref 3.5–5.1)
RBC # BLD: 2.61 M/CU MM (ref 4.2–5.4)
SEGMENTED NEUTROPHILS ABSOLUTE COUNT: 6.1 K/CU MM
SEGMENTED NEUTROPHILS RELATIVE PERCENT: 70.9 % (ref 36–66)
SODIUM BLD-SCNC: 141 MMOL/L (ref 135–145)
TOTAL IMMATURE NEUTOROPHIL: 0.14 K/CU MM
TOTAL NUCLEATED RBC: 0 K/CU MM
WBC # BLD: 8.5 K/CU MM (ref 4–10.5)

## 2023-01-20 PROCEDURE — 97530 THERAPEUTIC ACTIVITIES: CPT

## 2023-01-20 PROCEDURE — 6370000000 HC RX 637 (ALT 250 FOR IP): Performed by: STUDENT IN AN ORGANIZED HEALTH CARE EDUCATION/TRAINING PROGRAM

## 2023-01-20 PROCEDURE — 82962 GLUCOSE BLOOD TEST: CPT

## 2023-01-20 PROCEDURE — 99233 SBSQ HOSP IP/OBS HIGH 50: CPT | Performed by: NURSE PRACTITIONER

## 2023-01-20 PROCEDURE — 6370000000 HC RX 637 (ALT 250 FOR IP)

## 2023-01-20 PROCEDURE — 2500000003 HC RX 250 WO HCPCS: Performed by: STUDENT IN AN ORGANIZED HEALTH CARE EDUCATION/TRAINING PROGRAM

## 2023-01-20 PROCEDURE — 76937 US GUIDE VASCULAR ACCESS: CPT

## 2023-01-20 PROCEDURE — 2700000000 HC OXYGEN THERAPY PER DAY

## 2023-01-20 PROCEDURE — 2580000003 HC RX 258: Performed by: STUDENT IN AN ORGANIZED HEALTH CARE EDUCATION/TRAINING PROGRAM

## 2023-01-20 PROCEDURE — 6360000002 HC RX W HCPCS: Performed by: STUDENT IN AN ORGANIZED HEALTH CARE EDUCATION/TRAINING PROGRAM

## 2023-01-20 PROCEDURE — 85025 COMPLETE CBC W/AUTO DIFF WBC: CPT

## 2023-01-20 PROCEDURE — 80048 BASIC METABOLIC PNL TOTAL CA: CPT

## 2023-01-20 PROCEDURE — 97112 NEUROMUSCULAR REEDUCATION: CPT

## 2023-01-20 PROCEDURE — 94761 N-INVAS EAR/PLS OXIMETRY MLT: CPT

## 2023-01-20 PROCEDURE — 97535 SELF CARE MNGMENT TRAINING: CPT

## 2023-01-20 PROCEDURE — 94640 AIRWAY INHALATION TREATMENT: CPT

## 2023-01-20 PROCEDURE — 02HV33Z INSERTION OF INFUSION DEVICE INTO SUPERIOR VENA CAVA, PERCUTANEOUS APPROACH: ICD-10-PCS | Performed by: STUDENT IN AN ORGANIZED HEALTH CARE EDUCATION/TRAINING PROGRAM

## 2023-01-20 PROCEDURE — 94760 N-INVAS EAR/PLS OXIMETRY 1: CPT

## 2023-01-20 PROCEDURE — 92526 ORAL FUNCTION THERAPY: CPT

## 2023-01-20 PROCEDURE — 97110 THERAPEUTIC EXERCISES: CPT

## 2023-01-20 PROCEDURE — 2060000000 HC ICU INTERMEDIATE R&B

## 2023-01-20 RX ADMIN — DOXYCYCLINE 100 MG: 100 INJECTION, POWDER, LYOPHILIZED, FOR SOLUTION INTRAVENOUS at 01:46

## 2023-01-20 RX ADMIN — LEVOTHYROXINE SODIUM 175 MCG: 25 TABLET ORAL at 06:05

## 2023-01-20 RX ADMIN — METOPROLOL TARTRATE 25 MG: 25 TABLET, FILM COATED ORAL at 21:42

## 2023-01-20 RX ADMIN — SUCRALFATE 1 G: 1 TABLET ORAL at 06:05

## 2023-01-20 RX ADMIN — METOPROLOL TARTRATE 25 MG: 25 TABLET, FILM COATED ORAL at 10:01

## 2023-01-20 RX ADMIN — SUCRALFATE 1 G: 1 TABLET ORAL at 15:57

## 2023-01-20 RX ADMIN — SODIUM CHLORIDE, PRESERVATIVE FREE 10 ML: 5 INJECTION INTRAVENOUS at 10:01

## 2023-01-20 RX ADMIN — IPRATROPIUM BROMIDE AND ALBUTEROL SULFATE 1 AMPULE: 2.5; .5 SOLUTION RESPIRATORY (INHALATION) at 19:31

## 2023-01-20 RX ADMIN — FOLIC ACID 2 MG: 1 TABLET ORAL at 10:01

## 2023-01-20 RX ADMIN — Medication 10 ML: at 21:35

## 2023-01-20 RX ADMIN — IPRATROPIUM BROMIDE AND ALBUTEROL SULFATE 1 AMPULE: 2.5; .5 SOLUTION RESPIRATORY (INHALATION) at 08:19

## 2023-01-20 RX ADMIN — SUCRALFATE 1 G: 1 TABLET ORAL at 11:52

## 2023-01-20 RX ADMIN — SODIUM CHLORIDE, PRESERVATIVE FREE 10 ML: 5 INJECTION INTRAVENOUS at 21:34

## 2023-01-20 RX ADMIN — CEFTRIAXONE SODIUM 1000 MG: 1 INJECTION, POWDER, FOR SOLUTION INTRAMUSCULAR; INTRAVENOUS at 21:33

## 2023-01-20 RX ADMIN — Medication 10 ML: at 10:01

## 2023-01-20 RX ADMIN — ENOXAPARIN SODIUM 40 MG: 100 INJECTION SUBCUTANEOUS at 10:00

## 2023-01-20 RX ADMIN — FERROUS SULFATE TAB 325 MG (65 MG ELEMENTAL FE) 325 MG: 325 (65 FE) TAB at 10:00

## 2023-01-20 RX ADMIN — SUCRALFATE 1 G: 1 TABLET ORAL at 21:42

## 2023-01-20 NOTE — PROGRESS NOTES
Physical Therapy  Name: Cristhian Licona MRN: [id number] :   1935   Date:  2023   Admission Date: 2023 Room:  -A   Restrictions/Precautions:        fall risk, confusion, R-sided deficits, on 02, tele, suprapubic catheter    Communication with other providers:  Sona Davis RN states pt is ok to see for therapy, Mary Muller present for cotx to provide simultaneous skilled services per patient safety with rehab    Subjective:  Patient states:  Patient unable to verbalize meaningful conversation. She does demo's appropriate pain/discomfort response with BLE and BUE AAROM. Pain:   Location, Type, Intensity (0/10 to 10/10): Hip discomfort with AAROM    Objective:    Observation:  Patient is supine in bed on 1N, vitals stable. Patient has suprapubic catheter. Perirectal area saturated with BM. She demo's little to no muscle activation on RUE and RLE. She demo's fair processing and carries over cues to the wrong muscle action. Treatment, including education/measures:    Transfers with line management of Tele Monitor, Pulse Ox, BP Cuff, NC O2  Rolling: Patient needs Dep x 2 to the Lt, Max A x 2 to the Rt with patient demo'ing good initiation to reach to the Rt with LUE. She needs Mod-Max A to balance in side lying bilarterally during CARRILLO ADL for perirectal cleanup. She needs Min A to return to supine d/t fair eccentric return from side lying. On each side for ~3-5 minutes  Patient repositioned in bed: Supine scooting Dep x 2    Bed placed in chair postioning for exercises:  Trunk ant/posterior weight shifting, lateral weight shifting x 5 reps ea. Mod A for patient to participate, from CARRILLO, during [address] for weight shifting. Neuro-juna m - patient cued for participation, direction of weight shift and proprioception and balance. PNF pattern unilateral D2 flexion for ea UE x 5 reps    Supine exercises:  All AAROM  Ankle pumps x 10  Hip abd x 10  SLR x 10    Therapeutic exercises were instructed today.  Cues were given for technique, safety, recruitment, and rationale. Cues were verbal and/or tactile. Safety  Patient left safely in the bed, HOB elevated to 60*, with call light/phone in reach with alarm applied. Gait belt and mask were used for transfers and gait. Assessment / Impression:     Patient's tolerance of treatment:  Fair   Adverse Reaction: none  Significant change in status and impact:  none  Barriers to improvement:  cognition, weakness    Plan for Next Session:    Will cont to work towards pt's goals per patient tolerance  Time in:  0906  Time out:  0953  Timed treatment minutes:  47  Total treatment time:  52  Previously filed items:     Short Term Goals  Time Frame for Short Term Goals: 1 week  Short Term Goal 1: Pt will follow commands for LLE AROM x10 reps ea  Short Term Goal 2: Pt will tolerate sup>sit with MAx A x2  Short Term Goal 3: Pt will tolerate Chris to chair with x2 assist  Short Term Goal 4: Pt will participate in seated balance activity at EOB x5 min with Mod x2 for balance and safety       Electronically signed by:     Dominik Abdi PTA  1/20/2023, 9:41 AM

## 2023-01-20 NOTE — PROGRESS NOTES
[id number] [place] OF SPEECH/LANGUAGE PATHOLOGY  DAILY PROGRESS NOTE  Luigi Soliman  1/20/2023  [id number]  Chronic anemia [D64.9]  Chronic respiratory failure with hypoxia (HCC) [J96.11]  Altered mental status, unspecified altered mental status type [R41.82]  Pneumonia of both lungs due to infectious organism, unspecified part of lung [J18.9]  Multifocal pneumonia [J18.9]  Sepsis, due to unspecified organism, unspecified whether acute organ dysfunction present ([facility] [address] [A41.9]  Allergies   Allergen Reactions    Tape Raguel Gulling Tape] Itching    Codeine Rash    Sulfa Antibiotics Nausea And Vomiting         Pt was seen this date for dysphagia treatment. IMPRESSION AND RECOMMENDATIONS: Luigi Soliman was seen for dysphagia follow-up. She was seated upright in bed, alert, cooperative. She did not follow direct verbal commands but responded appropriately to environmental cues/stimuli. Noted continued intermittent moaning, no verbalizations. Pt seen with breakfast tray including soft and bite-sized diet/thin liquids via straw and was also seen with pills administered crushed in pudding by RN. Oral stage remains mildly impaired with inconsistent labial seal around straw, slow/adequate mastication, intact oral clearance. Pharyngeal stage appears grossly intact. Delayed cough noted x1 following initial straw sip of thin liquids, suspect d/t reduced lingual control/premature pharyngeal entry. No additional s/s aspiration identified throughout meal.    Recommend continued soft and bite-sized diet/thin liquids, total feed, slow rate, upright. Continue to crush pills as able and give in pureed/pudding consistency. SLP will follow to monitor/advance and for further speech/language evaluation.     GOALS (current status in bold):  Short-term Goals  Timeframe for Short-term Goals: length of admission  Goal 1: Pt will tolerate soft/bite-sized diet and thin liquids with adequate oral manipulation/clearance and no s/s aspiration. Partially meeting, continue  Goal 2: Pt will tolerate PO trials of advanced textures with adequate oral manipulation/clearance and no s/s aspiration. Deferred d/t increased time required for current diet level  Goal 3: Caregivers will indicate understanding of education/recommendations.  Meeting, d/w RN          EDUCATION: recommendations d/w RN    PAIN RATING (0-10 Scale): does not rate, appears comfortable  Time in/Time out: SLP Individual Minutes  Time In: 1005  Time Out: 7703  Minutes: 30    Visit number: [id number] [address] [facility]  1/20/2023  11:19 AM

## 2023-01-20 NOTE — PROGRESS NOTES
V2.0  [facility] [place]ist Progress Note      Name:  Abimael Tapia /Age/Sex: 1935  (82 y.o. female)   MRN & CSN:  [id number] & [id number] Encounter Date/Time: 2023 7:26 AM EST    Location:  -A PCP: Lidia Pearson MD       Hospital Day: 4    Assessment and Plan:   Sepsis 2/2 multifocal pneumonia. Acute hypoxic resp failure   Came w leukocytosis, tachycardia. No LA. CXR revealed multifocal PNA. Currently on 3L NC. Will treat for community acquired pneumonia  - rocephin/azithromycin-------- initially, switched to Rocephin and doxycycline given prolonged QTC as below. Will discontinue doxycycline, continue with Rocephin to treat for potential aspiration pneumonia, antigens are negative as below.  - get resp culture pending, strep, legionella, negative  -Negative resp viral panel  -Was on IV fluids initially, currently eating, will hold on further IV fluids. - blood cxX2 sent and pending  - wean O2 as tolerated  - breathing treatment     Acute encephalopathy -likely mixed picture in the setting of stroke as below in addition to infection as above. Acute left PCA territory infarct -came in with A. fib with RVR on admission, she is not on anticoagulation as she did have recurrent falls recently, she is high risk for stroke  Gradual since Friday with poor oral intake. Likely 2/2 delirium from sepsis. Versus could be a stroke given upon talking to patient's family, she was last seen well Friday, and her baseline is usually able to move around with a wheelchair, she is able to eat and drink on her own, and she is able to hold a conversation. Currently patient on admission is just moaning in bed, does not follow any commands. Pt has signs of worsening cognitive per family past few months but never worked up by neurology outpatient. Had MRI 2022 showing cerebral atrophy, severe chronic small vessel ischemic changes. CT head this admission was negative.    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 Noted though extensive conservative changes of the right upper lobe and mild concentric changes of the left upper lobe with small bilateral pleural effusions. - brain MRI with acute left posterior cerebral artery territory infarct involving the medial aspect of the left temporal lobe including the left hippocampus and the left thalamus. No hemorrhage. Severe diffuse cerebral volume loss and chronic small vessel ischemic changes. also sent for lipid panel and A1c. Consider repeat TTE with bubble.  -Completing altered mental state work-up with ammonia back at 21 and VBG with pH of 7.21.    -TSH checked this admission and was unremarkable. Not sure if patient is focal or not as she has not following commands and we are not able to do an neurological exam, likely has right-sided deficits, noted by staff that she is not moving her right side much.  -Neurology consulted, appreciate help  NIH SS? Unable to determine  -Neurochecks every 4 hours  -----We will call son today, discussed goals of care, may be appropriate for hospice, will bring it up today. Potential for rehab is poor given the size of the stroke. Chronic atrial fibrillation came with 's. Likely 2/2 sepsis. Patient came and with RVR, she is not on anticoagulation, high risk for stroke as above. Not on anticoagulation, likely due to recurrent falls. Pt was being considered for watchman.  -Currently off her home amio.   -consult cardiology given  and pt on home dronedarone, cardiology evaluated, they started Toprol tartrate 25 mg twice daily.  -Telemetry     HFpEF. Not in excarbation. ECHO 08/30/2022 EF 55-60%, proximal septal thickening, mildly dilated LA, mild to moderate MR, AR, TR.   -holding lasix given pt's sepsis.    - daily intake/output, weights     CKD stage IIIb  - Cr 0.9 baseline 1.2-1.4    Chronic anemia  Stable Hgb 9 on baseline 7-8. No sign of bleed. - continue home iron           Disposition:   Current Living situation: nursing home  Expected Disposition: nursing home  Estimated D/C: 2-3 days     Diet ADULT DIET; Dysphagia - Soft and Bite Sized   DVT Prophylaxis [x] Lovenox, []  Heparin, [] SCDs, [] Ambulation,  [] Eliquis, [] Xarelto  [] Coumadin   Code Status Full Code   Disposition Patient requires continued admission due to pneumonia    son     Subjective:     Chief Complaint: Altered Mental Status (LKW apprx 2 days ago. /)       Lashanda Cummings is a 82 y.o. female who presents with pneumonia. Sitting in bed today, eating breakfast, dysphagia diet, doing okay, alert. Review of Systems:    Review of Systems    10 point review of systems negative except as above. Objective: Intake/Output Summary (Last 24 hours) at 1/20/2023 1105  Last data filed at 1/20/2023 9626  Gross per 24 hour   Intake 120 ml   Output 400 ml   Net -280 ml          Vitals:   Vitals:    01/20/23 1047   BP:    Pulse: 76   Resp:    Temp:    SpO2:        Physical Exam:     General: NAD, obese  Eyes: EOMI  ENT: neck supple  Cardiovascular: Regular rhythm, normal rate  Respiratory: On 3 L nasal cannula  Gastrointestinal: Soft, non tender  Genitourinary: no suprapubic tenderness  Musculoskeletal: No edema  Skin: warm, dry  Neuro: Alert. Moans in bed, follows commands, wiggles toes bilaterally, squeezes on the left, 2 out of 5 on the right upper extremity. Dysarthric, voices no words. Psych: Mood appropriate.      Medications:   Medications:    ferrous sulfate  325 mg Oral Daily with breakfast    ipratropium-albuterol  1 ampule Inhalation BID    doxycycline (VIBRAMYCIN) IV  100 mg IntraVENous Q12H    metoprolol tartrate  25 mg Oral BID    sodium chloride flush  5-40 mL IntraVENous 2 times per day    [Held by provider] divalproex  250 mg Oral Nightly    [Held by provider] escitalopram  10 mg Oral Daily    folic acid  2 mg Oral Daily    sodium chloride flush  5-40 mL IntraVENous 2 times per day    enoxaparin  40 mg SubCUTAneous Daily    cefTRIAXone (ROCEPHIN) IV  1,000 mg IntraVENous Q24H    sucralfate  1 g Oral 4x Daily AC & HS    levothyroxine  175 mcg Oral QAM AC      Infusions:    sodium chloride      sodium chloride 10 mL/hr at 01/20/23 0145     PRN Meds: sodium chloride flush, 5-40 mL, PRN  sodium chloride, , PRN  sodium chloride flush, 5-40 mL, PRN  sodium chloride, , PRN  ondansetron, 4 mg, Q8H PRN  polyethylene glycol, 17 g, Daily PRN  acetaminophen, 650 mg, Q6H PRN   Or  acetaminophen, 650 mg, Q6H PRN      Labs      Recent Results (from the past 24 hour(s))   POCT Glucose    Collection Time: 01/19/23 12:05 PM   Result Value Ref Range    POC Glucose 93 70 - 99 MG/DL   POCT Glucose    Collection Time: 01/19/23  4:28 PM   Result Value Ref Range    POC Glucose 132 (H) 70 - 99 MG/DL   POCT Glucose    Collection Time: 01/19/23  9:57 PM   Result Value Ref Range    POC Glucose 101 (H) 70 - 99 MG/DL   CBC with Auto Differential    Collection Time: 01/20/23  4:52 AM   Result Value Ref Range    WBC 8.5 4.0 - 10.5 K/CU MM    RBC 2.61 (L) 4.2 - 5.4 M/CU MM    Hemoglobin 7.6 (L) 12.5 - 16.0 GM/DL    Hematocrit 25.4 (L) 37 - 47 %    MCV 97.3 78 - 100 FL    MCH 29.1 27 - 31 PG    MCHC 29.9 (L) 32.0 - 36.0 %    RDW 15.5 (H) 11.7 - 14.9 %    Platelets 202 283 - 254 K/CU MM    MPV 11.7 (H) 7.5 - 11.1 FL    Differential Type AUTOMATED DIFFERENTIAL     Segs Relative 70.9 (H) 36 - 66 %    Lymphocytes % 14.6 (L) 24 - 44 %    Monocytes % 8.1 (H) 0 - 4 %    Eosinophils % 4.3 (H) 0 - 3 %    Basophils % 0.5 0 - 1 %    Segs Absolute 6.1 K/CU MM    Lymphocytes Absolute 1.3 K/CU MM    Monocytes Absolute 0.7 K/CU MM    Eosinophils Absolute 0.4 K/CU MM    Basophils Absolute 0.0 K/CU MM    Nucleated RBC % 0.0 %    Total Nucleated RBC 0.0 K/CU MM    Total Immature Neutrophil 0.14 K/CU MM    Immature Neutrophil % 1.6 (H) 0 - 0.43 %   Basic Metabolic Panel w/ Reflex to MG    Collection Time: 01/20/23  4:52 AM   Result Value Ref Range    Sodium 141 135 - 145 MMOL/L    Potassium 3.6 3.5 - 5.1 MMOL/L    Chloride 105 99 - 110 mMol/L    CO2 29 21 - 32 MMOL/L    Anion Gap 7 4 - 16    BUN 11 6 - 23 MG/DL    Creatinine 0.7 0.6 - 1.1 MG/DL    Est, Glom Filt Rate >60 >60 mL/min/1.73m2    Glucose 91 70 - 99 MG/DL    Calcium 7.8 (L) 8.3 - 10.6 MG/DL        Imaging/Diagnostics Last 24 Hours   CT HEAD WO CONTRAST    Result Date: 1/17/2023  EXAMINATION: CT OF THE HEAD WITHOUT CONTRAST  1/17/2023 11:10 am TECHNIQUE: CT of the head was performed without the administration of intravenous contrast. Automated exposure control, iterative reconstruction, and/or weight based adjustment of the mA/kV was utilized to reduce the radiation dose to as low as reasonably achievable. COMPARISON: 08/30/2022. HISTORY: ORDERING SYSTEM PROVIDED HISTORY: aMS TECHNOLOGIST PROVIDED HISTORY: Has a \"code stroke\" or \"stroke alert\" been called? ->No Reason for exam:->aMS Decision Support Exception - unselect if not a suspected or confirmed emergency medical condition->Emergency Medical Condition (MA) Reason for Exam: AMS FINDINGS: BRAIN/VENTRICLES: There is no acute intracranial hemorrhage, mass effect or midline shift. No abnormal extra-axial fluid collection. The gray-white differentiation is maintained without evidence of an acute infarct. There is prominence of the ventricles and sulci due to global parenchymal volume loss. There are nonspecific areas of hypoattenuation within the periventricular and subcortical white matter, which likely represent chronic microvascular ischemic change. 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 ORBITS: The visualized portion of the orbits demonstrate no acute abnormality. SINUSES: The visualized paranasal sinuses and mastoid air cells demonstrate no acute abnormality.  SOFT TISSUES/SKULL: No acute abnormality of the visualized skull or soft tissues. 1.No acute intracranial abnormality. XR CHEST PORTABLE    Result Date: 1/17/2023  EXAMINATION: ONE XRAY VIEW OF THE CHEST 1/17/2023 8:49 am COMPARISON: 08/30/2022. HISTORY: ORDERING SYSTEM PROVIDED HISTORY: AMS TECHNOLOGIST PROVIDED HISTORY: Reason for exam:->AMS Reason for Exam: AMS FINDINGS: The heart size is enlarged. The pulmonary vasculature is within normal limits. There are patchy bilateral densities. No pneumothoraces are seen. 1. Patchy bilateral densities likely representing multifocal pneumonia. I would recommend follow-up to resolution. 2. Stable cardiomegaly without overt failure.        Electronically signed by Marcial Landon MD on 1/20/2023 at 11:05 AM

## 2023-01-20 NOTE — PROGRESS NOTES
Occupational Therapy  . Occupational Therapy Treatment Note    Name: Tea Gupta MRN: [id number] :   1935   Date:  2023   Admission Date: 2023 Room:  -A     Primary Problem:      Restrictions/Precautions:              Communication with other providers: cotx with PTA [place] for assist and safety as well as patient tolerance with therapy. Subjective:  Patient states:  moaned throughout. Pain:   Location, Type, Intensity (0/10 to 10/10):  unable to verbalize    Objective:    Observation: patient in semi fowlers. Non verbal this date but moans throughout. Unable to answer simple questions or follow simple commands. Objective Measures:  tele, 1L 02    Treatment, including education:    ADL activity training was instructed today. Cues were given for safety, sequence, UE/LE placement, visual cues, and balance. Activities performed today included dressing, toileting, hand hygiene, and grooming. Toileting- DEP  Feeding- DEP- did have coughing bout x2 trials. Unable to reach for utensils when prompted. Increased time needed for task. Facial hygiene- DEP- prompted patient to performed task and reach for wash cloth. Patient unable. Therapeutic activity training was instructed today. Cues were given for safety, sequence, UE/LE placement, awareness, and balance. Activities performed today included bed mobility training, sup-sit, sit-stand, SPT. Rolling- DEP x2 to the L and Max x 2 to the R. Patient did attempt to roll to R- did slightly reach for bed rail. Patient did attempt to raise BLE during pillow placement. Bed placed into chair position. Patient tolerated x20 min. Did perform trunk strengthen activities including lateral weight shifting and mini push ups. Patient participated with Mod A and needed cues throughout. Patient educated on role of OT , benefits of OT and rationale for therapeutic intervention.    Benefit of OOB/EOB activities, benefit of movement. AE/AD, WS/EC,     All therapeutic intervention performed c emphasis on BUE strengthening and endurance to  increase strength for functional tasks / transfers. Patient left safely in bed at end of session, with call light in reach, alarm on and nursing aware.      Assessment / Impression:    Patient's tolerance of treatment: fair  Adverse Reaction: None  Significant change in status and impact:n/a  Barriers to improvement: weakness, non verbal      Plan for Next Session:    Continue with OT POC      Time in:  906  Time out:  953  Timed treatment minutes:  47  Total treatment time:  47      Electronically signed by:    Junior Guardian, MARLA, CARRILLO/L [id number]    1/20/2023, 9:54 AM

## 2023-01-20 NOTE — PROGRESS NOTES
Neurology Service Progress Note  [address]   Patient Name: Francesca Howard  : 1935        Subjective:   CC: Stroke  Chart was reviewed in detail. Patient was seen and assessed. There have been no significant events overnight. Patient remains drowsy but wakes easily. She continues to have altered mental status, moaning with no meaningful speech today. She is able to follow a few simple commands. Right upper extremity weakness more noticeable today, unable to hold right arm to antigravity. Sensation remains intact. No family at bedside during exam today. Past Medical History:   Diagnosis Date    Acute cerebrovascular accident (CVA) due to occlusion of left posterior cerebral artery ([address] 2023    Allergic rhinitis     Arthritis     CHF (congestive heart failure) ([facility])     DVT, lower extremity, proximal, acute, left ([facility])     Edema leg     Gastroesophageal reflux disease 3/30/2019    H/O 24 hour EKG monitoring 6/10 1/09 11/08    6/7/10- predominant rhythm is a-fib, freq PVCs    H/O cardiac catheterization 00    H/O cardiac catheterization 00    right dominant system, left main is short but angiographically normal, LAD and CX reveal sign  tortuosities distally  but no sign angiographic atherosclerotic disease noted except luminal  irregularities here and there. LVSF normal, EF  55%    H/O cardiovascular stress test 2016    lexiscan-normal,EF70%    H/O Doppler echocardiogram 7/12/10    intimal thickening but no sign. atherosclerotic plaque noted in [facility]  [facility]. H/O echocardiogram 2016 9/10     5/16 EF60% mild MR, TR, AR 9/20/10 Complete 2 dim transthoracic echo, techn. difficult. The study was  tech limited, LVSF is normal    History of atrial fibrillation     History of cardiovascular stress test 13, 7/10 8/08    7/13-WNL Observed defect consistent with breast attenuation. EF 70%.  7/10-post stress myocardial images show a normal pattern of perfusion in all regions. LV is normal  in size.   EF is 62%, global  LVSF is normal    History of falling     per old chart    History of nuclear stress test 10/08/2020    EF 60%, Normal    Hx of blood clots     per old chart  hx of DVT left leg    Hx: UTI (urinary tract infection)     Hyperglycemia 3/30/2019    Hyperlipidemia     Hypertension     Hypothyroidism     Incontinence of bowel     Incontinence of urine     Sepsis ([address] 2015    Tremor 3/30/2019    Wears glasses     to read    Wears partial dentures     upper    :   Past Surgical History:   Procedure Laterality Date    BREAST SURGERY      Left breast biopsy    CARDIOVERSION  10/17/2008    CARDIOVERSION  12/15/2008    HYSTERECTOMY (CERVIX STATUS UNKNOWN)  age 21's    IR GUIDED DRAIN W CATHETER PLACEMENT  08/21/2020    IR GUIDED DRAIN W CATHETER PLACEMENT 8/21/2020 [facility]    IR GUIDED DRAIN W CATHETER PLACEMENT  09/08/2020    IR GUIDED DRAIN W CATHETER PLACEMENT 9/8/2020 [facility] PROC[facility]    JOINT REPLACEMENT  4/2005 2/2006    bilat    OTHER SURGICAL HISTORY  10/2009    bladder stimulator - interstim II 3058 --REMOVED PER SON - XRAY CHECKED BY RADIOLOGIST    WV ARTHRP ACETBLR/PROX FEM PROSTC AGRFT/ALGRFT Left 02/23/2015    Hip Replacement, Total    TOTAL KNEE ARTHROPLASTY  2005, 2006    bilateral     Medications:  Scheduled Meds:   ferrous sulfate  325 mg Oral Daily with breakfast    ipratropium-albuterol  1 ampule Inhalation BID    metoprolol tartrate  25 mg Oral BID    sodium chloride flush  5-40 mL IntraVENous 2 times per day    [Held by provider] divalproex  250 mg Oral Nightly    [Held by provider] escitalopram  10 mg Oral Daily    folic acid  2 mg Oral Daily    sodium chloride flush  5-40 mL IntraVENous 2 times per day    enoxaparin  40 mg SubCUTAneous Daily    cefTRIAXone (ROCEPHIN) IV  1,000 mg IntraVENous Q24H    sucralfate  1 g Oral 4x Daily AC & HS    levothyroxine  175 mcg Oral QAM AC     Continuous Infusions:   sodium chloride      sodium chloride 10 mL/hr at 01/20/23 0145     PRN Meds:.sodium chloride flush, sodium chloride, sodium chloride flush, sodium chloride, ondansetron **OR** [DISCONTINUED] ondansetron, polyethylene glycol, acetaminophen **OR** acetaminophen    Allergies   Allergen Reactions    Tape Donnel Sneddon Tape] Itching    Codeine Rash    Sulfa Antibiotics Nausea And Vomiting     Social History     Socioeconomic History    Marital status:       Spouse name: Not on file    Number of children: Not on file    Years of education: Not on file    Highest education level: Not on file   Occupational History    Not on file   Tobacco Use    Smoking status: Former     Years: 1.00     Types: Cigarettes    Smokeless tobacco: Never    Tobacco comments:     \"quit yrs ago only smoked for 2-3 yrs\"   Vaping Use    Vaping Use: Never used   Substance and Sexual Activity    Alcohol use: No     Alcohol/week: 0.0 standard drinks    Drug use: No    Sexual activity: Not Currently   Other Topics Concern    Not on file   Social History Narrative    Not on file     Social Determinants of Health     Financial Resource Strain: Not on file   Food Insecurity: Not on file   Transportation Needs: Not on file   Physical Activity: Not on file   Stress: Not on file   Social Connections: Not on file   Intimate Partner Violence: Not on file   Housing Stability: Not on file      Family History   Problem Relation Age of Onset    Diabetes Mother     Diabetes Father     Heart Disease Father          ROS (10 systems)  Unable to answer commands    Physical Exam:      Wt Readings from Last 3 Encounters:   01/19/23 214 lb 8.1 oz (97.3 kg)   01/18/23 210 lb (95.3 kg)   08/30/22 215 lb (97.5 kg)     Temp Readings from Last 3 Encounters:   01/20/23 98.6 °F (37 °C) (Axillary)   09/01/22 98.1 °F (36.7 °C) (Oral)   03/25/22 98 °F (36.7 °C)     BP Readings from Last 3 Encounters:   01/20/23 (!) 140/61   12/13/22 132/74   09/23/22 108/64     Pulse Readings from Last 3 Encounters:   01/20/23 76   12/13/22 84   09/23/22 84        Gen: Alert, not verbalizing, unable to assess orientation, follows some commands  HEENT: NC/AT, tracks examiner around the room, PERRL, mmm, neck supple, no meningeal signs  Heart: a fib  Lungs: Respirations even an unlabored  Ext: no edema, no calf tenderness b/l  Psych: Non verbal, moaning, alert  Skin: no rashes or lesions    NEUROLOGIC EXAM:    Mental Status: Alert, moaning and otherwise non verbal, NAD, follows commands intermittently    Cranial Nerve Exam:   CN II-XII:  PERRL, VFF, no nystagmus, no gaze paresis, sensation V1-V3 intact b/l, muscles of facial expression symmetric; hearing intact to conversational tone, palate elevates symmetrically, shoulder elevation symmetric and tongue protrudes midline with movement side to side. Motor Exam:       Strength 5/5 UE's/LE's left, 4/5 UE/LE right  Tone and bulk normal   Right upper extremity pronator drift    Deep Tendon Reflexes: 1/4 biceps, triceps, brachioradialis, patellar, and achilles b/l; flexor plantar responses b/l    Sensation: Intact light touch/pinprick/vibration UE's/LE's b/l    Coordination/Cerebellum:       Tremors--none      Rapidly alternating movements: Unable to follow commands, AYUSH      Heel-to-Shin: Unable to follow commands, AYUSH      Finger-to-Nose: Unable to follow commands, AYUSH    Gait and stance:      Gait: deferred      LABS:     Recent Labs     01/18/23  0410 01/19/23  0603 01/20/23  0452   WBC 11.0* 10.8* 8.5    138 141   K 4.0 3.8 3.6    102 105   CO2 27 28 29   BUN 21 14 11   CREATININE 0.7 0.6 0.7   GLUCOSE 83 92 91         IMAGING:    CT head w/o contrast:  Impression   1. No acute intracranial abnormality. CTA head and neck:  Impression   Unchanged moderate stenosis at the origin of bilateral vertebral arteries. No other hemodynamically significant stenosis within the head and neck   circulation.        Extensive consolidative changes of the right upper lobe and mild   consolidative changes of left upper lobe with small bilateral pleural   effusion are noted. Findings are likely suggestive of multifocal pneumonia. The C2 vertebral body extensive degenerative changes is associated with the   large cystic change and posterior cortical destruction there is also   associated fracture line at the base of the C2. .  The findings appears   unchanged since prior examination. If there is need for further evaluation   contrast enhanced MR examination can be obtained. Cervical       RECOMMENDATIONS:   Unavailable     MRI brain w/o contrast:  Impression   1. Acute left posterior cerebral artery territory infarct involving the   medial aspect of the left temporal lobe including the left hippocampus and   the left thalamus. 2. No acute intracranial hemorrhage. 3. Severe diffuse cerebral volume loss and chronic small vessel ischemic   changes. TTE 8/30/22:  Summary   TDS Due to patient cooperation. Saline Study appears negative. Left ventricular systolic function is normal.   Ejection fraction is visually estimated at 55-60%. Proximal septal thickening. No regional wall motion abnormalites. Indeterminate diastolic function due to underlying rhythm. Left ventricle size is normal.   Mildly dilated left atrium. Sclerotic, but non-stenotic aortic valve. Mild to moderate aortic regurgitation is noted. Mitral annular calcification is present PMVL. Mild to moderate mitral regurgitation. Mild to moderate tricuspid regurgitation; RVSP: 34 mmHg. Trace pulmonic regurgitation present. Pulmonic valve is structurally normal.   No evidence of any pericardial effusion. Aneurysmal interatrial septum with bowing into right atria; Suggestive of   increase LA pressures. IVC and abdominal aorta are within normal limits. All imaging was personally reviewed   ASSESSMENT/PLAN:   82year old female presenting with altered mental status.  Patient is drowsy but wakes easily to voice. She is lying in bed, moaning with no other meaningful speech today. Face is symmetric, PERRLA, appears to be managing own secretions. Right upper and lower extremity weakness on exam more noticeable today, no longer able to hold right arm to antigravity. Sensation intact to painful stimulus in the upper and lower extremities. Altered mental status likely secondary to metabolic encephalopathy superimposed on acute left PCA stroke, pneumonia and underlying diminished cognitive reserve. Neuro imaging as above  MRI notes acute left PCA infarct involving left temporal lobe including left hippocampus and left thalamus  CTA with moderate stenosis of the vertebral arteries, appears to be at baseline  Recent TTE as above  Recommend aspirin and atorvastatin for secondary stroke prevention  Recommend SLP swallow eval  A fib with RVR management per Cardiology/EP  Pneumonia, given area of stroke possibly secondary to aspiration, management per IM  LDL 29, A1C 6.3, TSH 0.934  B12, folate, pending  PT/OT as recommended  Feel would be appropriate to have conversation regarding goals of care with patient's family given extensive stroke and patient's inability to tolerate anticoagulation, antiplatelet or statin and multiple stroke risk factors. Will continue to follow patient while hospitalized      Patient was discussed with attending neurologist Dr. Mina Messer. Thank you for allowing us to participate in the care of your patient. If there are any questions regarding evaluation please feel free to contact us.      RAHAT Melchor - ILIANA, 1/20/2023

## 2023-01-20 NOTE — PLAN OF CARE
Problem: Discharge Planning  Goal: Discharge to home or other facility with appropriate resources  Outcome: Progressing  Flowsheets (Taken 1/20/2023 4071)  Discharge to home or other facility with appropriate resources:   Identify barriers to discharge with patient and caregiver   Arrange for needed discharge resources and transportation as appropriate   Identify discharge learning needs (meds, wound care, etc)   Refer to discharge planning if patient needs post-hospital services based on physician order or complex needs related to functional status, cognitive ability or social support system     Problem: Pain  Goal: Verbalizes/displays adequate comfort level or baseline comfort level  Outcome: Progressing     Problem: Skin/Tissue Integrity  Goal: Absence of new skin breakdown  Description: 1. Monitor for areas of redness and/or skin breakdown  2. Assess vascular access sites hourly  3. Every 4-6 hours minimum:  Change oxygen saturation probe site  4. Every 4-6 hours:  If on nasal continuous positive airway pressure, respiratory therapy assess nares and determine need for appliance change or resting period.   Outcome: Progressing     Problem: ABCDS Injury Assessment  Goal: Absence of physical injury  Outcome: Progressing     Problem: Safety - Adult  Goal: Free from fall injury  Outcome: Progressing

## 2023-01-21 PROCEDURE — 94618 PULMONARY STRESS TESTING: CPT

## 2023-01-21 PROCEDURE — 2580000003 HC RX 258: Performed by: STUDENT IN AN ORGANIZED HEALTH CARE EDUCATION/TRAINING PROGRAM

## 2023-01-21 PROCEDURE — 6360000002 HC RX W HCPCS: Performed by: STUDENT IN AN ORGANIZED HEALTH CARE EDUCATION/TRAINING PROGRAM

## 2023-01-21 PROCEDURE — 6370000000 HC RX 637 (ALT 250 FOR IP): Performed by: STUDENT IN AN ORGANIZED HEALTH CARE EDUCATION/TRAINING PROGRAM

## 2023-01-21 PROCEDURE — 2700000000 HC OXYGEN THERAPY PER DAY

## 2023-01-21 PROCEDURE — 1200000000 HC SEMI PRIVATE

## 2023-01-21 PROCEDURE — 94640 AIRWAY INHALATION TREATMENT: CPT

## 2023-01-21 PROCEDURE — 94761 N-INVAS EAR/PLS OXIMETRY MLT: CPT

## 2023-01-21 PROCEDURE — 6370000000 HC RX 637 (ALT 250 FOR IP)

## 2023-01-21 RX ADMIN — SUCRALFATE 1 G: 1 TABLET ORAL at 18:11

## 2023-01-21 RX ADMIN — ENOXAPARIN SODIUM 40 MG: 100 INJECTION SUBCUTANEOUS at 09:05

## 2023-01-21 RX ADMIN — METOPROLOL TARTRATE 25 MG: 25 TABLET, FILM COATED ORAL at 21:21

## 2023-01-21 RX ADMIN — SODIUM CHLORIDE, PRESERVATIVE FREE 10 ML: 5 INJECTION INTRAVENOUS at 09:08

## 2023-01-21 RX ADMIN — FERROUS SULFATE TAB 325 MG (65 MG ELEMENTAL FE) 325 MG: 325 (65 FE) TAB at 09:05

## 2023-01-21 RX ADMIN — FOLIC ACID 2 MG: 1 TABLET ORAL at 09:05

## 2023-01-21 RX ADMIN — IPRATROPIUM BROMIDE AND ALBUTEROL SULFATE 1 AMPULE: 2.5; .5 SOLUTION RESPIRATORY (INHALATION) at 09:13

## 2023-01-21 RX ADMIN — SODIUM CHLORIDE, PRESERVATIVE FREE 10 ML: 5 INJECTION INTRAVENOUS at 21:21

## 2023-01-21 RX ADMIN — SUCRALFATE 1 G: 1 TABLET ORAL at 21:21

## 2023-01-21 RX ADMIN — METOPROLOL TARTRATE 25 MG: 25 TABLET, FILM COATED ORAL at 09:05

## 2023-01-21 RX ADMIN — CEFTRIAXONE SODIUM 1000 MG: 1 INJECTION, POWDER, FOR SOLUTION INTRAMUSCULAR; INTRAVENOUS at 21:33

## 2023-01-21 RX ADMIN — SUCRALFATE 1 G: 1 TABLET ORAL at 09:05

## 2023-01-21 RX ADMIN — SUCRALFATE 1 G: 1 TABLET ORAL at 11:48

## 2023-01-21 RX ADMIN — IPRATROPIUM BROMIDE AND ALBUTEROL SULFATE 1 AMPULE: 2.5; .5 SOLUTION RESPIRATORY (INHALATION) at 19:46

## 2023-01-21 RX ADMIN — LEVOTHYROXINE SODIUM 175 MCG: 25 TABLET ORAL at 06:20

## 2023-01-21 NOTE — PROGRESS NOTES
Patient unable to ambulate on room air for second portion of home oxygen evaluation.   Will order overnight oximetry on room air to complete home O2 eval.

## 2023-01-21 NOTE — PROGRESS NOTES
V2.0  [facility] Hospitalist Progress Note      Name:  Dinesh Creleslie /Age/Sex: 1935  (82 y.o. female)   MRN & CSN:  [id number] & [id number] Encounter Date/Time: 2023 7:26 AM EST    Location:  -A PCP: Kolton Abebe MD       Hospital Day: 5    Assessment and Plan:   Sepsis 2/2 multifocal pneumonia. Acute hypoxic resp failure   Came w leukocytosis, tachycardia. No LA. CXR revealed multifocal PNA. Currently on 3L NC. Will treat for community acquired pneumonia  - rocephin/azithromycin-------- initially, switched to Rocephin and doxycycline given prolonged QTC as below. Will discontinue doxycycline, continue with Rocephin to treat for potential aspiration pneumonia, antigens are negative as below.  - get resp culture pending, strep, legionella, negative  -Negative resp viral panel  -Was on IV fluids initially, currently eating, will hold on further IV fluids. - blood cxX2 sent and pending  - wean O2 as tolerated  - breathing treatment     Acute encephalopathy -likely mixed picture in the setting of stroke as below in addition to infection as above. Acute left PCA territory infarct -came in with AVik siegel with RVR on admission, she is not on anticoagulation as she did have recurrent falls recently, she is high risk for stroke  Gradual since Friday with poor oral intake. Likely 2/2 delirium from sepsis. Versus could be a stroke given upon talking to patient's family, she was last seen well Friday, and her baseline is usually able to move around with a wheelchair, she is able to eat and drink on her own, and she is able to hold a conversation. Currently patient on admission is just moaning in bed, does not follow any commands. Pt has signs of worsening cognitive per family past few months but never worked up by neurology outpatient. Had MRI 2022 showing cerebral atrophy, severe chronic small vessel ischemic changes. CT head this admission was negative.    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 Noted though extensive conservative changes of the right upper lobe and mild concentric changes of the left upper lobe with small bilateral pleural effusions. - brain MRI with acute left posterior cerebral artery territory infarct involving the medial aspect of the left temporal lobe including the left hippocampus and the left thalamus. No hemorrhage. Severe diffuse cerebral volume loss and chronic small vessel ischemic changes. also sent for lipid panel and A1c. Consider repeat TTE with bubble.  -Completing altered mental state work-up with ammonia back at 21 and VBG with pH of 7.21.    -TSH checked this admission and was unremarkable. Not sure if patient is focal or not as she has not following commands and we are not able to do an neurological exam, likely has right-sided deficits, noted by staff that she is not moving her right side much.  -Neurology consulted, appreciate help  NIH SS? Unable to determine  -Neurochecks every 4 hours  -----Discussed with son at bedside 1/21, discussed goals of care, may be appropriate for hospice, son agreeable with consult. Potential for rehab is poor given the size of the stroke. Discussed plan in details, discussed why we think the rehab potential as poor and we discussed quality of life and all care details. -----CODE STATUS changed to DNI, limited code for now, okay for chest compressions, DO NOT INTUBATE and no feeding tube. Chronic atrial fibrillation came with 's. Likely 2/2 sepsis. Patient came and with RVR, she is not on anticoagulation, high risk for stroke as above. Not on anticoagulation, likely due to recurrent falls.   Pt was being considered for watchman.  -Currently off her home amio.   -consult cardiology given  and pt on home dronedarone, cardiology evaluated, they started Toprol tartrate 25 mg twice daily.  -Telemetry     HFpEF. Not in excarbation. ECHO 08/30/2022 EF 55-60%, proximal septal thickening, mildly dilated LA, mild to moderate MR, AR, TR.   -holding lasix given pt's sepsis. - daily intake/output, weights     CKD stage IIIb  - Cr 0.9 baseline 1.2-1.4    Chronic anemia  Stable Hgb 9 on baseline 7-8. No sign of bleed. - continue home iron           Disposition:   Current Living situation: nursing home  Expected Disposition: nursing home  Estimated D/C: 2-3 days     Diet ADULT DIET; Dysphagia - Soft and Bite Sized   DVT Prophylaxis [x] Lovenox, []  Heparin, [] SCDs, [] Ambulation,  [] Eliquis, [] Xarelto  [] Coumadin   Code Status Full Code   Disposition Patient requires continued admission due to pneumonia    sons     Subjective:     Chief Complaint: Altered Mental Status (LKW apprx 2 days ago. /)       Taisha Luna is a 82 y.o. female who presents with pneumonia. Taking a nap in bed today, intermittently morning, son at bedside, discussed plan in details. Review of Systems:    Review of Systems    10 point review of systems negative except as above. Objective: Intake/Output Summary (Last 24 hours) at 1/21/2023 0817  Last data filed at 1/21/2023 5174  Gross per 24 hour   Intake 675.53 ml   Output 400 ml   Net 275.53 ml          Vitals:   Vitals:    01/21/23 0600   BP: (!) 115/45   Pulse: 79   Resp: 20   Temp:    SpO2:        Physical Exam:     General: NAD, obese  Eyes: EOMI  ENT: neck supple  Cardiovascular: Regular rhythm, normal rate  Respiratory: On 3 L nasal cannula  Gastrointestinal: Soft, non tender  Genitourinary: no suprapubic tenderness  Musculoskeletal: No edema  Skin: warm, dry  Neuro: Alert. Moans in bed, follows commands, wiggles toes bilaterally, squeezes on the left, 2 out of 5 on the right upper extremity. Dysarthric, voices no words. Psych: Mood appropriate.      Medications:   Medications:    ferrous sulfate  325 mg Oral Daily with breakfast    ipratropium-albuterol  1 ampule Inhalation BID    metoprolol tartrate  25 mg Oral BID    sodium chloride flush  5-40 mL IntraVENous 2 times per day    [Held by provider] divalproex  250 mg Oral Nightly    [Held by provider] escitalopram  10 mg Oral Daily    folic acid  2 mg Oral Daily    sodium chloride flush  5-40 mL IntraVENous 2 times per day    enoxaparin  40 mg SubCUTAneous Daily    cefTRIAXone (ROCEPHIN) IV  1,000 mg IntraVENous Q24H    sucralfate  1 g Oral 4x Daily AC & HS    levothyroxine  175 mcg Oral QAM AC      Infusions:    sodium chloride      sodium chloride Stopped (01/20/23 0605)     PRN Meds: sodium chloride flush, 5-40 mL, PRN  sodium chloride, , PRN  sodium chloride flush, 5-40 mL, PRN  sodium chloride, , PRN  ondansetron, 4 mg, Q8H PRN  polyethylene glycol, 17 g, Daily PRN  acetaminophen, 650 mg, Q6H PRN   Or  acetaminophen, 650 mg, Q6H PRN      Labs      Recent Results (from the past 24 hour(s))   POCT Glucose    Collection Time: 01/20/23 11:24 AM   Result Value Ref Range    POC Glucose 133 (H) 70 - 99 MG/DL   POCT Glucose    Collection Time: 01/20/23  3:53 PM   Result Value Ref Range    POC Glucose 92 70 - 99 MG/DL   POCT Glucose    Collection Time: 01/20/23 10:01 PM   Result Value Ref Range    POC Glucose 136 (H) 70 - 99 MG/DL        Imaging/Diagnostics Last 24 Hours   CT HEAD WO CONTRAST    Result Date: 1/17/2023  EXAMINATION: CT OF THE HEAD WITHOUT CONTRAST  1/17/2023 11:10 am TECHNIQUE: CT of the head was performed without the administration of intravenous contrast. Automated exposure control, iterative reconstruction, and/or weight based adjustment of the mA/kV was utilized to reduce the radiation dose to as low as reasonably achievable. COMPARISON: 08/30/2022. HISTORY: ORDERING SYSTEM PROVIDED HISTORY: aMS TECHNOLOGIST PROVIDED HISTORY: Has a \"code stroke\" or \"stroke alert\" been called? ->No Reason for exam:->aMS Decision Support Exception - unselect if not a suspected or confirmed emergency medical condition->Emergency Medical Condition (MA) Reason for Exam: AMS FINDINGS: BRAIN/VENTRICLES: There is no acute intracranial hemorrhage, mass effect or midline shift. No abnormal extra-axial fluid collection. The gray-white differentiation is maintained without evidence of an acute infarct. There is prominence of the ventricles and sulci due to global parenchymal volume loss. There are nonspecific areas of hypoattenuation within the periventricular and subcortical white matter, which likely represent chronic microvascular ischemic change. 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 ORBITS: The visualized portion of the orbits demonstrate no acute abnormality. SINUSES: The visualized paranasal sinuses and mastoid air cells demonstrate no acute abnormality. SOFT TISSUES/SKULL: No acute abnormality of the visualized skull or soft tissues. 1.No acute intracranial abnormality. XR CHEST PORTABLE    Result Date: 1/17/2023  EXAMINATION: ONE XRAY VIEW OF THE CHEST 1/17/2023 8:49 am COMPARISON: 08/30/2022. HISTORY: ORDERING SYSTEM PROVIDED HISTORY: AMS TECHNOLOGIST PROVIDED HISTORY: Reason for exam:->AMS Reason for Exam: AMS FINDINGS: The heart size is enlarged. The pulmonary vasculature is within normal limits. There are patchy bilateral densities. No pneumothoraces are seen. 1. Patchy bilateral densities likely representing multifocal pneumonia. I would recommend follow-up to resolution. 2. Stable cardiomegaly without overt failure.        Electronically signed by Marivel Drew MD on 1/21/2023 at 8:17 AM

## 2023-01-21 NOTE — PROGRESS NOTES
1/21/2023 12:16 PM  Patient Room #: 4540/3322-X  Patient Name: Eduardo Carcamo    (Step 1 Done by RN if possible otherwise call Pulmonary Diagnostics)  Place patient on room air at rest for at least 30 minutes. If patient falls below 88% before 30 minutes then you can record the level and stop. Record room air saturation level 97%. If patient is at 88% or below, they will qualify for home oxygen and you can stop. If level does not fall below 88%, fill in level above. If indicated continue to Step 2. [place] P[place]in RRT   Date: 1/21/2023  (Step 2&3 Done by GLENIS)  Ambulate patient on room air until saturation falls below 89%. Record level of room air saturation with ambulation___ %. Next, place patient back on ___lpm oxygen and ambulate, record level __%. (Note:  this level must show improvement from room air level done with ambulation.)  If patients saturation on room air with ambulation is 88% or below AND patient shows improvement with oxygen during ambulation, they will qualify for home oxygen and you can stop. If patient does not drop below 89%, then patient should have an overnight oximetry trending on room air to see if level falls below 88%. Complete level in Step 3 below. Room air overnight oximetry level 88 % for 3 cumulative minutes. If patients room air oxygen level is < 89% for at least 5 cumulative minutes, patient will qualify for home oxygen and you can stop. (Attach Night Trending Report)    Complete order below: Diagnosis:____  Home oxygen at:  Length of Need: ? Lifetime ?  3 Months     ___lpm or __%   via  [] nasal cannula  []mask  [] other         []continuous []  with activity  []  Nocturnal   [] Portable Tanks []  Concentrator  [] Conserving Device        Therapist Signature:_______     Date:  ___  Physician Signature:  __Electronically Signed in EMR_    Date:___  Physician Printed Name:  ____   NPI # ___    [] Patient Qualifies      [x] Patient Does NOT qualify

## 2023-01-22 LAB
CULTURE: NORMAL
CULTURE: NORMAL
Lab: NORMAL
Lab: NORMAL
SPECIMEN: NORMAL
SPECIMEN: NORMAL

## 2023-01-22 PROCEDURE — 6370000000 HC RX 637 (ALT 250 FOR IP): Performed by: STUDENT IN AN ORGANIZED HEALTH CARE EDUCATION/TRAINING PROGRAM

## 2023-01-22 PROCEDURE — 6360000002 HC RX W HCPCS: Performed by: STUDENT IN AN ORGANIZED HEALTH CARE EDUCATION/TRAINING PROGRAM

## 2023-01-22 PROCEDURE — 94762 N-INVAS EAR/PLS OXIMTRY CONT: CPT

## 2023-01-22 PROCEDURE — 97112 NEUROMUSCULAR REEDUCATION: CPT

## 2023-01-22 PROCEDURE — 6370000000 HC RX 637 (ALT 250 FOR IP)

## 2023-01-22 PROCEDURE — 2580000003 HC RX 258: Performed by: STUDENT IN AN ORGANIZED HEALTH CARE EDUCATION/TRAINING PROGRAM

## 2023-01-22 PROCEDURE — 97530 THERAPEUTIC ACTIVITIES: CPT

## 2023-01-22 PROCEDURE — 94618 PULMONARY STRESS TESTING: CPT

## 2023-01-22 PROCEDURE — 97535 SELF CARE MNGMENT TRAINING: CPT

## 2023-01-22 PROCEDURE — 1200000000 HC SEMI PRIVATE

## 2023-01-22 PROCEDURE — 94761 N-INVAS EAR/PLS OXIMETRY MLT: CPT

## 2023-01-22 PROCEDURE — 94640 AIRWAY INHALATION TREATMENT: CPT

## 2023-01-22 RX ADMIN — LEVOTHYROXINE SODIUM 175 MCG: 25 TABLET ORAL at 05:34

## 2023-01-22 RX ADMIN — SUCRALFATE 1 G: 1 TABLET ORAL at 20:37

## 2023-01-22 RX ADMIN — METOPROLOL TARTRATE 25 MG: 25 TABLET, FILM COATED ORAL at 10:19

## 2023-01-22 RX ADMIN — SUCRALFATE 1 G: 1 TABLET ORAL at 10:19

## 2023-01-22 RX ADMIN — Medication 10 ML: at 10:18

## 2023-01-22 RX ADMIN — FERROUS SULFATE TAB 325 MG (65 MG ELEMENTAL FE) 325 MG: 325 (65 FE) TAB at 10:17

## 2023-01-22 RX ADMIN — SODIUM CHLORIDE, PRESERVATIVE FREE 10 ML: 5 INJECTION INTRAVENOUS at 20:36

## 2023-01-22 RX ADMIN — SUCRALFATE 1 G: 1 TABLET ORAL at 17:00

## 2023-01-22 RX ADMIN — METOPROLOL TARTRATE 25 MG: 25 TABLET, FILM COATED ORAL at 20:37

## 2023-01-22 RX ADMIN — ONDANSETRON 4 MG: 4 TABLET, ORALLY DISINTEGRATING ORAL at 10:17

## 2023-01-22 RX ADMIN — SODIUM CHLORIDE, PRESERVATIVE FREE 10 ML: 5 INJECTION INTRAVENOUS at 10:17

## 2023-01-22 RX ADMIN — ENOXAPARIN SODIUM 40 MG: 100 INJECTION SUBCUTANEOUS at 10:17

## 2023-01-22 RX ADMIN — FOLIC ACID 2 MG: 1 TABLET ORAL at 10:17

## 2023-01-22 RX ADMIN — IPRATROPIUM BROMIDE AND ALBUTEROL SULFATE 1 AMPULE: 2.5; .5 SOLUTION RESPIRATORY (INHALATION) at 19:25

## 2023-01-22 RX ADMIN — SUCRALFATE 1 G: 1 TABLET ORAL at 05:34

## 2023-01-22 RX ADMIN — CEFTRIAXONE SODIUM 1000 MG: 1 INJECTION, POWDER, FOR SOLUTION INTRAMUSCULAR; INTRAVENOUS at 20:43

## 2023-01-22 NOTE — PROGRESS NOTES
V2.0  [facility]ist Progress Note      Name:  Farzana Magaña /Age/Sex: 1935  (82 y.o. female)   MRN & CSN:  [id number] & [id number] Encounter Date/Time: 2023 7:26 AM EST    Location:  [address] [place] [address]-A PCP: Sri Longo MD       Hospital Day: 6    Assessment and Plan:   Sepsis 2/2 multifocal pneumonia. Acute hypoxic resp failure   Came w leukocytosis, tachycardia. No LA. CXR revealed multifocal PNA. Currently on 3L NC. Will treat for community acquired pneumonia  - rocephin/azithromycin-------- initially, switched to Rocephin and doxycycline given prolonged QTC as below. Will discontinue doxycycline, continue with Rocephin to treat for potential aspiration pneumonia, antigens are negative as below.  - get resp culture pending, strep, legionella, negative  -Negative resp viral panel  -Was on IV fluids initially, currently eating, will hold on further IV fluids. - blood cxX2 sent and pending  - wean O2 as tolerated, currently on room air, did not qualify for home oxygen  - breathing treatment     Acute encephalopathy -likely mixed picture in the setting of stroke as below in addition to infection as above. Acute left PCA territory infarct -came in with A. fib with RVR on admission, she is not on anticoagulation as she did have recurrent falls recently, she is high risk for stroke  Gradual since Friday with poor oral intake. Likely 2/2 delirium from sepsis. Versus could be a stroke given upon talking to patient's family, she was last seen well Friday, and her baseline is usually able to move around with a wheelchair, she is able to eat and drink on her own, and she is able to hold a conversation. Currently patient on admission is just moaning in bed, does not follow any commands. Pt has signs of worsening cognitive per family past few months but never worked up by neurology outpatient. Had MRI 2022 showing cerebral atrophy, severe chronic small vessel ischemic changes.    CT head this admission was negative. 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 Noted though extensive conservative changes of the right upper lobe and mild concentric changes of the left upper lobe with small bilateral pleural effusions. - brain MRI with acute left posterior cerebral artery territory infarct involving the medial aspect of the left temporal lobe including the left hippocampus and the left thalamus. No hemorrhage. Severe diffuse cerebral volume loss and chronic small vessel ischemic changes. also sent for lipid panel and A1c. Consider repeat TTE with bubble.  -Completing altered mental state work-up with ammonia back at 21 and VBG with pH of 7.21.    -TSH checked this admission and was unremarkable. Not sure if patient is focal or not as she has not following commands and we are not able to do an neurological exam, likely has right-sided deficits, noted by staff that she is not moving her right side much.  -Neurology consulted, appreciate help  NIH SS? Unable to determine  -Neurochecks every 4 hours  -----Discussed with son at bedside 1/21, discussed goals of care, may be appropriate for hospice, son agreeable with consult. Potential for rehab is poor given the size of the stroke. Discussed plan in details, discussed why we think the rehab potential as poor and we discussed quality of life and all care details. -----CODE STATUS changed to DNI, limited code for now, okay for chest compressions, DO NOT INTUBATE and no feeding tube. -Hospice consult in, appreciate help     Chronic atrial fibrillation came with 's. Likely 2/2 sepsis. Patient came and with RVR, she is not on anticoagulation, high risk for stroke as above. Not on anticoagulation, likely due to recurrent falls. Pt was being considered for watchman.  -Currently off her home amio. -consult cardiology given  and pt on home dronedarone, cardiology evaluated, they started Toprol tartrate 25 mg twice daily.  -Telemetry     HFpEF. Not in excarbation. ECHO 08/30/2022 EF 55-60%, proximal septal thickening, mildly dilated LA, mild to moderate MR, AR, TR.   -holding lasix given pt's sepsis. - daily intake/output, weights     CKD stage IIIb  - Cr 0.9 baseline 1.2-1.4    Chronic anemia  Stable Hgb 9 on baseline 7-8. No sign of bleed. - continue home iron           Disposition:   Current Living situation: nursing home  Expected Disposition: nursing home  Estimated D/C: 2-3 days     Diet ADULT DIET; Dysphagia - Soft and Bite Sized   DVT Prophylaxis [x] Lovenox, []  Heparin, [] SCDs, [] Ambulation,  [] Eliquis, [] Xarelto  [] Coumadin   Code Status Limited   Disposition Patient requires continued admission due to pneumonia    sons     Subjective:     Chief Complaint: Altered Mental Status (LKW apprx 2 days ago. /)       Meka Green is a 82 y.o. female who presents with pneumonia. Clinically stable, moaning in bed, does not answer any questions. Review of Systems:    Review of Systems    10 point review of systems negative except as above. Objective: Intake/Output Summary (Last 24 hours) at 1/22/2023 0909  Last data filed at 1/22/2023 0901  Gross per 24 hour   Intake 150 ml   Output 850 ml   Net -700 ml          Vitals:   Vitals:    01/22/23 0844   BP:    Pulse:    Resp:    Temp:    SpO2: 96%       Physical Exam:     General: NAD, obese  Eyes: EOMI  ENT: neck supple  Cardiovascular: Regular rhythm, normal rate  Respiratory: On 3 L nasal cannula  Gastrointestinal: Soft, non tender  Genitourinary: no suprapubic tenderness  Musculoskeletal: No edema  Skin: warm, dry  Neuro: Alert. Moans in bed, follows commands, wiggles toes bilaterally, squeezes on the left, 2 out of 5 on the right upper extremity. Dysarthric, voices no words. Psych: Mood appropriate.      Medications: Medications:    ferrous sulfate  325 mg Oral Daily with breakfast    ipratropium-albuterol  1 ampule Inhalation BID    metoprolol tartrate  25 mg Oral BID    sodium chloride flush  5-40 mL IntraVENous 2 times per day    [Held by provider] divalproex  250 mg Oral Nightly    [Held by provider] escitalopram  10 mg Oral Daily    folic acid  2 mg Oral Daily    sodium chloride flush  5-40 mL IntraVENous 2 times per day    enoxaparin  40 mg SubCUTAneous Daily    cefTRIAXone (ROCEPHIN) IV  1,000 mg IntraVENous Q24H    sucralfate  1 g Oral 4x Daily AC & HS    levothyroxine  175 mcg Oral QAM AC      Infusions:    sodium chloride      sodium chloride Stopped (01/20/23 0605)     PRN Meds: sodium chloride flush, 5-40 mL, PRN  sodium chloride, , PRN  sodium chloride flush, 5-40 mL, PRN  sodium chloride, , PRN  ondansetron, 4 mg, Q8H PRN  polyethylene glycol, 17 g, Daily PRN  acetaminophen, 650 mg, Q6H PRN   Or  acetaminophen, 650 mg, Q6H PRN      Labs      No results found for this or any previous visit (from the past 24 hour(s)). Imaging/Diagnostics Last 24 Hours   CT HEAD WO CONTRAST    Result Date: 1/17/2023  EXAMINATION: CT OF THE HEAD WITHOUT CONTRAST  1/17/2023 11:10 am TECHNIQUE: CT of the head was performed without the administration of intravenous contrast. Automated exposure control, iterative reconstruction, and/or weight based adjustment of the mA/kV was utilized to reduce the radiation dose to as low as reasonably achievable. COMPARISON: 08/30/2022. HISTORY: ORDERING SYSTEM PROVIDED HISTORY: aMS TECHNOLOGIST PROVIDED HISTORY: Has a \"code stroke\" or \"stroke alert\" been called? ->No Reason for exam:->aMS Decision Support Exception - unselect if not a suspected or confirmed emergency medical condition->Emergency Medical Condition (MA) Reason for Exam: AMS FINDINGS: BRAIN/VENTRICLES: There is no acute intracranial hemorrhage, mass effect or midline shift. No abnormal extra-axial fluid collection.   The gray-white differentiation is maintained without evidence of an acute infarct. There is prominence of the ventricles and sulci due to global parenchymal volume loss. There are nonspecific areas of hypoattenuation within the periventricular and subcortical white matter, which likely represent chronic microvascular ischemic change. 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 ORBITS: The visualized portion of the orbits demonstrate no acute abnormality. SINUSES: The visualized paranasal sinuses and mastoid air cells demonstrate no acute abnormality. SOFT TISSUES/SKULL: No acute abnormality of the visualized skull or soft tissues. 1.No acute intracranial abnormality. XR CHEST PORTABLE    Result Date: 1/17/2023  EXAMINATION: ONE XRAY VIEW OF THE CHEST 1/17/2023 8:49 am COMPARISON: 08/30/2022. HISTORY: ORDERING SYSTEM PROVIDED HISTORY: AMS TECHNOLOGIST PROVIDED HISTORY: Reason for exam:->AMS Reason for Exam: AMS FINDINGS: The heart size is enlarged. The pulmonary vasculature is within normal limits. There are patchy bilateral densities. No pneumothoraces are seen. 1. Patchy bilateral densities likely representing multifocal pneumonia. I would recommend follow-up to resolution. 2. Stable cardiomegaly without overt failure.        Electronically signed by Josh Tilley MD on 1/22/2023 at 9:09 AM

## 2023-01-22 NOTE — PROGRESS NOTES
Occupational Therapy      Occupational Therapy Treatment Note    Name: Farzana Magaña MRN: [id number] :   1935   Date:  2023   Admission Date: 2023 Room:  [address] [place] [address]-A     Primary Problem:  Multifocal Pnemonia    Restrictions/Precautions:          General Precautions, Fall Risk    Communication with other providers: Nursing handoff    Subjective:  Patient states:  \"A little bit more\" pt stated when asked if she wanted her head up higher  Pain:   Location, Type, Intensity (0/10 to 10/10):  No    Objective:    Observation: Pt received supine in bed, agreeable to therapy   Objective Measures:  [facility]    Treatment, including education:  Therapeutic Activity Training:   Therapeutic activity training was instructed today. Cues were given for safety, sequence, UE/LE placement, awareness, and balance. Activities performed today included bed mobility training, sup-sit, dynamic sitting balance, sit to supine, scooting to [facility], rolling L/R    Self Care Training:   Cues were given for safety, sequence, UE/LE placement, visual cues, and balance. Activities performed today included grooming, LB dressing    Supine to sit maxA, sitting EOB w/ use of bilateral hands rails Supa moving to SBA; LB dressing donning socks sitting EOB maxA, grooming washing face w/ warm washcloth modA w/ hand over hand to overcome BUE weakness; siting EOB ~4 minutes before pt fatigued. Sit to supine maxA.  Scooting to [address] [place] [address]    Pt supine in bed, bed alarm on, call light at side, nursing notified       Assessment / Impression:    Patient's tolerance of treatment: Well  Adverse Reaction: None  Significant change in status and impact: Improved from initial evaluation: increased cognition and ability to follow commands  Barriers to improvement: None noted        Plan for Next Session:    Continue OT POC    Time in:  909  Time out:  932  Timed treatment minutes:  23 minutes  Total treatment time:  23 minutes      Electronically signed by:    Lolis JORDAN/L [id number]  10:02 AM,1/22/2023

## 2023-01-22 NOTE — PROGRESS NOTES
Physical Therapy    Physical Therapy Treatment Note  Name: Cristhian Licona MRN: [id number] :   1935   Date:  2023   Admission Date: 2023 Room:  [address] [place] [address]   Restrictions/Precautions:           fall risk, confusion, R-sided deficits, on , , suprapubic catheter  Communication with other providers:  Discussed with OT, Ariel Pleitez. RN Inderjit Cain assist with scooting to [facility] end of session  Subjective:  Patient states: \"Yeah\", or shakes head \"no\". Pt demonstrates expressive aphasia  Pain:   Location, Type, Intensity (0/10 to 10/10): Pt without c/o  Objective:    Observation:  Pt is awake in semi-fowlers upon entry  Objective Measures:  Vitals WFL  ROM R/L:  WFL BLE with Mod Assist.  Strength R/L: RLE appears 3/5 quad, 2/5 DF and hip flexion, 3/5 HS. LLE generally 3+/5    Neuro:  Pt appears to have R-sided deficits  Treatment, including education/measures: In supine PT re-assessed LE AROM. Bed mobility: PT encourages sup>sit, provides v/c for sequencing. Pt demonstrates good initiation and fait- ability to advance LE, requires increased effort + Mod A for LE fully to EOB/ hips to EOB and Max A for trunk to upright. PT v/c for scooting to EOB, pt requires Max A over multiple reps. Sitting balance: Seated EOB pt demonstrates fair- balance, initial increased retro and R lean, which required Min-Mod to correct, however with adjustments to ELDER pt improved to CGA. LUE support required for maintaining upright, PT assisted with placement of RUE d/t strength deficits. Neuro Re-ed: Pt seated EOB with tray table waist-height. PT cues throughout for midline sitting posture and balance with fair carryover. PT provides towels for UE activity. Mod A for placement RUE on table. Mod-Max for R hand towel squeezes x5.   Cues for trunk management throughout:   - A/P towel glides: x10 TC with Max A RUE and CGA LUE   - Diagonal towel glides with trunk WS: x10 TC with Max A RUE+Min A at trunk for WS to neutral. X10 LUE with Min A for trunk only. - LUE circular glides x5 Min A for trunk   LE Exs: Towel placed under feet for glide. PT demonstrates exs with pt requiring increased cues to attend. -HS curls TC with max cues and mod A via towel to complete ROM, x8  Trunk WS: lateral WS onto R elbow with max cues and Mod A to upright x5 reps, pt fatigues. Sit>supine: Heavy Mod for LE and trunk control, pt does initiate well. RN assist for rolling to L: Max, and scooting to [facility] Max x2. Heels floated end of session   End of session pt left in semi-fowlers  with lines managed, call light, phone, exit alarm, tray, all needs, RN aware. Assessment / Impression:    Pt demonstrates significant improvement in command-following and R-sided activation this session.  Cont rec SNF  Patient's tolerance of treatment:  fair   Adverse Reaction: fatigue  Significant change in status and impact:  none  Barriers to improvement:  Neuro deficits  Plan for Next Session:    Time in:  11:15  Time out:  11:42  Timed treatment minutes:  27  Total treatment time:  27    Previously filed items:           Short Term Goals  Time Frame for Short Term Goals: 1 week  Short Term Goal 1: Pt will follow commands for LLE AROM x10 reps ea  Short Term Goal 2: Pt will tolerate sup>sit with MAx A x2  Short Term Goal 3: Pt will tolerate Chris to chair with x2 assist  Short Term Goal 4: Pt will participate in seated balance activity at EOB x5 min with Mod x2 for balance and safety    Electronically signed by:    Leroy Gonzalez, PT  1/22/2023, 12:46 PM

## 2023-01-23 LAB — GLUCOSE BLD-MCNC: 91 MG/DL (ref 70–99)

## 2023-01-23 PROCEDURE — 94640 AIRWAY INHALATION TREATMENT: CPT

## 2023-01-23 PROCEDURE — 6370000000 HC RX 637 (ALT 250 FOR IP)

## 2023-01-23 PROCEDURE — 6370000000 HC RX 637 (ALT 250 FOR IP): Performed by: STUDENT IN AN ORGANIZED HEALTH CARE EDUCATION/TRAINING PROGRAM

## 2023-01-23 PROCEDURE — 6360000002 HC RX W HCPCS: Performed by: STUDENT IN AN ORGANIZED HEALTH CARE EDUCATION/TRAINING PROGRAM

## 2023-01-23 PROCEDURE — 2580000003 HC RX 258: Performed by: STUDENT IN AN ORGANIZED HEALTH CARE EDUCATION/TRAINING PROGRAM

## 2023-01-23 PROCEDURE — 2500000003 HC RX 250 WO HCPCS: Performed by: STUDENT IN AN ORGANIZED HEALTH CARE EDUCATION/TRAINING PROGRAM

## 2023-01-23 PROCEDURE — 82962 GLUCOSE BLOOD TEST: CPT

## 2023-01-23 PROCEDURE — 94761 N-INVAS EAR/PLS OXIMETRY MLT: CPT

## 2023-01-23 PROCEDURE — 1200000000 HC SEMI PRIVATE

## 2023-01-23 RX ADMIN — SODIUM CHLORIDE, PRESERVATIVE FREE 10 ML: 5 INJECTION INTRAVENOUS at 11:01

## 2023-01-23 RX ADMIN — METOPROLOL TARTRATE 25 MG: 25 TABLET, FILM COATED ORAL at 10:53

## 2023-01-23 RX ADMIN — IPRATROPIUM BROMIDE AND ALBUTEROL SULFATE 1 AMPULE: 2.5; .5 SOLUTION RESPIRATORY (INHALATION) at 09:13

## 2023-01-23 RX ADMIN — ENOXAPARIN SODIUM 40 MG: 100 INJECTION SUBCUTANEOUS at 10:53

## 2023-01-23 RX ADMIN — SUCRALFATE 1 G: 1 TABLET ORAL at 06:58

## 2023-01-23 RX ADMIN — SUCRALFATE 1 G: 1 TABLET ORAL at 17:51

## 2023-01-23 RX ADMIN — FERROUS SULFATE TAB 325 MG (65 MG ELEMENTAL FE) 325 MG: 325 (65 FE) TAB at 10:53

## 2023-01-23 RX ADMIN — SUCRALFATE 1 G: 1 TABLET ORAL at 12:01

## 2023-01-23 RX ADMIN — LEVOTHYROXINE SODIUM 175 MCG: 25 TABLET ORAL at 06:58

## 2023-01-23 RX ADMIN — SODIUM CHLORIDE, PRESERVATIVE FREE 10 ML: 5 INJECTION INTRAVENOUS at 21:17

## 2023-01-23 RX ADMIN — MICONAZOLE NITRATE: 2 POWDER TOPICAL at 11:21

## 2023-01-23 RX ADMIN — SUCRALFATE 1 G: 1 TABLET ORAL at 21:16

## 2023-01-23 RX ADMIN — Medication 10 ML: at 21:17

## 2023-01-23 RX ADMIN — FOLIC ACID 2 MG: 1 TABLET ORAL at 10:53

## 2023-01-23 RX ADMIN — MICONAZOLE NITRATE: 2 POWDER TOPICAL at 21:16

## 2023-01-23 RX ADMIN — METOPROLOL TARTRATE 25 MG: 25 TABLET, FILM COATED ORAL at 21:16

## 2023-01-23 RX ADMIN — IPRATROPIUM BROMIDE AND ALBUTEROL SULFATE 1 AMPULE: 2.5; .5 SOLUTION RESPIRATORY (INHALATION) at 19:20

## 2023-01-23 RX ADMIN — CEFTRIAXONE SODIUM 1000 MG: 1 INJECTION, POWDER, FOR SOLUTION INTRAMUSCULAR; INTRAVENOUS at 21:15

## 2023-01-23 ASSESSMENT — PAIN SCALES - GENERAL: PAINLEVEL_OUTOF10: 0

## 2023-01-23 NOTE — PROGRESS NOTES
V2.0  [facility] [place]      Name:  Wilfrido Barragan /Age/Sex: 1935  (82 y.o. female)   MRN & CSN:  [id number] & [id number] Encounter Date/Time: 2023 7:26 AM EST    Location:  [address] [place] [address]-A PCP: Aristeo Barron MD       Hospital Day: 7    Assessment and Plan:   Sepsis 2/2 multifocal pneumonia. Acute hypoxic resp failure   Came w leukocytosis, tachycardia. No LA. CXR revealed multifocal PNA. Currently on 3L NC. Will treat for community acquired pneumonia  - rocephin/azithromycin-------- initially, switched to Rocephin and doxycycline given prolonged QTC as below. Will discontinue doxycycline, continue with Rocephin to treat for potential aspiration pneumonia, antigens are negative as below.  - get resp culture pending, strep, legionella, negative  -Negative resp viral panel  -Was on IV fluids initially, currently eating, will hold on further IV fluids. - blood cxX2 sent and pending  - wean O2 as tolerated, currently on room air, did not qualify for home oxygen  - breathing treatment     Acute encephalopathy -likely mixed picture in the setting of stroke as below in addition to infection as above. Acute left PCA territory infarct -came in with A. fib with RVR on admission, she is not on anticoagulation as she did have recurrent falls recently, she is high risk for stroke  Gradual since Friday with poor oral intake. Likely 2/2 delirium from sepsis. Versus could be a stroke given upon talking to patient's family, she was last seen well Friday, and her baseline is usually able to move around with a wheelchair, she is able to eat and drink on her own, and she is able to hold a conversation. Currently patient on admission is just moaning in bed, does not follow any commands. Pt has signs of worsening cognitive per family past few months but never worked up by neurology outpatient. Had MRI 2022 showing cerebral atrophy, severe chronic small vessel ischemic changes.    CT head this admission was negative. 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 Noted though extensive conservative changes of the right upper lobe and mild concentric changes of the left upper lobe with small bilateral pleural effusions. - brain MRI with acute left posterior cerebral artery territory infarct involving the medial aspect of the left temporal lobe including the left hippocampus and the left thalamus. No hemorrhage. Severe diffuse cerebral volume loss and chronic small vessel ischemic changes. also sent for lipid panel and A1c. Consider repeat TTE with bubble.  -Completing altered mental state work-up with ammonia back at 21 and VBG with pH of 7.21.    -TSH checked this admission and was unremarkable. Not sure if patient is focal or not as she has not following commands and we are not able to do an neurological exam, likely has right-sided deficits, noted by staff that she is not moving her right side much.  -Neurology consulted, appreciate help  NIH SS? Unable to determine  -Neurochecks every 4 hours  -----Discussed with son at bedside 1/21, discussed goals of care, may be appropriate for hospice, son agreeable with consult. Potential for rehab is poor given the size of the stroke. Discussed plan in details, discussed why we think the rehab potential as poor and we discussed quality of life and all care details. -----CODE STATUS changed to DNI, limited code for now, okay for chest compressions, DO NOT INTUBATE and no feeding tube. -Hospice consult in, appreciate help     Chronic atrial fibrillation came with 's. Likely 2/2 sepsis. Patient came and with RVR, she is not on anticoagulation, high risk for stroke as above. Not on anticoagulation, likely due to recurrent falls. Pt was being considered for watchman.  -Currently off her home amio. -consult cardiology given  and pt on home dronedarone, cardiology evaluated, they started Toprol tartrate 25 mg twice daily.  -Telemetry     HFpEF. Not in excarbation. ECHO 08/30/2022 EF 55-60%, proximal septal thickening, mildly dilated LA, mild to moderate MR, AR, TR.   -holding lasix given pt's sepsis. - daily intake/output, weights     CKD stage IIIb  - Cr 0.9 baseline 1.2-1.4    Chronic anemia  Stable Hgb 9 on baseline 7-8. No sign of bleed. - continue home iron           Disposition:   Current Living situation: nursing home  Expected Disposition: nursing home  Estimated D/C: 2-3 days     Diet ADULT DIET; Dysphagia - Soft and Bite Sized   DVT Prophylaxis [x] Lovenox, []  Heparin, [] SCDs, [] Ambulation,  [] Eliquis, [] Xarelto  [] Coumadin   Code Status Limited   Disposition Patient requires continued admission pending goals of care discussion, appreciate hospice help.    sons     Subjective:     Chief Complaint: Altered Mental Status (LKW apprx 2 days ago. /)       Santo Barrera is a 82 y.o. female who presents with pneumonia. Clinically stable, moaning in bed, does not answer any questions. Review of Systems:    Review of Systems    10 point review of systems negative except as above. Objective: Intake/Output Summary (Last 24 hours) at 1/23/2023 0851  Last data filed at 1/22/2023 1820  Gross per 24 hour   Intake 120 ml   Output 400 ml   Net -280 ml          Vitals:   Vitals:    01/23/23 0745   BP: 125/66   Pulse: 88   Resp: 18   Temp: 98.1 °F (36.7 °C)   SpO2: 93%       Physical Exam:     General: NAD, obese  Eyes: EOMI  ENT: neck supple  Cardiovascular: Regular rhythm, normal rate  Respiratory: On 3 L nasal cannula  Gastrointestinal: Soft, non tender  Genitourinary: no suprapubic tenderness  Musculoskeletal: No edema  Skin: warm, dry  Neuro: Alert. Moans in bed, follows commands, wiggles toes bilaterally, squeezes on the left, 2 out of 5 on the right upper extremity. Dysarthric, voices no words. Psych: Mood appropriate. Medications:   Medications:    ferrous sulfate  325 mg Oral Daily with breakfast    ipratropium-albuterol  1 ampule Inhalation BID    metoprolol tartrate  25 mg Oral BID    sodium chloride flush  5-40 mL IntraVENous 2 times per day    [Held by provider] divalproex  250 mg Oral Nightly    [Held by provider] escitalopram  10 mg Oral Daily    folic acid  2 mg Oral Daily    sodium chloride flush  5-40 mL IntraVENous 2 times per day    enoxaparin  40 mg SubCUTAneous Daily    cefTRIAXone (ROCEPHIN) IV  1,000 mg IntraVENous Q24H    sucralfate  1 g Oral 4x Daily AC & HS    levothyroxine  175 mcg Oral QAM AC      Infusions:    sodium chloride      sodium chloride Stopped (01/20/23 0605)     PRN Meds: sodium chloride flush, 5-40 mL, PRN  sodium chloride, , PRN  sodium chloride flush, 5-40 mL, PRN  sodium chloride, , PRN  ondansetron, 4 mg, Q8H PRN  polyethylene glycol, 17 g, Daily PRN  acetaminophen, 650 mg, Q6H PRN   Or  acetaminophen, 650 mg, Q6H PRN      Labs      No results found for this or any previous visit (from the past 24 hour(s)). Imaging/Diagnostics Last 24 Hours   CT HEAD WO CONTRAST    Result Date: 1/17/2023  EXAMINATION: CT OF THE HEAD WITHOUT CONTRAST  1/17/2023 11:10 am TECHNIQUE: CT of the head was performed without the administration of intravenous contrast. Automated exposure control, iterative reconstruction, and/or weight based adjustment of the mA/kV was utilized to reduce the radiation dose to as low as reasonably achievable. COMPARISON: 08/30/2022. HISTORY: ORDERING SYSTEM PROVIDED HISTORY: aMS TECHNOLOGIST PROVIDED HISTORY: Has a \"code stroke\" or \"stroke alert\" been called? ->No Reason for exam:->aMS Decision Support Exception - unselect if not a suspected or confirmed emergency medical condition->Emergency Medical Condition (MA) Reason for Exam: AMS FINDINGS: BRAIN/VENTRICLES: There is no acute intracranial hemorrhage, mass effect or midline shift. No abnormal extra-axial fluid collection. The gray-white differentiation is maintained without evidence of an acute infarct. There is prominence of the ventricles and sulci due to global parenchymal volume loss. There are nonspecific areas of hypoattenuation within the periventricular and subcortical white matter, which likely represent chronic microvascular ischemic change. 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 ORBITS: The visualized portion of the orbits demonstrate no acute abnormality. SINUSES: The visualized paranasal sinuses and mastoid air cells demonstrate no acute abnormality. SOFT TISSUES/SKULL: No acute abnormality of the visualized skull or soft tissues. 1.No acute intracranial abnormality. XR CHEST PORTABLE    Result Date: 1/17/2023  EXAMINATION: ONE XRAY VIEW OF THE CHEST 1/17/2023 8:49 am COMPARISON: 08/30/2022. HISTORY: ORDERING SYSTEM PROVIDED HISTORY: AMS TECHNOLOGIST PROVIDED HISTORY: Reason for exam:->AMS Reason for Exam: AMS FINDINGS: The heart size is enlarged. The pulmonary vasculature is within normal limits. There are patchy bilateral densities. No pneumothoraces are seen. 1. Patchy bilateral densities likely representing multifocal pneumonia. I would recommend follow-up to resolution. 2. Stable cardiomegaly without overt failure.        Electronically signed by Antonio Kaur MD on 1/23/2023 at 8:51 AM Yes

## 2023-01-23 NOTE — CONSULTS
[address] [place] [address] Consult Note    Date: 1/23/2023  Name: Edward Rodriguez  MRN: [id number]  YOB: 1935   Patient's PCP: Teddy Black MD  Consultants during acute care: Cardiology, Neurology  Referring Physician: Dr. Hillary Bustamante care admission date: 1/17/2023 and 8/30 to 9/1/22    Informant: Chart reviewed, discussed with the patient's nurse. I examined the patient who is unable to provide any information due to clinical status. There are no family here. CC: stroke    [place]: This is a 82 y.o. female with a history of cerebrovascular disease (MRI brain 8/2022 showing chronic small vessel disease and severe atrophy), atrial fibrillation not on anticoagulation due to falls and risk, CKD, hypertension, hyperlipidemia, hypothyroidism, who was admitted on 1/17/2023 from [facility] with altered mental status. She is reported to have decreasing oral intake. The patient had CTA head and neck that did not show hemodynamically significant carotid stenosis but did show multifocal pneumonia and the patient has been treated with Ceftriaxone and Azithromycin, later changed to Doxycycline. Chest X-ray  showed patchy infiltrates. Influenza and respiratory viral panel are negative. The patient has been seen by Neurology and MRI brain shows left temporal infarct in the PCA distribution with diffuse volume loss and chronic small vessel disease. Neurology had suggested hospice evaluation. The patient has been evaluated by PT and OT and notes from 1/22 show improved ability to follow commands. Speech therapy notes are reviewed. I evaluated the patient, and her nurse was at the bedside. The patient does not appear to be in distress, and has taken oral meds with applesauce. She is able to follow simple commands such as squeeze my fingers. There is weakness of the right upper extremity with apraxia. She was able to lift her right leg off the bed, but became fatigued. There is no conversational speech. I am unable to have a goals of care discussion with the patient, and case management is awaiting a return call from the family. The patient is a limited code with no intubation but with use of resuscitative medications, CPR and defibrillation. Past Medical History:   Diagnosis Date    Acute cerebrovascular accident (CVA) due to occlusion of left posterior cerebral artery ([facility] [address] 1/19/2023    Allergic rhinitis     Arthritis     CHF (congestive heart failure) (HCC)     DVT, lower extremity, proximal, acute, left (HCC)     Edema leg     Gastroesophageal reflux disease 3/30/2019    H/O 24 hour EKG monitoring 6/10 1/09 11/08    6/7/10- predominant rhythm is a-fib, freq PVCs    H/O cardiac catheterization 7/20/00    H/O cardiac catheterization 7/20/00    right dominant system, left main is short but angiographically normal, LAD and CX reveal sign  tortuosities distally  but no sign angiographic atherosclerotic disease noted except luminal  irregularities here and there. LVSF normal, EF  55%    H/O cardiovascular stress test 5/2/2016    lexiscan-normal,EF70%    H/O Doppler echocardiogram 7/12/10    intimal thickening but no sign. atherosclerotic plaque noted in [facility]  LICA. H/O echocardiogram 5/2/2016 9/10     5/16 EF60% mild MR, TR, AR 9/20/10 Complete 2 dim transthoracic echo, techn. difficult. The study was  tech limited, LVSF is normal    History of atrial fibrillation     History of cardiovascular stress test 7/26/13, 7/10 8/08    7/13-WNL Observed defect consistent with breast attenuation. EF 70%. 7/10-post stress myocardial images show a normal pattern of perfusion in all regions. LV is normal  in size.   EF is 62%, global  LVSF is normal    History of falling     per old chart    History of nuclear stress test 10/08/2020    EF 60%, Normal    Hx of blood clots     per old chart  hx of DVT left leg    Hx: UTI (urinary tract infection)     Hyperglycemia 3/30/2019 Hyperlipidemia     Hypertension     Hypothyroidism     Incontinence of bowel     Incontinence of urine     Sepsis ([facility] [address] 2015    Tremor 3/30/2019    Wears glasses     to read    Wears partial dentures     upper       Past Surgical History:   Procedure Laterality Date    BREAST SURGERY      Left breast biopsy    CARDIOVERSION  10/17/2008    CARDIOVERSION  12/15/2008    HYSTERECTOMY (CERVIX STATUS UNKNOWN)  age 21's    IR GUIDED DRAIN W CATHETER PLACEMENT  08/21/2020    IR GUIDED DRAIN W CATHETER PLACEMENT 8/21/2020 [facility] PROCEDURES    IR GUIDED DRAIN W CATHETER PLACEMENT  09/08/2020    IR GUIDED DRAIN W CATHETER PLACEMENT 9/8/2020 [facility] SPECIAL PROCEDURES    JOINT REPLACEMENT  4/2005 2/2006    bilat    OTHER SURGICAL HISTORY  10/2009    bladder stimulator - interstim II 3058 --REMOVED PER SON - XRAY CHECKED BY RADIOLOGIST    VT ARTHRP ACETBLR/PROX FEM PROSTC AGRFT/ALGRFT Left 02/23/2015    Hip Replacement, Total    TOTAL KNEE ARTHROPLASTY  2005, 2006    bilateral       Social History     Socioeconomic History    Marital status:       Spouse name: Not on file    Number of children: Not on file    Years of education: Not on file    Highest education level: Not on file   Occupational History    Not on file   Tobacco Use    Smoking status: Former     Years: 1.00     Types: Cigarettes    Smokeless tobacco: Never    Tobacco comments:     \"quit yrs ago only smoked for 2-3 yrs\"   Vaping Use    Vaping Use: Never used   Substance and Sexual Activity    Alcohol use: No     Alcohol/week: 0.0 standard drinks    Drug use: No    Sexual activity: Not Currently   Other Topics Concern    Not on file   Social History Narrative    Not on file     Social Determinants of Health     Financial Resource Strain: Not on file   Food Insecurity: Not on file   Transportation Needs: Not on file   Physical Activity: Not on file   Stress: Not on file   Social Connections: Not on file   Intimate Partner Violence: Not on file   Housing Stability: Not on file       Family History   Problem Relation Age of Onset    Diabetes Mother     Diabetes Father     Heart Disease Father        Allergies   Allergen Reactions    Tape [facility] [facility] Tape] Itching    Codeine Rash    Sulfa Antibiotics Nausea And Vomiting       Medication list reviewed  Prior to Admission medications    Medication Sig Start Date End Date Taking? Authorizing Provider   sucralfate (CARAFATE) 1 GM tablet Take 1 g by mouth 4 times daily   Yes Historical Provider, MD   divalproex (DEPAKOTE) 250 MG DR tablet Take 250 mg by mouth nightly   Yes Historical Provider, MD   folic acid (FOLVITE) 1 MG tablet Take 2 mg by mouth daily   Yes Historical Provider, MD   ipratropium-albuterol (DUONEB) 0.5-2.5 (3) MG/3ML SOLN nebulizer solution Inhale 1 vial into the lungs every 6 hours as needed for Shortness of Breath   Yes Historical Provider, MD   lidocaine (XYLOCAINE) 2 % jelly Apply topically every 2 hours as needed for Pain Apply to bottom lower lip as needed   Yes Historical Provider, MD   ondansetron (ZOFRAN) 4 MG tablet Take 4 mg by mouth every 6 hours as needed for Nausea or Vomiting   Yes Historical Provider, MD   furosemide (LASIX) 20 MG tablet Take 1 tablet by mouth See Admin Instructions Receives on Mon/Wed/Fri//Sat/Sun 9/1/22   Maxim Thomas MD   potassium chloride (MICRO-K) 10 MEQ extended release capsule Take 1 capsule by mouth nightly 9/1/22   Maxim Thomas MD   atorvastatin (LIPITOR) 80 MG tablet Take 0.5 tablets by mouth nightly 9/1/22   Maxim Thomas MD   ferrous sulfate (FE TABS 325) 325 (65 Fe) MG EC tablet Take 325 mg by mouth in the morning and 325 mg in the evening.     Historical Provider, MD   escitalopram (LEXAPRO) 10 MG tablet Take 10 mg by mouth daily    Historical Provider, MD   vitamin D 25 MCG (1000 UT) CAPS Take 5 capsules by mouth daily 1/7/22 1/17/23  Elaine Trinidad DO   levothyroxine (SYNTHROID) 175 MCG tablet Take 1 tablet by mouth daily 9/8/21   Shakir Sequeira MD [place]   dronedarone hcl (MULTAQ) 400 MG TABS TAKE 1 TABLET TWICE A DAY WITH MEALS  Patient taking differently: Take 400 mg by mouth 2 times daily (with meals) 3/8/21 1/17/23  Osvaldo Mendoza MD   loratadine (CLARITIN) 10 MG tablet Take 10 mg by mouth daily     Historical Provider, MD   acetaminophen (TYLENOL) 325 MG tablet Take 325 mg by mouth nightly    Historical Provider, MD       ROS: As noted in [address], all other systems are unobtainable due to the patient's clinical condition and expressive aphasia     Weight:    Wt Readings from Last 5 Encounters:   01/21/23 215 lb 6.2 oz (97.7 kg)   01/18/23 210 lb (95.3 kg)   08/30/22 215 lb (97.5 kg)   03/24/22 215 lb (97.5 kg)   01/18/22 215 lb 11.2 oz (97.8 kg)       Data reviewed 1/23/2023:  Transthoracic Echocardiogram 8/30/22   TDS Due to patient cooperation. Saline Study appears negative. Left ventricular systolic function is normal.   Ejection fraction is visually estimated at 55-60%. Proximal septal thickening. No regional wall motion abnormalites. Indeterminate diastolic function due to underlying rhythm. Left ventricle size is normal.   Mildly dilated left atrium. Sclerotic, but non-stenotic aortic valve. Mild to moderate aortic regurgitation is noted. Mitral annular calcification is present PMVL. Mild to moderate mitral regurgitation. Mild to moderate tricuspid regurgitation; RVSP: 34 mmHg. Trace pulmonic regurgitation present. Pulmonic valve is structurally normal.   No evidence of any pericardial effusion. Aneurysmal interatrial septum with bowing into right atria; Suggestive of   increase LA pressures. IVC and abdominal aorta are within normal limits. CT Ahead and neck 1/18/23  Unchanged moderate stenosis at the origin of bilateral vertebral arteries. No other hemodynamically significant stenosis within the head and neck   circulation.    Extensive consolidative changes of the right upper lobe and mild consolidative changes of left upper lobe with small bilateral pleural   effusion are noted. Findings are likely suggestive of multifocal pneumonia. The C2 vertebral body extensive degenerative changes is associated with the   large cystic change and posterior cortical destruction there is also   associated fracture line at the base of the C2. .  The findings appears   unchanged since prior examination     Chest X-ray  1/17/23  1. Patchy bilateral densities likely representing multifocal pneumonia. I   would recommend follow-up to resolution. 2. Stable cardiomegaly without overt failure. MRI brain 1/18/23  1. Acute left posterior cerebral artery territory infarct involving the   medial aspect of the left temporal lobe including the left hippocampus and   the left thalamus. 2. No acute intracranial hemorrhage. 3. Severe diffuse cerebral volume loss and chronic small vessel ischemic   changes.      Hepatic Function Panel:    Lab Results   Component Value Date/Time    ALKPHOS 76 01/17/2023 08:55 AM    ALT 18 01/17/2023 08:55 AM    AST 24 01/17/2023 08:55 AM    PROT 6.8 01/17/2023 08:55 AM    PROT 7.4 08/20/2012 04:10 PM    BILITOT 0.4 01/17/2023 08:55 AM    BILITOT 0.4 02/11/2015 11:03 AM    BILIDIR 0.09 08/20/2012 04:10 PM    IBILI 0.2 08/20/2012 04:10 PM    LABALBU 2.7 01/17/2023 08:55 AM    LABALBU 100 02/16/2011 12:37 PM    LABALBU  02/16/2011 12:37 PM     CORRECTED ON 02/18 AT 1325: PREVIOUSLY REPORTED AS: 0     CBC with Differential:    Lab Results   Component Value Date/Time    WBC 8.5 01/20/2023 04:52 AM    RBC 2.61 01/20/2023 04:52 AM    HGB 7.6 01/20/2023 04:52 AM    HCT 25.4 01/20/2023 04:52 AM     01/20/2023 04:52 AM    MCV 97.3 01/20/2023 04:52 AM    MCH 29.1 01/20/2023 04:52 AM    MCHC 29.9 01/20/2023 04:52 AM    RDW 15.5 01/20/2023 04:52 AM    SEGSPCT 70.9 01/20/2023 04:52 AM    BANDSPCT 6 01/18/2023 04:10 AM    LYMPHOPCT 14.6 01/20/2023 04:52 AM    MONOPCT 8.1 01/20/2023 04:52 AM BASOPCT 0.5 01/20/2023 04:52 AM    MONOSABS 0.7 01/20/2023 04:52 AM    LYMPHSABS 1.3 01/20/2023 04:52 AM    EOSABS 0.4 01/20/2023 04:52 AM    BASOSABS 0.0 01/20/2023 04:52 AM    DIFFTYPE AUTOMATED DIFFERENTIAL 01/20/2023 04:52 AM     BMP:    Lab Results   Component Value Date/Time     01/20/2023 04:52 AM    K 3.6 01/20/2023 04:52 AM    K 3.8 02/24/2018 04:05 AM     01/20/2023 04:52 AM    CO2 29 01/20/2023 04:52 AM    BUN 11 01/20/2023 04:52 AM    LABALBU 2.7 01/17/2023 08:55 AM    LABALBU 100 02/16/2011 12:37 PM    LABALBU  02/16/2011 12:37 PM     CORRECTED ON 02/18 AT 1325: PREVIOUSLY REPORTED AS: 0    CREATININE 0.7 01/20/2023 04:52 AM    CALCIUM 7.8 01/20/2023 04:52 AM    GFRAA 52 10/04/2022 05:28 AM    GFRAA >60 08/20/2012 04:10 PM    LABGLOM >60 01/20/2023 04:52 AM    GLUCOSE 91 01/20/2023 04:52 AM    GLUCOSE 94 02/11/2015 11:03 AM       Physical Exam:   /66   Pulse 88   Temp 98.1 °F (36.7 °C) (Axillary)   Resp 18   Ht 5' 4\" (1.626 m)   Wt 215 lb 6.2 oz (97.7 kg)   SpO2 93%   BMI 36.97 kg/m²   General: Comfortable, awake, expressive aphasia, follows simple commands, in no distress  Skin: sallow  HEENT: Mucous membranes are moist, sclerae are clear, mild conjunctival pallor   Neck: carotid upstrokes are diminished without bruit    Heart: IRRR, S1S2, no murmurs  Lungs:  coarse breath sounds bilaterally, diminished at the bases, without rales, scattered rhonchi   Abdomen: soft, bowel sounds present, no apparent tenderness, nondistended  : suprapubic tube   Extremities:  No mottling, no edema  Neurologic: expressive aphasia, tries to squeeze my fingers with her hands and weaker on the right. I asked her to touch her nose with her finger, and she was able to bring her index finger towards her face. There is a right pronator drift. She briefly lifts her right leg off the bed. There is no clonus, plantar reflex is equivocal. Gait is unable to be safely evaluated. Assessment/Plan:  Cerebrovascular disease with left temporal CVA (PCA distribution) superimposed on extensive small vessel disease and atrophy. The patient appears to have made some improvement per therapy notes, but is high risk for ongoing decline. Hospice information will be provided to family. I agree that the patient is Hospice eligible on acute care discharge back to [facility]. A meeting will be arranged with the patient's family (here or at [facility]) to discuss. Multifocal pneumonia treated  Persistent atrial fibrillation, not on anticoagulation due to risk  Chronic anemia    Patient Active Problem List   Diagnosis Code    Chronic anticoagulation Z79.01    Hypertension I10    Hypothyroidism E03.9    Encounter for long-term (current) use of other medications Z79.899    Recurrent UTI N39.0    Multiple falls R29.6    Anemia D64.9    Sepsis ([facility]) A41.9    Leg weakness R29.898    Abnormality of gait R26.9    CHF (congestive heart failure) ([facility]) I50.9    Rhabdomyolysis M62.82    Sepsis associated hypotension ([facility]) A41.9, I95.9    Acute cystitis without hematuria N30.00    DVT (deep vein thrombosis) in pregnancy O22.30    Progression of deep vein thrombosis (DVT) ([facility]) I82.409    EKG abnormalities R94.31    Noncompliance with medication regimen Z91.14    Chronic atrial fibrillation ([facility]) K82.77    Toxic metabolic encephalopathy X62.1    Urinary tract infection associated with catheterization of urinary tract ([facility]) T83.511A, N39.0    Fall at home, initial encounter W19. XXXA, Y92.009    Frequent falls R29.6    Muscle weakness of lower extremity M62.81    Tremor R25.1    Gastroesophageal reflux disease K21.9    Hyperglycemia R73.9    Morbid obesity with BMI of 40.0-44.9, adult ([facility]) E66.01, Z68.41    CKD (chronic kidney disease) stage 4, GFR 15-29 ml/min ([facility]) N18.4    COVID-19 U07.1    Hyponatremia E87.1    Acute kidney injury superimposed on CKD ([facility]) N17.9, N18.9 Stroke-like symptoms R29.90    Multifocal pneumonia J18.9    Altered mental status R41.82    Acute cerebrovascular accident (CVA) due to occlusion of left posterior cerebral artery [facility]) Z31.136    Vascular dementia ([facility] [address] O14.03         Certification of Terminal Illness: I certify that this patient is eligible for Hospice services for a terminal diagnosis of cerebrovascular disease with acute left PCA infarct with a life expectancy predicted to be less than 6 months if this illness follows its expected course.       Freddy Emanuel MD

## 2023-01-23 NOTE — PROGRESS NOTES
Brief Neurology Note:  Chart was reviewed in detail. Hospice consult was placed, agree that this is appropriate. We will sign off at this time. Please contact  us with any new concerns.    RAHAT Kruse - ILIANA  01/23/23

## 2023-01-23 NOTE — CARE COORDINATION
Reviewed chart and discussed in IDR, pt if from [place], has a hospice consult. Called and spoke with son/ELIER Kim discussed hospice and would like [facility] hospice. Called referral to [facility].

## 2023-01-24 VITALS
DIASTOLIC BLOOD PRESSURE: 74 MMHG | RESPIRATION RATE: 20 BRPM | HEIGHT: 64 IN | BODY MASS INDEX: 36.04 KG/M2 | OXYGEN SATURATION: 95 % | TEMPERATURE: 97.5 F | WEIGHT: 211.1 LBS | SYSTOLIC BLOOD PRESSURE: 128 MMHG | HEART RATE: 82 BPM

## 2023-01-24 PROCEDURE — 6370000000 HC RX 637 (ALT 250 FOR IP): Performed by: STUDENT IN AN ORGANIZED HEALTH CARE EDUCATION/TRAINING PROGRAM

## 2023-01-24 PROCEDURE — 94640 AIRWAY INHALATION TREATMENT: CPT

## 2023-01-24 PROCEDURE — 2580000003 HC RX 258: Performed by: STUDENT IN AN ORGANIZED HEALTH CARE EDUCATION/TRAINING PROGRAM

## 2023-01-24 PROCEDURE — 6360000002 HC RX W HCPCS: Performed by: STUDENT IN AN ORGANIZED HEALTH CARE EDUCATION/TRAINING PROGRAM

## 2023-01-24 PROCEDURE — 94761 N-INVAS EAR/PLS OXIMETRY MLT: CPT

## 2023-01-24 PROCEDURE — 92523 SPEECH SOUND LANG COMPREHEN: CPT

## 2023-01-24 PROCEDURE — 6370000000 HC RX 637 (ALT 250 FOR IP)

## 2023-01-24 RX ADMIN — FERROUS SULFATE TAB 325 MG (65 MG ELEMENTAL FE) 325 MG: 325 (65 FE) TAB at 10:32

## 2023-01-24 RX ADMIN — MICONAZOLE NITRATE: 2 POWDER TOPICAL at 10:31

## 2023-01-24 RX ADMIN — METOPROLOL TARTRATE 25 MG: 25 TABLET, FILM COATED ORAL at 10:32

## 2023-01-24 RX ADMIN — SUCRALFATE 1 G: 1 TABLET ORAL at 06:08

## 2023-01-24 RX ADMIN — SODIUM CHLORIDE, PRESERVATIVE FREE 10 ML: 5 INJECTION INTRAVENOUS at 10:47

## 2023-01-24 RX ADMIN — LEVOTHYROXINE SODIUM 175 MCG: 25 TABLET ORAL at 06:08

## 2023-01-24 RX ADMIN — ENOXAPARIN SODIUM 40 MG: 100 INJECTION SUBCUTANEOUS at 10:47

## 2023-01-24 RX ADMIN — IPRATROPIUM BROMIDE AND ALBUTEROL SULFATE 1 AMPULE: 2.5; .5 SOLUTION RESPIRATORY (INHALATION) at 08:17

## 2023-01-24 RX ADMIN — FOLIC ACID 2 MG: 1 TABLET ORAL at 10:31

## 2023-01-24 ASSESSMENT — PAIN SCALES - GENERAL: PAINLEVEL_OUTOF10: 0

## 2023-01-24 NOTE — PROGRESS NOTES
[address] [place] [address] follow up      The hospice nurse liaison is to meet with the patient's family at noon for hospice information. The patient is awake with expressive aphasia and continued right sided deficits and appears stable for Nursing Home transfer when able. Will follow for plan of care.      Leana Cam MD

## 2023-01-24 NOTE — PROGRESS NOTES
Occupational Therapy  . Occupational Therapy Treatment Note    Name: Taisha Luna MRN: [id number] :   1935   Date:  2023   Admission Date: 2023 Room:  [address] [place] [address]-A     Per chart review patient is now hospice.  Will update evaluating therapist.       Electronically signed by:    MARLA Chong COTA/L [id number]    2023, 4:17 PM

## 2023-01-24 NOTE — CONSULTS
Consult cancelled. Patient pulled out current PIV due to confusion. No scheduled or PRN medications other than flushes. Juan Coffey, primary RN notified.

## 2023-01-24 NOTE — PROGRESS NOTES
V2.0  [facility] Hospitalist Progress Note      Name:  Jessica Carter /Age/Sex: 1935  (82 y.o. female)   MRN & CSN:  [id number] & [id number] Encounter Date/Time: 2023 7:26 AM EST    Location:  [address] [place] [address] PCP: Luca Álvarez MD       Hospital Day: 8    Assessment and Plan:   Sepsis 2/2 multifocal pneumonia. Acute hypoxic resp failure   Came w leukocytosis, tachycardia. No LA. CXR revealed multifocal PNA. Currently on 3L NC. Completed 7-day course of antibiotics. Acute encephalopathy -likely mixed picture in the setting of stroke as below in addition to infection as above. Acute left PCA territory infarct -came in with A. fib with RVR on admission, she is not on anticoagulation as she did have recurrent falls recently, she is high risk for stroke  Gradual since Friday with poor oral intake. Likely 2/2 delirium from sepsis. Versus could be a stroke given upon talking to patient's family, she was last seen well Friday, and her baseline is usually able to move around with a wheelchair, she is able to eat and drink on her own, and she is able to hold a conversation. Currently patient on admission is just moaning in bed, does not follow any commands. Pt has signs of worsening cognitive per family past few months but never worked up by neurology outpatient. Had MRI 2022 showing cerebral atrophy, severe chronic small vessel ischemic changes. CT head this admission was negative. 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 Noted though extensive conservative changes of the right upper lobe and mild concentric changes of the left upper lobe with small bilateral pleural effusions.   - brain MRI with acute left posterior cerebral artery territory infarct involving the medial aspect of the left temporal lobe including the left hippocampus and the left thalamus. No hemorrhage. Severe diffuse cerebral volume loss and chronic small vessel ischemic changes. -TSH checked this admission and was unremarkable.  -Neurology consulted  NIHSS  Unable to determine  -Neurochecks every 4 hours  -----Discussion with son at bedside 1/21, discussed goals of care, may be appropriate for hospice, son agreeable with consult. Potential for rehab is poor given the size of the stroke. Discussed plan in details, discussed why we think the rehab potential as poor and we discussed quality of life and all care details. -----CODE STATUS changed to DNI, limited code for now, okay for chest compressions, DO NOT INTUBATE and no feeding tube. -Hospice has been consulted. Plan for hospice at nursing home. Chronic atrial fibrillation came with 's. Likely 2/2 sepsis. Patient came and with RVR, she is not on anticoagulation, high risk for stroke as above. Not on anticoagulation, likely due to recurrent falls. Pt was being considered for watchman.  -Currently off her home amio.   -consult cardiology given  and pt on home dronedarone, cardiology evaluated, they started Toprol tartrate 25 mg twice daily.  -Telemetry     HFpEF. Not in excarbation. ECHO 08/30/2022 EF 55-60%, proximal septal thickening, mildly dilated LA, mild to moderate MR, AR, TR.   -holding lasix given pt's sepsis. - daily intake/output, weights     CKD stage IIIb  - Cr 0.9 baseline 1.2-1.4    Chronic anemia  Stable Hgb 9 on baseline 7-8. No sign of bleed. - continue home iron           Disposition:   Current Living situation: nursing home  Expected Disposition: nursing home  Estimated D/C: 2-3 days     Diet ADULT DIET;  Dysphagia - Soft and Bite Sized   DVT Prophylaxis [x] Lovenox, []  Heparin, [] SCDs, [] Ambulation,  [] Eliquis, [] Xarelto  [] Coumadin   Code Status Limited   Disposition Patient requires continued admission pending goals of care discussion, appreciate hospice help.    alexis Subjective:     Chief Complaint: Altered Mental Status (LKW apprx 2 days ago. /)       Awake on exam.  Does not answer any questions. As per interval history. Review of Systems:    Review of Systems    10 point review of systems negative except as above. Objective: Intake/Output Summary (Last 24 hours) at 1/24/2023 1139  Last data filed at 1/24/2023 0509  Gross per 24 hour   Intake 200 ml   Output 1300 ml   Net -1100 ml          Vitals:   Vitals:    01/24/23 1032   BP: 133/72   Pulse: 84   Resp:    Temp:    SpO2:        Physical Exam:     General: NAD, obese  Eyes: EOMI  ENT: neck supple  Cardiovascular: Regular rhythm, normal rate  Respiratory: On 3 L nasal cannula  Gastrointestinal: Soft, non tender  Genitourinary: no suprapubic tenderness  Musculoskeletal: No edema  Skin: warm, dry  Neuro: Alert. Moans in bed, follows commands, wiggles toes bilaterally, squeezes on the left, 2 out of 5 on the right upper extremity. Dysarthric, voices no words. Psych: Mood appropriate.      Medications:   Medications:    miconazole   Topical BID    ferrous sulfate  325 mg Oral Daily with breakfast    ipratropium-albuterol  1 ampule Inhalation BID    metoprolol tartrate  25 mg Oral BID    sodium chloride flush  5-40 mL IntraVENous 2 times per day    [Held by provider] divalproex  250 mg Oral Nightly    [Held by provider] escitalopram  10 mg Oral Daily    folic acid  2 mg Oral Daily    sodium chloride flush  5-40 mL IntraVENous 2 times per day    enoxaparin  40 mg SubCUTAneous Daily    sucralfate  1 g Oral 4x Daily AC & HS    levothyroxine  175 mcg Oral QAM AC      Infusions:    sodium chloride      sodium chloride Stopped (01/20/23 0605)     PRN Meds: sodium chloride flush, 5-40 mL, PRN  sodium chloride, , PRN  sodium chloride flush, 5-40 mL, PRN  sodium chloride, , PRN  ondansetron, 4 mg, Q8H PRN  polyethylene glycol, 17 g, Daily PRN  acetaminophen, 650 mg, Q6H PRN   Or  acetaminophen, 650 mg, Q6H PRN      Labs Recent Results (from the past 24 hour(s))   POCT Glucose    Collection Time: 01/23/23  8:26 PM   Result Value Ref Range    POC Glucose 91 70 - 99 MG/DL          Imaging/Diagnostics Last 24 Hours   CT HEAD WO CONTRAST    Result Date: 1/17/2023  EXAMINATION: CT OF THE HEAD WITHOUT CONTRAST  1/17/2023 11:10 am TECHNIQUE: CT of the head was performed without the administration of intravenous contrast. Automated exposure control, iterative reconstruction, and/or weight based adjustment of the mA/kV was utilized to reduce the radiation dose to as low as reasonably achievable. COMPARISON: 08/30/2022. HISTORY: ORDERING SYSTEM PROVIDED HISTORY: aMS TECHNOLOGIST PROVIDED HISTORY: Has a \"code stroke\" or \"stroke alert\" been called? ->No Reason for exam:->aMS Decision Support Exception - unselect if not a suspected or confirmed emergency medical condition->Emergency Medical Condition (MA) Reason for Exam: AMS FINDINGS: BRAIN/VENTRICLES: There is no acute intracranial hemorrhage, mass effect or midline shift. No abnormal extra-axial fluid collection. The gray-white differentiation is maintained without evidence of an acute infarct. There is prominence of the ventricles and sulci due to global parenchymal volume loss. There are nonspecific areas of hypoattenuation within the periventricular and subcortical white matter, which likely represent chronic microvascular ischemic change. 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 ORBITS: The visualized portion of the orbits demonstrate no acute abnormality. SINUSES: The visualized paranasal sinuses and mastoid air cells demonstrate no acute abnormality. SOFT TISSUES/SKULL: No acute abnormality of the visualized skull or soft tissues. 1.No acute intracranial abnormality. XR CHEST PORTABLE    Result Date: 1/17/2023  EXAMINATION: ONE XRAY VIEW OF THE CHEST 1/17/2023 8:49 am COMPARISON: 08/30/2022.  HISTORY: ORDERING SYSTEM PROVIDED HISTORY: [facility] TECHNOLOGIST PROVIDED HISTORY: Reason for exam:->AMS Reason for Exam: AMS FINDINGS: The heart size is enlarged. The pulmonary vasculature is within normal limits. There are patchy bilateral densities. No pneumothoraces are seen. 1. Patchy bilateral densities likely representing multifocal pneumonia. I would recommend follow-up to resolution. 2. Stable cardiomegaly without overt failure.        Electronically signed by Abelino Kate MD on 1/24/2023 at 11:39 AM

## 2023-01-24 NOTE — DISCHARGE SUMMARY
V2.0  Discharge Summary    Name:  Qian Waters /Age/Sex: 1935 (82 y.o. female)   Admit Date: 2023  Discharge Date: 23    MRN & CSN:  [id number] & [id number] Encounter Date and Time 23 2:50 PM EST    Attending:  Jessi Gallegos MD Discharging Provider: Jessi Gallegos MD       Hospital Course:     Brief HPI: Qian Waters is a 82 y.o. female with PMH of Afib (not on anticoagulation), CHF, CKD   who presents with AMS. Pt has been having poor oral intake since Friday. History was taken from son given pt's mental status. Pt has been gradually having worsening oral intake, and activity since Friday. Per nursing home, she was found unresponsive today which prompted them to call EMS. Last well known was 2 days ago. Pt otherwise wasn't complaining of any symptoms prior. Her baseline is that she converses, feeds herself and ambulates on wheelchair. Brief Problem Based Course:     Sepsis 2/2 multifocal pneumonia. Acute hypoxic resp failure   Came w leukocytosis, tachycardia. No LA. CXR revealed multifocal PNA. Currently on 3L NC. Completed 7-day course of antibiotics. Acute encephalopathy -likely mixed picture in the setting of stroke as below in addition to infection as above. Acute left PCA territory infarct -came in with A. fib with RVR on admission, she is not on anticoagulation as she did have recurrent falls recently, she is high risk for stroke  Gradual since Friday with poor oral intake. Likely 2/2 delirium from sepsis. Versus could be a stroke given upon talking to patient's family, she was last seen well Friday, and her baseline is usually able to move around with a wheelchair, she is able to eat and drink on her own, and she is able to hold a conversation. Currently patient on admission is just moaning in bed, does not follow any commands. Pt has signs of worsening cognitive per family past few months but never worked up by neurology outpatient.   Had MRI 2022 showing cerebral atrophy, severe chronic small vessel ischemic changes. CT head this admission was negative. 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 Noted though extensive conservative changes of the right upper lobe and mild concentric changes of the left upper lobe with small bilateral pleural effusions. - brain MRI with acute left posterior cerebral artery territory infarct involving the medial aspect of the left temporal lobe including the left hippocampus and the left thalamus. No hemorrhage. Severe diffuse cerebral volume loss and chronic small vessel ischemic changes. -TSH checked this admission and was unremarkable.  -Neurology consulted  NIHSS  Unable to determine  -Neurochecks every 4 hours  -----Discussion with son at bedside 1/21, discussed goals of care, may be appropriate for hospice, son agreeable with consult. Potential for rehab is poor given the size of the stroke. Discussed plan in details, discussed why we think the rehab potential as poor and we discussed quality of life and all care details. -----CODE STATUS changed to DNI, limited code for now, okay for chest compressions, DO NOT INTUBATE and no feeding tube. -Hospice has been consulted. Patient discharged to hospice at nursing home. Chronic atrial fibrillation came with 's. Likely 2/2 sepsis. Patient came and with RVR, she is not on anticoagulation, high risk for stroke as above. Not on anticoagulation, likely due to recurrent falls. Pt was being considered for watchman. Elevated QTC. Meds were held on discharge as patient was transition to hospice. HFpEF. Not in excarbation.  ECHO 08/30/2022 EF 55-60%, proximal septal thickening, mildly dilated LA, mild to moderate MR, AR, TR.      CKD stage IIIb  - Cr 0.9 baseline 1.2-1.4     Chronic anemia  Stable Hgb 9 on baseline 7-8. No sign of bleed. The patient expressed appropriate understanding of, and agreement with the discharge recommendations, medications, and plan. Consults this admission:  IP CONSULT TO CARDIOLOGY  IP CONSULT TO NEUROLOGY  IP CONSULT TO IV TEAM  IP CONSULT TO HOSPICE  IP CONSULT TO IV TEAM    Discharge Diagnosis:   Multifocal pneumonia  Sepsis  Acute metabolic encephalopathy  Acute left PCA infarct  Chronic A. fib  Chronic diastolic heart failure  CKD  Chronic anemia      Discharge Instruction:   Follow up appointments: Hospice  Primary care physician: Aaron Hughes MD within 2 weeks  Diet: regular diet   Activity: activity as tolerated  Disposition: Discharged to:   []Home, []C, []SNF, []Acute Rehab, [x]Hospice   Condition on discharge: Stable  Labs and Tests to be Followed up as an outpatient by PCP or Specialist: Hospice care with comfort meds.     Discharge Medications:        Medication List        CONTINUE taking these medications      levothyroxine 175 MCG tablet  Commonly known as: SYNTHROID  Take 1 tablet by mouth daily     sucralfate 1 GM tablet  Commonly known as: CARAFATE            STOP taking these medications      acetaminophen 325 MG tablet  Commonly known as: TYLENOL     atorvastatin 80 MG tablet  Commonly known as: LIPITOR     divalproex 250 MG DR tablet  Commonly known as: DEPAKOTE     dronedarone hcl 400 MG Tabs  Commonly known as: Multaq     escitalopram 10 MG tablet  Commonly known as: LEXAPRO     ferrous sulfate 325 (65 Fe) MG EC tablet  Commonly known as: FE TABS 344     folic acid 1 MG tablet  Commonly known as: FOLVITE     furosemide 20 MG tablet  Commonly known as: LASIX     ipratropium-albuterol 0.5-2.5 (3) MG/3ML Soln nebulizer solution  Commonly known as: DUONEB     lidocaine 2 % jelly  Commonly known as: XYLOCAINE     loratadine 10 MG tablet  Commonly known as: CLARITIN     ondansetron 4 MG tablet  Commonly known as: ZOFRAN     potassium chloride 10 MEQ extended release capsule  Commonly known as: MICRO-K     vitamin D 25 MCG (1000 UT) Caps             Objective Findings at Discharge:   /74   Pulse 82   Temp 97.5 °F (36.4 °C) (Oral)   Resp 20   Ht 5' 4\" (1.626 m)   Wt 211 lb 1.6 oz (95.8 kg)   SpO2 95%   BMI 36.24 kg/m²       Physical Exam:   General: NAD  Eyes: EOMI  ENT: neck supple  Cardiovascular: Regular rate. Respiratory: Clear to auscultation  Gastrointestinal: Soft, non tender  Genitourinary: no suprapubic tenderness  Musculoskeletal: No edema  Skin: warm, dry  Neuro: Alert. Psych: Mood appropriate. Labs and Imaging   CTA HEAD NECK W CONTRAST    Result Date: 1/18/2023  EXAMINATION: CTA OF THE HEAD AND NECK WITH CONTRAST 1/18/2023 2:14 pm: TECHNIQUE: CTA of the head and neck was performed with the administration of intravenous contrast. Multiplanar reformatted images are provided for review. MIP images are provided for review. Stenosis of the internal carotid arteries measured using NASCET criteria. Automated exposure control, iterative reconstruction, and/or weight based adjustment of the mA/kV was utilized to reduce the radiation dose to as low as reasonably achievable. COMPARISON: CTA head and neck of 08/30/2022. HISTORY: ORDERING SYSTEM PROVIDED HISTORY: stroke? ? TECHNOLOGIST PROVIDED HISTORY: Reason for exam:->stroke?? Has a \"code stroke\" or \"stroke alert\" been called? ->No Reason for Exam: Altered Mental Status FINDINGS: CTA NECK: AORTIC ARCH/ARCH VESSELS: No dissection or arterial injury. No significant stenosis of the brachiocephalic or subclavian arteries. Moderate stenosis at the origin of bilateral vertebral arteries. CAROTID ARTERIES: No dissection, arterial injury, or hemodynamically significant stenosis by NASCET criteria. There is mild atherosclerotic calcification of bilateral carotid bulbs extending into the origin of internal carotid arteries not resulting in hemodynamically significant stenosis.  VERTEBRAL ARTERIES: No dissection, arterial injury, or significant stenosis. SOFT TISSUES: Extensive consolidative changes of the right upper lobe and mild consolidative changes of left upper lobe with small bilateral pleural effusion are noted. BONES: The C2 vertebral body extensive degenerative changes is associated with the large cystic change and posterior cortical destruction there is also associated fracture line at the base of the C2. .  The findings appears unchanged since prior examination. CTA HEAD: ANTERIOR CIRCULATION: No significant stenosis of the intracranial internal carotid, anterior cerebral, or middle cerebral arteries. Algis Broaden POSTERIOR CIRCULATION: No significant stenosis of the vertebral, basilar, or posterior cerebral arteries. No aneurysm. OTHER: No dural venous sinus thrombosis on this non-dedicated study. BRAIN: Please refer to same-day head CT report. Unchanged moderate stenosis at the origin of bilateral vertebral arteries. No other hemodynamically significant stenosis within the head and neck circulation. Extensive consolidative changes of the right upper lobe and mild consolidative changes of left upper lobe with small bilateral pleural effusion are noted. Findings are likely suggestive of multifocal pneumonia. The C2 vertebral body extensive degenerative changes is associated with the large cystic change and posterior cortical destruction there is also associated fracture line at the base of the C2. .  The findings appears unchanged since prior examination. If there is need for further evaluation contrast enhanced MR examination can be obtained.   Cervical RECOMMENDATIONS: Unavailable     MRI BRAIN WO CONTRAST    Result Date: 1/18/2023  EXAMINATION: MRI OF THE BRAIN WITHOUT CONTRAST  1/18/2023 3:37 pm TECHNIQUE: Multiplanar multisequence MRI of the brain was performed without the administration of intravenous contrast. COMPARISON: 08/31/2022 HISTORY: ORDERING SYSTEM PROVIDED HISTORY: ams, concern for stroke TECHNOLOGIST PROVIDED HISTORY: Reason for exam:->ams, concern for stroke Reason for Exam: AMS CONCERN FOR STOKE-PT HAD DIFF LYING ON TABLE TOLERATING EXAM - BEST IMAGES POSSIBLE - UNABLE TO FOLLOW DIRECTIONS FINDINGS: INTRACRANIAL STRUCTURES/VENTRICLES: There is abnormal restricted diffusion involving the medial aspect of the left temporal lobe, including the left hippocampus, and the majority of the left thalamus consistent with an acute left posterior cerebral artery territory infarct. There is corresponding abnormal increased T2/FLAIR signal intensity. No acute intracranial hemorrhage is identified. There is severe diffuse cerebral volume loss. Confluent abnormal increased T2/FLAIR signal intensity is present within the periventricular white matter. There is no sellar or suprasellar mass. ORBITS: Limited evaluation of the orbits is unremarkable. SINUSES: The paranasal sinuses and mastoid air cells are clear. BONES/SOFT TISSUES: Bone marrow signal intensity is normal.     1. 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 2. No acute intracranial hemorrhage. 3. Severe diffuse cerebral volume loss and chronic small vessel ischemic changes. The findings were sent to the Radiology Results [id number] at 3:57 pm on 1/18/2023 to be communicated to a licensed caregiver.      Lipids:   Lab Results   Component Value Date/Time    CHOL 68 01/18/2023 05:18 PM    CHOL 144 02/05/2020 01:30 PM    HDL 21 01/18/2023 05:18 PM    TRIG 89 01/18/2023 05:18 PM     Hemoglobin A1C:   Lab Results   Component Value Date/Time    LABA1C 6.3 01/18/2023 05:18 PM     TSH:   Lab Results   Component Value Date/Time    TSH 0.39 09/08/2021 04:10 PM     Troponin:   Lab Results   Component Value Date/Time    TROPONINT <0.010 01/17/2023 08:55 AM    TROPONINT <0.010 08/30/2022 08:45 AM    TROPONINT <0.010 01/14/2022 09:05 AM UA:  Lab Results   Component Value Date/Time    NITRU POSITIVE 01/17/2023 10:40 AM    NITRU Negative 07/01/2014 05:46 PM    COLORU YELLOW 01/17/2023 10:40 AM    PHUR 6.0 07/01/2014 05:46 PM    WBCUA 27 01/17/2023 10:40 AM    RBCUA 5 01/17/2023 10:40 AM    MUCUS RARE 01/12/2022 05:26 PM    TRICHOMONAS NONE SEEN 01/17/2023 10:40 AM    YEAST FEW 07/06/2018 10:17 AM    BACTERIA NEGATIVE 01/17/2023 10:40 AM    CLARITYU CLOUDY 01/17/2023 10:40 AM    SPECGRAV 1.015 01/17/2023 10:40 AM    LEUKOCYTESUR SMALL 01/17/2023 10:40 AM    UROBILINOGEN 0.2 01/17/2023 10:40 AM    BILIRUBINUR NEGATIVE 01/17/2023 10:40 AM    BILIRUBINUR neg 01/31/2014 04:02 PM    BLOODU SMALL 01/17/2023 10:40 AM    GLUCOSEU Negative 07/01/2014 05:46 PM    KETUA NEGATIVE 01/17/2023 10:40 AM    AMORPHOUS RARE 02/23/2018 08:20 PM       Organism:   Lab Results   Component Value Date/Time    ORG PROT 11/22/2018 01:00 PM       Time Spent Discharging patient 36 minutes    Electronically signed by Dayana Justice MD on 1/24/2023 at 2:50 PM

## 2023-01-24 NOTE — CARE COORDINATION
Spoke with Eddi Diggs from [facility] pt signing on with [facility]. Eddi Diggs is waiting to hear back from Dr Linda Blackburn then [facility]

## 2023-01-24 NOTE — PROGRESS NOTES
Speech Language Pathology  Facility/Department: [facility]  Initial Speech/Language/Cognitive Assessment    NAME: Carmen Lopez  : 1935   MRN: [id number]  ADMISSION DATE: 2023  ADMITTING DIAGNOSIS: has Chronic anticoagulation; Hypertension; Hypothyroidism; Encounter for long-term (current) use of other medications; Recurrent UTI; Multiple falls; Anemia; Sepsis ([address] Leg weakness; Abnormality of gait; CHF (congestive heart failure) ([address] Rhabdomyolysis; Sepsis associated hypotension ([address] Acute cystitis without hematuria; DVT (deep vein thrombosis) in pregnancy; Progression of deep vein thrombosis (DVT) ([address] EKG abnormalities; Noncompliance with medication regimen; Chronic atrial fibrillation ([address] Toxic metabolic encephalopathy; Urinary tract infection associated with catheterization of urinary tract ([address] Fall at home, initial encounter; Frequent falls; Muscle weakness of lower extremity; Tremor; Gastroesophageal reflux disease; Hyperglycemia; Morbid obesity with BMI of 40.0-44.9, adult ([address] CKD (chronic kidney disease) stage 4, GFR 15-29 ml/min ([address] COVID-19; Hyponatremia; Acute kidney injury superimposed on CKD ([address] Stroke-like symptoms; Multifocal pneumonia; Altered mental status; 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  DATE ONSET: this admission    IMPRESSIONS AND RECOMMENDATIONS: Carmen Lopez was seen for inpatient speech/language evaluation following admission to [facility] with sepsis, acute respiratory failure 2/2 multifocal PNA, acute encephalopathy found to have acute L PCA CVA. Medical hx includes afib, CHF, CKF, chronic anemia. No known history of speech/language impairment prior to admission. Pt seen for evaluation seated upright in bed, alert, cooperative. Informal assessment completed d/t severity of deficits noted during other visits for dysphagia.  Motor speech assessment reveals moderate verbal apraxia characterized by impairments in phoneme, word, and connected speech production. Deficits include phoneme distortions, syllable additions, and phoneme addition and substitutions. No evidence of dysarthria identified. Pt presents with moderate-severe transcortical sensory aphasia characterized by deficits in basic expressive and receptive language tasks including confrontational naming, automatic speech, basic 1-step command-following, and object identification in a field of two. She accurately repeated or attempted close repetition in majority of trials. Pt inconsistently nodded and shook head to respond to yes/no questions. She demonstrated reduced initiation with speech and decreased attention, further limiting her performance during structured tasks. Recommend continued speech/language tx as consistent with goals of care. May consider trial of training with AAC at next level of care to facilitate functional communication (e.g. communication of wants/needs). SLP will follow during acute admission to target goals below. Date of Eval: 1/24/2023   Evaluating Therapist: ENEDELIA Bolivar    RECENT RESULTS BRAIN MRI:   Impression   1. Acute left posterior cerebral artery territory infarct involving the   medial aspect of the left temporal lobe including the left hippocampus and   the left thalamus. 2. No acute intracranial hemorrhage. 3. Severe diffuse cerebral volume loss and chronic small vessel ischemic   changes.        Primary Complaint: difficulty with communication    Pain:  Pain Assessment  Pain Assessment: Monte-Baker FACES  Pain Level: 0  Patient's Stated Pain Goal: 0 - No pain  Faces, Legs, Activity, Cry, and Consolability (FLACC)  Face (F): no particular expression or smile  Legs (L): normal position or relaxed  Activity (A): lying quietly, normal position, moves easily  Cry (C): no cry (awake or asleep)  Consolability (C): content, relaxed  FLACC Score : 0    Vision/ Hearing  Hearing  Hearing: Within functional limits    Assessment:  Aphasia Diagnosis: Pt presents with moderate-severe expressive and receptive aphasia with inconsistent/occasional errors in repetition, consistent with mixed transcortical aphasia. Speech Diagnosis: Pt presents with moderate verbal apraxia characterized by phonemic substitutions, syllable additions/deletions, and occasional distorted phoneme productions at the phoneme, word, and phrase levels. Recommendations:  Recommendations  Patient Education: results, recommendations  Patient Education Response: No evidence of learning  Duration of Treatment: LOS          Plan:   Speech Therapy Prognosis  Prognosis: Guarded  Prognosis Considerations: Severity of Impairments  Individuals consulted  Consulted and agree with results and recommendations: Patient  Safety Devices  Safety Devices in place: Yes  Type of devices: All fall risk precautions in place; Left in bed  Restraints Initially in Place: No    Goals:  Short Term Goals  Time Frame for Short Term Goals: length of admission  Goal 1: Pt will repeat phonemes and words with 75% accuracy given min cues. Goal 2: Pt will complete automatic speech tasks with 75% accuracy given min cues. Goal 3: Pt will follow basic 1-step commands 75% accurately given min cues. Goal 4: Pt/caregivers will indicate understanding of education/recommendations.    Patient/family involved in developing goals and treatment plan: pt    Subjective:   Previous level of function and limitations: independent with speech/language        Hearing  Hearing: Within functional limits           Objective:       Motor Speech  Apraxic Characteristics: Verbal  Dysarthric Characteristics: None  Overall Impairment Severity: Moderate    Auditory Comprehension  Comprehension: Exceptions         Expression  Primary Mode of Expression: Verbal    Verbal Expression  Verbal Expression: Exceptions to functional limits Cognition:   DNT       Additional Assessments:  N/A          Prognosis:  Speech Therapy Prognosis  Prognosis: Guarded  Prognosis Considerations: Severity of Impairments  Individuals consulted  Consulted and agree with results and recommendations: Patient    Education:  Patient Education: results, recommendations  Patient Education Response: No evidence of learning  Safety Devices in place: Yes  Type of devices: All fall risk precautions in place; Left in bed    Therapy Time:   Individual Concurrent Group Co-treatment   Time In 1255         Time Out 1315         Minutes 20                 Electronically signed by ENEDELIA Woo on 1/24/2023 at 3:09 PM

## 2023-01-24 NOTE — PROGRESS NOTES
[facility] service arrive for transport to [facility]. No IV in place. Belongings gathered.

## 2023-01-25 ENCOUNTER — CLINICAL DOCUMENTATION ONLY (OUTPATIENT)
Facility: CLINIC | Age: 88
End: 2023-01-25

## 2023-03-30 ENCOUNTER — HOSPITAL ENCOUNTER (OUTPATIENT)
Age: 88
Setting detail: SPECIMEN
Discharge: HOME | End: 2023-03-30

## 2023-03-31 ENCOUNTER — HOSPITAL ENCOUNTER (OUTPATIENT)
Age: 88
Setting detail: SPECIMEN
Discharge: HOME | End: 2023-03-31
Payer: MEDICARE

## 2023-03-31 LAB
BACTERIA: NEGATIVE /HPF
BILIRUBIN URINE: NEGATIVE MG/DL
BLOOD, URINE: ABNORMAL
CLARITY: ABNORMAL
COLOR: YELLOW
GLUCOSE, URINE: NEGATIVE MG/DL
KETONES, URINE: ABNORMAL MG/DL
LEUKOCYTE ESTERASE, URINE: ABNORMAL
MUCUS: ABNORMAL HPF
NITRITE URINE, QUANTITATIVE: NEGATIVE
PH, URINE: 9 (ref 5–8)
PROTEIN UA: 100 MG/DL
RBC URINE: 3 /HPF (ref 0–6)
SPECIFIC GRAVITY UA: 1.01 (ref 1–1.03)
SQUAMOUS EPITHELIAL: 13 /HPF
TRICHOMONAS: ABNORMAL /HPF
TRIPLE PHOSPHATE CRYSTALS: ABNORMAL /HPF
UROBILINOGEN, URINE: 1 MG/DL (ref 0.2–1)
WBC UA: ABNORMAL /HPF (ref 0–5)
YEAST: ABNORMAL /HPF

## 2023-03-31 PROCEDURE — 87086 URINE CULTURE/COLONY COUNT: CPT

## 2023-03-31 PROCEDURE — 87077 CULTURE AEROBIC IDENTIFY: CPT

## 2023-03-31 PROCEDURE — 87186 SC STD MICRODIL/AGAR DIL: CPT

## 2023-03-31 PROCEDURE — 81001 URINALYSIS AUTO W/SCOPE: CPT

## 2023-04-02 LAB
CULTURE: ABNORMAL
CULTURE: ABNORMAL
Lab: ABNORMAL
SPECIMEN: ABNORMAL

## 2023-05-21 ENCOUNTER — HOSPITAL ENCOUNTER (OUTPATIENT)
Age: 88
Setting detail: SPECIMEN
Discharge: HOME | End: 2023-05-21

## 2023-05-22 ENCOUNTER — HOSPITAL ENCOUNTER (OUTPATIENT)
Age: 88
Setting detail: SPECIMEN
Discharge: HOME | End: 2023-05-22
Payer: MEDICARE

## 2023-05-22 PROCEDURE — 87077 CULTURE AEROBIC IDENTIFY: CPT

## 2023-05-22 PROCEDURE — 87186 SC STD MICRODIL/AGAR DIL: CPT

## 2023-05-22 PROCEDURE — 87086 URINE CULTURE/COLONY COUNT: CPT

## 2023-05-24 LAB
CULTURE: ABNORMAL
CULTURE: ABNORMAL
Lab: ABNORMAL
SPECIMEN: ABNORMAL

## 2024-05-15 NOTE — ED NOTES
Report given Enoc Cervantes RN. Care transferred at this time.       Joycelyn Gutierrez RN  01/12/22 [id number] (4) excellent

## 2025-05-01 NOTE — CONSULTS
[facility] 1935[facility] 1/18/2023    History  [place]:  The primary encounter diagnosis was Altered mental status, unspecified altered mental status type. Diagnoses of Sepsis, due to unspecified organism, unspecified whether acute organ dysfunction present [facility]), Pneumonia of both lungs due to infectious organism, unspecified part of lung, Chronic respiratory failure with hypoxia ([address] and Chronic anemia were also pertinent to this visit. Patient  has a past medical history of Allergic rhinitis, Arthritis, CHF (congestive heart failure) ([address] DVT, lower extremity, proximal, acute, left (HCC), Edema leg, Gastroesophageal reflux disease, H/O 24 hour EKG monitoring, H/O cardiac catheterization, H/O cardiac catheterization, H/O cardiovascular stress test, H/O Doppler echocardiogram, H/O echocardiogram, History of atrial fibrillation, History of cardiovascular stress test, History of falling, History of nuclear stress test, Hx of blood clots, Hx: UTI (urinary tract infection), Hyperglycemia, Hyperlipidemia, Hypertension, Hypothyroidism, Incontinence of bowel, Incontinence of urine, Sepsis ([address] Tremor, Wears glasses, and Wears partial dentures. Patient  has a past surgical history that includes Total knee arthroplasty (2005, 2006); Breast surgery; joint replacement (4/2005 2/2006); Cardioversion (10/17/2008); Cardioversion (12/15/2008); other surgical history (10/2009); pr arthrp acetblr/prox fem prostc agrft/algrft (Left, 02/23/2015); Hysterectomy (age 21's); IR GUIDED FLUID DRAINAGE BY CATH SOFT TISSUE PERC (08/21/2020); and IR GUIDED FLUID DRAINAGE BY CATH SOFT TISSUE PERC (09/08/2020). Subjective:  Patient states:  Domingo Aguilar. Vladimir Rgne Gilbert Rg \" pt verbally perseverative and with confusion. Pain:  unable to assess d/t confusion. Communication with other providers:  Handoff to RN, co-eval with Leelee Vasquez. Discussed pt status with RN. Patient called requesting an appointment due to increasing migraines and nausea and vomiting and insomnia. She states that she is taking her medications as prescribed and wants to know what should she do   Restrictions: fall risk, confusion, R-sided deficits, on 02, tele, suprapubic catheter    Home Setup/Prior level of function   Per charting pt is resident of [facility], use of w/c for mobility, able to feed self and converse normally. Pt required assist for pivot to w/c  Per last Therapy note 9/1/22 pt was able to sup>sit Min A, STS with Mod A, ans stand-pivot Max A. Will need to confirm details with family d/t pt AMS. Examination of body systems (includes body structures/functions, activity/participation limitations):  Observation:  Pt is awake in semi-fowlers, vitals stable, eyes open upon arrival  Vision:  Pt is demonstrating intact acuity, however limited attending see below  Hearing:  Difficult to determine  Cardiopulmonary:  On 2L 02 and stable in 90s. BP at initiation of session supine in bed: 140/68 (87); sitting upright in chair position: 149/87 (105)  Cognition: Impaired, pt with limited command-following, speech is limited and perseverative, see OT/SLP note for further evaluation. Musculoskeletal  ROM R/L:  WFL BLE with Max Assist. Pt does demonstrate LLE AROM in ABD and knee flexion volitionally. Max cues for LUE reaching to yellow ball- pt able with increased cues, notable weak shoulder flexion  Max cues RUE reaching to ball, pt able to activate finger extensors only  Strength R/L: RLE appears 0-1/5 difficult to assess d/t confusion, LLE pt demonstrated at least 3/5 in hip ABD, knee flexion, decreased in function and endurance. Neuro:  Pt appears to have R-sided deficits:  Visual neglect/inattention to R side, decreased ability to visually cross midline, minimal tracking to R. Fair tracking to L.      Possible tone in R wrist and L DF, poor repeatability of test.     Gait pattern: deferred for safety    Mobility:  Rolling L/R:  Dep x2  Further mobility deferred for safety    [facility] 6 Clicks Inpatient Mobility:  AM-PAC Inpatient Mobility Raw Score : 7    Treatment:  Bed mobility: Evaluation performed with pt in high-fowlers and \"chair\" position d/t safety concerns. Overall increased time was required in multiple approaches/cues for pt attending to LE and attempting AROM. PT provided PROM x5 TC knee/hip flex and ext repetitions for joint mobility and assessment of tone. With bed in \"chair\" position pt does engage in minimal reaching activity to target on L side>R side as above. Neuro testing with pt in high-fowlers, min increased time. Scooting to [facility] with Dep x2 for positioning. Max A for RLE SLR for pillow placement, cues for LLE SLR pt able to activate hip/knee flexors, Mod A for assist. Heels floated. Safety: patient left in semi-fowlers with heels floated, call light within reach, RN notified, gait belt used. Assessment:    Pt is an 83 y/o female admitted 1/17 with c/o  PNA. Patient with significant h/o Arthritis, CHF (congestive heart failure) ([address] DVT, lower extremity, proximal, acute, left (HCC), Edema leg, Gastroesophageal reflux disease, H/O 24 hour EKG monitoring, H/O cardiac catheterization, H/O cardiac catheterization, H/O cardiovascular stress test, H/O Doppler echocardiogram, H/O echocardiogram, History of atrial fibrillation, History of cardiovascular stress test, History of falling, see chart. Per chart pt has been performing ADLs/IADLs as above. At this time pt appears to be functioning below baseline. Pt is now presenting with impairments in R-side activation and attention, LE strength, functional endurance, safety awareness, balance, cognition, transfers. Pt would benefit from skilled PT services in order to address impairments and promote return to PLOF. PT to recommend d/c to SNF with PT. Complexity: Moderate  Prognosis: Good, no significant barriers to participation at this time.    Plan General Plan: 2-3 times per week/week, 1 week,   Discharge Recommendations: Subacute/Skilled Nursing Facility  Equipment: defer    Goals:  Short Term Goals  Time Frame for Short Term Goals: 1 week  Short Term Goal 1: Pt will follow commands for LLE AROM x10 reps ea  Short Term Goal 2: Pt will tolerate sup>sit with MAx A x2  Short Term Goal 3: Pt will tolerate Chris to chair with x2 assist  Short Term Goal 4: Pt will participate in seated balance activity at EOB x5 min with Mod x2 for balance and safety       Treatment plan:  Bed mobility, transfers, balance, gait, TA, TX, Chris    Recommendations for NURSING mobility: bed mobility x2 assist, Holden Leftye if needed    Time:   Time in: 13:16  Time out: 13:35  Timed treatment minutes: 9  Total time: 19    Electronically signed by:    Komal Foley, PT  5/91/8169, 9:39 PM  PT Lic #: [id number]